# Patient Record
Sex: MALE | Race: WHITE | Employment: OTHER | RURAL
[De-identification: names, ages, dates, MRNs, and addresses within clinical notes are randomized per-mention and may not be internally consistent; named-entity substitution may affect disease eponyms.]

---

## 2017-02-09 ENCOUNTER — OFFICE VISIT (OUTPATIENT)
Dept: FAMILY MEDICINE CLINIC | Age: 82
End: 2017-02-09

## 2017-02-09 VITALS
BODY MASS INDEX: 29.77 KG/M2 | WEIGHT: 201 LBS | DIASTOLIC BLOOD PRESSURE: 70 MMHG | OXYGEN SATURATION: 94 % | HEIGHT: 69 IN | SYSTOLIC BLOOD PRESSURE: 100 MMHG | HEART RATE: 97 BPM | TEMPERATURE: 97.8 F | RESPIRATION RATE: 22 BRPM

## 2017-02-09 DIAGNOSIS — I48.91 ATRIAL FIBRILLATION, UNSPECIFIED TYPE (HCC): ICD-10-CM

## 2017-02-09 DIAGNOSIS — K21.9 GASTROESOPHAGEAL REFLUX DISEASE, ESOPHAGITIS PRESENCE NOT SPECIFIED: ICD-10-CM

## 2017-02-09 DIAGNOSIS — J40 BRONCHITIS: Primary | ICD-10-CM

## 2017-02-09 DIAGNOSIS — K57.30 DIVERTICULOSIS OF LARGE INTESTINE WITHOUT HEMORRHAGE: ICD-10-CM

## 2017-02-09 DIAGNOSIS — E78.00 HYPERCHOLESTEROLEMIA: ICD-10-CM

## 2017-02-09 DIAGNOSIS — R05.9 COUGH: ICD-10-CM

## 2017-02-09 RX ORDER — CODEINE PHOSPHATE AND GUAIFENESIN 10; 100 MG/5ML; MG/5ML
SOLUTION ORAL
Qty: 180 ML | Refills: 0 | Status: SHIPPED | OUTPATIENT
Start: 2017-02-09 | End: 2017-04-26

## 2017-02-09 RX ORDER — DOXYCYCLINE 100 MG/1
100 TABLET ORAL 2 TIMES DAILY
Qty: 20 TAB | Refills: 0 | Status: SHIPPED | OUTPATIENT
Start: 2017-02-09 | End: 2017-02-19

## 2017-02-09 RX ORDER — LEVOFLOXACIN 500 MG/1
500 TABLET, FILM COATED ORAL DAILY
Qty: 10 TAB | Refills: 0 | Status: SHIPPED | OUTPATIENT
Start: 2017-02-09 | End: 2017-02-09 | Stop reason: SINTOL

## 2017-02-09 NOTE — MR AVS SNAPSHOT
Visit Information Date & Time Provider Department Dept. Phone Encounter #  
 2/9/2017  3:00 PM Ancelmo MckeonLeo 72 [phone] [id number] Follow-up Instructions Return if symptoms worsen or fail to improve. Upcoming Health Maintenance Date Due  
 GLAUCOMA SCREENING Q2Y 7/1/1993 Pneumococcal 65+ High/Highest Risk (1 of 2 - PCV13) 7/1/1993 INFLUENZA AGE 9 TO ADULT 8/1/2016 MEDICARE YEARLY EXAM 6/9/2017 DTaP/Tdap/Td series (2 - Td) 12/12/2026 Allergies as of 2/9/2017  Review Complete On: 2/9/2017 By: Ancelmo Mckeon MD  
  
 Severity Noted Reaction Type Reactions Ancef [Cefazolin]  10/28/2013    Unknown (comments) Neosporin [Benzalkonium Chloride]  10/28/2013    Unknown (comments) Polysporin [Bacitracin-polymyxin B]  10/28/2013    Other (comments) WOUNDS DO NOT HEAL Current Immunizations  Never Reviewed Name Date Influenza Vaccine 10/14/2013 Tdap 12/12/2016 Zoster Vaccine, Live 2/23/2016 Not reviewed this visit You Were Diagnosed With   
  
 Codes Comments Bronchitis    -  Primary ICD-10-CM: D03 ICD-9-CM: 493 Atrial fibrillation, unspecified type ([facility]     ICD-10-CM: I48.91 
ICD-9-CM: 427.31 Hypercholesterolemia     ICD-10-CM: E78.00 ICD-9-CM: 272.0 Gastroesophageal reflux disease, esophagitis presence not specified     ICD-10-CM: K21.9 ICD-9-CM: 530.81 Diverticulosis of large intestine without hemorrhage     ICD-10-CM: K57.30 ICD-9-CM: 562.10 Cough     ICD-10-CM: R05 ICD-9-CM: 946. 2 Vitals BP Pulse Temp Resp Height(growth percentile) 100/70 (BP 1 Location: Left arm, BP Patient Position: Sitting) 97 97.8 °F (36.6 °C) (Temporal) 22 5' 9\" (1.753 m) Weight(growth percentile) SpO2 BMI Smoking Status 201 lb (91.2 kg) 94% 29.68 kg/m2 Former Smoker BMI and BSA Data  Body Mass Index Body Surface Area  
 29.68 kg/m 2 2.11 m 2  
  
  
 Preferred Pharmacy Pharmacy Name Phone [facility], [place] [address] [phone] Your Updated Medication List  
  
   
This list is accurate as of: 17  3:38 PM.  Always use your most recent med list.  
  
  
  
  
 amiodarone 200 mg tablet Commonly known as:  CORDARONE Take  by mouth two (2) times a day. carvedilol 3.125 mg tablet Commonly known as:  Vergia Scotty Take  by mouth two (2) times daily (with meals). diphenhydrAMINE 25 mg capsule Commonly known as:  BENADRYL Take 25 mg by mouth nightly as needed for Sleep. Indications: ALLERGIC RHINITIS  
  
 guaiFENesin-codeine 100-10 mg/5 mL solution Commonly known as:  ROBITUSSIN AC  
1-2 tsp po q 4 hours prn cough  
  
 levoFLOXacin 500 mg tablet Commonly known as:  Marci Guilerendira Take 1 Tab by mouth daily for 10 days. lisinopril 5 mg tablet Commonly known as:  Uzma Shames Take  by mouth daily. rivaroxaban 15 mg Tab tablet Commonly known as:  Alayne Viktor Take  by mouth daily. simvastatin 20 mg tablet Commonly known as:  ZOCOR Take  by mouth nightly. Prescriptions Printed Refills  
 guaiFENesin-codeine (ROBITUSSIN AC) 100-10 mg/5 mL solution 0 Si-2 tsp po q 4 hours prn cough Class: Print Prescriptions Sent to Pharmacy Refills  
 levoFLOXacin (LEVAQUIN) 500 mg tablet 0 Sig: Take 1 Tab by mouth daily for 10 days. Class: Normal  
 Pharmacy: [facility], [address] [facility] [address] Ph #: [phone] Route: Oral  
  
Follow-up Instructions Return if symptoms worsen or fail to improve. To-Do List   
 2017 Imaging:  XR CHEST PA LAT Introducing [facility] & HEALTH SERVICES! Kathryn Bee introduces [employer]Cell patient portal. Now you can access parts of your medical record, email your doctor's office, and request medication refills online.    
 
1. In your internet browser, go to [email or web address] 2. Click on the First Time User? Click Here link in the Sign In box. You will see the New Member Sign Up page. 3. Enter your Cor[employer] Lace Access Code exactly as it appears below. You will not need to use this code after youve completed the sign-up process. If you do not sign up before the expiration date, you must request a new code. · Cor[employer] L[employer] Access Code: [email or web address] Expires: 3/12/2017  4:06 PM 
 
4. Enter the last four digits of your Social Security Number (xxxx) and Date of Birth (mm/dd/yyyy) as indicated and click Submit. You will be taken to the next sign-up page. 5. Create a cWyzet ID. This will be your Corrupt Lace login ID and cannot be changed, so think of one that is secure and easy to remember. 6. Create a Corrupt Lace password. You can change your password at any time. 7. Enter your Password Reset Question and Answer. This can be used at a later time if you forget your password. 8. Enter your e-mail address. You will receive e-mail notification when new information is available in [address] 9. Click Sign Up. You can now view and download portions of your medical record. 10. Click the Download Summary menu link to download a portable copy of your medical information. If you have questions, please visit the Frequently Asked Questions section of the Cor[employer] Lace website. Remember, Cor[employer] Lace is NOT to be used for urgent needs. For medical emergencies, dial 911. Now available from your iPhone and Android! Please provide this summary of care documentation to your next provider. Your primary care clinician is listed as Adalberto Gunter. If you have any questions after today's visit, please call [phone].

## 2017-02-09 NOTE — PROGRESS NOTES
Subjective:  Rogelio Shields presents with cough he has had now for about 4 weeks. Slowly progressive productive at times of light colored sputum on one occasion was noted mild blood tinge sputum denies any norma hemoptysis. Low-grade fever not quantitated no chills. Denies any respiratory distress no tobacco use no history of asthma or COPD. Does have paroxysmal coughing episodes disrupt sleep. Some mild shortness of breath with exertion. Decreased appetite taking fluids well not sleeping well feels rundown and tired fatigued      Problem list reviewed as part of this encounter. Past Medical History   Diagnosis Date    Atrial fibrillation (HCC)     Diverticula of colon     GERD (gastroesophageal reflux disease)     Hypercholesterolemia     Malignant neoplasm of prostate ([facility]       Medication list reviewed and updated. Review of Systems -as per the above in previous documentation  Objective:  Visit Vitals    /70 (BP 1 Location: Left arm, BP Patient Position: Sitting)    Pulse 97    Temp 97.8 °F (36.6 °C) (Temporal)    Resp 22    Ht 5' 9\" (1.753 m)    Wt 201 lb (91.2 kg)    SpO2 94%    BMI 29.68 kg/m2     Alert and oriented. No acute distress. HEENT Ears TMs are normal.  Canals are clear. Eyes pupils equal, round, react to light and accommodation. Extraocular movements intact. Nose patent, boggy erythematous. Mouth and throat is clear. Neck supple full range of motion no thyromegaly. No carotid bruits. No significant lymphadenopathy  Lungs clear to auscultation without wheezes, rales, scattered rhonchi bases. No respiratory distress accessory muscle use or tachypnea  Heart  RRR,  S1 & S2 are normal intensity. No murmur; no gallop  Abdomen bowel sounds active. No tenderness, guarding, rebound, masses, organomegaly. Back no CVA tenderness. Extremities without clubbing, cyanosis, or edema  Neuro HMF intact.   Cranial nerves II through XII grossly normal.  Motor is 5 over 5 and symmetrical.  Deep tendon reflexes +2 equal  Results for orders placed or performed in visit on 16   VITAMIN B12 & FOLATE   Result Value Ref Range    Vitamin B12 253 211 - 946 pg/mL    Folate 7.2 >5.7 ng/mL   METABOLIC PANEL, BASIC   Result Value Ref Range    Glucose 138 (H) 65 - 99 mg/dL    BUN 27 8 - 27 mg/dL    Creatinine 1.42 (H) 0.76 - 1.27 mg/dL    GFR est non-AA 44 (L) >59 mL/min/1.73    GFR est AA 51 (L) >59 mL/min/1.73    BUN/Creatinine ratio 19 10 - 22    Sodium 139 134 - 144 mmol/L    Potassium 4.8 3.5 - 5.2 mmol/L    Chloride 100 97 - 108 mmol/L    CO2 25 18 - 29 mmol/L    Calcium 9.0 8.6 - 10.2 mg/dL   CKD REPORT   Result Value Ref Range    Interpretation Note          Assessment:  Encounter Diagnoses   Name Primary?  Bronchitis Yes    Atrial fibrillation, unspecified type ([facility]     Hypercholesterolemia     Gastroesophageal reflux disease, esophagitis presence not specified     Diverticulosis of large intestine without hemorrhage     Cough            Plan:  See orders. Orders Placed This Encounter    XR CHEST PA LAT     Standing Status:   Future     Standing Expiration Date:   3/9/2018     Order Specific Question:   Reason for Exam     Answer:   cough     Order Specific Question:   Is Patient Allergic to Contrast Dye? Answer:   No    levoFLOXacin (LEVAQUIN) 500 mg tablet     Sig: Take 1 Tab by mouth daily for 10 days. Dispense:  10 Tab     Refill:  0    guaiFENesin-codeine (ROBITUSSIN AC) 100-10 mg/5 mL solution     Si-2 tsp po q 4 hours prn cough     Dispense:  180 mL     Refill:  0    fluids rest medications as listed Levaquin was changed to doxycycline given interaction with amiodarone. Follow up if symptoms worsen, change, fail to improve or any new symptoms develop. There are no Patient Instructions on file for this visit.     (Please note that portions of this note were completed with a voice recognition program. Efforts were made to edit the dictations but occasionally words are mis-transcribed.)

## 2017-02-10 DIAGNOSIS — R05.9 COUGH: ICD-10-CM

## 2017-02-10 DIAGNOSIS — J40 BRONCHITIS: ICD-10-CM

## 2017-02-13 NOTE — PROGRESS NOTES
Patient has been advised of chest xray results. Stated he is better had 2 coughing spells where he coughed up mucous that was blood tinged.

## 2017-03-13 ENCOUNTER — TELEPHONE (OUTPATIENT)
Dept: FAMILY MEDICINE CLINIC | Age: 82
End: 2017-03-13

## 2017-03-13 NOTE — TELEPHONE ENCOUNTER
Patient wants to know if he can drink OJ. He says he was told his potassium was elevated and therefore should not drink OJ.

## 2017-03-14 ENCOUNTER — DOCUMENTATION ONLY (OUTPATIENT)
Dept: FAMILY MEDICINE CLINIC | Age: 82
End: 2017-03-14

## 2017-03-14 ENCOUNTER — TELEPHONE (OUTPATIENT)
Dept: FAMILY MEDICINE CLINIC | Age: 82
End: 2017-03-14

## 2017-03-14 ENCOUNTER — OFFICE VISIT (OUTPATIENT)
Dept: FAMILY MEDICINE CLINIC | Age: 82
End: 2017-03-14

## 2017-03-14 NOTE — PROGRESS NOTES
CT of brain without contrast scheduled for 3- 10:00 AM,patient aware,no prep,order faxed to [place] [address] Radiology and [address].  Elmo called no PA  Needed ref # CDR [id number]

## 2017-03-14 NOTE — MR AVS SNAPSHOT
Visit Information Date & Time Provider Department Dept. Phone Encounter #  
 3/14/2017  2:00 PM Davon Jacob NP [address] [phone] [id number] Upcoming Health Maintenance Date Due  
 GLAUCOMA SCREENING Q2Y 7/1/1993 Pneumococcal 65+ High/Highest Risk (1 of 2 - PCV13) 7/1/1993 INFLUENZA AGE 9 TO ADULT 8/1/2016 MEDICARE YEARLY EXAM 6/9/2017 DTaP/Tdap/Td series (2 - Td) 12/12/2026 Allergies as of 3/14/2017  Review Complete On: 2/9/2017 By: Isaac James MD  
  
 Severity Noted Reaction Type Reactions Ancef [Cefazolin]  10/28/2013    Unknown (comments) Neosporin [Benzalkonium Chloride]  10/28/2013    Unknown (comments) Polysporin [Bacitracin-polymyxin B]  10/28/2013    Other (comments) WOUNDS DO NOT HEAL Current Immunizations  Never Reviewed Name Date Influenza Vaccine 10/14/2013 Tdap 12/12/2016 Zoster Vaccine, Live 2/23/2016 Not reviewed this visit You Were Diagnosed With   
  
 Codes Comments Weakness due to cerebrovascular accident (CVA) ([facility]    -  Primary ICD-10-CM: I63.9, R53.1 ICD-9-CM: 434.91, 780.79 Vitals BP Pulse Temp Height(growth percentile) 118/72 (BP 1 Location: Left arm, BP Patient Position: Sitting) 94 97.8 °F (36.6 °C) (Temporal) 5' 9\" (1.753 m) Weight(growth percentile) SpO2 BMI Smoking Status 203 lb (92.1 kg) 96% 29.98 kg/m2 Former Smoker BMI and BSA Data Body Mass Index Body Surface Area  
 29.98 kg/m 2 2.12 m 2 Preferred Pharmacy Pharmacy Name Phone [facility] [address] [phone] Your Updated Medication List  
  
   
This list is accurate as of: 3/14/17 11:59 PM.  Always use your most recent med list.  
  
  
  
  
 amiodarone 200 mg tablet Commonly known as:  CORDARONE Take  by mouth two (2) times a day. carvedilol 3.125 mg tablet Commonly known as:  Cori Mandy  
 Take  by mouth two (2) times daily (with meals). diphenhydrAMINE 25 mg capsule Commonly known as:  BENADRYL Take 25 mg by mouth nightly as needed for Sleep. Indications: ALLERGIC RHINITIS  
  
 guaiFENesin-codeine 100-10 mg/5 mL solution Commonly known as:  ROBITUSSIN AC  
1-2 tsp po q 4 hours prn cough  
  
 lisinopril 5 mg tablet Commonly known as:  Araceli Primmer Take  by mouth daily. rivaroxaban 15 mg Tab tablet Commonly known as:  Ronnie Safe Take  by mouth daily. simvastatin 20 mg tablet Commonly known as:  ZOCOR Take  by mouth nightly. Introducing [facility] & HEALTH SERVICES! Melissa Wyman introduces [employer]box patient portal. Now you can access parts of your medical record, email your doctor's office, and request medication refills online. 1. In your internet browser, go to [email or web address] 2. Click on the First Time User? Click Here link in the Sign In box. You will see the New Member Sign Up page. 3. Enter your Fundbox Access Code exactly as it appears below. You will not need to use this code after youve completed the sign-up process. If you do not sign up before the expiration date, you must request a new code. · Fundbox Access Code: [email or web address] Expires: 6/12/2017  4:32 PM 
 
4. Enter the last four digits of your Social Security Number (xxxx) and Date of Birth (mm/dd/yyyy) as indicated and click Submit. You will be taken to the next sign-up page. 5. Create a Diagnosoftt ID. This will be your Fundbox login ID and cannot be changed, so think of one that is secure and easy to remember. 6. Create a Fundbox password. You can change your password at any time. 7. Enter your Password Reset Question and Answer. This can be used at a later time if you forget your password. 8. Enter your e-mail address. You will receive e-mail notification when new information is available in [address] 9. Click Sign Up. You can now view and download portions of your medical record. 10. Click the Download Summary menu link to download a portable copy of your medical information. If you have questions, please visit the Frequently Asked Questions section of the Wallarm website. Remember, Wallarm is NOT to be used for urgent needs. For medical emergencies, dial 911. Now available from your iPhone and Android! Please provide this summary of care documentation to your next provider. Your primary care clinician is listed as Francisco Javier Garcia. If you have any questions after today's visit, please call [phone].

## 2017-03-15 VITALS
SYSTOLIC BLOOD PRESSURE: 118 MMHG | TEMPERATURE: 97.8 F | WEIGHT: 203 LBS | BODY MASS INDEX: 30.07 KG/M2 | HEIGHT: 69 IN | OXYGEN SATURATION: 96 % | HEART RATE: 94 BPM | DIASTOLIC BLOOD PRESSURE: 72 MMHG

## 2017-03-15 NOTE — PROGRESS NOTES
Subjective:   03/14/17  Edith Kay is a 80 y.o. male who presents today with the following:  Chief Complaint   Patient presents with    Swelling     rt [address] was working in his attic last week when he lost his balance and feel face first into fiberglass insulation. ROS:  Gen: denies fever, chills, fatigue, weight loss, weight gain  HEENT:denies blurry vision, nasal congestion, sore throat  Resp: denies dypsnea, cough, wheezing  CV: denies chest pain radiating to the jaws or arms, palpitations, lower extremity edema  Abd: denies nausea, vomiting, diarrhea, constipation  Neuro: denies numbness/tingling  Endo: denies polyuria, polydipsia, heat/cold intolerance  Heme: no lymphadenopathy    Allergies   Allergen Reactions    Ancef [Cefazolin] Unknown (comments)    Neosporin [Benzalkonium Chloride] Unknown (comments)    Polysporin [Bacitracin-Polymyxin B] Other (comments)     WOUNDS DO NOT HEAL         Current Outpatient Prescriptions:     guaiFENesin-codeine (ROBITUSSIN AC) 100-10 mg/5 mL solution, 1-2 tsp po q 4 hours prn cough, Disp: 180 mL, Rfl: 0    diphenhydrAMINE (BENADRYL) 25 mg capsule, Take 25 mg by mouth nightly as needed for Sleep. Indications: ALLERGIC RHINITIS, Disp: , Rfl:     rivaroxaban (XARELTO) 15 mg tab tablet, Take  by mouth daily. , Disp: , Rfl:     carvedilol (COREG) 3.125 mg tablet, Take  by mouth two (2) times daily (with meals). , Disp: , Rfl:     lisinopril (PRINIVIL, ZESTRIL) 5 mg tablet, Take  by mouth daily. , Disp: , Rfl:     amiodarone (CORDARONE) 200 mg tablet, Take  by mouth two (2) times a day., Disp: , Rfl:     simvastatin (ZOCOR) 20 mg tablet, Take  by mouth nightly., Disp: , Rfl:     Past Medical History:   Diagnosis Date    At[facility] fibrillation ([facility]     Diverticula of colon     GERD (gastroesophageal reflux disease)     Hypercholesterolemia     Malignant neoplasm of prostate ([facility]        Past Surgical History:   Procedure Laterality Date    HX CATARACT REMOVAL      HX HEENT      tonsils/facial surgery/cataract    HX PROSTATECTOMY         History   Smoking Status    Former Smoker   Smokeless Tobacco    Not on file       Social History     Social History    Marital status:      Spouse name: N/A    Number of children: N/A    Years of education: N/A     Social History Main Topics    Smoking status: Former Smoker    Smokeless tobacco: None    Alcohol use 4.2 oz/week     7 Standard drinks or equivalent per week      Comment: SOCIAL    Drug use: None    Sexual activity: Not Asked     Other Topics Concern     Service Yes     [employer]    Blood Transfusions No    Caffeine Concern No     1 Cup Coffe Daily    Occupational Exposure No    Hobby Hazards No    Sleep Concern No    Stress Concern No    Weight Concern No     Heart Health    Special Diet No    Back Care Yes    Exercise Yes     Swimmimg / Walking    Bike Helmet No     Doesn't Own    Seat Belt Yes    Self-Exams Yes     Social History Narrative       Family History   Problem Relation Age of Onset    Cancer Mother      BRAIN TUMOR    Cancer Brother      PROSTATE         Objective:     Visit Vitals    /72 (BP 1 Location: Left arm, BP Patient Position: Sitting)    Pulse 94    Temp 97.8 °F (36.6 °C) (Temporal)    Ht 5' 9\" (1.753 m)    Wt 203 lb (92.1 kg)    SpO2 96%    BMI 29.98 kg/m2     Body mass index is 29.98 kg/(m^2). General: Alert and oriented. No acute distress. Well nourished  HEENT : Facial drooping on the right with loss of nasal labial fold and ptosis of the right eye. Ears:TMs are normal. Canals are clear. Eyes: pupils equal, round, react to light and accommodation. Extra ocular movements intact. Nose: patent. Mouth and throat is clear. Neck:supple full range of motion no thyromegaly. Trachea midline, No carotid bruits. No significant lymphadenopathy  Lungs[de-identified] clear to auscultation without wheezes, rales, or rhonchi.   Heart :RRR, S1 & S2 are normal intensity. No murmur; no gallop  Abdomen: bowel sounds active. No tenderness, guarding, rebound, masses, hepatic or spleen enlargement  Back: no CVA tenderness. Extremities: without clubbing, cyanosis, or edema. Right upper and lower extremity deficits  noted in strength. Pulses: radial and femoral pulses are normal  Neuro: HMF intact. Cranial nerves II through XII grossly normal.  Motor: is 3 over 5 and asymmetrical. Right upper and lower extremities  Deep tendon reflexes: +2 equal        No results found for this visit on 03/14/17. Assessment/ Plan:     Weakness due to CVA    CT of the brain without contrast    Education provided on warning signs of a stroke and stroke prevention. Verbal and written instructions (see AVS) provided.  Patient expresses understanding of diagnosis and treatment plan.     Allison Banks, KIETC

## 2017-03-17 ENCOUNTER — TELEPHONE (OUTPATIENT)
Dept: FAMILY MEDICINE CLINIC | Age: 82
End: 2017-03-17

## 2017-03-28 ENCOUNTER — TELEPHONE (OUTPATIENT)
Dept: FAMILY MEDICINE CLINIC | Age: 82
End: 2017-03-28

## 2017-03-28 NOTE — TELEPHONE ENCOUNTER
The last time Mr Silvia Hernandez was here and saw Adán Seo came in to see him also. He would like to go over his CT result with Dr Janet Seo. Phone number [phone].

## 2017-03-28 NOTE — TELEPHONE ENCOUNTER
Advised patient that Dr Chan Bowie is out office today , but I will send him a message he will be in [facility] office in morning.

## 2017-04-25 ENCOUNTER — TELEPHONE (OUTPATIENT)
Dept: FAMILY MEDICINE CLINIC | Age: 82
End: 2017-04-25

## 2017-04-25 NOTE — TELEPHONE ENCOUNTER
Pt states dr Cinthya Ford gave him a med for cough and wants more , he has cough again made aware needs be seen and pt states would like to be worked in today ?  Please advise

## 2017-04-25 NOTE — TELEPHONE ENCOUNTER
Spoke to pt and he is going to call and get a Z spot Wednesday or Thursday. He has a cough since last weekend. He stated he may try OTC meds until he gets in.

## 2017-04-26 ENCOUNTER — TELEPHONE (OUTPATIENT)
Dept: FAMILY MEDICINE CLINIC | Age: 82
End: 2017-04-26

## 2017-04-26 ENCOUNTER — OFFICE VISIT (OUTPATIENT)
Dept: FAMILY MEDICINE CLINIC | Age: 82
End: 2017-04-26

## 2017-04-26 VITALS
HEART RATE: 90 BPM | OXYGEN SATURATION: 94 % | RESPIRATION RATE: 18 BRPM | WEIGHT: 200 LBS | HEIGHT: 69 IN | BODY MASS INDEX: 29.62 KG/M2 | TEMPERATURE: 97 F | SYSTOLIC BLOOD PRESSURE: 90 MMHG | DIASTOLIC BLOOD PRESSURE: 46 MMHG

## 2017-04-26 DIAGNOSIS — I48.91 ATRIAL FIBRILLATION, UNSPECIFIED TYPE (HCC): ICD-10-CM

## 2017-04-26 DIAGNOSIS — R05.9 COUGH: ICD-10-CM

## 2017-04-26 DIAGNOSIS — R09.89 RESPIRATORY CRACKLES AT RIGHT LUNG BASE: ICD-10-CM

## 2017-04-26 DIAGNOSIS — J18.9 CAP (COMMUNITY ACQUIRED PNEUMONIA): Primary | ICD-10-CM

## 2017-04-26 DIAGNOSIS — M27.8 JAW MASS: ICD-10-CM

## 2017-04-26 RX ORDER — ALBUTEROL SULFATE 90 UG/1
2 AEROSOL, METERED RESPIRATORY (INHALATION)
Qty: 1 INHALER | Refills: 2 | Status: SHIPPED | OUTPATIENT
Start: 2017-04-26

## 2017-04-26 RX ORDER — AMOXICILLIN AND CLAVULANATE POTASSIUM 875; 125 MG/1; MG/1
1 TABLET, FILM COATED ORAL EVERY 12 HOURS
Qty: 20 TAB | Refills: 0 | Status: SHIPPED | OUTPATIENT
Start: 2017-04-26 | End: 2017-05-06

## 2017-04-26 NOTE — PROGRESS NOTES
Taran Kapadia is a 80 y.o. male presenting for/with:    Cough      HPI:  Symptoms include cough and chest congestion x3 days, gradually worsening. Had similar spell about a year ago. Started after cutting the grass in the pollen this year and last year. CXR from 2/2017 reviewed, showed no Chronic lung dz or acute ASDZ. Facial mass  Pt also reports the R cheek is a little bigger lately, now seems to have a definite bulge at the angle of the mandible. Firm, Nttp, doesn't ache. Never smoker. PMH, SH, Medications/Allergies: reviewed, on chart. ROS:  Constitutional: No fever, chills or weight loss  Respiratory: No cough, SOB   CV: No chest pain or Palpitations    Visit Vitals    BP 90/46    Pulse 90    Temp 97 °F (36.1 °C) (Oral)    Resp 18    Ht 5' 9\" (1.753 m)    Wt 200 lb (90.7 kg)    SpO2 94%    BMI 29.53 kg/m2     Wt Readings from Last 3 Encounters:   04/26/17 200 lb (90.7 kg)   03/15/17 203 lb (92.1 kg)   02/09/17 201 lb (91.2 kg)     Physical Examination: General appearance - alert, well appearing, and in no distress  Mental status - alert, oriented to person, place, and time  Eyes - pupils equal and reactive, extraocular eye movements intact  ENT - bilateral external ears and nose normal. Normal lips. OP pink, moist. nttp to the parotid duct or the parotid gland. A 1cm firm mass is palpated along the lateral mandible at the angle  Neck - supple, no significant adenopathy, no thyromegaly or mass  Lymphatics - no palpable lymphadenopathy, no hepatosplenomegaly  Chest - R base with mod crackles and wheezes throughout. Heart - normal rate, regular rhythm, normal S1, S2, no murmurs, rubs, clicks or gallops  Extremities - peripheral pulses normal, no pedal edema, no clubbing or cyanosis    A/P  CAP, atypical  Vs asthma attack.  In older frail pt, without access to POC CBC, will start empiric tx with augmentin (can't use levaquin or zmax 2nd to amiodarone tx, and is allergic to ancef, so augmentin probably best bet, no probls with PCN in past, and new recommendations downplay cross reactivity between CF and PCN meds), albuterol inhaler. Check CBC and CXR at [facility]. Jaw mass  More obvious since I last saw pt with Chiquis Curiel NP. Concerning for neoplasm, but not mentioned at all on CT report from just a few weeks ago. Tried to look at CT imaging, but couldn't make any definitive judgment about jaw mass. Will try to get radiology to overread the CT to see if they see anything on the jaw. DDx includes metatstasis, warthins tumor, primary parotid neoplasm. Doesn't feel like a dental abscess, and isn't ttp. F/U 1 wk.

## 2017-04-26 NOTE — PATIENT INSTRUCTIONS
Pneumonia: Care Instructions  Your Care Instructions    Pneumonia is an infection of the lungs. Most cases are caused by infections from bacteria or viruses. Pneumonia may be mild or very severe. If it is caused by bacteria, you will be treated with antibiotics. It may take a few weeks to a few months to recover fully from pneumonia, depending on how sick you were and whether your overall health is good. Follow-up care is a key part of your treatment and safety. Be sure to make and go to all appointments, and call your doctor if you are having problems. Its also a good idea to know your test results and keep a list of the medicines you take. How can you care for yourself at home? · Take your antibiotics exactly as directed. Do not stop taking the medicine just because you are feeling better. You need to take the full course of antibiotics. · Take your medicines exactly as prescribed. Call your doctor if you think you are having a problem with your medicine. · Get plenty of rest and sleep. You may feel weak and tired for a while, but your energy level will improve with time. · To prevent dehydration, drink plenty of fluids, enough so that your urine is light yellow or clear like water. Choose water and other caffeine-free clear liquids until you feel better. If you have kidney, heart, or liver disease and have to limit fluids, talk with your doctor before you increase the amount of fluids you drink. · Take care of your cough so you can rest. A cough that brings up mucus from your lungs is common with pneumonia. It is one way your body gets rid of the infection. But if coughing keeps you from resting or causes severe fatigue and chest-wall pain, talk to your doctor. He or she may suggest that you take a medicine to reduce the cough. · Use a vaporizer or humidifier to add moisture to your bedroom. Follow the directions for cleaning the machine. · Do not smoke or allow others to smoke around you.  Smoke will make your cough last longer. If you need help quitting, talk to your doctor about stop-smoking programs and medicines. These can increase your chances of quitting for good. · Take an over-the-counter pain medicine, such as acetaminophen (Tylenol), ibuprofen (Advil, Motrin), or naproxen (Aleve). Read and follow all instructions on the label. · Do not take two or more pain medicines at the same time unless the doctor told you to. Many pain medicines have acetaminophen, which is Tylenol. Too much acetaminophen (Tylenol) can be harmful. · If you were given a spirometer to measure how well your lungs are working, use it as instructed. This can help your doctor tell how your recovery is going. · To prevent pneumonia in the future, talk to your doctor about getting a flu vaccine (once a year) and a pneumococcal vaccine (one time only for most people). When should you call for help? Call 911 anytime you think you may need emergency care. For example, call if:  · You have severe trouble breathing. Call your doctor now or seek immediate medical care if:  · You cough up dark brown or bloody mucus (sputum). · You have new or worse trouble breathing. · You are dizzy or lightheaded, or you feel like you may faint. Watch closely for changes in your health, and be sure to contact your doctor if:  · You have a new or higher fever. · You are coughing more deeply or more often. · You are not getting better after 2 days (48 hours). · You do not get better as expected. Where can you learn more? Go to http://ellen-judd.info/. Enter [id number] in the search box to learn more about \"Pneumonia: Care Instructions. \"  Current as of: May 23, 2016  Content Version: 11.2  © 3156-1282 [employer]. Care instructions adapted under license by [employer] (which disclaims liability or warranty for this information).  If you have questions about a medical condition or this instruction, always ask your healthcare professional. [place] [address] any warranty or liability for your use of this information.

## 2017-04-26 NOTE — MR AVS SNAPSHOT
Visit Information Date & Time Provider Department Dept. Phone Encounter #  
 4/26/2017  3:40 PM Sandra Boudreaux MD [address] [place] [address] [id number] Your Appointments 6/29/2017  2:00 PM  
ESTABLISHED PATIENT with Tomas Neumann MD  
[address] [place] ([address]) Appt Note: ov  
 [address] [address]  
[address] [facility] [address] [address] Upcoming Health Maintenance Date Due  
 GLAUCOMA SCREENING Q2Y 7/1/1993 Pneumococcal 65+ High/Highest Risk (1 of 2 - PCV13) 7/1/1993 INFLUENZA AGE 9 TO ADULT 8/1/2016 MEDICARE YEARLY EXAM 6/9/2017 DTaP/Tdap/Td series (2 - Td) 12/12/2026 Allergies as of 4/26/2017  Review Complete On: 4/26/2017 By: Sandra Boudreaux MD  
  
 Severity Noted Reaction Type Reactions Ancef [Cefazolin]  10/28/2013    Unknown (comments) Neosporin [Benzalkonium Chloride]  10/28/2013    Unknown (comments) Polysporin [Bacitracin-polymyxin B]  10/28/2013    Other (comments) WOUNDS DO NOT HEAL Current Immunizations  Never Reviewed Name Date Influenza Vaccine 10/14/2013 Tdap 12/12/2016 Zoster Vaccine, Live 2/23/2016 Not reviewed this visit You Were Diagnosed With   
  
 Codes Comments CAP (community acquired pneumonia)    -  Primary ICD-10-CM: J18.9 ICD-9-CM: 227 Atrial fibrillation, unspecified type ([facility] 75.)     ICD-10-CM: I48.91 
ICD-9-CM: 427.31 Jaw mass     ICD-10-CM: R22.0 ICD-9-CM: 784.2 Cough     ICD-10-CM: R05 ICD-9-CM: 786.2 Respiratory crackles at right lung base     ICD-10-CM: R09.89 ICD-9-CM: 004. 7 Vitals BP Pulse Temp Resp Height(growth percentile) Weight(growth percentile) 90/46 90 97 °F (36.1 °C) (Oral) 18 5' 9\" (1.753 m) 200 lb (90.7 kg) SpO2 BMI Smoking Status 94% 29.53 kg/m2 Former Smoker Vitals History BMI and BSA Data Body Mass Index Body Surface Area  
 29.53 kg/m 2 2.1 m 2 Preferred Pharmacy Pharmacy Name Phone [facility] [place] - [address] [phone] Your Updated Medication List  
  
   
This list is accurate as of: 4/26/17  5:09 PM.  Always use your most recent med list.  
  
  
  
  
 albuterol 90 mcg/actuation inhaler Commonly known as:  PROVENTIL HFA, VENTOLIN HFA, PROAIR HFA Take 2 Puffs by inhalation every four (4) hours as needed for Wheezing. amiodarone 200 mg tablet Commonly known as:  CORDARONE Take  by mouth two (2) times a day. amoxicillin-clavulanate 875-125 mg per tablet Commonly known as:  AUGMENTIN Take 1 Tab by mouth every twelve (12) hours for 10 days. Indications: pneumonia  
  
 carvedilol 3.125 mg tablet Commonly known as:  Ronaldo Flatten Take  by mouth two (2) times daily (with meals). diphenhydrAMINE 25 mg capsule Commonly known as:  BENADRYL Take 25 mg by mouth nightly as needed for Sleep. Indications: ALLERGIC RHINITIS  
  
 lisinopril 5 mg tablet Commonly known as:  Kvng Mash Take  by mouth daily. rivaroxaban 20 mg Tab tablet Commonly known as:  Ronald Middleton Take 1 Tab by mouth daily. Indications: prevent stroke  
  
 simvastatin 20 mg tablet Commonly known as:  ZOCOR Take  by mouth nightly. Prescriptions Printed Refills  
 rivaroxaban (XARELTO) 20 mg tab tablet 11 Sig: Take 1 Tab by mouth daily. Indications: prevent stroke Class: Print Route: Oral  
  
Prescriptions Sent to Pharmacy Refills  
 albuterol (PROVENTIL HFA, VENTOLIN HFA, PROAIR HFA) 90 mcg/actuation inhaler 2 Sig: Take 2 Puffs by inhalation every four (4) hours as needed for Wheezing. Class: Normal  
 Pharmacy: [facility], [address] Ph #: [phone] Route: Inhalation amoxicillin-clavulanate (AUGMENTIN) 875-125 mg per tablet 0 Sig: Take 1 Tab by mouth every twelve (12) hours for 10 days. Indications: pneumonia Class: Normal  
 Pharmacy: [facility] [address] [facility] [address]  #: [phone] Route: Oral  
  
To-Do List   
 04/26/2017 Lab:  CBC WITH AUTOMATED DIFF   
  
 04/26/2017 Imaging:  XR CHEST PA LAT Introducing [facility] & HEALTH SERVICES! Tanikaerendira Conway introduces Phoodeez patient portal. Now you can access parts of your medical record, email your doctor's office, and request medication refills online. 1. In your internet browser, go to [email or web address] 2. Click on the First Time User? Click Here link in the Sign In box. You will see the New Member Sign Up page. 3. Enter your Phoodeez Access Code exactly as it appears below. You will not need to use this code after youve completed the sign-up process. If you do not sign up before the expiration date, you must request a new code. · Phoodeez Access Code: [email or web address] Expires: 6/12/2017  4:32 PM 
 
4. Enter the last four digits of your Social Security Number (xxxx) and Date of Birth (mm/dd/yyyy) as indicated and click Submit. You will be taken to the next sign-up page. 5. Create a Phoodeez ID. This will be your Phoodeez login ID and cannot be changed, so think of one that is secure and easy to remember. 6. Create a Phoodeez password. You can change your password at any time. 7. Enter your Password Reset Question and Answer. This can be used at a later time if you forget your password. 8. Enter your e-mail address. You will receive e-mail notification when new information is available in [address]. 9. Click Sign Up. You can now view and download portions of your medical record. 10. Click the Download Summary menu link to download a portable copy of your medical information. If you have questions, please visit the Frequently Asked Questions section of the [employer] website. Remember, [employer] is NOT to be used for urgent needs. For medical emergencies, dial 911. Now available from your iPhone and Android! Please provide this summary of care documentation to your next provider. Your primary care clinician is listed as Tita Horner. If you have any questions after today's visit, please call [phone].

## 2017-05-02 ENCOUNTER — TELEPHONE (OUTPATIENT)
Dept: FAMILY MEDICINE CLINIC | Age: 82
End: 2017-05-02

## 2017-05-02 ENCOUNTER — DOCUMENTATION ONLY (OUTPATIENT)
Dept: FAMILY MEDICINE CLINIC | Age: 82
End: 2017-05-02

## 2017-05-02 DIAGNOSIS — K11.8 MASS OF PAROTID GLAND: Primary | ICD-10-CM

## 2017-05-02 DIAGNOSIS — K11.8 PAROTID MASS: Primary | ICD-10-CM

## 2017-05-02 NOTE — PROGRESS NOTES
Appointment for CT of the neck with contrast was schedule for May 3,,2017 (Wednesday ) @ 9:00 AM ,arrival time 8:00AM, Lab([facility] ) to be drawn at [facility] prior to CT. Patient informed of appointment ,arrrival time and not to eat or drink after [place], [facility] [address] # [phone] effective from 05/02/2017 to 06/02/2017.

## 2017-05-04 ENCOUNTER — OFFICE VISIT (OUTPATIENT)
Dept: FAMILY MEDICINE CLINIC | Age: 82
End: 2017-05-04

## 2017-05-04 VITALS
BODY MASS INDEX: 29.1 KG/M2 | SYSTOLIC BLOOD PRESSURE: 97 MMHG | RESPIRATION RATE: 16 BRPM | DIASTOLIC BLOOD PRESSURE: 74 MMHG | HEART RATE: 55 BPM | TEMPERATURE: 96.5 F | WEIGHT: 196.5 LBS | HEIGHT: 69 IN | OXYGEN SATURATION: 94 %

## 2017-05-04 DIAGNOSIS — K11.8 PAROTID MASS: Primary | ICD-10-CM

## 2017-05-04 NOTE — PROGRESS NOTES
Ilene Bustillo is a 80 y.o. male presenting for/with:    Jaw Pain      HPI:  F/U cough and chest congestion. Doing a lot better since last week. Facial mass  R cheek con't to have prominent mass. Saw Eye dr Umair Kruger, had eval, also thought to have a parotid mass. 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 No LAD. Never smoker. PMH, SH, Medications/Allergies: reviewed, on chart. ROS:  Constitutional: No fever, chills or weight loss  Respiratory: No cough, SOB   CV: No chest pain or Palpitations    Visit Vitals    BP 97/74    Pulse (!) 55    Temp 96.5 °F (35.8 °C) (Oral)    Resp 16    Ht 5' 9\" (1.753 m)    Wt 196 lb 8 oz (89.1 kg)    SpO2 94%    BMI 29.02 kg/m2     Wt Readings from Last 3 Encounters:   05/04/17 196 lb 8 oz (89.1 kg)   04/26/17 200 lb (90.7 kg)   03/15/17 203 lb (92.1 kg)     Physical Examination: General appearance - alert, well appearing, and in no distress  Mental status - alert, oriented to person, place, and time  Eyes - pupils equal and reactive, extraocular eye movements intact  ENT - bilateral external ears and nose normal. Normal lips. OP pink, moist. nttp to the parotid duct or the parotid gland. A 1cm firm mass is palpated along the lateral mandible at the angle, unchanged. Neck - supple, no significant adenopathy, no thyromegaly or mass  Lymphatics - no palpable lymphadenopathy, no hepatosplenomegaly  Chest - R base with mod crackles and wheezes throughout. Heart - normal rate, regular rhythm, normal S1, S2, no murmurs, rubs, clicks or gallops  Extremities - peripheral pulses normal, no pedal edema, no clubbing or cyanosis    A/P  Jaw mass/Parotid gland mass  Seeing Dr Travon Sykes next week for further eval. Already arranged. CAP, atypical  Back to normal now. Did well with augmentin and albuterol inhaler. Monitor. F/U per ENT.

## 2017-05-04 NOTE — MR AVS SNAPSHOT
Visit Information Date & Time Provider Department Dept. Phone Encounter #  
 5/4/2017 10:50 AM Clara Terry MD [address] [place] [address] [id number] Follow-up Instructions Return if symptoms worsen or fail to improve. Your Appointments 6/29/2017  2:00 PM  
ESTABLISHED PATIENT with Tanna Boswell MD  
[address] [place] ([facility]) Appt Note: ov  
 [address] [address]  
[address] [facility] [address] [address] Upcoming Health Maintenance Date Due  
 GLAUCOMA SCREENING Q2Y 7/1/1993 Pneumococcal 65+ High/Highest Risk (1 of 2 - PCV13) 7/1/1993 MEDICARE YEARLY EXAM 6/9/2017 INFLUENZA AGE 9 TO ADULT 8/1/2017 DTaP/Tdap/Td series (2 - Td) 12/12/2026 Allergies as of 5/4/2017  Review Complete On: 5/4/2017 By: Alberto Stout LPN Severity Noted Reaction Type Reactions Ancef [Cefazolin]  10/28/2013    Unknown (comments) Neosporin [Benzalkonium Chloride]  10/28/2013    Unknown (comments) Polysporin [Bacitracin-polymyxin B]  10/28/2013    Other (comments) WOUNDS DO NOT HEAL Current Immunizations  Never Reviewed Name Date Influenza Vaccine 10/14/2013 Tdap 12/12/2016 Zoster Vaccine, Live 2/23/2016 Not reviewed this visit Vitals BP Pulse Temp Resp Height(growth percentile) Weight(growth percentile) 97/74 (!) 55 96.5 °F (35.8 °C) (Oral) 16 5' 9\" (1.753 m) 196 lb 8 oz (89.1 kg) SpO2 BMI Smoking Status 94% 29.02 kg/m2 Former Smoker BMI and BSA Data Body Mass Index Body Surface Area  
 29.02 kg/m 2 2.08 m 2 Preferred Pharmacy Pharmacy Name Phone [facility] [place]  [address] [phone] Your Updated Medication List  
  
   
This list is accurate as of: 5/4/17 12:02 PM.  Always use your most recent med list.  
  
  
  
 albuterol 90 mcg/actuation inhaler Commonly known as:  PROVENTIL HFA, VENTOLIN HFA, PROAIR HFA Take 2 Puffs by inhalation every four (4) hours as needed for Wheezing. amiodarone 200 mg tablet Commonly known as:  CORDARONE Take  by mouth two (2) times a day. amoxicillin-clavulanate 875-125 mg per tablet Commonly known as:  AUGMENTIN Take 1 Tab by mouth every twelve (12) hours for 10 days. Indications: pneumonia  
  
 carvedilol 3.125 mg tablet Commonly known as:  Cleda Dima Take  by mouth two (2) times daily (with meals). diphenhydrAMINE 25 mg capsule Commonly known as:  BENADRYL Take 25 mg by mouth nightly as needed for Sleep. Indications: ALLERGIC RHINITIS  
  
 lisinopril 5 mg tablet Commonly known as:  Jeff Leaver Take  by mouth daily. rivaroxaban 20 mg Tab tablet Commonly known as:  Zelda Juniper Take 1 Tab by mouth daily. Indications: prevent stroke  
  
 simvastatin 20 mg tablet Commonly known as:  ZOCOR Take  by mouth nightly. Follow-up Instructions Return if symptoms worsen or fail to improve. Introducing [facility] & HEALTH SERVICES! Arnulfo Goldsmith introduces [email or web address] patient portal. Now you can access parts of your medical record, email your doctor's office, and request medication refills online. 1. In your internet browser, go to [email or web address] 2. Click on the First Time User? Click Here link in the Sign In box. You will see the New Member Sign Up page. 3. Enter your [email or web address] Access Code exactly as it appears below. You will not need to use this code after youve completed the sign-up process. If you do not sign up before the expiration date, you must request a new code. · [email or web address] Access Code: [email or web address] Expires: 6/12/2017  4:32 PM 
 
4. Enter the last four digits of your Social Security Number (xxxx) and Date of Birth (mm/dd/yyyy) as indicated and click Submit.  You will be taken to the next sign-up page. 5. Create a Curb Call ID. This will be your Curb Call login ID and cannot be changed, so think of one that is secure and easy to remember. 6. Create a Curb Call password. You can change your password at any time. 7. Enter your Password Reset Question and Answer. This can be used at a later time if you forget your password. 8. Enter your e-mail address. You will receive e-mail notification when new information is available in [address] 9. Click Sign Up. You can now view and download portions of your medical record. 10. Click the Download Summary menu link to download a portable copy of your medical information. If you have questions, please visit the Frequently Asked Questions section of the Curb Call website. Remember, [employer] is NOT to be used for urgent needs. For medical emergencies, dial 911. Now available from your iPhone and Android! Please provide this summary of care documentation to your next provider. Your primary care clinician is listed as Theo Jones. If you have any questions after today's visit, please call [phone].

## 2017-05-30 ENCOUNTER — HOSPITAL ENCOUNTER (OUTPATIENT)
Dept: ULTRASOUND IMAGING | Age: 82
Discharge: HOME | End: 2017-05-30
Attending: OTOLARYNGOLOGY
Payer: MEDICARE

## 2017-05-30 DIAGNOSIS — K11.8 PAROTID MASS: ICD-10-CM

## 2017-05-30 PROCEDURE — 88172 CYTP DX EVAL FNA 1ST EA SITE: CPT | Performed by: OTOLARYNGOLOGY

## 2017-05-30 PROCEDURE — 88305 TISSUE EXAM BY PATHOLOGIST: CPT | Performed by: OTOLARYNGOLOGY

## 2017-05-30 PROCEDURE — 88173 CYTOPATH EVAL FNA REPORT: CPT | Performed by: OTOLARYNGOLOGY

## 2017-05-30 PROCEDURE — 88342 IMHCHEM/IMCYTCHM 1ST ANTB: CPT | Performed by: OTOLARYNGOLOGY

## 2017-05-30 PROCEDURE — 42400 BIOPSY OF SALIVARY GLAND: CPT

## 2017-06-07 NOTE — PERIOP NOTES
Spoke with nurse in Dr. Rich Ocampo office; they do PAT per anesthesia guidelines. I asked if they want T & S & they said no.

## 2017-06-08 ENCOUNTER — HOSPITAL ENCOUNTER (OUTPATIENT)
Dept: PREADMISSION TESTING | Age: 82
Discharge: HOME | End: 2017-06-08
Attending: OTOLARYNGOLOGY
Payer: MEDICARE

## 2017-06-08 VITALS
DIASTOLIC BLOOD PRESSURE: 72 MMHG | OXYGEN SATURATION: 95 % | HEART RATE: 73 BPM | HEIGHT: 69 IN | TEMPERATURE: 98 F | WEIGHT: 198.85 LBS | SYSTOLIC BLOOD PRESSURE: 132 MMHG | BODY MASS INDEX: 29.45 KG/M2 | RESPIRATION RATE: 18 BRPM

## 2017-06-08 LAB
ANION GAP BLD CALC-SCNC: 2 MMOL/L (ref 5–15)
BUN SERPL-MCNC: 26 MG/DL (ref 6–20)
BUN/CREAT SERPL: 16 (ref 12–20)
CALCIUM SERPL-MCNC: 9.1 MG/DL (ref 8.5–10.1)
CHLORIDE SERPL-SCNC: 105 MMOL/L (ref 97–108)
CO2 SERPL-SCNC: 30 MMOL/L (ref 21–32)
CREAT SERPL-MCNC: 1.65 MG/DL (ref 0.7–1.3)
ERYTHROCYTE [DISTWIDTH] IN BLOOD BY AUTOMATED COUNT: 14.8 % (ref 11.5–14.5)
GLUCOSE SERPL-MCNC: 102 MG/DL (ref 65–100)
HCT VFR BLD AUTO: 37.4 % (ref 36.6–50.3)
HGB BLD-MCNC: 12.3 G/DL (ref 12.1–17)
MCH RBC QN AUTO: 34.7 PG (ref 26–34)
MCHC RBC AUTO-ENTMCNC: 32.9 G/DL (ref 30–36.5)
MCV RBC AUTO: 105.6 FL (ref 80–99)
PLATELET # BLD AUTO: 176 K/UL (ref 150–400)
POTASSIUM SERPL-SCNC: 4.8 MMOL/L (ref 3.5–5.1)
RBC # BLD AUTO: 3.54 M/UL (ref 4.1–5.7)
SODIUM SERPL-SCNC: 137 MMOL/L (ref 136–145)
WBC # BLD AUTO: 5.9 K/UL (ref 4.1–11.1)

## 2017-06-08 PROCEDURE — 36415 COLL VENOUS BLD VENIPUNCTURE: CPT | Performed by: OTOLARYNGOLOGY

## 2017-06-08 PROCEDURE — 85027 COMPLETE CBC AUTOMATED: CPT | Performed by: OTOLARYNGOLOGY

## 2017-06-08 PROCEDURE — 80048 BASIC METABOLIC PNL TOTAL CA: CPT | Performed by: OTOLARYNGOLOGY

## 2017-06-08 NOTE — PERIOP NOTES
[facility]  Preoperative Instructions        Surgery Date 6/13/2017          Time of Arrival 5:45 a.m.    1. On the day of your surgery, please report to the Surgical Services Registration Desk and sign in at your designated time. The Surgery Center is located to the right of the Emergency Room. 2. You must have someone with you to drive you home. You should not drive a car for 24 hours following surgery. Please make arrangements for a friend or family member to stay with you for the first 24 hours after your surgery. 3. Do not have anything to eat or drink (including water, gum, mints, coffee, juice) after midnight 6/12/2017. This may not apply to medications prescribed by your physician. Please note special instructions, if applicable. If you are currently taking Plavix, Coumadin, or other blood-thinning agents, contact your surgeon for instructions. 4. We recommend you do not drink any alcoholic beverages for 24 hours before and after your surgery. 5. Have a list of all current medications, including vitamins, herbal supplements and any other over the counter medications. Stop all Aspirin and non-steroidal anti-inflammatory drugs (I.e. Advil, Aleve), as directed by your surgeon's office. Stop all vitamins and herbal supplements seven days prior to your surgery. 6. Wear comfortable clothes. Wear glasses instead of contacts. Do not bring any money or jewelry. Please bring picture ID, insurance card, and any prearranged co-payment or hospital payment. Do not wear make-up, particularly mascara the morning of your surgery. Do not wear nail polish, particularly if you are having foot /hand surgery. Wear your hair loose or down, no ponytails, buns, anali pins or clips. All body piercings must be removed.   Please shower with antibacterial soap for three consecutive days before and on the morning of surgery, but do not apply any lotions, powders or deodorants after the shower on the day of surgery. Please use a fresh towels after each shower. Please sleep in clean clothes and change bed linens the night before surgery. Please do not shave for 48 hours prior to surgery. Shaving of the face is acceptable. 7. You should understand that if you do not follow these instructions your surgery may be cancelled. If your physical condition changes (I.e. fever, cold or flu) please contact your surgeon as soon as possible. 8. It is important that you be on time. If a situation occurs where you may be late, please call [phone] (OR Holding Area). 9. If you have any questions and or problems, please call [phone] (Pre-admission Testing). 10. Your surgery time may be subject to change. You will receive a phone call the evening prior if your time changes. 11.  If having outpatient surgery, you must have someone to drive you here, stay with you during the duration of your stay, and to drive you home at time of discharge. Special Instructions: Follow Xarelto instructions given to you by Dr. Martha Murdock. MEDICATIONS TO TAKE THE MORNING OF SURGERY WITH A SIP OF WATER: inhaler, amiodarone, carvedilol      I understand a pre-operative phone call will be made to verify my surgery time. In the event that I am not available, I give permission for a message to be left on my answering service and/or with another person?   yes    Contact # [phone]         ___________________      __________   _________    (Signature of Patient)             (Witness)                (Date and Time)

## 2017-06-12 ENCOUNTER — ANESTHESIA EVENT (OUTPATIENT)
Dept: SURGERY | Age: 82
End: 2017-06-12
Payer: MEDICARE

## 2017-06-13 ENCOUNTER — ANESTHESIA (OUTPATIENT)
Dept: SURGERY | Age: 82
End: 2017-06-13
Payer: MEDICARE

## 2017-06-13 ENCOUNTER — HOSPITAL ENCOUNTER (OUTPATIENT)
Age: 82
Discharge: HOME | End: 2017-06-15
Attending: OTOLARYNGOLOGY | Admitting: OTOLARYNGOLOGY
Payer: MEDICARE

## 2017-06-13 LAB
ABO + RH BLD: NORMAL
BLOOD GROUP ANTIBODIES SERPL: NORMAL
SPECIMEN EXP DATE BLD: NORMAL

## 2017-06-13 PROCEDURE — 77030018836 HC SOL IRR NACL ICUM -A: Performed by: OTOLARYNGOLOGY

## 2017-06-13 PROCEDURE — 77030021678 HC GLIDESCP STAT DISP VERT -B: Performed by: NURSE ANESTHETIST, CERTIFIED REGISTERED

## 2017-06-13 PROCEDURE — 88342 IMHCHEM/IMCYTCHM 1ST ANTB: CPT | Performed by: OTOLARYNGOLOGY

## 2017-06-13 PROCEDURE — 74011250636 HC RX REV CODE- 250/636: Performed by: OTOLARYNGOLOGY

## 2017-06-13 PROCEDURE — 77030013079 HC BLNKT BAIR HGGR 3M -A: Performed by: NURSE ANESTHETIST, CERTIFIED REGISTERED

## 2017-06-13 PROCEDURE — 88307 TISSUE EXAM BY PATHOLOGIST: CPT | Performed by: OTOLARYNGOLOGY

## 2017-06-13 PROCEDURE — 77030005401 HC CATH RAD ARRO -A: Performed by: NURSE ANESTHETIST, CERTIFIED REGISTERED

## 2017-06-13 PROCEDURE — 74011250636 HC RX REV CODE- 250/636: Performed by: ANESTHESIOLOGY

## 2017-06-13 PROCEDURE — 74011000250 HC RX REV CODE- 250

## 2017-06-13 PROCEDURE — 77030014366 HC DRN WND BNTM -A: Performed by: OTOLARYNGOLOGY

## 2017-06-13 PROCEDURE — 77030008698 HC TU ET REINF MEDT -D: Performed by: NURSE ANESTHETIST, CERTIFIED REGISTERED

## 2017-06-13 PROCEDURE — 77030012406 HC DRN WND PENRS BARD -A: Performed by: OTOLARYNGOLOGY

## 2017-06-13 PROCEDURE — 74011250636 HC RX REV CODE- 250/636

## 2017-06-13 PROCEDURE — 77030032490 HC SLV COMPR SCD KNE COVD -B: Performed by: OTOLARYNGOLOGY

## 2017-06-13 PROCEDURE — 74011000250 HC RX REV CODE- 250: Performed by: OTOLARYNGOLOGY

## 2017-06-13 PROCEDURE — 77030011640 HC PAD GRND REM COVD -A: Performed by: OTOLARYNGOLOGY

## 2017-06-13 PROCEDURE — 77030013218 HC SUPP FACE BSNM -B: Performed by: OTOLARYNGOLOGY

## 2017-06-13 PROCEDURE — 77030013079 HC BLNKT BAIR HGGR 3M -A: Performed by: OTOLARYNGOLOGY

## 2017-06-13 PROCEDURE — 77030019615 HC ELCTRD EMG NDL MEDT -B: Performed by: OTOLARYNGOLOGY

## 2017-06-13 PROCEDURE — 88313 SPECIAL STAINS GROUP 2: CPT | Performed by: OTOLARYNGOLOGY

## 2017-06-13 PROCEDURE — 76010000175 HC OR TIME 4 TO 4.5 HR INTENSV-TIER 1: Performed by: OTOLARYNGOLOGY

## 2017-06-13 PROCEDURE — 77030034850: Performed by: OTOLARYNGOLOGY

## 2017-06-13 PROCEDURE — 77030019655 HC PRB STIM CRAN MEDT -B: Performed by: OTOLARYNGOLOGY

## 2017-06-13 PROCEDURE — 76060000039 HC ANESTHESIA 4 TO 4.5 HR: Performed by: OTOLARYNGOLOGY

## 2017-06-13 PROCEDURE — 36415 COLL VENOUS BLD VENIPUNCTURE: CPT | Performed by: OTOLARYNGOLOGY

## 2017-06-13 PROCEDURE — 77030002888 HC SUT CHRMC J&J -A: Performed by: OTOLARYNGOLOGY

## 2017-06-13 PROCEDURE — 77030008467 HC STPLR SKN COVD -B: Performed by: OTOLARYNGOLOGY

## 2017-06-13 PROCEDURE — 77030020061 HC IV BLD WRMR ADMIN SET 3M -B: Performed by: NURSE ANESTHETIST, CERTIFIED REGISTERED

## 2017-06-13 PROCEDURE — 74011250637 HC RX REV CODE- 250/637: Performed by: OTOLARYNGOLOGY

## 2017-06-13 PROCEDURE — 77030002996 HC SUT SLK J&J -A: Performed by: OTOLARYNGOLOGY

## 2017-06-13 PROCEDURE — 77030013797 HC KT TRNSDUC PRSSR EDWD -A: Performed by: NURSE ANESTHETIST, CERTIFIED REGISTERED

## 2017-06-13 PROCEDURE — 77030002916 HC SUT ETHLN J&J -A: Performed by: OTOLARYNGOLOGY

## 2017-06-13 PROCEDURE — 86900 BLOOD TYPING SEROLOGIC ABO: CPT | Performed by: OTOLARYNGOLOGY

## 2017-06-13 PROCEDURE — 76210000016 HC OR PH I REC 1 TO 1.5 HR: Performed by: OTOLARYNGOLOGY

## 2017-06-13 RX ORDER — SODIUM CHLORIDE 9 MG/ML
25 INJECTION, SOLUTION INTRAVENOUS CONTINUOUS
Status: DISCONTINUED | OUTPATIENT
Start: 2017-06-13 | End: 2017-06-13 | Stop reason: HOSPADM

## 2017-06-13 RX ORDER — HYDROCODONE BITARTRATE AND ACETAMINOPHEN 10; 325 MG/1; MG/1
1-2 TABLET ORAL
Status: DISCONTINUED | OUTPATIENT
Start: 2017-06-13 | End: 2017-06-15 | Stop reason: HOSPADM

## 2017-06-13 RX ORDER — DOXYCYCLINE HYCLATE 100 MG
100 TABLET ORAL EVERY 12 HOURS
Status: DISCONTINUED | OUTPATIENT
Start: 2017-06-13 | End: 2017-06-15 | Stop reason: HOSPADM

## 2017-06-13 RX ORDER — DEXTROSE, SODIUM CHLORIDE, AND POTASSIUM CHLORIDE 5; .2; .15 G/100ML; G/100ML; G/100ML
75 INJECTION INTRAVENOUS CONTINUOUS
Status: DISCONTINUED | OUTPATIENT
Start: 2017-06-13 | End: 2017-06-15 | Stop reason: HOSPADM

## 2017-06-13 RX ORDER — FENTANYL CITRATE 50 UG/ML
50 INJECTION, SOLUTION INTRAMUSCULAR; INTRAVENOUS AS NEEDED
Status: DISCONTINUED | OUTPATIENT
Start: 2017-06-13 | End: 2017-06-13 | Stop reason: HOSPADM

## 2017-06-13 RX ORDER — CLINDAMYCIN PHOSPHATE 900 MG/50ML
900 INJECTION INTRAVENOUS ONCE
Status: COMPLETED | OUTPATIENT
Start: 2017-06-13 | End: 2017-06-13

## 2017-06-13 RX ORDER — ALBUTEROL SULFATE 90 UG/1
2 AEROSOL, METERED RESPIRATORY (INHALATION)
Status: DISCONTINUED | OUTPATIENT
Start: 2017-06-13 | End: 2017-06-15 | Stop reason: HOSPADM

## 2017-06-13 RX ORDER — AMIODARONE HYDROCHLORIDE 200 MG/1
200 TABLET ORAL DAILY
Status: DISCONTINUED | OUTPATIENT
Start: 2017-06-14 | End: 2017-06-15 | Stop reason: HOSPADM

## 2017-06-13 RX ORDER — MIDAZOLAM HYDROCHLORIDE 1 MG/ML
0.5 INJECTION, SOLUTION INTRAMUSCULAR; INTRAVENOUS
Status: DISCONTINUED | OUTPATIENT
Start: 2017-06-13 | End: 2017-06-13 | Stop reason: HOSPADM

## 2017-06-13 RX ORDER — LIDOCAINE HYDROCHLORIDE AND EPINEPHRINE 10; 10 MG/ML; UG/ML
INJECTION, SOLUTION INFILTRATION; PERINEURAL AS NEEDED
Status: DISCONTINUED | OUTPATIENT
Start: 2017-06-13 | End: 2017-06-13 | Stop reason: HOSPADM

## 2017-06-13 RX ORDER — MIDAZOLAM HYDROCHLORIDE 1 MG/ML
1 INJECTION, SOLUTION INTRAMUSCULAR; INTRAVENOUS AS NEEDED
Status: DISCONTINUED | OUTPATIENT
Start: 2017-06-13 | End: 2017-06-13 | Stop reason: HOSPADM

## 2017-06-13 RX ORDER — SODIUM CHLORIDE 0.9 % (FLUSH) 0.9 %
5-10 SYRINGE (ML) INJECTION AS NEEDED
Status: DISCONTINUED | OUTPATIENT
Start: 2017-06-13 | End: 2017-06-13 | Stop reason: HOSPADM

## 2017-06-13 RX ORDER — LIDOCAINE HYDROCHLORIDE 20 MG/ML
INJECTION, SOLUTION EPIDURAL; INFILTRATION; INTRACAUDAL; PERINEURAL AS NEEDED
Status: DISCONTINUED | OUTPATIENT
Start: 2017-06-13 | End: 2017-06-13 | Stop reason: HOSPADM

## 2017-06-13 RX ORDER — SODIUM CHLORIDE 0.9 % (FLUSH) 0.9 %
5-10 SYRINGE (ML) INJECTION EVERY 8 HOURS
Status: DISCONTINUED | OUTPATIENT
Start: 2017-06-13 | End: 2017-06-15 | Stop reason: HOSPADM

## 2017-06-13 RX ORDER — SODIUM CHLORIDE 0.9 % (FLUSH) 0.9 %
5-10 SYRINGE (ML) INJECTION AS NEEDED
Status: DISCONTINUED | OUTPATIENT
Start: 2017-06-13 | End: 2017-06-15 | Stop reason: HOSPADM

## 2017-06-13 RX ORDER — SUCCINYLCHOLINE CHLORIDE 20 MG/ML
INJECTION INTRAMUSCULAR; INTRAVENOUS AS NEEDED
Status: DISCONTINUED | OUTPATIENT
Start: 2017-06-13 | End: 2017-06-13 | Stop reason: HOSPADM

## 2017-06-13 RX ORDER — ROCURONIUM BROMIDE 10 MG/ML
INJECTION, SOLUTION INTRAVENOUS AS NEEDED
Status: DISCONTINUED | OUTPATIENT
Start: 2017-06-13 | End: 2017-06-13 | Stop reason: HOSPADM

## 2017-06-13 RX ORDER — HYDROMORPHONE HYDROCHLORIDE 1 MG/ML
0.5 INJECTION, SOLUTION INTRAMUSCULAR; INTRAVENOUS; SUBCUTANEOUS
Status: DISCONTINUED | OUTPATIENT
Start: 2017-06-13 | End: 2017-06-13 | Stop reason: HOSPADM

## 2017-06-13 RX ORDER — DEXAMETHASONE SODIUM PHOSPHATE 4 MG/ML
10 INJECTION, SOLUTION INTRA-ARTICULAR; INTRALESIONAL; INTRAMUSCULAR; INTRAVENOUS; SOFT TISSUE ONCE
Status: COMPLETED | OUTPATIENT
Start: 2017-06-13 | End: 2017-06-13

## 2017-06-13 RX ORDER — CARVEDILOL 3.12 MG/1
3.12 TABLET ORAL 2 TIMES DAILY WITH MEALS
Status: DISCONTINUED | OUTPATIENT
Start: 2017-06-13 | End: 2017-06-15 | Stop reason: HOSPADM

## 2017-06-13 RX ORDER — ONDANSETRON 2 MG/ML
INJECTION INTRAMUSCULAR; INTRAVENOUS AS NEEDED
Status: DISCONTINUED | OUTPATIENT
Start: 2017-06-13 | End: 2017-06-13 | Stop reason: HOSPADM

## 2017-06-13 RX ORDER — MORPHINE SULFATE 10 MG/ML
2 INJECTION, SOLUTION INTRAMUSCULAR; INTRAVENOUS
Status: DISCONTINUED | OUTPATIENT
Start: 2017-06-13 | End: 2017-06-13 | Stop reason: HOSPADM

## 2017-06-13 RX ORDER — ONDANSETRON 2 MG/ML
4 INJECTION INTRAMUSCULAR; INTRAVENOUS AS NEEDED
Status: DISCONTINUED | OUTPATIENT
Start: 2017-06-13 | End: 2017-06-13 | Stop reason: HOSPADM

## 2017-06-13 RX ORDER — SODIUM CHLORIDE, SODIUM LACTATE, POTASSIUM CHLORIDE, CALCIUM CHLORIDE 600; 310; 30; 20 MG/100ML; MG/100ML; MG/100ML; MG/100ML
100 INJECTION, SOLUTION INTRAVENOUS CONTINUOUS
Status: DISCONTINUED | OUTPATIENT
Start: 2017-06-13 | End: 2017-06-13 | Stop reason: HOSPADM

## 2017-06-13 RX ORDER — ACETAMINOPHEN 10 MG/ML
INJECTION, SOLUTION INTRAVENOUS AS NEEDED
Status: DISCONTINUED | OUTPATIENT
Start: 2017-06-13 | End: 2017-06-13 | Stop reason: HOSPADM

## 2017-06-13 RX ORDER — LIDOCAINE HYDROCHLORIDE 10 MG/ML
0.1 INJECTION, SOLUTION EPIDURAL; INFILTRATION; INTRACAUDAL; PERINEURAL AS NEEDED
Status: DISCONTINUED | OUTPATIENT
Start: 2017-06-13 | End: 2017-06-13 | Stop reason: HOSPADM

## 2017-06-13 RX ORDER — SODIUM CHLORIDE 0.9 % (FLUSH) 0.9 %
5-10 SYRINGE (ML) INJECTION EVERY 8 HOURS
Status: DISCONTINUED | OUTPATIENT
Start: 2017-06-13 | End: 2017-06-13 | Stop reason: HOSPADM

## 2017-06-13 RX ORDER — LISINOPRIL 5 MG/1
5 TABLET ORAL EVERY EVENING
Status: DISCONTINUED | OUTPATIENT
Start: 2017-06-13 | End: 2017-06-15 | Stop reason: HOSPADM

## 2017-06-13 RX ORDER — FENTANYL CITRATE 50 UG/ML
INJECTION, SOLUTION INTRAMUSCULAR; INTRAVENOUS AS NEEDED
Status: DISCONTINUED | OUTPATIENT
Start: 2017-06-13 | End: 2017-06-13 | Stop reason: HOSPADM

## 2017-06-13 RX ORDER — ROPIVACAINE HYDROCHLORIDE 5 MG/ML
30 INJECTION, SOLUTION EPIDURAL; INFILTRATION; PERINEURAL AS NEEDED
Status: DISCONTINUED | OUTPATIENT
Start: 2017-06-13 | End: 2017-06-13 | Stop reason: HOSPADM

## 2017-06-13 RX ORDER — SODIUM CHLORIDE 9 MG/ML
INJECTION, SOLUTION INTRAVENOUS
Status: DISCONTINUED | OUTPATIENT
Start: 2017-06-13 | End: 2017-06-13 | Stop reason: HOSPADM

## 2017-06-13 RX ORDER — SODIUM CHLORIDE, SODIUM LACTATE, POTASSIUM CHLORIDE, CALCIUM CHLORIDE 600; 310; 30; 20 MG/100ML; MG/100ML; MG/100ML; MG/100ML
25 INJECTION, SOLUTION INTRAVENOUS CONTINUOUS
Status: DISCONTINUED | OUTPATIENT
Start: 2017-06-13 | End: 2017-06-13 | Stop reason: HOSPADM

## 2017-06-13 RX ORDER — DIPHENHYDRAMINE HYDROCHLORIDE 50 MG/ML
12.5 INJECTION, SOLUTION INTRAMUSCULAR; INTRAVENOUS AS NEEDED
Status: DISCONTINUED | OUTPATIENT
Start: 2017-06-13 | End: 2017-06-13 | Stop reason: HOSPADM

## 2017-06-13 RX ORDER — PROPOFOL 10 MG/ML
INJECTION, EMULSION INTRAVENOUS AS NEEDED
Status: DISCONTINUED | OUTPATIENT
Start: 2017-06-13 | End: 2017-06-13 | Stop reason: HOSPADM

## 2017-06-13 RX ORDER — AZITHROMYCIN 250 MG/1
250 TABLET, FILM COATED ORAL DAILY
Status: DISCONTINUED | OUTPATIENT
Start: 2017-06-14 | End: 2017-06-13 | Stop reason: SINTOL

## 2017-06-13 RX ORDER — FENTANYL CITRATE 50 UG/ML
25 INJECTION, SOLUTION INTRAMUSCULAR; INTRAVENOUS
Status: DISCONTINUED | OUTPATIENT
Start: 2017-06-13 | End: 2017-06-13 | Stop reason: HOSPADM

## 2017-06-13 RX ADMIN — CARVEDILOL 3.12 MG: 3.12 TABLET, FILM COATED ORAL at 17:10

## 2017-06-13 RX ADMIN — FENTANYL CITRATE 25 MCG: 50 INJECTION, SOLUTION INTRAMUSCULAR; INTRAVENOUS at 08:56

## 2017-06-13 RX ADMIN — PROPOFOL 150 MG: 10 INJECTION, EMULSION INTRAVENOUS at 07:39

## 2017-06-13 RX ADMIN — LISINOPRIL 5 MG: 5 TABLET ORAL at 17:10

## 2017-06-13 RX ADMIN — ONDANSETRON 4 MG: 2 INJECTION INTRAMUSCULAR; INTRAVENOUS at 11:12

## 2017-06-13 RX ADMIN — DOXYCYCLINE HYCLATE 100 MG: 100 TABLET, COATED ORAL at 17:10

## 2017-06-13 RX ADMIN — LIDOCAINE HYDROCHLORIDE 80 MG: 20 INJECTION, SOLUTION EPIDURAL; INFILTRATION; INTRACAUDAL; PERINEURAL at 07:39

## 2017-06-13 RX ADMIN — FENTANYL CITRATE 50 MCG: 50 INJECTION, SOLUTION INTRAMUSCULAR; INTRAVENOUS at 07:39

## 2017-06-13 RX ADMIN — SODIUM CHLORIDE: 9 INJECTION, SOLUTION INTRAVENOUS at 08:15

## 2017-06-13 RX ADMIN — FENTANYL CITRATE 25 MCG: 50 INJECTION, SOLUTION INTRAMUSCULAR; INTRAVENOUS at 09:41

## 2017-06-13 RX ADMIN — FENTANYL CITRATE 50 MCG: 50 INJECTION, SOLUTION INTRAMUSCULAR; INTRAVENOUS at 08:07

## 2017-06-13 RX ADMIN — SUCCINYLCHOLINE CHLORIDE 160 MG: 20 INJECTION INTRAMUSCULAR; INTRAVENOUS at 07:39

## 2017-06-13 RX ADMIN — SODIUM CHLORIDE, SODIUM LACTATE, POTASSIUM CHLORIDE, AND CALCIUM CHLORIDE 25 ML/HR: 600; 310; 30; 20 INJECTION, SOLUTION INTRAVENOUS at 07:19

## 2017-06-13 RX ADMIN — FENTANYL CITRATE 25 MCG: 50 INJECTION, SOLUTION INTRAMUSCULAR; INTRAVENOUS at 10:31

## 2017-06-13 RX ADMIN — Medication 10 ML: at 15:24

## 2017-06-13 RX ADMIN — SODIUM CHLORIDE, SODIUM LACTATE, POTASSIUM CHLORIDE, AND CALCIUM CHLORIDE: 600; 310; 30; 20 INJECTION, SOLUTION INTRAVENOUS at 10:39

## 2017-06-13 RX ADMIN — ACETAMINOPHEN 1000 MG: 10 INJECTION, SOLUTION INTRAVENOUS at 09:44

## 2017-06-13 RX ADMIN — Medication 10 ML: at 21:55

## 2017-06-13 RX ADMIN — DEXAMETHASONE SODIUM PHOSPHATE 10 MG: 4 INJECTION, SOLUTION INTRA-ARTICULAR; INTRALESIONAL; INTRAMUSCULAR; INTRAVENOUS; SOFT TISSUE at 07:20

## 2017-06-13 RX ADMIN — LIDOCAINE HYDROCHLORIDE 100 MG: 20 INJECTION, SOLUTION EPIDURAL; INFILTRATION; INTRACAUDAL; PERINEURAL at 11:31

## 2017-06-13 RX ADMIN — DEXTROSE MONOHYDRATE, SODIUM CHLORIDE, AND POTASSIUM CHLORIDE 75 ML/HR: 50; 2.25; 1.49 INJECTION, SOLUTION INTRAVENOUS at 15:24

## 2017-06-13 RX ADMIN — DOXYCYCLINE HYCLATE 100 MG: 100 TABLET, COATED ORAL at 21:55

## 2017-06-13 RX ADMIN — ROCURONIUM BROMIDE 5 MG: 10 INJECTION, SOLUTION INTRAVENOUS at 07:39

## 2017-06-13 RX ADMIN — CLINDAMYCIN PHOSPHATE 900 MG: 18 INJECTION, SOLUTION INTRAVENOUS at 07:41

## 2017-06-13 NOTE — PERIOP NOTES
Handoff Report from Operating Room to PACU    Report received from JANINE Kuhn RN and Monica Palomino CRNA regarding Berenice Salgado.      Surgeon(s):  Varsha Tovar MD  And Procedure(s) (LRB):  RIGHT PAROTIDECTOMY, MODIFIED RADICAL NECK DISSECTION (Right)  confirmed   with allergies, drains, dressings and local anesthetic discussed. Anesthesia type, drugs, patient history, complications, estimated blood loss, vital signs, intake and output, and last pain medication, lines and temperature were reviewed.

## 2017-06-13 NOTE — IP AVS SNAPSHOT
Current Discharge Medication List  
  
CONTINUE these medications which have NOT CHANGED Dose & Instructions Dispensing Information Comments Morning Noon Evening Bedtime  
 albuterol 90 mcg/actuation inhaler Commonly known as:  PROVENTIL HFA, VENTOLIN HFA, PROAIR HFA Your last dose was: Your next dose is:    
   
   
 Dose:  2 Puff Take 2 Puffs by inhalation every four (4) hours as needed for Wheezing. Quantity:  1 Inhaler Refills:  2  
     
   
   
   
  
 amiodarone 200 mg tablet Commonly known as:  CORDARONE Your last dose was: Your next dose is:    
   
   
 Dose:  200 mg Take 200 mg by mouth daily. Refills:  0  
     
   
   
   
  
 carvedilol 3.125 mg tablet Commonly known as:  Persaud Brod Your last dose was: Your next dose is: Take  by mouth two (2) times daily (with meals). Refills:  0  
     
   
   
   
  
 diphenhydrAMINE 25 mg capsule Commonly known as:  BENADRYL Your last dose was: Your next dose is:    
   
   
 Dose:  25 mg Take 25 mg by mouth nightly as needed for Sleep. Indications: ALLERGIC RHINITIS Refills:  0  
     
   
   
   
  
 lisinopril 5 mg tablet Commonly known as:  Sydna Lies Your last dose was: Your next dose is:    
   
   
 Dose:  5 mg Take 5 mg by mouth every evening. Refills:  0  
     
   
   
   
  
 simvastatin 20 mg tablet Commonly known as:  ZOCOR Your last dose was: Your next dose is: Take  by mouth nightly. Refills:  0  
     
   
   
   
  
 triamcinolone acetonide 0.1 % ointment Commonly known as:  KENALOG Your last dose was: Your next dose is:    
   
   
 Apply  to affected area two (2) times a day. use thin layer   Indications: psoariasis Refills:  0 ASK your doctor about these medications Dose & Instructions Dispensing Information Comments Morning Noon Evening Bedtime  
 rivaroxaban 20 mg Tab tablet Commonly known as:  Kathy Driver Your last dose was: Your next dose is:    
   
   
 Dose:  20 mg Take 1 Tab by mouth daily. Indications: prevent stroke Quantity:  30 Tab Refills:  11

## 2017-06-13 NOTE — ANESTHESIA PREPROCEDURE EVALUATION
Anesthetic History   No history of anesthetic complications            Review of Systems / Medical History  Patient summary reviewed, nursing notes reviewed and pertinent labs reviewed    Pulmonary  Within defined limits                 Neuro/Psych   Within defined limits           Cardiovascular          CHF  Dysrhythmias : atrial fibrillation  CAD    Exercise tolerance: >4 METS     GI/Hepatic/Renal     GERD           Endo/Other        Cancer     Other Findings            Physical Exam    Airway  Mallampati: II  TM Distance: 4 - 6 cm  Neck ROM: normal range of motion   Mouth opening: Normal     Cardiovascular  Regular rate and rhythm,  S1 and S2 normal,  no murmur, click, rub, or gallop             Dental  No notable dental hx       Pulmonary  Breath sounds clear to auscultation               Abdominal  GI exam deferred       Other Findings            Anesthetic Plan    ASA: 3  Anesthesia type: general          Induction: Intravenous  Anesthetic plan and risks discussed with: Patient      Glidescope/asleep fob

## 2017-06-13 NOTE — IP AVS SNAPSHOT
[address] [facility] 
[phone] Patient: Dioni Griffin 
MRN: [id number] TSW:[id number] You are allergic to the following Allergen Reactions Ancef (Cefazolin) Unknown (comments) \"drops my blood pressure\" Neosporin (Benzalkonium Chloride) Unknown (comments) Polysporin (Bacitracin-Polymyxin B) Other (comments) \"WOUNDS DO NOT HEAL\" Recent Documentation Height Weight BMI Smoking Status 1.753 m 88.1 kg 28.68 kg/m2 Former Smoker Emergency Contacts Name Discharge Info Relation Home Work Mobile oMntse Worthy DISCHARGE CAREGIVER [3] Spouse [3]   [phone] About your hospitalization You were admitted on:  June 13, 2017 You last received care in the:  [facility] GENERAL SURGERY You were discharged on:  Caro 15, 2017 Unit phone number:  [phone] Why you were hospitalized Your primary diagnosis was:  Not on File Providers Seen During Your Hospitalizations Provider Role Specialty Primary office phone Shereen Ray MD Attending Provider Otolaryngology [phone] Your Primary Care Physician (PCP) Primary Care Physician Office Phone Office Fax Rosangela Duran [phone] Follow-up Information Follow up With Details Comments Contact Info Virginie Blum MD On 6/29/2017 2:00 pm  [address] [facility],[address] [place] [address] [phone] Brock Felix, NP   [address] [facility] [address] [employer] [address] 
[phone] Shereen Ray MD Go on 6/22/2017 at 3 PM  [address] [facility] 
[phone] Your Appointments Thursday June 29, 2017  2:00 PM EDT  
ESTABLISHED PATIENT with Virginie Blum MD  
[address] [place] [address] ([facility]) [address]  
[phone] Current Discharge Medication List  
  
CONTINUE these medications which have NOT CHANGED Dose & Instructions Dispensing Information Comments Morning Noon Evening Bedtime  
 albuterol 90 mcg/actuation inhaler Commonly known as:  PROVENTIL HFA, VENTOLIN HFA, PROAIR HFA Your last dose was: Your next dose is:    
   
   
 Dose:  2 Puff Take 2 Puffs by inhalation every four (4) hours as needed for Wheezing. Quantity:  1 Inhaler Refills:  2  
     
   
   
   
  
 amiodarone 200 mg tablet Commonly known as:  CORDARONE Your last dose was: Your next dose is:    
   
   
 Dose:  200 mg Take 200 mg by mouth daily. Refills:  0  
     
   
   
   
  
 carvedilol 3.125 mg tablet Commonly known as:  Geannie Clos Your last dose was: Your next dose is: Take  by mouth two (2) times daily (with meals). Refills:  0  
     
   
   
   
  
 diphenhydrAMINE 25 mg capsule Commonly known as:  BENADRYL Your last dose was: Your next dose is:    
   
   
 Dose:  25 mg Take 25 mg by mouth nightly as needed for Sleep. Indications: ALLERGIC RHINITIS Refills:  0  
     
   
   
   
  
 lisinopril 5 mg tablet Commonly known as:  Marcia Shawl Your last dose was: Your next dose is:    
   
   
 Dose:  5 mg Take 5 mg by mouth every evening. Refills:  0  
     
   
   
   
  
 simvastatin 20 mg tablet Commonly known as:  ZOCOR Your last dose was: Your next dose is: Take  by mouth nightly. Refills:  0  
     
   
   
   
  
 triamcinolone acetonide 0.1 % ointment Commonly known as:  KENALOG Your last dose was: Your next dose is:    
   
   
 Apply  to affected area two (2) times a day. use thin layer   Indications: psoariasis Refills:  0 ASK your doctor about these medications Dose & Instructions Dispensing Information Comments Morning Noon Evening Bedtime  
 rivaroxaban 20 mg Tab tablet Commonly known as:  Alpeshcarla Pool Your last dose was: Your next dose is:    
   
   
 Dose:  20 mg Take 1 Tab by mouth daily. Indications: prevent stroke Quantity:  30 Tab Refills:  11 Discharge Instructions [facility] M.D., Ph.D., F.A.C.S. Otolaryngology 95  [facility] [address] [place], [address] [facility]  
[phone] Salivary Gland Surgery Discharge Instructions: 
Maintain facelift dressing for two more days, may remove on Saturday, 6/17/2017. Change right lower neck gauze dressing 2-3 times/day until no longer needed. Clean any crusts around drain site with peroxide and q-tips. Replace dressing at drain site as needed. Prescriptions for Norco and Doxycycline have been provided to patient. Take antibiotic as prescribed. Take pain medication as needed. Resume Xarelto on 6/20/2017. Call Dr. Vince Roca' office for any questions at [phone]. Discharge Orders None Introducing [facility]! [facility] introduces [employer] patient portal. Now you can access parts of your medical record, email your doctor's office, and request medication refills online. 1. In your internet browser, go to [email or web address] 2. Click on the First Time User? Click Here link in the Sign In box. You will see the New Member Sign Up page. 3. Enter your [employer] Access Code exactly as it appears below. You will not need to use this code after youve completed the sign-up process. If you do not sign up before the expiration date, you must request a new code. · [employer] Access Code: [email or web address] Expires: 9/11/2017  6:16 AM 
 
4. Enter the last four digits of your Social Security Number (xxxx) and Date of Birth (mm/dd/yyyy) as indicated and click Submit.  You will be taken to the next sign-up page. 5. Create a [employer] ID. This will be your [employer] login ID and cannot be changed, so think of one that is secure and easy to remember. 6. Create a [employer] password. You can change your password at any time. 7. Enter your Password Reset Question and Answer. This can be used at a later time if you forget your password. 8. Enter your e-mail address. You will receive e-mail notification when new information is available in [address] 9. Click Sign Up. You can now view and download portions of your medical record. 10. Click the Download Summary menu link to download a portable copy of your medical information. If you have questions, please visit the Frequently Asked Questions section of the [employer] website. Remember, [employer] is NOT to be used for urgent needs. For medical emergencies, dial 911. Now available from your iPhone and Android! General Information Please provide this summary of care documentation to your next provider. Patient Signature:  ____________________________________________________________ Date:  ____________________________________________________________  
  
Gabriella Campo Provider Signature:  ____________________________________________________________ Date:  ____________________________________________________________

## 2017-06-13 NOTE — PROGRESS NOTES
Primary Nurse Martin Langston and Otto Watson RN performed a dual skin assessment on this patient Impairment noted. Patient has areas of psoriasis over entire body.    Luis score is 19

## 2017-06-13 NOTE — PERIOP NOTES
TRANSFER - OUT REPORT:    Verbal report given to Marcelo Cerda RN(name) on Mirreyes  being transferred to [facility](unit) for routine post - op       Report consisted of patients Situation, Background, Assessment and   Recommendations(SBAR). Information from the following report(s) SBAR, OR Summary, Intake/Output and MAR was reviewed with the receiving nurse. Opportunity for questions and clarification was provided. Patient transported with:   O2 @ 2 liters  Tech     Family in [address] notified of transfer by volunteer.

## 2017-06-13 NOTE — DISCHARGE INSTRUCTIONS
[facility], PRISCA Modi. Avel Eid M.D., Ph.D., F.A.C.S. [address] [facility], [address] [facility]   [phone]       Salivary Gland Surgery Discharge Instructions:  Maintain facelift dressing for two more days, may remove on Saturday, 6/17/2017. Change right lower neck gauze dressing 2-3 times/day until no longer needed. Clean any crusts around drain site with peroxide and q-tips. Replace dressing at drain site as needed. Prescriptions for Norco and Doxycycline have been provided to patient. Take antibiotic as prescribed. Take pain medication as needed. Resume Xarelto on 6/20/2017. Call Dr. Avel Eid' office for any questions at [phone].

## 2017-06-13 NOTE — PROGRESS NOTES
End of Shift Nursing Note    Bedside shift change report given to Rey Archuleta RN  (oncoming nurse) by Andrew Baez RN (offgoing nurse). Report included the following information SBAR, Kardex, Intake/Output, MAR and Recent Results. Zone Phone:   [phone]    Significant changes during shift:       Non-emergent issues for physician to address:        Number times ambulated in hallway past shift: 0      Number of times OOB to chair past shift: 0    POD #:      Vital Signs:    Temp: 97.4 °F (36.3 °C)     Pulse (Heart Rate): 67     BP: 105/67     Resp Rate: 18     O2 Sat (%): 99 %    Lines & Drains:     Urinary Catheter? No   Placement Date:    Medical Necessity:   Central Line? No   Placement Date:    Medical Necessity:   PICC Line? No   Placement Date:    Medical Necessity:     NG tube [] in [] removed [] not applicable   Drains [] in [] removed [] not applicable     Skin Integrity:      Wounds: yes   Dressings Present: yes    Wound Concerns: no      GI:    Current diet:  DIET REGULAR    Nausea: NO  Vomiting: NO  Bowel Sounds: YES  Flatus: YES  Last Bowel Movement: yesterday   Appearance:     Respiratory:  Supplemental O2: No      Device:    via  Liters/min     Incentive Spirometer: YES  Volume:   Coughing and Deep Breathing: YES  Oral Care: YES  Understanding (patient/family education): YES   Getting out of bed: YES  Head of bed elevation: YES    Patient Safety:    Falls Score: 3  Mobility Score: 1  Bed Alarm On? No  Sitter? No      Opportunity for questions and clarification was given to oncoming nurse. Patient bed is in upright position, side rails are up x 3, door & observation blinds open as needed, call bell within reach and patient not in distress.     Wilberto Grimaldo

## 2017-06-13 NOTE — ANESTHESIA POSTPROCEDURE EVALUATION
Post-Anesthesia Evaluation and Assessment    Patient: Nanda Finney MRN: [id number]  SSN: [id number]    YOB: 1928  Age: 80 y.o. Sex: male       Cardiovascular Function/Vital Signs  Visit Vitals    BP (P) 107/63 (BP 1 Location: Left arm, BP Patient Position: At rest;Head of bed elevated (Comment degrees))    Pulse 66    Temp 36.4 °C (97.5 °F)    Resp 12    Ht 5' 9\" (1.753 m)    Wt 88.1 kg (194 lb 3.6 oz)    SpO2 98%    BMI 28.68 kg/m2       Patient is status post general anesthesia for Procedure(s):  RIGHT PAROTIDECTOMY, MODIFIED RADICAL NECK DISSECTION. Nausea/Vomiting: None    Postoperative hydration reviewed and adequate. Pain:  Pain Scale 1: (P) Numeric (0 - 10) (06/13/17 1300)  Pain Intensity 1: (P) 0 (06/13/17 1300)   Managed    Neurological Status:   Neuro (WDL): Within Defined Limits (06/13/17 0730)  Neuro  Neurologic State: Alert (06/13/17 1760)  Orientation Level: Oriented X4 (06/13/17 1403)  Cognition: Appropriate decision making; Appropriate safety awareness; Follows commands (06/13/17 0736)  Speech: Clear (06/13/17 0657)  Assessment L Pupil: Deferred (06/13/17 0657)  Assessment R Pupil: Deferred (06/13/17 0657)  LUE Motor Response: Purposeful;Spontaneous  (06/13/17 0657)  LLE Motor Response: Spontaneous ; Purposeful (06/13/17 0657)  RUE Motor Response: Purposeful;Spontaneous  (06/13/17 0657)  RLE Motor Response: Spontaneous ; Purposeful (06/13/17 0657)   At baseline    Mental Status and Level of Consciousness: Arousable    Pulmonary Status:   O2 Device: (P) Nasal cannula (06/13/17 1300)   Adequate oxygenation and airway patent    Complications related to anesthesia: None    Post-anesthesia assessment completed.  No concerns    Signed By: Sandra Coombs MD     June 13, 2017

## 2017-06-13 NOTE — OP NOTES
[address], [facility]   OP NOTE       Name:  Chavo Estrada   MR#:  [id number]   :  1928   Account #:  [de-identified]    Surgery Date:  2017   Date of Adm:  2017       PREOPERATIVE DIAGNOSIS: Squamous cell carcinoma, right   parotid. POSTOPERATIVE DIAGNOSIS: Squamous cell carcinoma, right   parotid. PROCEDURES PERFORMED: Right total parotidectomy with sacrifice   of facial nerve, right modified radical neck dissection. INDICATIONS: The patient is an 80-year-old male with a several   month history of right preauricular swelling. A recent fine-needle   aspiration confirmed squamous cell carcinoma. A recent CT scan of   the neck showed a mass occupying the right parotid gland. Cervical   lymphadenopathy was not described on the patient's CT findings. The   patient's preoperative examination included right facial nerve paralysis,   further evidence of a malignant process involving the right parotid   gland. Preoperatively, the alternatives, potential benefits, and possible   risks of the procedure were explained to the patient and his family, who   understood and requested to proceed. The eventual need for   postoperative radiation therapy and likely tarsorrhaphy intervention   were explained to the patient preoperatively. ESTIMATED BLOOD LOSS: 110 mL. COMPLICATIONS: None apparent. SPECIMENS REMOVED: Right parotid, facial nerve, supraomohyoid   neck dissection. ANESTHESIA: General endotracheal tube anesthesia. DESCRIPTION OF PROCEDURE: The patient was brought to the   operating room, placed supine on the operating table, and following the   smooth induction of general endotracheal tube anesthesia, placement   of a Ivy catheter, and application of an arterial blood pressure   monitor to the right forearm, the table was turned 90 degrees and the   patient was positioned for operation.  The right neck was cleansed with   topical alcohol solution. Xylocaine 1% with 1:100,000 epinephrine was   injected along the site of standard parotidectomy incision with an   inferior anterior extension to allow for cervical lymphadenectomy. A   routine Betadine prep and drape was carried out. The facial nerve   integrity monitor was applied and tested and found to function   marginally at the periorbital branch superior to the orbit and the   marginal branch inferiorly. A 15 blade was used to crosshatch the previously demarcated incision. A 15 blade was used to initiate a standard parotidectomy incision with   inferior extension or lymphadenectomy. A meticulous elevation of the   parotidectomy flap off of the superficial fascia overlying the parotid was   carried out from posterior to anterior. The inferior skin flap was   elevated off of the sternocleidomastoid muscle until adequate anterior   exposure could be obtained. A pretragal dissection was carried out from lateral to medial. Significant   difficulty was encountered medially due to the size of the tumor and   compression of the space preventing easy dissection. For this reason,   attention was turned superiorly and inferiorly to allow elevation of the   soft tissues away from the underlying structures consisting of the   mandible superiorly. The inferior neck dissection was carried out with   elevation of the fibrofatty tissue and lymph nodes occupying the   supraomohyoid region of the superior neck. The specimen was   allowed to remain in contiguity with the parotidectomy specimen   throughout the procedure. As inferior and superior elevation of the soft   tissues and lymphatics proceeded, the deep portion of the parotid   fascia could be identified and appeared to be intact. This was used as   a deep margin plane where this was possible. Where the deep parotid   fascia appeared to involve the masseter muscle, the masseter was   sacrificed.  The Harmonic scissor was used to facilitate dissection. The   retrofacial artery and vein was ligated inferiorly. The artery was   controlled with a stick tie consisting of a silk suture. Once superior and   inferior mobility was obtained, the medial dissection proceeded until   the facial nerve could be identified in its typical exit location from the   temporal bone. The main trunk of the nerve was then observed to   enter the tumor and was circumferentially involved by tumor. The nerve   could not be  from the bulky tumor tissue. At this point, it was   elected to sacrifice the facial nerve in order to preserve oncologic   margins. The facial nerve was sectioned and with meticulous tedious   medial dissection and further mobilization superiorly and inferiorly, the   parotid mass which occupied the inferior and middle portions of the   parotid gland was elevated off of the mandible. The masseter was of   necessity sacrificed posteriorly. The anterior masseter portion could be   preserved. Once the entire parotid containing the tumor was elevated   out of the neck along with the lymphatic contents of the superior neck,   the wound was meticulously inspected for hemostasis. With multiple   Valsalva maneuvers, satisfactory hemostasis could be confidently   confirmed. The wound was copiously irrigated with sterile saline and   closed in layers over a 1/2-inch nonlatex Penrose drain. Interrupted 3-0   chromic was used for the deep layers and a running 4-0 subcuticular   chromic was used to approximate the skin edges. The wound was   drained inferiorly with the 1/2-inch Penrose which was secured with   nylon sutures. A sterile dressing consisting of Spandage and 4 x 4's   and inferiorly and 4 x 4's and a facelift dressing superiorly. A large   Jobst facelift dressing was found appropriate for the patient's size and   was applied without difficulty.  The leads of the nerve integrity monitor   were removed as the patient was aroused from general anesthesia. The patient was subsequently extubated in the operating room, and   transferred to the recovery area in satisfactory condition. The Ivy   catheter was likewise removed at the conclusion of the patient's   procedure.         Gordo Wooten MD      [address] [place] [address] / Tanis Epley   D:  06/13/2017   12:54   T:  06/13/2017   13:52   Job #:  [id number]

## 2017-06-13 NOTE — BRIEF OP NOTE
BRIEF OPERATIVE NOTE    Date of Procedure: 6/13/2017   Preoperative Diagnosis: PAROTID MASS  Postoperative Diagnosis: PAROTID MASS    Procedure(s):  RIGHT PAROTIDECTOMY, MODIFIED RADICAL NECK DISSECTION  Surgeon(s) and Role:     * Theodora Smart MD - Primary         Assistant Staff:       Surgical Staff:  Circ-1: Helena Foote RN  Scrub Tech-1: Zach Jones  Scrub RN-1: Dewayne Conway RN  Surg Asst-1: Moises Chang  Float Staff: Ulises Jasso RN  Event Time In   Incision Start 2252   Incision Close      Anesthesia: General   Estimated Blood Loss:120 ml  Specimens:   ID Type Source Tests Collected by Time Destination   1 : RIGHT TOTAL CAROTIDECTOMY WITH FACIAL NERVE, STITCH SUPERIOR, RIGHT SUPRAOMOHYOID NECK DISSECTION Preservative Neck  Theodora Smart MD 6/13/2017 1039 Pathology      Findings: Dense Involvement of the right facial nerve by tumor mass. Unable to separate without sacrifice of oncologic margins.    Complications: none apparent  Implants: * No implants in log *

## 2017-06-14 LAB
APPEARANCE UR: CLEAR
BACTERIA URNS QL MICRO: NEGATIVE /HPF
BILIRUB UR QL: NEGATIVE
COLOR UR: ABNORMAL
EPITH CASTS URNS QL MICRO: ABNORMAL /LPF
GLUCOSE UR STRIP.AUTO-MCNC: NEGATIVE MG/DL
HGB UR QL STRIP: ABNORMAL
HYALINE CASTS URNS QL MICRO: ABNORMAL /LPF (ref 0–5)
KETONES UR QL STRIP.AUTO: NEGATIVE MG/DL
LEUKOCYTE ESTERASE UR QL STRIP.AUTO: NEGATIVE
NITRITE UR QL STRIP.AUTO: NEGATIVE
PH UR STRIP: 5.5 [PH] (ref 5–8)
PROT UR STRIP-MCNC: NEGATIVE MG/DL
RBC #/AREA URNS HPF: ABNORMAL /HPF (ref 0–5)
SP GR UR REFRACTOMETRY: 1.01 (ref 1–1.03)
UA: UC IF INDICATED,UAUC: ABNORMAL
UROBILINOGEN UR QL STRIP.AUTO: 0.2 EU/DL (ref 0.2–1)
WBC URNS QL MICRO: ABNORMAL /HPF (ref 0–4)

## 2017-06-14 PROCEDURE — 81001 URINALYSIS AUTO W/SCOPE: CPT | Performed by: OTOLARYNGOLOGY

## 2017-06-14 PROCEDURE — 74011250637 HC RX REV CODE- 250/637: Performed by: OTOLARYNGOLOGY

## 2017-06-14 PROCEDURE — 77010033678 HC OXYGEN DAILY

## 2017-06-14 RX ADMIN — Medication 10 ML: at 14:30

## 2017-06-14 RX ADMIN — LISINOPRIL 5 MG: 5 TABLET ORAL at 17:32

## 2017-06-14 RX ADMIN — AMIODARONE HYDROCHLORIDE 200 MG: 200 TABLET ORAL at 08:58

## 2017-06-14 RX ADMIN — DOXYCYCLINE HYCLATE 100 MG: 100 TABLET, COATED ORAL at 20:34

## 2017-06-14 RX ADMIN — Medication 10 ML: at 22:10

## 2017-06-14 RX ADMIN — DOXYCYCLINE HYCLATE 100 MG: 100 TABLET, COATED ORAL at 08:58

## 2017-06-14 RX ADMIN — CARVEDILOL 3.12 MG: 3.12 TABLET, FILM COATED ORAL at 08:58

## 2017-06-14 NOTE — PROGRESS NOTES
End of Shift Nursing Note    Bedside shift change report given to [address] [place] [address],[address] (oncoming nurse) by Bernardo Schwartz  (offgoing nurse). Report included the following information SBAR, Kardex and MAR. Zone Phone:       Significant changes during shift:    No pain   Non-emergent issues for physician to address:   none     Number times ambulated in hallway past shift: 1      Number of times OOB to chair past shift: 1      POD #:      Vital Signs:    Temp: 98 °F (36.7 °C)     Pulse (Heart Rate): 71     BP: 106/70     Resp Rate: 18     O2 Sat (%): 97 %    Lines & Drains:     Urinary Catheter? No   Placement Date:    Medical Necessity:   Central Line? No   Placement Date:    Medical Necessity:   PICC Line? No   Placement Date:    Medical Necessity:     NG tube [] in [] removed [x] not applicable   Drains [x] in [] removed [] not applicable     Skin Integrity:      Wounds: yes   Dressings Present: yes    Wound Concerns: no      GI:    Current diet:  DIET REGULAR    Nausea: NO  Vomiting: NO  Bowel Sounds: YES  Flatus: YES  Last Bowel Movement:    Appearance:     Respiratory:  Supplemental O2: No      Device:    via  Liters/min     Incentive Spirometer: YES  Volume:   Coughing and Deep Breathing: YES  Oral Care: YES  Understanding (patient/family education): YES   Getting out of bed: YES  Head of bed elevation: YES    Patient Safety:    Falls Score: 2  Mobility Score: 2  Bed Alarm On? No  Sitter? No      Opportunity for questions and clarification was given to oncoming nurse. Patient bed is in low position, side rails are up x 2, door & observation blinds open as needed, call bell within reach and patient not in distress.     Jennifer Squires RN

## 2017-06-14 NOTE — PROGRESS NOTES
Pt is an 79 y/o  male admitted for parotid mass. Pt lives with spouse in a one story home with four steps to the entrance of the residence. Pt is independent with ADL's to include driving. [facility], [facility]F or DME providers. Pt prefers to use [facility] in [place], [place]. Pt will be transported at discharge by spouse via private vehicle. CM met with pt and spouse to complete initial assessment. Pt is alert and oriented to person, place,time and situation. CM will continue to follow pt to assist with discharge plans as needed. Care Management Interventions  PCP Verified by CM: Yes (John Israel )  Mode of Transport at Discharge:  Other (see comment) (private vehicle )  Transition of Care Consult (CM Consult): Discharge Planning (CM to assist as needed )  Discharge Durable Medical Equipment: No  Health Maintenance Reviewed: Yes  Physical Therapy Consult: No  Occupational Therapy Consult: No  Speech Therapy Consult: No  Current Support Network: Lives with Spouse  Confirm Follow Up Transport: Self  Plan discussed with Pt/Family/Caregiver: Yes  Discharge Location  Discharge Placement: Home    MONTEZ Mo, [address] [place] [address]   [phone]

## 2017-06-14 NOTE — PROGRESS NOTES
Interdisciplinary Rounds were completed on this patient. Rounds included nursing, clinical care leader, pharmacy, and case management. Patient was doing well without problems, helped up to chair to eat breakfast (minimal assist). Patient had the following concerns: when to resume xarelto (pharmacy to follow up with MD). Goals for the day will include: mobilize and possible discharge later today or tomorrow.

## 2017-06-14 NOTE — PROGRESS NOTES
2003: Assisted pt up to BR. Pt was able to void but complained about a \"warm,burning feeling\" when voiding. Nurse assessed urine and found pt to have what appear to be blood in pt urine. Calling on-call dr vega for Dr Constantine Thao ([phone]). 2006: Spoke to on call service and they stated Dr Damián Aguirre is on call at [phone], calling now    2011: Spoke to Dr Damián Aguirre, he is aware of pt bloody urine and \"burning\", Order for UA at this time and continue to monitor.

## 2017-06-15 VITALS
RESPIRATION RATE: 16 BRPM | HEART RATE: 75 BPM | TEMPERATURE: 98.1 F | BODY MASS INDEX: 28.77 KG/M2 | HEIGHT: 69 IN | DIASTOLIC BLOOD PRESSURE: 84 MMHG | SYSTOLIC BLOOD PRESSURE: 111 MMHG | WEIGHT: 194.22 LBS | OXYGEN SATURATION: 90 %

## 2017-06-15 PROCEDURE — 74011250637 HC RX REV CODE- 250/637: Performed by: OTOLARYNGOLOGY

## 2017-06-15 RX ADMIN — Medication 10 ML: at 06:51

## 2017-06-15 RX ADMIN — CARVEDILOL 3.12 MG: 3.12 TABLET, FILM COATED ORAL at 08:42

## 2017-06-15 RX ADMIN — AMIODARONE HYDROCHLORIDE 200 MG: 200 TABLET ORAL at 08:42

## 2017-06-15 RX ADMIN — DOXYCYCLINE HYCLATE 100 MG: 100 TABLET, COATED ORAL at 08:42

## 2017-06-15 NOTE — PROGRESS NOTES
Dr. Avani Bains here,changed patient's dressing and removed drain and sutures. Dressing per R neck CDI.

## 2017-06-15 NOTE — PROGRESS NOTES
End of Shift Nursing Note    Bedside shift change report given to Heidi RN (oncoming nurse) by Justice Merchant RN (offgoing nurse). Report included the following information SBAR. Zone Phone:       Significant changes during shift:    none   Non-emergent issues for physician to address:   none     Number times ambulated in hallway past shift: 0      Number of times OOB to chair past shift:     POD #: 1     Vital Signs:    Temp: 98.2 °F (36.8 °C)     Pulse (Heart Rate): 73     BP: 113/65     Resp Rate: 16     O2 Sat (%): 93 %    Lines & Drains:     Urinary Catheter? No       Skin Integrity:      Wounds: yes   Dressings Present: yes    Wound Concerns: No ,with some drainage per R neck     GI:    Current diet:  DIET REGULAR    Nausea: NO  Vomiting: NO  Bowel Sounds: NO  Flatus: YES  Last Bowel Movement: 6/12/2017   Appearance: black brown    Respiratory:  Supplemental O2: No        Incentive Spirometer: YES  Volume:   Coughing and Deep Breathing: YES  Oral Care: YES  Understanding (patient/family education): YES   Getting out of bed: YES  Head of bed elevation: YES    Patient Safety:    Falls Score: 1  Mobility Score: 4  Bed Alarm On? No  Sitter? No      Opportunity for questions and clarification was given to oncoming nurse. Patient bed is in low position, side rails are up x 3, door & observation blinds open as needed, call bell within reach and patient not in distress.     Margret Trujillo

## 2017-06-15 NOTE — PROGRESS NOTES
Pt is scheduled to discharge to home today. CM added Dr. Jhonatan Le appointment to follow up on AVS 6/22/17 at 3 PM.  Pt is Outpatient. Family will transport him home in the car. No further CM needs. Care Management Interventions  PCP Verified by CM: Yes  Mode of Transport at Discharge:  Other (see comment) (Family to transport home in car. )  Transition of Care Consult (CM Consult): Discharge Planning  MyChart Signup: No  Discharge Durable Medical Equipment: No  Health Maintenance Reviewed: Yes  Physical Therapy Consult: No  Occupational Therapy Consult: No  Speech Therapy Consult: No  Current Support Network: Lives with Spouse  Confirm Follow Up Transport: Family  Plan discussed with Pt/Family/Caregiver: Yes  Freedom of Choice Offered: Yes  Discharge Location  Discharge Placement: [address] [facility]   Ext [phone]

## 2017-06-15 NOTE — PROGRESS NOTES
Otolaryngology POD 2  Patient alert, conversant, has been ambulatory. Voiding without difficulty. Exam:Afebrile with stable vital signs. Taking PO well. Incision intact, without evidence of infection. Small amount of serosanguinous drainage observed from drain. Drain removed. Dressing changed. Parotidectomy flap flat without evidence of seroma or hematoma. Skin well perfused over flap. 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 Recommend: Discharge to home. Patient to continue facelift dressing with gauze for two more days. Wound cares to include regular cleaning of any crusts from the incision site, change gauze dressings as needed. Resume Xarelto in five days. Need for Oculoplastic and Radiation Therapy consults reviewed, My office to arrange. Patient will follow up to our office at 3 pm on Thursday June 22, 2017.  Patient has filled prescriptions already for Doxycycline and Norco.

## 2017-06-15 NOTE — PROGRESS NOTES
Pt signed and understood discharge instructions. Pt IV's removed pt tolerated well. Medication education understood by Pt and family .

## 2017-06-29 ENCOUNTER — OFFICE VISIT (OUTPATIENT)
Dept: FAMILY MEDICINE CLINIC | Age: 82
End: 2017-06-29

## 2017-06-29 VITALS
SYSTOLIC BLOOD PRESSURE: 92 MMHG | RESPIRATION RATE: 18 BRPM | HEART RATE: 68 BPM | OXYGEN SATURATION: 92 % | BODY MASS INDEX: 28.65 KG/M2 | WEIGHT: 194 LBS | DIASTOLIC BLOOD PRESSURE: 60 MMHG

## 2017-06-29 DIAGNOSIS — I48.91 ATRIAL FIBRILLATION, UNSPECIFIED TYPE (HCC): ICD-10-CM

## 2017-06-29 DIAGNOSIS — K11.8 PAROTID MASS: ICD-10-CM

## 2017-06-29 DIAGNOSIS — Z00.00 MEDICARE ANNUAL WELLNESS VISIT, SUBSEQUENT: Primary | ICD-10-CM

## 2017-06-29 DIAGNOSIS — C61 MALIGNANT NEOPLASM OF PROSTATE (HCC): ICD-10-CM

## 2017-06-29 DIAGNOSIS — E78.00 HYPERCHOLESTEROLEMIA: ICD-10-CM

## 2017-06-29 RX ORDER — TRAMADOL HYDROCHLORIDE 50 MG/1
TABLET ORAL
Status: ON HOLD | COMMUNITY
Start: 2017-06-01 | End: 2018-02-23

## 2017-06-29 RX ORDER — GENTAMICIN SULFATE AND PREDNISOLONE ACETATE 3; 6 MG/G; MG/G
OINTMENT OPHTHALMIC
COMMUNITY
Start: 2017-05-22 | End: 2017-09-10

## 2017-06-29 RX ORDER — HYDROCODONE BITARTRATE AND ACETAMINOPHEN 5; 325 MG/1; MG/1
TABLET ORAL
COMMUNITY
Start: 2017-06-06 | End: 2017-09-10

## 2017-06-29 NOTE — ACP (ADVANCE CARE PLANNING)
He has a LW on file. He is presently under treatment for parotid cancer.     In the event that he is unable to speak for himself, he asks that we contact his wife, Jean-Claude Geller, who can be reached at [place] [address]

## 2017-06-29 NOTE — PROGRESS NOTES
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 He is  and lives in [facility]. 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 He now has a right facial droop and is scheduled for speech therapy and radiation therapy in [place]. 18 months ago he was diagnosed with AFib and has been cardioverted twice by Dr Rachel Cedeno. Anticoagulated and rate controlled. No bleeding or CVA. There is a history of prostate cancer, hyperlipidemia and HTN    Allergies   Allergen Reactions    Ancef [Cefazolin] Unknown (comments)     \"drops my blood pressure\"    Neosporin [Benzalkonium Chloride] Unknown (comments)    Polysporin [Bacitracin-Polymyxin B] Other (comments)     \"WOUNDS DO NOT HEAL\"       Current Outpatient Prescriptions   Medication Sig    HYDROcodone-acetaminophen (NORCO) 5-325 mg per tablet     traMADol (ULTRAM) 50 mg tablet     triamcinolone acetonide (KENALOG) 0.1 % ointment Apply  to affected area two (2) times a day. use thin layer   Indications: psoariasis    diphenhydrAMINE (BENADRYL) 25 mg capsule Take 25 mg by mouth nightly as needed for Sleep. Indications: ALLERGIC RHINITIS    carvedilol (COREG) 3.125 mg tablet Take  by mouth two (2) times daily (with meals).  lisinopril (PRINIVIL, ZESTRIL) 5 mg tablet Take 5 mg by mouth every evening.  amiodarone (CORDARONE) 200 mg tablet Take 200 mg by mouth daily.  PRED-G S.O.P. 0.3-0.6 % oint     albuterol (PROVENTIL HFA, VENTOLIN HFA, PROAIR HFA) 90 mcg/actuation inhaler Take 2 Puffs by inhalation every four (4) hours as needed for Wheezing.  rivaroxaban (XARELTO) 20 mg tab tablet Take 1 Tab by mouth daily. Indications: prevent stroke (Patient taking differently: Take 15 mg by mouth daily (with dinner).  Indications: prevent stroke)    simvastatin (ZOCOR) 20 mg tablet Take  by mouth nightly. No current facility-administered medications for this visit. Past Medical History:   Diagnosis Date    [facility]     Dr Leila Vallejo - cardiologist    CAD (coronary artery disease)     Cardiomyopathy ([facility]     Diverticula of colon     Heart failure ([facility]     Hypercholesterolemia     Ill-defined condition     psorosis    Malignant neoplasm of prostate ([facility]     prostrate removed    Psoriasis     lower arms, legs (above knees)         ROS:  Denies fever, chills, cough, chest pain, SOB,  nausea, vomiting, or diarrhea. Denies wt loss, wt gain, hemoptysis, hematochezia or melena. Physical Examination:    BP 92/60 (BP 1 Location: Left arm, BP Patient Position: Sitting)  Pulse 68  Resp 18  Wt 194 lb (88 kg)  SpO2 92%  BMI 28.65 kg/m2    General: Alert and Ox3, Speech is dysarthric  HEENT:  NC/AT, unable to close the right eye  Neck:  There is a healing right neck incision. Chest:  Clear to Ausculation, without wheezes, rales, rubs or ronchi  Cardiac: RRR  Abdomen:  +BS, soft, nontender without palpable HSM  Extremities:  No cyanosis, clubbing or edema  Neurologic:  Ambulatory without assist, right facial droop. Moves all extremities. Skin: no rash  Lymphadenopathy: no cervical or supraclavicular nodes      ASSESSMENT AND PLAN:     1. Undergoing care for right parotid tumor  2. SAWV today  3. Labs today  4. RTC in 3 months    Orders Placed This Encounter    CBC WITH AUTOMATED DIFF    PROSTATE SPECIFIC AG    LIPID PANEL    METABOLIC PANEL, COMPREHENSIVE    TSH 3RD GENERATION    HYDROcodone-acetaminophen (NORCO) 5-325 mg per tablet    PRED-G S.O.P. 0.3-0.6 % oint    traMADol (ULTRAM) 50 mg tablet       Lana Angulo MD, FACP        ______________________________________________________________________    Filbert Kayser is a 80 y.o. male and presents for annual Medicare Wellness Visit.     Problem List: Reviewed with patient and discussed risk factors. Patient Active Problem List   Diagnosis Code    Hypercholesterolemia E78.00    Malignant neoplasm of prostate ([facility] C61    GERD (gastroesophageal reflux disease) K21.9    Diverticula of colon K57.30    Atrial fibrillation ([facility] I48.91    Parotid mass K11.9       Current medical providers:  Patient Care Team:  Brock Felix NP as PCP - General (Nurse Practitioner)  Abby Stephens MD as Consulting Provider (Dermatology)    [facility], Medications/Allergies: reviewed, on chart. Male Alcohol Screening: On any occasion during the past 3 months, have you had more than 4 drinks containing alcohol? No    Do you average more than 14 drinks per week? No    ROS:  Constitutional: No fever, chills or weight loss  Respiratory: No cough, SOB   CV: No chest pain or Palpitations    Objective:  Visit Vitals    BP 92/60 (BP 1 Location: Left arm, BP Patient Position: Sitting)    Pulse 68    Resp 18    Wt 194 lb (88 kg)    SpO2 92%    BMI 28.65 kg/m2    Body mass index is 28.65 kg/(m^2). Assessment of cognitive impairment: Alert and oriented x 3    Depression Screen:   PHQ over the last two weeks 5/4/2017   PHQ Not Done -   Little interest or pleasure in doing things Not at all   Feeling down, depressed or hopeless -   Total Score PHQ 2 -       Fall Risk Assessment:    Fall Risk Assessment, last 12 mths 5/4/2017   Able to walk? Yes   Fall in past 12 months? Yes   Fall with injury? Yes   Number of falls in past 12 months 4   Fall Risk Score 5       Functional Ability:   Does the patient exhibit a steady gait? yes   How long did it take the patient to get up and walk from a sitting position? 12 sec   Is the patient self reliant?  (ie can do own laundry, meals, household chores)  yes     Does the patient handle his/her own medications? yes     Does the patient handle his/her own money? yes     Is the patients home safe (ie good lighting, handrails on stairs and bath, etc.)? yes     Did you notice or did patient express any hearing difficulties? yes     Did you notice or did patient express any vision difficulties? no       Advance Care Planning:   Patient was offered the opportunity to discuss advance care planning:  yes     Does patient have an Advance Directive:  yes   If no, did you provide information on Caring Connections?  no       Plan:      Orders Placed This Encounter    CBC WITH AUTOMATED DIFF    PROSTATE SPECIFIC AG    LIPID PANEL    METABOLIC PANEL, COMPREHENSIVE    TSH 3RD GENERATION    HYDROcodone-acetaminophen (NORCO) 5-325 mg per tablet    PRED-G S.O.P. 0.3-0.6 % oint    traMADol (ULTRAM) 50 mg tablet       Health Maintenance   Topic Date Due    GLAUCOMA SCREENING Q2Y  07/01/1993    Pneumococcal 65+ High/Highest Risk (1 of 2 - PCV13) 07/01/1993    MEDICARE YEARLY EXAM  06/09/2017    INFLUENZA AGE 9 TO ADULT  08/01/2017    DTaP/Tdap/Td series (2 - Td) 12/12/2026    ZOSTER VACCINE AGE 60>  Completed       *Patient verbalized understanding and agreement with the plan. A copy of the After Visit Summary with personalized health plan was given to the patient today.

## 2017-06-30 LAB
ALBUMIN SERPL-MCNC: 4.1 G/DL (ref 3.5–4.7)
ALBUMIN/GLOB SERPL: 1.5 {RATIO} (ref 1.2–2.2)
ALP SERPL-CCNC: 69 IU/L (ref 39–117)
ALT SERPL-CCNC: 19 IU/L (ref 0–44)
AST SERPL-CCNC: 26 IU/L (ref 0–40)
BASOPHILS # BLD AUTO: 0 X10E3/UL (ref 0–0.2)
BASOPHILS NFR BLD AUTO: 0 %
BILIRUB SERPL-MCNC: 0.8 MG/DL (ref 0–1.2)
BUN SERPL-MCNC: 27 MG/DL (ref 8–27)
BUN/CREAT SERPL: 18 (ref 10–24)
CALCIUM SERPL-MCNC: 9.1 MG/DL (ref 8.6–10.2)
CHLORIDE SERPL-SCNC: 101 MMOL/L (ref 96–106)
CHOLEST SERPL-MCNC: 128 MG/DL (ref 100–199)
CO2 SERPL-SCNC: 27 MMOL/L (ref 18–29)
CREAT SERPL-MCNC: 1.48 MG/DL (ref 0.76–1.27)
EOSINOPHIL # BLD AUTO: 0 X10E3/UL (ref 0–0.4)
EOSINOPHIL NFR BLD AUTO: 1 %
ERYTHROCYTE [DISTWIDTH] IN BLOOD BY AUTOMATED COUNT: 14.5 % (ref 12.3–15.4)
GLOBULIN SER CALC-MCNC: 2.7 G/DL (ref 1.5–4.5)
GLUCOSE SERPL-MCNC: 84 MG/DL (ref 65–99)
HCT VFR BLD AUTO: 36.9 % (ref 37.5–51)
HDLC SERPL-MCNC: 49 MG/DL
HGB BLD-MCNC: 11.9 G/DL (ref 12.6–17.7)
IMM GRANULOCYTES # BLD: 0 X10E3/UL (ref 0–0.1)
IMM GRANULOCYTES NFR BLD: 1 %
INTERPRETATION: NORMAL
LDLC SERPL CALC-MCNC: 60 MG/DL (ref 0–99)
LYMPHOCYTES # BLD AUTO: 0.8 X10E3/UL (ref 0.7–3.1)
LYMPHOCYTES NFR BLD AUTO: 20 %
MCH RBC QN AUTO: 33.8 PG (ref 26.6–33)
MCHC RBC AUTO-ENTMCNC: 32.2 G/DL (ref 31.5–35.7)
MCV RBC AUTO: 105 FL (ref 79–97)
MONOCYTES # BLD AUTO: 1.1 X10E3/UL (ref 0.1–0.9)
MONOCYTES NFR BLD AUTO: 27 %
MORPHOLOGY BLD-IMP: ABNORMAL
NEUTROPHILS # BLD AUTO: 2.1 X10E3/UL (ref 1.4–7)
NEUTROPHILS NFR BLD AUTO: 51 %
PLATELET # BLD AUTO: 191 X10E3/UL (ref 150–379)
POTASSIUM SERPL-SCNC: 5.8 MMOL/L (ref 3.5–5.2)
PROT SERPL-MCNC: 6.8 G/DL (ref 6–8.5)
PSA SERPL-MCNC: <0.1 NG/ML (ref 0–4)
RBC # BLD AUTO: 3.52 X10E6/UL (ref 4.14–5.8)
SODIUM SERPL-SCNC: 142 MMOL/L (ref 134–144)
TRIGL SERPL-MCNC: 94 MG/DL (ref 0–149)
TSH SERPL DL<=0.005 MIU/L-ACNC: 1.13 UIU/ML (ref 0.45–4.5)
VLDLC SERPL CALC-MCNC: 19 MG/DL (ref 5–40)
WBC # BLD AUTO: 4.2 X10E3/UL (ref 3.4–10.8)

## 2017-06-30 NOTE — PROGRESS NOTES
Patient notified by phone of lab results. K+ is up in the absence of hemolysis and supplementation. Will need to reduce dose of Lisinopril. He is due for followup in 3 months and we will need to check this again at that time.

## 2017-07-07 ENCOUNTER — HOSPITAL ENCOUNTER (OUTPATIENT)
Dept: PET IMAGING | Age: 82
Discharge: HOME | End: 2017-07-07
Payer: MEDICARE

## 2017-07-07 VITALS — WEIGHT: 193 LBS | BODY MASS INDEX: 28.58 KG/M2 | HEIGHT: 69 IN

## 2017-07-07 DIAGNOSIS — Z85.818 HISTORY OF PAROTID CANCER: ICD-10-CM

## 2017-07-07 PROCEDURE — 78815 PET IMAGE W/CT SKULL-THIGH: CPT

## 2017-07-07 RX ORDER — SODIUM CHLORIDE 0.9 % (FLUSH) 0.9 %
10 SYRINGE (ML) INJECTION
Status: COMPLETED | OUTPATIENT
Start: 2017-07-07 | End: 2017-07-07

## 2017-07-07 RX ADMIN — Medication 10 ML: at 12:52

## 2017-09-06 ENCOUNTER — OFFICE VISIT (OUTPATIENT)
Dept: FAMILY MEDICINE CLINIC | Age: 82
End: 2017-09-06

## 2017-09-06 ENCOUNTER — TELEPHONE (OUTPATIENT)
Dept: FAMILY MEDICINE CLINIC | Age: 82
End: 2017-09-06

## 2017-09-06 VITALS
RESPIRATION RATE: 16 BRPM | WEIGHT: 186 LBS | OXYGEN SATURATION: 93 % | SYSTOLIC BLOOD PRESSURE: 98 MMHG | BODY MASS INDEX: 27.47 KG/M2 | HEART RATE: 72 BPM | DIASTOLIC BLOOD PRESSURE: 60 MMHG

## 2017-09-06 DIAGNOSIS — L03.113 CELLULITIS OF RIGHT UPPER ARM: Primary | ICD-10-CM

## 2017-09-06 PROBLEM — D49.0 PAROTID TUMOR: Status: ACTIVE | Noted: 2017-01-01

## 2017-09-06 RX ORDER — CLINDAMYCIN HYDROCHLORIDE 300 MG/1
300 CAPSULE ORAL 3 TIMES DAILY
Qty: 30 CAP | Refills: 0 | Status: SHIPPED | OUTPATIENT
Start: 2017-09-06 | End: 2017-09-21 | Stop reason: SDUPTHER

## 2017-09-06 RX ORDER — SILVER SULFADIAZINE 10 G/1000G
CREAM TOPICAL
COMMUNITY
Start: 2017-09-05 | End: 2018-02-23

## 2017-09-06 NOTE — PROGRESS NOTES
Chief Complaint   Patient presents with    Elbow Swelling     right    Psoriasis         HPI:         is a 80 y.o. male who notes the onset of a skin problem. The details are as follows:  He noted emergence or a red, warm rash 5 days ago. Denies trauma or bite. Undergoing treatment for right parotid tumor (XRT); He is allergic to cephalosporins. Allergies   Allergen Reactions    Ancef [Cefazolin] Unknown (comments)     \"drops my blood pressure\"    Neosporin [Benzalkonium Chloride] Unknown (comments)    Polysporin [Bacitracin-Polymyxin B] Other (comments)     \"WOUNDS DO NOT HEAL\"       Current Outpatient Prescriptions   Medication Sig    SSD 1 % topical cream     clindamycin (CLEOCIN) 300 mg capsule Take 1 Cap by mouth three (3) times daily for 10 days.  PRED-G S.O.P. 0.3-0.6 % oint     triamcinolone acetonide (KENALOG) 0.1 % ointment Apply  to affected area two (2) times a day. use thin layer   Indications: psoariasis    rivaroxaban (XARELTO) 20 mg tab tablet Take 1 Tab by mouth daily. Indications: prevent stroke (Patient taking differently: Take 15 mg by mouth daily (with dinner). Indications: prevent stroke)    diphenhydrAMINE (BENADRYL) 25 mg capsule Take 25 mg by mouth nightly as needed for Sleep. Indications: ALLERGIC RHINITIS    carvedilol (COREG) 3.125 mg tablet Take  by mouth two (2) times daily (with meals).  lisinopril (PRINIVIL, ZESTRIL) 5 mg tablet Take 5 mg by mouth every evening.  amiodarone (CORDARONE) 200 mg tablet Take 200 mg by mouth daily.  simvastatin (ZOCOR) 20 mg tablet Take  by mouth nightly.  HYDROcodone-acetaminophen (NORCO) 5-325 mg per tablet     traMADol (ULTRAM) 50 mg tablet     albuterol (PROVENTIL HFA, VENTOLIN HFA, PROAIR HFA) 90 mcg/actuation inhaler Take 2 Puffs by inhalation every four (4) hours as needed for Wheezing. No current facility-administered medications for this visit.         Past Medical History:   Diagnosis Date  Atrial fibrillation with RVR ([facility])     Dr Nery Santana - cardiologist    CAD (coronary artery disease)     Cardiomyopathy ([facility]     Diverticula of colon     Heart failure ([facility]     Hypercholesterolemia     Ill-defined condition     psorosis    Malignant neoplasm of prostate ([facility]     prostrate removed    Parotid tumor 06/13/2017    Surgery, XRT; resulting right [place]' palsy    Psoriasis     lower arms, legs (above knees)         ROS:  Denies fever, chills, cough, chest pain, SOB,  nausea, vomiting, or diarrhea. Denies wt loss, wt gain, hemoptysis, hematochezia or melena. Physical Examination:    Visit Vitals    BP 98/60 (BP 1 Location: Left arm, BP Patient Position: Sitting)    Pulse 72    Resp 16    Wt 186 lb (84.4 kg)    SpO2 93%    BMI 27.47 kg/m2      General:  Alert, cooperative, no distress. Head:  Normocephalic, without obvious abnormality, atraumatic. Eyes:  Right eye is patched. Right Bell's palsy   Chest wall:  No tenderness or deformity. Extremities: Extremities normal, atraumatic, no cyanosis or edema. Skin:             Lymph nodes: Cervical and supraclavicular nodes are normal.   Neurologic: Moves all extremities, fluent speech         ASSESSMENT AND PLAN:    1.  RUE cellulitis:  Nonpurulent:  Clindamycin would provide coverage for MRSA and Group B strep. Allergy to cephalosporins narrows options. 2.  If he fails outpatient antibiotics, he may require IV therapy  3. Currently undergoing XRT for right parottid tumor  4. Follow up in a week. Orders Placed This Encounter    SSD 1 % topical cream    clindamycin (CLEOCIN) 300 mg capsule     Sig: Take 1 Cap by mouth three (3) times daily for 10 days.      Dispense:  30 Cap     Refill:  0       Carlos Salazar MD, Stevan Yao

## 2017-09-06 NOTE — MR AVS SNAPSHOT
Visit Information Date & Time Provider Department Dept. Phone Encounter #  
 9/6/2017  1:15 PM Darleen De Jesus MD [address] [place] [address] [id number] Follow-up Instructions Return in about 1 week (around 9/13/2017). Follow-up and Disposition History Your Appointments 9/13/2017  1:30 PM  
ESTABLISHED PATIENT with Darleen De Jesus MD  
[place] [address] ([address]) Appt Note: PER BESSLER/1 wk FU  
 [address] [address] [phone]  
  
   
 [address] [address]  
  
    
 10/5/2017  2:00 PM  
ESTABLISHED PATIENT with Darleen De Jesus MD  
[address] [place] [address] ([address]) Appt Note: f/u $20 ck sj 8.21.17  
 [address] [facility] [address]  
[address] [facility] [address] [facility] [address] Upcoming Health Maintenance Date Due Pneumococcal 65+ High/Highest Risk (2 of 2 - PPSV23) 10/21/2015 MEDICARE YEARLY EXAM 6/30/2018 GLAUCOMA SCREENING Q2Y 4/28/2019 DTaP/Tdap/Td series (2 - Td) 12/12/2026 Allergies as of 9/6/2017  Review Complete On: 9/6/2017 By: Darleen De Jesus MD  
  
 Severity Noted Reaction Type Reactions Ancef [Cefazolin]  10/28/2013    Unknown (comments) \"drops my blood pressure\" Neosporin [Benzalkonium Chloride]  10/28/2013    Unknown (comments) Polysporin [Bacitracin-polymyxin B]  10/28/2013    Other (comments) \"WOUNDS DO NOT HEAL\" Current Immunizations  Never Reviewed Name Date Influenza Vaccine 10/14/2013 Pneumococcal Conjugate (PCV-13) 8/26/2015 Tdap 12/12/2016 Zoster Vaccine, Live 2/23/2016 Not reviewed this visit You Were Diagnosed With   
  
 Codes Comments Cellulitis of right upper arm    -  Primary ICD-10-CM: P91.085 ICD-9-CM: 623. 3 Vitals BP Pulse Resp Weight(growth percentile) SpO2 BMI 98/60 (BP 1 Location: Left arm, BP Patient Position: Sitting) 72 16 186 lb (84.4 kg) 93% 27.47 kg/m2 Smoking Status Former Smoker BMI and BSA Data Body Mass Index Body Surface Area  
 27.47 kg/m 2 2.03 m 2 Preferred Pharmacy Pharmacy Name Phone [facility] [address] [facility], [place] - [address] [phone] Your Updated Medication List  
  
   
This list is accurate as of: 9/6/17  2:10 PM.  Always use your most recent med list.  
  
  
  
  
 albuterol 90 mcg/actuation inhaler Commonly known as:  PROVENTIL HFA, VENTOLIN HFA, PROAIR HFA Take 2 Puffs by inhalation every four (4) hours as needed for Wheezing. amiodarone 200 mg tablet Commonly known as:  CORDARONE Take 200 mg by mouth daily. carvedilol 3.125 mg tablet Commonly known as:  Alem Awe Take  by mouth two (2) times daily (with meals). clindamycin 300 mg capsule Commonly known as:  CLEOCIN Take 1 Cap by mouth three (3) times daily for 10 days. diphenhydrAMINE 25 mg capsule Commonly known as:  BENADRYL Take 25 mg by mouth nightly as needed for Sleep. Indications: ALLERGIC RHINITIS HYDROcodone-acetaminophen 5-325 mg per tablet Commonly known as:  NORCO  
  
 lisinopril 5 mg tablet Commonly known as:  John Human Take 5 mg by mouth every evening. PRED-G S.O.P. 0.3-0.6 % Oint Generic drug:  gentamicin-prednisoLONE  
  
 rivaroxaban 20 mg Tab tablet Commonly known as:  Coretta Benes Take 1 Tab by mouth daily. Indications: prevent stroke  
  
 simvastatin 20 mg tablet Commonly known as:  ZOCOR Take  by mouth nightly. SSD 1 % topical cream  
Generic drug:  silver sulfADIAZINE  
  
 traMADol 50 mg tablet Commonly known as:  ULTRAM  
  
 triamcinolone acetonide 0.1 % ointment Commonly known as:  KENALOG Apply  to affected area two (2) times a day. use thin layer   Indications: psoariasis Prescriptions Sent to Pharmacy Refills  
 clindamycin (CLEOCIN) 300 mg capsule 0 Sig: Take 1 Cap by mouth three (3) times daily for 10 days. Class: Normal  
 Pharmacy: [address] [address]  #: [phone] Route: Oral  
  
Follow-up Instructions Return in about 1 week (around 9/13/2017). Introducing hospitals & HEALTH SERVICES! Dear Beto Henry: Thank you for requesting a [employer] account. Our records indicate that you already have an active [employer] account. You can access your account anytime at [email or web address] Did you know that you can access your hospital and ER discharge instructions at any time in [employer]? You can also review all of your test results from your hospital stay or ER visit. Additional Information If you have questions, please visit the Frequently Asked Questions section of the [employer] website at [email or web address] Remember, [employer] is NOT to be used for urgent needs. For medical emergencies, dial 911. Now available from your iPhone and Android! Please provide this summary of care documentation to your next provider. Your primary care clinician is listed as Marely Morgan. If you have any questions after today's visit, please call [phone].

## 2017-09-13 ENCOUNTER — OFFICE VISIT (OUTPATIENT)
Dept: FAMILY MEDICINE CLINIC | Age: 82
End: 2017-09-13

## 2017-09-13 VITALS
HEART RATE: 68 BPM | SYSTOLIC BLOOD PRESSURE: 84 MMHG | OXYGEN SATURATION: 93 % | DIASTOLIC BLOOD PRESSURE: 50 MMHG | BODY MASS INDEX: 25.09 KG/M2 | RESPIRATION RATE: 16 BRPM | WEIGHT: 185 LBS

## 2017-09-13 DIAGNOSIS — L03.113 CELLULITIS OF RIGHT UPPER ARM: ICD-10-CM

## 2017-09-13 DIAGNOSIS — D49.0 PAROTID TUMOR: Primary | ICD-10-CM

## 2017-09-13 NOTE — PATIENT INSTRUCTIONS
If you have any questions regarding [employer], you may call [employer] support at [phone].

## 2017-09-13 NOTE — PROGRESS NOTES
Chief Complaint   Patient presents with    Psoriasis         HPI:         is a 80 y.o. male who notes the onset of a skin problem. The details are as follows:  Seen last week with a concern for MRSA cellulitis in the right arm. Started Clindamycin and he reports that he is much better. Seen and released from the ER for noncardiac chest pain       Allergies   Allergen Reactions    Ancef [Cefazolin] Unknown (comments)     \"drops my blood pressure\"    Neosporin [Benzalkonium Chloride] Unknown (comments)    Polysporin [Bacitracin-Polymyxin B] Other (comments)     \"WOUNDS DO NOT HEAL\"       Current Outpatient Prescriptions   Medication Sig    SSD 1 % topical cream     clindamycin (CLEOCIN) 300 mg capsule Take 1 Cap by mouth three (3) times daily for 10 days.  traMADol (ULTRAM) 50 mg tablet     triamcinolone acetonide (KENALOG) 0.1 % ointment Apply  to affected area two (2) times a day. use thin layer   Indications: psoariasis    albuterol (PROVENTIL HFA, VENTOLIN HFA, PROAIR HFA) 90 mcg/actuation inhaler Take 2 Puffs by inhalation every four (4) hours as needed for Wheezing.  rivaroxaban (XARELTO) 20 mg tab tablet Take 1 Tab by mouth daily. Indications: prevent stroke (Patient taking differently: Take 15 mg by mouth daily (with dinner). Indications: prevent stroke)    diphenhydrAMINE (BENADRYL) 25 mg capsule Take 25 mg by mouth nightly as needed for Sleep. Indications: ALLERGIC RHINITIS    carvedilol (COREG) 3.125 mg tablet Take  by mouth two (2) times daily (with meals).  lisinopril (PRINIVIL, ZESTRIL) 5 mg tablet Take 5 mg by mouth every evening.  amiodarone (CORDARONE) 200 mg tablet Take 200 mg by mouth daily.  simvastatin (ZOCOR) 20 mg tablet Take  by mouth nightly. No current facility-administered medications for this visit.         Past Medical History:   Diagnosis Date    Atrial fibrillation with RVR ([facility]     Dr Juliane Kayser - cardiologist    CAD (coronary artery disease)     Cardiomyopathy ([address]     Diverticula of colon     Heart failure ([address]     Hypercholesterolemia     Ill-defined condition     psorosis    Malignant neoplasm of prostate ([address]     prostrate removed    Parotid tumor 06/13/2017    Surgery, XRT; resulting right Wellington' palsy    Psoriasis     lower arms, legs (above knees)         ROS:  Denies fever, chills, cough, chest pain, SOB,  nausea, vomiting, or diarrhea. Denies wt loss, wt gain, hemoptysis, hematochezia or melena. Physical Examination:    Visit Vitals    BP (!) 84/50 (BP 1 Location: Right arm, BP Patient Position: Sitting)    Pulse 68    Resp 16    Wt 185 lb (83.9 kg)    SpO2 93%    BMI 25.09 kg/m2      General:  Alert, cooperative, no distress. Head:  Normocephalic, without obvious abnormality, atraumatic. Eyes:  Conjunctivae/corneas clear. Pupils equal, round, reactive to light. Chest wall:  No tenderness or deformity. Extremities: Extremities normal, atraumatic, no cyanosis or edema. Skin:         Lymph nodes: Cervical and supraclavicular nodes are normal.   Neurologic: Moves all extremities, fluent speech         ASSESSMENT AND PLAN:    1.  RUE cellulitis:  Improved. Complete clindamycin  2. Right Bell's palsy due to parotid tumor, and its treatment  3. Low BP: asymptomatic  4. Recent evaluation for noncardiac chest pain. No orders of the defined types were placed in this encounter.       Odilia uL MD, [place]

## 2017-09-13 NOTE — MR AVS SNAPSHOT
Visit Information Date & Time Provider Department Dept. Phone Encounter #  
 9/13/2017  1:30 PM Renea Aguilar, Cherelle Forrest [id number] Your Appointments 10/5/2017  2:00 PM  
ESTABLISHED PATIENT with Renea Aguilar MD  
[address] ([facility]) Appt Note: f/u $20 ck sj 8.21.17  
 [address] [address]  
[address] [facility] [address] [facility] [address] Upcoming Health Maintenance Date Due Pneumococcal 65+ High/Highest Risk (2 of 2 - PPSV23) 10/21/2015 MEDICARE YEARLY EXAM 6/30/2018 GLAUCOMA SCREENING Q2Y 4/28/2019 DTaP/Tdap/Td series (2 - Td) 12/12/2026 Allergies as of 9/13/2017  Review Complete On: 9/13/2017 By: Renea Aguilar MD  
  
 Severity Noted Reaction Type Reactions Ancef [Cefazolin]  10/28/2013    Unknown (comments) \"drops my blood pressure\" Neosporin [Benzalkonium Chloride]  10/28/2013    Unknown (comments) Polysporin [Bacitracin-polymyxin B]  10/28/2013    Other (comments) \"WOUNDS DO NOT HEAL\" Current Immunizations  Never Reviewed Name Date Influenza Vaccine 10/14/2013 Pneumococcal Conjugate (PCV-13) 8/26/2015 Tdap 12/12/2016 Zoster Vaccine, Live 2/23/2016 Not reviewed this visit Vitals BP Pulse Resp Weight(growth percentile) SpO2 BMI  
 (!) 84/50 (BP 1 Location: Right arm, BP Patient Position: Sitting) 68 16 185 lb (83.9 kg) 93% 25.09 kg/m2 Smoking Status Former Smoker BMI and BSA Data Body Mass Index Body Surface Area 25.09 kg/m 2 2.06 m 2 Preferred Pharmacy Pharmacy Name Phone [facility] - [address] [phone] Your Updated Medication List  
  
   
This list is accurate as of: 9/13/17  2:04 PM.  Always use your most recent med list.  
  
  
  
  
 albuterol 90 mcg/actuation inhaler Commonly known as:  PROVENTIL HFA, VENTOLIN HFA, PROAIR HFA Take 2 Puffs by inhalation every four (4) hours as needed for Wheezing. amiodarone 200 mg tablet Commonly known as:  CORDARONE Take 200 mg by mouth daily. carvedilol 3.125 mg tablet Commonly known as:  Melanie Eli Take  by mouth two (2) times daily (with meals). clindamycin 300 mg capsule Commonly known as:  CLEOCIN Take 1 Cap by mouth three (3) times daily for 10 days. diphenhydrAMINE 25 mg capsule Commonly known as:  BENADRYL Take 25 mg by mouth nightly as needed for Sleep. Indications: ALLERGIC RHINITIS  
  
 lisinopril 5 mg tablet Commonly known as:  Meade Nakul Take 5 mg by mouth every evening. rivaroxaban 20 mg Tab tablet Commonly known as:  Ollen Alpesh Take 1 Tab by mouth daily. Indications: prevent stroke  
  
 simvastatin 20 mg tablet Commonly known as:  ZOCOR Take  by mouth nightly. SSD 1 % topical cream  
Generic drug:  silver sulfADIAZINE  
  
 traMADol 50 mg tablet Commonly known as:  ULTRAM  
  
 triamcinolone acetonide 0.1 % ointment Commonly known as:  KENALOG Apply  to affected area two (2) times a day. use thin layer   Indications: psoariasis Patient Instructions If you have any questions regarding Wesabe, you may call Wesabe support at [phone]. [facility] SERVICES! Dear Radha Blackwell: Thank you for requesting a FuturestateIT account. Our records indicate that you already have an active FuturestateIT account. You can access your account anytime at [email or web address] Did you know that you can access your hospital and ER discharge instructions at any time in FuturestateIT? You can also review all of your test results from your hospital stay or ER visit. Additional Information If you have questions, please visit the Frequently Asked Questions section of the [employer]OR [employer] website at [email or web address] Remember, [employer] is NOT to be used for urgent needs. For medical emergencies, dial 911. Now available from your iPhone and Android! Please provide this summary of care documentation to your next provider. Your primary care clinician is listed as Ney Castillo. If you have any questions after today's visit, please call [phone].

## 2017-09-21 ENCOUNTER — OFFICE VISIT (OUTPATIENT)
Dept: FAMILY MEDICINE CLINIC | Age: 82
End: 2017-09-21

## 2017-09-21 ENCOUNTER — TELEPHONE (OUTPATIENT)
Dept: FAMILY MEDICINE CLINIC | Age: 82
End: 2017-09-21

## 2017-09-21 VITALS
OXYGEN SATURATION: 96 % | BODY MASS INDEX: 24.79 KG/M2 | TEMPERATURE: 98.3 F | HEART RATE: 58 BPM | SYSTOLIC BLOOD PRESSURE: 73 MMHG | HEIGHT: 72 IN | RESPIRATION RATE: 16 BRPM | DIASTOLIC BLOOD PRESSURE: 52 MMHG | WEIGHT: 183 LBS

## 2017-09-21 DIAGNOSIS — L03.113 CELLULITIS OF RIGHT UPPER ARM: ICD-10-CM

## 2017-09-21 DIAGNOSIS — I48.91 ATRIAL FIBRILLATION, UNSPECIFIED TYPE (HCC): Primary | ICD-10-CM

## 2017-09-21 RX ORDER — CLINDAMYCIN HYDROCHLORIDE 300 MG/1
300 CAPSULE ORAL 3 TIMES DAILY
Qty: 30 CAP | Refills: 0 | Status: SHIPPED | OUTPATIENT
Start: 2017-09-21 | End: 2017-09-28 | Stop reason: ALTCHOICE

## 2017-09-21 RX ORDER — AMIODARONE HYDROCHLORIDE 100 MG/1
100 TABLET ORAL DAILY
Qty: 90 TAB | Refills: 4
Start: 2017-09-21 | End: 2018-02-23

## 2017-09-21 NOTE — MR AVS SNAPSHOT
Visit Information Date & Time Provider Department Dept. Phone Encounter #  
 9/21/2017  2:00 PM Agnieszka SantanaMikayla [id number] Your Appointments 9/27/2017  9:00 AM  
ESTABLISHED PATIENT with MD [address] ([facility]) Appt Note: f/up, sr right arm pain and swelling,  
 [address] [facility] [address] [facility] [id number] [phone]  
  
   
 [address] [facility] [address] [facility] [address] 9/28/2017  1:00 PM  
ESTABLISHED PATIENT with Agnieszka Santana MD  
[address] ([facility]) Appt Note: Work in at 1:00 p.m. per Dr. Walton/F/U arm problem [address]  
[phone]  
  
   
 [address] [facility] [address]  
  
    
 10/5/2017  2:00 PM  
ESTABLISHED PATIENT with Agnieszka Santana MD  
[address] ([facility]) Appt Note: f/u $20 ck sj 8.21.17  
 [address]  
[phone] Upcoming Health Maintenance Date Due Pneumococcal 65+ High/Highest Risk (2 of 2 - PPSV23) 10/21/2015 MEDICARE YEARLY EXAM 6/30/2018 GLAUCOMA SCREENING Q2Y 4/28/2019 DTaP/Tdap/Td series (2 - Td) 12/12/2026 Allergies as of 9/21/2017  Review Complete On: 9/21/2017 By: Agnieszka Santana MD  
  
 Severity Noted Reaction Type Reactions Ancef [Cefazolin]  10/28/2013    Unknown (comments) \"drops my blood pressure\" Neosporin [Benzalkonium Chloride]  10/28/2013    Unknown (comments) Polysporin [Bacitracin-polymyxin B]  10/28/2013    Other (comments) \"WOUNDS DO NOT HEAL\" Current Immunizations  Never Reviewed Name Date Influenza High Dose Vaccine PF 9/13/2017 Influenza Vaccine 10/14/2013 Pneumococcal Conjugate (PCV-13) 8/26/2015 Tdap 12/12/2016 Zoster Vaccine, Live 2/23/2016 Not reviewed this visit Vitals BP Pulse Temp Resp Height(growth percentile) Weight(growth percentile) (!) 73/52 (BP 1 Location: Left arm, BP Patient Position: Sitting) (!) 58 98.3 °F (36.8 °C) (Temporal) 16 6' (1.829 m) 183 lb (83 kg) SpO2 BMI Smoking Status 96% 24.82 kg/m2 Former Smoker Vitals History BMI and BSA Data Body Mass Index Body Surface Area  
 24.82 kg/m 2 2.05 m 2 Preferred Pharmacy Pharmacy Name Phone [facility] [address] [place] - [address] [phone] Your Updated Medication List  
  
   
This list is accurate as of: 9/21/17  2:35 PM.  Always use your most recent med list.  
  
  
  
  
 albuterol 90 mcg/actuation inhaler Commonly known as:  PROVENTIL HFA, VENTOLIN HFA, PROAIR HFA Take 2 Puffs by inhalation every four (4) hours as needed for Wheezing. amiodarone 200 mg tablet Commonly known as:  CORDARONE Take 200 mg by mouth daily. carvedilol 3.125 mg tablet Commonly known as:  Raynette Hy Take  by mouth two (2) times daily (with meals). diphenhydrAMINE 25 mg capsule Commonly known as:  BENADRYL Take 25 mg by mouth nightly as needed for Sleep. Indications: ALLERGIC RHINITIS  
  
 rivaroxaban 20 mg Tab tablet Commonly known as:  Mardeen Naas Take 1 Tab by mouth daily. Indications: prevent stroke  
  
 simvastatin 20 mg tablet Commonly known as:  ZOCOR Take  by mouth nightly. SSD 1 % topical cream  
Generic drug:  silver sulfADIAZINE  
  
 traMADol 50 mg tablet Commonly known as:  ULTRAM  
  
 triamcinolone acetonide 0.1 % ointment Commonly known as:  KENALOG Apply  to affected area two (2) times a day. use thin layer   Indications: psoariasis Introducing [facility] & HEALTH SERVICES! Dear Almaz Omalley: Thank you for requesting a Avieon account. Our records indicate that you already have an active Avieon account. You can access your account anytime at [email or web address]. [email or web address] [email or web address] Corporation Did you know that you can access your hospital and ER discharge instructions at any time in Strategy Store? You can also review all of your test results from your hospital stay or ER visit. Additional Information If you have questions, please visit the Frequently Asked Questions section of the Strategy Store website at [email or web address] Remember, Strategy Store is NOT to be used for urgent needs. For medical emergencies, dial 911. Now available from your iPhone and Android! Please provide this summary of care documentation to your next provider. Your primary care clinician is listed as Momo Appiah. If you have any questions after today's visit, please call [phone].

## 2017-09-21 NOTE — PROGRESS NOTES
Chief Complaint   Patient presents with    Arm Pain     right    Fatigue         HPI:       is a 80 y.o. male. Slowly resolving RUE cellulitis; improving on Clindamycin. Just about out of meds. Much less pain with diminished swelling and edema. No fever or rigors. New Issues:  Notes that he is a bit dizzy when walking today. Denies bleeding, diarrhea. Allergies   Allergen Reactions    Ancef [Cefazolin] Unknown (comments)     \"drops my blood pressure\"    Neosporin [Benzalkonium Chloride] Unknown (comments)    Polysporin [Bacitracin-Polymyxin B] Other (comments)     \"WOUNDS DO NOT HEAL\"       Current Outpatient Prescriptions   Medication Sig    triamcinolone acetonide (KENALOG) 0.1 % ointment Apply  to affected area two (2) times a day. use thin layer   Indications: psoariasis    rivaroxaban (XARELTO) 20 mg tab tablet Take 1 Tab by mouth daily. Indications: prevent stroke (Patient taking differently: Take 15 mg by mouth daily (with dinner). Indications: prevent stroke)    carvedilol (COREG) 3.125 mg tablet Take  by mouth two (2) times daily (with meals).  lisinopril (PRINIVIL, ZESTRIL) 5 mg tablet Take 5 mg by mouth every evening.  amiodarone (CORDARONE) 200 mg tablet Take 200 mg by mouth daily.  simvastatin (ZOCOR) 20 mg tablet Take  by mouth nightly.  SSD 1 % topical cream     traMADol (ULTRAM) 50 mg tablet     albuterol (PROVENTIL HFA, VENTOLIN HFA, PROAIR HFA) 90 mcg/actuation inhaler Take 2 Puffs by inhalation every four (4) hours as needed for Wheezing.  diphenhydrAMINE (BENADRYL) 25 mg capsule Take 25 mg by mouth nightly as needed for Sleep. Indications: ALLERGIC RHINITIS     No current facility-administered medications for this visit.         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 Denies wt loss, wt gain, hemoptysis, hematochezia or melena. Physical Examination:    BP (!) 73/52 (BP 1 Location: Left arm, BP Patient Position: Sitting)  Pulse (!) 58  Temp 98.3 °F (36.8 °C) (Temporal)   Resp 16  Ht 6' (1.829 m)  Wt 183 lb (83 kg)  SpO2 96%  BMI 24.82 kg/m2    General: Alert and Ox3, Fluent speech  HEENT:  NC/AT, EOMI, OP: clear  Neck:  Supple, no adenopathy, JVD, mass or bruit  Chest:  Clear to Ausculation, without wheezes, rales, rubs or ronchi  Cardiac: RRR  Abdomen:  +BS, soft, nontender without palpable HSM  Extremities:  Markedly decreased erythema and edema in the right volar distal forearm. Neurologic:  Ambulatory without assist, CN 2-12 grossly intact. Moves all extremities. Skin: no rash  Lymphadenopathy: no cervical or supraclavicular nodes      ASSESSMENT AND PLAN:     1. Resolving RUE cellulitis:  Will need a few more days of antibiotics  2. Hypotension:  Hold Lisinopril, Simvastatin and reduce Amiodarone to 100 mg daily and reassess in a week. No orders of the defined types were placed in this encounter.       Aneta Garcia MD, [address]

## 2017-09-28 ENCOUNTER — OFFICE VISIT (OUTPATIENT)
Dept: FAMILY MEDICINE CLINIC | Age: 82
End: 2017-09-28

## 2017-09-28 VITALS
TEMPERATURE: 97.9 F | WEIGHT: 182 LBS | OXYGEN SATURATION: 96 % | RESPIRATION RATE: 16 BRPM | DIASTOLIC BLOOD PRESSURE: 52 MMHG | SYSTOLIC BLOOD PRESSURE: 87 MMHG | BODY MASS INDEX: 24.65 KG/M2 | HEART RATE: 89 BPM | HEIGHT: 72 IN

## 2017-09-28 DIAGNOSIS — L03.113 CELLULITIS OF RIGHT UPPER ARM: Primary | ICD-10-CM

## 2017-09-28 DIAGNOSIS — K11.8 PAROTID MASS: ICD-10-CM

## 2017-09-28 NOTE — MR AVS SNAPSHOT
Visit Information Date & Time Provider Department Dept. Phone Encounter #  
 9/28/2017  1:00 PM Rosa Maria Ortiz, [address] [phone] [id number] Your Appointments 10/5/2017  2:00 PM  
ESTABLISHED PATIENT with Rosa Maria Ortiz MD  
[address] ([facility]) Appt Note: f/u $20 ck sj 8.21.17  
 [address] [address]  
[address] [facility] [address] [facility] [address] Upcoming Health Maintenance Date Due Pneumococcal 65+ High/Highest Risk (2 of 2 - PPSV23) 10/21/2015 MEDICARE YEARLY EXAM 6/30/2018 GLAUCOMA SCREENING Q2Y 4/28/2019 DTaP/Tdap/Td series (2 - Td) 12/12/2026 Allergies as of 9/28/2017  Review Complete On: 9/28/2017 By: Rosa Maria Ortiz MD  
  
 Severity Noted Reaction Type Reactions Ancef [Cefazolin]  10/28/2013    Unknown (comments) \"drops my blood pressure\" Neosporin [Benzalkonium Chloride]  10/28/2013    Unknown (comments) Polysporin [Bacitracin-polymyxin B]  10/28/2013    Other (comments) \"WOUNDS DO NOT HEAL\" Current Immunizations  Never Reviewed Name Date Influenza High Dose Vaccine PF 9/13/2017 Influenza Vaccine 10/14/2013 Pneumococcal Conjugate (PCV-13) 8/26/2015 Tdap 12/12/2016 Zoster Vaccine, Live 2/23/2016 Not reviewed this visit Vitals BP Pulse Temp Resp Height(growth percentile) Weight(growth percentile) (!) 87/52 (BP 1 Location: Left arm, BP Patient Position: Sitting) 89 97.9 °F (36.6 °C) (Temporal) 16 6' (1.829 m) 182 lb (82.6 kg) SpO2 BMI Smoking Status 96% 24.68 kg/m2 Former Smoker Vitals History BMI and BSA Data Body Mass Index Body Surface Area  
 24.68 kg/m 2 2.05 m 2 Preferred Pharmacy Pharmacy Name Phone [facility] [address], [place] - [address] [phone] Your Updated Medication List  
  
   
This list is accurate as of: 9/28/17  1:30 PM.  Always use your most recent med list.  
  
  
  
  
 albuterol 90 mcg/actuation inhaler Commonly known as:  PROVENTIL HFA, VENTOLIN HFA, PROAIR HFA Take 2 Puffs by inhalation every four (4) hours as needed for Wheezing. amiodarone 100 mg tablet Commonly known as:  Meghan Branch Take 1 Tab by mouth daily. carvedilol 3.125 mg tablet Commonly known as:  James Hirschfeld Take  by mouth two (2) times daily (with meals). clindamycin 300 mg capsule Commonly known as:  CLEOCIN Take 1 Cap by mouth three (3) times daily for 10 days. diphenhydrAMINE 25 mg capsule Commonly known as:  BENADRYL Take 25 mg by mouth nightly as needed for Sleep. Indications: ALLERGIC RHINITIS  
  
 rivaroxaban 20 mg Tab tablet Commonly known as:  Evangelista Lemon Take 1 Tab by mouth daily. Indications: prevent stroke SSD 1 % topical cream  
Generic drug:  silver sulfADIAZINE  
  
 traMADol 50 mg tablet Commonly known as:  ULTRAM  
  
 triamcinolone acetonide 0.1 % ointment Commonly known as:  KENALOG Apply  to affected area two (2) times a day. use thin layer   Indications: psoariasis Introducing [facility] & HEALTH SERVICES! Dear Jose Cordero: Thank you for requesting a Livestation account. Our records indicate that you already have an active Livestation account. You can access your account anytime at [email or web address] Did you know that you can access your hospital and ER discharge instructions at any time in Livestation? You can also review all of your test results from your hospital stay or ER visit. Additional Information If you have questions, please visit the Frequently Asked Questions section of the Livestation website at [email or web address] Remember, Livestation is NOT to be used for urgent needs. For medical emergencies, dial 911. Now available from your iPhone and Android! Please provide this summary of care documentation to your next provider. Your primary care clinician is listed as Chey Gaxiola. If you have any questions after today's visit, please call [phone].

## 2017-09-28 NOTE — PROGRESS NOTES
Chief Complaint   Patient presents with    Arm Pain     follow up         HPI:         is a 80 y.o. male who notes the onset of a skin problem. The details are as follows:  Seen twice in the past 2 weeks for a cellulitis involving the volar right forearm. Tolerating the Clindamycin. He feels that the arm is 100% better. No fever, chills, diarrhea or rigors. Allergies   Allergen Reactions    Ancef [Cefazolin] Unknown (comments)     \"drops my blood pressure\"    Neosporin [Benzalkonium Chloride] Unknown (comments)    Polysporin [Bacitracin-Polymyxin B] Other (comments)     \"WOUNDS DO NOT HEAL\"       Current Outpatient Prescriptions   Medication Sig    amiodarone (PACERONE) 100 mg tablet Take 1 Tab by mouth daily.  clindamycin (CLEOCIN) 300 mg capsule Take 1 Cap by mouth three (3) times daily for 10 days.  SSD 1 % topical cream     traMADol (ULTRAM) 50 mg tablet     triamcinolone acetonide (KENALOG) 0.1 % ointment Apply  to affected area two (2) times a day. use thin layer   Indications: psoariasis    albuterol (PROVENTIL HFA, VENTOLIN HFA, PROAIR HFA) 90 mcg/actuation inhaler Take 2 Puffs by inhalation every four (4) hours as needed for Wheezing.  rivaroxaban (XARELTO) 20 mg tab tablet Take 1 Tab by mouth daily. Indications: prevent stroke (Patient taking differently: Take 15 mg by mouth daily (with dinner). Indications: prevent stroke)    diphenhydrAMINE (BENADRYL) 25 mg capsule Take 25 mg by mouth nightly as needed for Sleep. Indications: ALLERGIC RHINITIS    carvedilol (COREG) 3.125 mg tablet Take  by mouth two (2) times daily (with meals). No current facility-administered medications for this visit.         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 Denies wt loss, wt gain, hemoptysis, hematochezia or melena. Physical Examination:    Visit Vitals    BP (!) 87/52 (BP 1 Location: Left arm, BP Patient Position: Sitting)    Pulse 89    Temp 97.9 °F (36.6 °C) (Temporal)    Resp 16    Ht 6' (1.829 m)    Wt 182 lb (82.6 kg)    SpO2 96%    BMI 24.68 kg/m2      General:  Alert, cooperative, no distress. Head:  Normocephalic, without obvious abnormality, atraumatic. Eyes:  Conjunctivae/corneas clear. Pupils equal, round, reactive to light. Chest wall:  No tenderness or deformity. Extremities: Extremities normal, atraumatic, no cyanosis or edema. Skin:  no erythema   Lymph nodes: Cervical and supraclavicular nodes are normal.   Neurologic: Moves all extremities, fluent speech               ASSESSMENT AND PLAN:    1.  RUE cellulitis has resolved  2. Right facial droop  3. Hypotension:  Discontinue Lisinopril and hydrate    No orders of the defined types were placed in this encounter.       Ja Cutler MD, [address]

## 2018-01-09 ENCOUNTER — TELEPHONE (OUTPATIENT)
Dept: FAMILY MEDICINE CLINIC | Age: 83
End: 2018-01-09

## 2018-01-09 ENCOUNTER — OFFICE VISIT (OUTPATIENT)
Dept: FAMILY MEDICINE CLINIC | Age: 83
End: 2018-01-09

## 2018-01-09 VITALS
OXYGEN SATURATION: 94 % | RESPIRATION RATE: 18 BRPM | WEIGHT: 199 LBS | DIASTOLIC BLOOD PRESSURE: 60 MMHG | BODY MASS INDEX: 26.95 KG/M2 | HEIGHT: 72 IN | HEART RATE: 70 BPM | TEMPERATURE: 97.5 F | SYSTOLIC BLOOD PRESSURE: 96 MMHG

## 2018-01-09 DIAGNOSIS — I50.22 SYSTOLIC CHF, CHRONIC (HCC): ICD-10-CM

## 2018-01-09 DIAGNOSIS — R60.0 BILATERAL LEG EDEMA: Primary | ICD-10-CM

## 2018-01-09 RX ORDER — LISINOPRIL 5 MG/1
5 TABLET ORAL DAILY
COMMUNITY
Start: 2017-12-21 | End: 2018-01-09

## 2018-01-09 RX ORDER — FUROSEMIDE 20 MG/1
20 TABLET ORAL
Qty: 30 TAB | Refills: 3 | Status: SHIPPED | OUTPATIENT
Start: 2018-01-09 | End: 2018-02-23

## 2018-01-09 RX ORDER — POTASSIUM CHLORIDE 750 MG/1
10 TABLET, EXTENDED RELEASE ORAL
Qty: 30 TAB | Refills: 3 | Status: SHIPPED | OUTPATIENT
Start: 2018-01-09 | End: 2018-02-23

## 2018-01-09 NOTE — MR AVS SNAPSHOT
Visit Information Date & Time Provider Department Dept. Phone Encounter #  
 1/9/2018  2:30 PM Leslie Luna, [address] [phone] [id number] Upcoming Health Maintenance Date Due Pneumococcal 65+ High/Highest Risk (2 of 2 - PPSV23) 10/21/2015 MEDICARE YEARLY EXAM 6/30/2018 GLAUCOMA SCREENING Q2Y 4/28/2019 DTaP/Tdap/Td series (2 - Td) 12/12/2026 Allergies as of 1/9/2018  Review Complete On: 1/9/2018 By: Leslie Luna MD  
  
 Severity Noted Reaction Type Reactions Ancef [Cefazolin]  10/28/2013    Unknown (comments) \"drops my blood pressure\" Neosporin [Benzalkonium Chloride]  10/28/2013    Unknown (comments) Polysporin [Bacitracin-polymyxin B]  10/28/2013    Other (comments) \"WOUNDS DO NOT HEAL\" Current Immunizations  Never Reviewed Name Date Influenza High Dose Vaccine PF 9/13/2017 Influenza Vaccine 10/14/2013 Pneumococcal Conjugate (PCV-13) 8/26/2015 Tdap 12/12/2016 Zoster Vaccine, Live 2/23/2016 Not reviewed this visit You Were Diagnosed With   
  
 Codes Comments Bilateral leg edema    -  Primary ICD-10-CM: R60.0 ICD-9-CM: 967. 3 Systolic CHF, chronic (HCC)     ICD-10-CM: I50.22 ICD-9-CM: 428.22, 428.0 Vitals BP Pulse Temp Resp Height(growth percentile) Weight(growth percentile) 96/60 (BP 1 Location: Left arm, BP Patient Position: Sitting) 70 97.5 °F (36.4 °C) (Oral) 18 6' (1.829 m) 199 lb (90.3 kg) SpO2 BMI Smoking Status 94% 26.99 kg/m2 Former Smoker BMI and BSA Data Body Mass Index Body Surface Area  
 26.99 kg/m 2 2.14 m 2 Preferred Pharmacy Pharmacy Name Phone [address], [facility] [place] [address] [place] [phone] Your Updated Medication List  
  
   
This list is accurate as of: 1/9/18  3:33 PM.  Always use your most recent med list.  
  
  
  
  
 albuterol 90 mcg/actuation inhaler Commonly known as:  PROVENTIL HFA, VENTOLIN HFA, PROAIR HFA Take 2 Puffs by inhalation every four (4) hours as needed for Wheezing. amiodarone 100 mg tablet Commonly known as:  Rose Collar Take 1 Tab by mouth daily. carvedilol 3.125 mg tablet Commonly known as:  Clerance Barley Take  by mouth two (2) times daily (with meals). diphenhydrAMINE 25 mg capsule Commonly known as:  BENADRYL Take 25 mg by mouth nightly as needed for Sleep. Indications: ALLERGIC RHINITIS  
  
 furosemide 20 mg tablet Commonly known as:  LASIX Take 1 Tab by mouth every Tuesday and Friday. potassium chloride 10 mEq tablet Commonly known as:  KLOR-CON Take 1 Tab by mouth every Tuesday and Friday. rivaroxaban 20 mg Tab tablet Commonly known as:  Hailee Lines Take 1 Tab by mouth daily. Indications: prevent stroke SSD 1 % topical cream  
Generic drug:  silver sulfADIAZINE  
  
 traMADol 50 mg tablet Commonly known as:  ULTRAM  
  
 triamcinolone acetonide 0.1 % ointment Commonly known as:  KENALOG Apply  to affected area two (2) times a day. use thin layer   Indications: psoariasis Prescriptions Sent to Pharmacy Refills  
 furosemide (LASIX) 20 mg tablet 3 Sig: Take 1 Tab by mouth every Tuesday and Friday. Class: Normal  
 Pharmacy: [address] [place], [place] [address]erendira Maciel Ph #: [phone] Route: Oral  
 potassium chloride (KLOR-CON) 10 mEq tablet 3 Sig: Take 1 Tab by mouth every Tuesday and Friday. Class: Normal  
 Pharmacy: [address] [place] [address] Toshia Maciel Ph #: [phone] Route: Oral  
  
Introducing [facility]! Dear Coy Cords: Thank you for requesting a 1DayLater account. Our records indicate that you already have an active 1DayLater account. You can access your account anytime at [email or web address] Access [email or web address] Did you know that you can access your hospital and ER discharge instructions at any time in [email or web address]? You can also review all of your test results from your hospital stay or ER visit. Additional Information If you have questions, please visit the Frequently Asked Questions section of the [email or web address] website at [email or web address] Remember, [email or web address] is NOT to be used for urgent needs. For medical emergencies, dial 911. Now available from your iPhone and Android! Please provide this summary of care documentation to your next provider. Your primary care clinician is listed as Liana High. If you have any questions after today's visit, please call [phone].

## 2018-01-09 NOTE — PROGRESS NOTES
Chief Complaint   Patient presents with    Shortness of Breath     even walking in the house, the past few days         HPI:       is a 80 y.o. 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 He is  and lives in [facility]. 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 18 months ago he was diagnosed with AFib and has been cardioverted twice by Dr Katelyn Lr. Anticoagulated and rate controlled. No bleeding or CVA. There is a history of prostate cancer, hyperlipidemia and HTN    New Issues:  He has noted increasing GRADY and SOB especially over the last few days. No bleeding, cough, fever, PND or orthopnea    Allergies   Allergen Reactions    Ancef [Cefazolin] Unknown (comments)     \"drops my blood pressure\"    Neosporin [Benzalkonium Chloride] Unknown (comments)    Polysporin [Bacitracin-Polymyxin B] Other (comments)     \"WOUNDS DO NOT HEAL\"       Current Outpatient Prescriptions   Medication Sig    furosemide (LASIX) 20 mg tablet Take 1 Tab by mouth every Tuesday and Friday.  amiodarone (PACERONE) 100 mg tablet Take 1 Tab by mouth daily.  SSD 1 % topical cream     traMADol (ULTRAM) 50 mg tablet     triamcinolone acetonide (KENALOG) 0.1 % ointment Apply  to affected area two (2) times a day. use thin layer   Indications: psoariasis    rivaroxaban (XARELTO) 20 mg tab tablet Take 1 Tab by mouth daily. Indications: prevent stroke (Patient taking differently: Take 15 mg by mouth daily (with dinner). Indications: prevent stroke)    diphenhydrAMINE (BENADRYL) 25 mg capsule Take 25 mg by mouth nightly as needed for Sleep. Indications: ALLERGIC RHINITIS    carvedilol (COREG) 3.125 mg tablet Take  by mouth two (2) times daily (with meals).     albuterol (PROVENTIL HFA, VENTOLIN HFA, PROAIR HFA) 90 mcg/actuation inhaler Take 2 Puffs by inhalation every four (4) hours as needed for Wheezing. No current facility-administered medications for this visit. 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 Denies wt loss, wt gain, hemoptysis, hematochezia or melena. Physical Examination:    BP 96/60 (BP 1 Location: Left arm, BP Patient Position: Sitting)  Pulse 70  Temp 97.5 °F (36.4 °C) (Oral)   Resp 18  Ht 6' (1.829 m)  Wt 199 lb (90.3 kg)  SpO2 94%  BMI 26.99 kg/m2    General: Alert and Ox3, Speech is dysarthric  HEENT:  NC/AT, unable to close the right eye  Neck:  There is a healing right neck incision. Chest:  Clear to Ausculation, without wheezes, rales, rubs or ronchi  Cardiac: RRR  Abdomen:  +BS, soft, nontender without palpable HSM  Extremities:  Bilateral LE edema  Neurologic:  Ambulatory without assist, right facial droop. Moves all extremities. Skin: no rash  Lymphadenopathy: no cervical or supraclavicular nodes    Wt Readings from Last 3 Encounters:   01/09/18 199 lb (90.3 kg)   09/28/17 182 lb (82.6 kg)   09/21/17 183 lb (83 kg)       ASSESSMENT AND PLAN:     1.  GRADY:  Likely somewhat volume overloaded (17 pound weight gain and ankle edema on exam). BP is borderline so the diuresis will need to be induced gradually. He had problems with hypokalemia previously. Will start with Furosemide 20 mg and Klor Con 10 Meq twice a week. Will cautiously titrate from there. Avoid salt and salty foods. No evidence for pneumonia, anemia or bleeding. RTC in a week if not better:  Would then check labs, ECHO and CXR    Orders Placed This Encounter    DISCONTD: lisinopril (PRINIVIL, ZESTRIL) 5 mg tablet     Sig: Take 5 mg by mouth daily.  furosemide (LASIX) 20 mg tablet     Sig: Take 1 Tab by mouth every Tuesday and Friday.      Dispense:  30 Tab     Refill:  3       Rizwan Castillo MD, [address]

## 2018-01-12 ENCOUNTER — TELEPHONE (OUTPATIENT)
Dept: FAMILY MEDICINE CLINIC | Age: 83
End: 2018-01-12

## 2018-01-12 NOTE — TELEPHONE ENCOUNTER
Called patient to check on him and his breathing. States he just took the 2nd pill today so hasn't really seen a difference. Told him to call Monday to the [place] office and update Dr. Guilherme Longoria.

## 2018-02-02 ENCOUNTER — APPOINTMENT (OUTPATIENT)
Dept: CT IMAGING | Age: 83
DRG: 871 | End: 2018-02-02
Attending: EMERGENCY MEDICINE
Payer: MEDICARE

## 2018-02-02 ENCOUNTER — HOSPITAL ENCOUNTER (INPATIENT)
Age: 83
LOS: 21 days | Discharge: NURSING, REHAB OR LONG-TERM CARE | DRG: 871 | End: 2018-02-23
Attending: EMERGENCY MEDICINE | Admitting: INTERNAL MEDICINE
Payer: MEDICARE

## 2018-02-02 ENCOUNTER — APPOINTMENT (OUTPATIENT)
Dept: GENERAL RADIOLOGY | Age: 83
DRG: 871 | End: 2018-02-02
Attending: EMERGENCY MEDICINE
Payer: MEDICARE

## 2018-02-02 ENCOUNTER — TELEPHONE (OUTPATIENT)
Dept: FAMILY MEDICINE CLINIC | Age: 83
End: 2018-02-02

## 2018-02-02 DIAGNOSIS — K11.8 PAROTID MASS: ICD-10-CM

## 2018-02-02 DIAGNOSIS — R45.1 AGITATION: ICD-10-CM

## 2018-02-02 DIAGNOSIS — C61 MALIGNANT NEOPLASM OF PROSTATE (HCC): ICD-10-CM

## 2018-02-02 DIAGNOSIS — I95.9 HYPOTENSION, UNSPECIFIED HYPOTENSION TYPE: ICD-10-CM

## 2018-02-02 DIAGNOSIS — D49.0 PAROTID TUMOR: ICD-10-CM

## 2018-02-02 DIAGNOSIS — T68.XXXA HYPOTHERMIA, INITIAL ENCOUNTER: Primary | ICD-10-CM

## 2018-02-02 DIAGNOSIS — R65.21 SEPTIC SHOCK (HCC): ICD-10-CM

## 2018-02-02 DIAGNOSIS — D68.9 COAGULOPATHY (HCC): ICD-10-CM

## 2018-02-02 DIAGNOSIS — R53.81 PHYSICAL DECONDITIONING: ICD-10-CM

## 2018-02-02 DIAGNOSIS — K59.00 CONSTIPATION, UNSPECIFIED CONSTIPATION TYPE: ICD-10-CM

## 2018-02-02 DIAGNOSIS — K92.2 ACUTE LOWER GI BLEEDING: ICD-10-CM

## 2018-02-02 DIAGNOSIS — R13.12 OROPHARYNGEAL DYSPHAGIA: ICD-10-CM

## 2018-02-02 DIAGNOSIS — A41.9 SEPTIC SHOCK (HCC): ICD-10-CM

## 2018-02-02 DIAGNOSIS — N17.9 AKI (ACUTE KIDNEY INJURY) (HCC): ICD-10-CM

## 2018-02-02 DIAGNOSIS — R00.1 SYMPTOMATIC BRADYCARDIA: ICD-10-CM

## 2018-02-02 DIAGNOSIS — R41.0 DELIRIUM: ICD-10-CM

## 2018-02-02 DIAGNOSIS — D61.818 PANCYTOPENIA (HCC): ICD-10-CM

## 2018-02-02 DIAGNOSIS — J96.01 ACUTE RESPIRATORY FAILURE WITH HYPOXIA (HCC): ICD-10-CM

## 2018-02-02 DIAGNOSIS — L03.113 CELLULITIS OF RIGHT UPPER ARM: ICD-10-CM

## 2018-02-02 LAB
ABO + RH BLD: NORMAL
ALBUMIN SERPL-MCNC: 3.3 G/DL (ref 3.5–5)
ALBUMIN/GLOB SERPL: 1 {RATIO} (ref 1.1–2.2)
ALP SERPL-CCNC: 99 U/L (ref 45–117)
ALT SERPL-CCNC: 24 U/L (ref 12–78)
ANION GAP SERPL CALC-SCNC: 9 MMOL/L (ref 5–15)
APPEARANCE UR: ABNORMAL
APTT PPP: 61.8 SEC (ref 22.1–32.5)
AST SERPL-CCNC: 32 U/L (ref 15–37)
BACTERIA URNS QL MICRO: NEGATIVE /HPF
BASOPHILS # BLD: 0 K/UL (ref 0–0.1)
BASOPHILS NFR BLD: 1 % (ref 0–1)
BILIRUB SERPL-MCNC: 0.9 MG/DL (ref 0.2–1)
BILIRUB UR QL: NEGATIVE
BLOOD GROUP ANTIBODIES SERPL: NORMAL
BNP SERPL-MCNC: 3989 PG/ML (ref 0–450)
BUN SERPL-MCNC: 63 MG/DL (ref 6–20)
BUN/CREAT SERPL: 29 (ref 12–20)
CALCIUM SERPL-MCNC: 8.5 MG/DL (ref 8.5–10.1)
CHLORIDE SERPL-SCNC: 110 MMOL/L (ref 97–108)
CK MB CFR SERPL CALC: 14.3 % (ref 0–2.5)
CK MB SERPL-MCNC: 9.9 NG/ML (ref 5–25)
CK SERPL-CCNC: 69 U/L (ref 39–308)
CO2 SERPL-SCNC: 26 MMOL/L (ref 21–32)
COLOR UR: ABNORMAL
CREAT SERPL-MCNC: 2.15 MG/DL (ref 0.7–1.3)
DIFFERENTIAL METHOD BLD: ABNORMAL
EOSINOPHIL # BLD: 0 K/UL (ref 0–0.4)
EOSINOPHIL NFR BLD: 0 % (ref 0–7)
EPITH CASTS URNS QL MICRO: ABNORMAL /LPF
ERYTHROCYTE [DISTWIDTH] IN BLOOD BY AUTOMATED COUNT: 16 % (ref 11.5–14.5)
GLOBULIN SER CALC-MCNC: 3.4 G/DL (ref 2–4)
GLUCOSE SERPL-MCNC: 92 MG/DL (ref 65–100)
GLUCOSE UR STRIP.AUTO-MCNC: NEGATIVE MG/DL
HCT VFR BLD AUTO: 33.9 % (ref 36.6–50.3)
HEMOCCULT STL QL: POSITIVE
HGB BLD-MCNC: 10.9 G/DL (ref 12.1–17)
HGB UR QL STRIP: ABNORMAL
IMM GRANULOCYTES # BLD: 0 K/UL
IMM GRANULOCYTES NFR BLD AUTO: 0 %
INR PPP: 2.3 (ref 0.9–1.1)
KETONES UR QL STRIP.AUTO: NEGATIVE MG/DL
LACTATE SERPL-SCNC: 1.2 MMOL/L (ref 0.4–2)
LEUKOCYTE ESTERASE UR QL STRIP.AUTO: NEGATIVE
LYMPHOCYTES # BLD: 0.4 K/UL (ref 0.8–3.5)
LYMPHOCYTES NFR BLD: 19 % (ref 12–49)
MAGNESIUM SERPL-MCNC: 2.4 MG/DL (ref 1.6–2.4)
MCH RBC QN AUTO: 34.5 PG (ref 26–34)
MCHC RBC AUTO-ENTMCNC: 32.2 G/DL (ref 30–36.5)
MCV RBC AUTO: 107.3 FL (ref 80–99)
MONOCYTES # BLD: 0.2 K/UL (ref 0–1)
MONOCYTES NFR BLD: 11 % (ref 5–13)
NEUTS SEG # BLD: 1.3 K/UL (ref 1.8–8)
NEUTS SEG NFR BLD: 69 % (ref 32–75)
NITRITE UR QL STRIP.AUTO: NEGATIVE
NRBC # BLD: 0.02 K/UL (ref 0–0.01)
NRBC BLD-RTO: 1 PER 100 WBC
PH UR STRIP: 5 [PH] (ref 5–8)
PLATELET # BLD AUTO: 45 K/UL (ref 150–400)
PMV BLD AUTO: ABNORMAL FL (ref 8.9–12.9)
POTASSIUM SERPL-SCNC: 5.1 MMOL/L (ref 3.5–5.1)
PROT SERPL-MCNC: 6.7 G/DL (ref 6.4–8.2)
PROT UR STRIP-MCNC: NEGATIVE MG/DL
PROTHROMBIN TIME: 23.4 SEC (ref 9–11.1)
RBC # BLD AUTO: 3.16 M/UL (ref 4.1–5.7)
RBC #/AREA URNS HPF: ABNORMAL /HPF (ref 0–5)
RBC MORPH BLD: ABNORMAL
SODIUM SERPL-SCNC: 145 MMOL/L (ref 136–145)
SP GR UR REFRACTOMETRY: 1.02 (ref 1–1.03)
SPECIMEN EXP DATE BLD: NORMAL
THERAPEUTIC RANGE,PTTT: ABNORMAL SECS (ref 58–77)
TROPONIN I SERPL-MCNC: 0.07 NG/ML
UA: UC IF INDICATED,UAUC: ABNORMAL
UROBILINOGEN UR QL STRIP.AUTO: 0.2 EU/DL (ref 0.2–1)
WBC # BLD AUTO: 1.9 K/UL (ref 4.1–11.1)
WBC MORPH BLD: ABNORMAL
WBC URNS QL MICRO: ABNORMAL /HPF (ref 0–4)

## 2018-02-02 PROCEDURE — 85730 THROMBOPLASTIN TIME PARTIAL: CPT | Performed by: EMERGENCY MEDICINE

## 2018-02-02 PROCEDURE — C1751 CATH, INF, PER/CENT/MIDLINE: HCPCS

## 2018-02-02 PROCEDURE — 71045 X-RAY EXAM CHEST 1 VIEW: CPT

## 2018-02-02 PROCEDURE — 86900 BLOOD TYPING SEROLOGIC ABO: CPT | Performed by: EMERGENCY MEDICINE

## 2018-02-02 PROCEDURE — 81001 URINALYSIS AUTO W/SCOPE: CPT | Performed by: EMERGENCY MEDICINE

## 2018-02-02 PROCEDURE — 83880 ASSAY OF NATRIURETIC PEPTIDE: CPT | Performed by: EMERGENCY MEDICINE

## 2018-02-02 PROCEDURE — 77030034848

## 2018-02-02 PROCEDURE — 85610 PROTHROMBIN TIME: CPT | Performed by: EMERGENCY MEDICINE

## 2018-02-02 PROCEDURE — 65610000006 HC RM INTENSIVE CARE

## 2018-02-02 PROCEDURE — 85025 COMPLETE CBC W/AUTO DIFF WBC: CPT | Performed by: EMERGENCY MEDICINE

## 2018-02-02 PROCEDURE — 51702 INSERT TEMP BLADDER CATH: CPT

## 2018-02-02 PROCEDURE — 82553 CREATINE MB FRACTION: CPT | Performed by: EMERGENCY MEDICINE

## 2018-02-02 PROCEDURE — 87040 BLOOD CULTURE FOR BACTERIA: CPT | Performed by: EMERGENCY MEDICINE

## 2018-02-02 PROCEDURE — 83735 ASSAY OF MAGNESIUM: CPT | Performed by: EMERGENCY MEDICINE

## 2018-02-02 PROCEDURE — 30233R1 TRANSFUSION OF NONAUTOLOGOUS PLATELETS INTO PERIPHERAL VEIN, PERCUTANEOUS APPROACH: ICD-10-PCS | Performed by: INTERNAL MEDICINE

## 2018-02-02 PROCEDURE — 87077 CULTURE AEROBIC IDENTIFY: CPT | Performed by: EMERGENCY MEDICINE

## 2018-02-02 PROCEDURE — 36415 COLL VENOUS BLD VENIPUNCTURE: CPT | Performed by: EMERGENCY MEDICINE

## 2018-02-02 PROCEDURE — 96365 THER/PROPH/DIAG IV INF INIT: CPT

## 2018-02-02 PROCEDURE — 74011250636 HC RX REV CODE- 250/636: Performed by: EMERGENCY MEDICINE

## 2018-02-02 PROCEDURE — 80053 COMPREHEN METABOLIC PANEL: CPT | Performed by: EMERGENCY MEDICINE

## 2018-02-02 PROCEDURE — 99285 EMERGENCY DEPT VISIT HI MDM: CPT

## 2018-02-02 PROCEDURE — 75810000137 HC PLCMT CENT VENOUS CATH

## 2018-02-02 PROCEDURE — 70450 CT HEAD/BRAIN W/O DYE: CPT

## 2018-02-02 PROCEDURE — 87147 CULTURE TYPE IMMUNOLOGIC: CPT | Performed by: EMERGENCY MEDICINE

## 2018-02-02 PROCEDURE — 83605 ASSAY OF LACTIC ACID: CPT | Performed by: EMERGENCY MEDICINE

## 2018-02-02 PROCEDURE — 87186 SC STD MICRODIL/AGAR DIL: CPT | Performed by: EMERGENCY MEDICINE

## 2018-02-02 PROCEDURE — 96366 THER/PROPH/DIAG IV INF ADDON: CPT

## 2018-02-02 PROCEDURE — 84484 ASSAY OF TROPONIN QUANT: CPT | Performed by: EMERGENCY MEDICINE

## 2018-02-02 PROCEDURE — 82272 OCCULT BLD FECES 1-3 TESTS: CPT | Performed by: EMERGENCY MEDICINE

## 2018-02-02 PROCEDURE — 93005 ELECTROCARDIOGRAM TRACING: CPT

## 2018-02-02 RX ORDER — SODIUM CHLORIDE 0.9 % (FLUSH) 0.9 %
5-10 SYRINGE (ML) INJECTION EVERY 8 HOURS
Status: DISCONTINUED | OUTPATIENT
Start: 2018-02-02 | End: 2018-02-23 | Stop reason: HOSPADM

## 2018-02-02 RX ORDER — VANCOMYCIN 1.75 GRAM/500 ML IN 0.9 % SODIUM CHLORIDE INTRAVENOUS
1750 EVERY 24 HOURS
Status: DISCONTINUED | OUTPATIENT
Start: 2018-02-03 | End: 2018-02-08

## 2018-02-02 RX ORDER — DOPAMINE HYDROCHLORIDE 320 MG/100ML
0-20 INJECTION, SOLUTION INTRAVENOUS
Status: DISCONTINUED | OUTPATIENT
Start: 2018-02-02 | End: 2018-02-05

## 2018-02-02 RX ORDER — LEVOFLOXACIN 5 MG/ML
750 INJECTION, SOLUTION INTRAVENOUS
Status: DISCONTINUED | OUTPATIENT
Start: 2018-02-02 | End: 2018-02-07

## 2018-02-02 RX ORDER — SODIUM CHLORIDE 9 MG/ML
250 INJECTION, SOLUTION INTRAVENOUS AS NEEDED
Status: DISCONTINUED | OUTPATIENT
Start: 2018-02-02 | End: 2018-02-07 | Stop reason: ALTCHOICE

## 2018-02-02 RX ORDER — SODIUM CHLORIDE 9 MG/ML
75 INJECTION, SOLUTION INTRAVENOUS CONTINUOUS
Status: DISCONTINUED | OUTPATIENT
Start: 2018-02-02 | End: 2018-02-03

## 2018-02-02 RX ORDER — MUPIROCIN 20 MG/G
OINTMENT TOPICAL 2 TIMES DAILY
Status: DISPENSED | OUTPATIENT
Start: 2018-02-02 | End: 2018-02-07

## 2018-02-02 RX ORDER — SODIUM CHLORIDE 0.9 % (FLUSH) 0.9 %
5-10 SYRINGE (ML) INJECTION AS NEEDED
Status: DISCONTINUED | OUTPATIENT
Start: 2018-02-02 | End: 2018-02-23 | Stop reason: HOSPADM

## 2018-02-02 RX ADMIN — DOPAMINE HYDROCHLORIDE IN DEXTROSE 2.5 MCG/KG/MIN: 3.2 INJECTION, SOLUTION INTRAVENOUS at 19:35

## 2018-02-02 RX ADMIN — LEVOFLOXACIN 750 MG: 5 INJECTION, SOLUTION INTRAVENOUS at 23:33

## 2018-02-02 RX ADMIN — Medication 10 ML: at 23:36

## 2018-02-02 RX ADMIN — VANCOMYCIN HYDROCHLORIDE 2250 MG: 10 INJECTION, POWDER, LYOPHILIZED, FOR SOLUTION INTRAVENOUS at 23:30

## 2018-02-02 NOTE — IP AVS SNAPSHOT
[address] [facility] 
[phone] Patient: Yunior Cevallos 
MRN: [id number] PBJ:1/2/8422 About your hospitalization You were admitted on:  February 2, 2018 You last received care in the:  [facility] PROGRESSIVE CARE You were discharged on:  February 23, 2018 Why you were hospitalized Your primary diagnosis was:  Not on File Your diagnoses also included:  Atrial Fibrillation (Hcc), Hypothermia, Sepsis (Hcc), Oropharyngeal Dysphagia, Increased Oropharyngeal Secretions, Aspiration Pneumonia (Hcc), Physical Deconditioning Follow-up Information Follow up With Details Comments Contact Info Tu Mulligan NP  PCP - please schedule a follow up 1-2 weeks after you are discharged from [address] [place] [address] [address] [employer] [address] 
[phone] [facility]. - you are being discharged here for [address] [facility] 
[phone] Anitha Childers MD  Cardiology - follow up 1-2 weeks after you are discharged from [address] [place] [address]. [address] [facility] 
[phone] Discharge Orders None A check jorge indicates which time of day the medication should be taken. My Medications START taking these medications Instructions Each Dose to Equal  
 Morning Noon Evening Bedtime  
 midodrine 2.5 mg tablet Commonly known as:  Evangelist Yina Your last dose was: Your next dose is: Take 3 Tabs by mouth three (3) times daily (with meals) for 30 days. 7.5 mg  
    
   
   
   
  
 nystatin powder Commonly known as:  MYCOSTATIN Your last dose was: Your next dose is:    
   
   
 Apply  to affected area three (3) times daily. polyethylene glycol 17 gram packet Commonly known as:  Odella Cadet Start taking on:  2/24/2018 Your last dose was: Your next dose is: Take 1 Packet by mouth daily. 17 g CHANGE how you take these medications Instructions Each Dose to Equal  
 Morning Noon Evening Bedtime  
 furosemide 40 mg tablet Commonly known as:  LASIX What changed:   
- medication strength 
- how much to take - when to take this Your last dose was: Your next dose is: Take 1 Tab by mouth every Monday, Wednesday, Friday. 40 mg  
    
   
   
   
  
 rivaroxaban 15 mg Tab tablet Commonly known as:  Tree Lambert Start taking on:  2/24/2018 What changed:   
- medication strength 
- how much to take - when to take this Your last dose was: Your next dose is: Take 1 Tab by mouth daily (with breakfast). 15 mg  
    
   
   
   
  
 traMADol 50 mg tablet Commonly known as:  ULTRAM  
What changed:   
- how much to take 
- how to take this - when to take this 
- reasons to take this Your last dose was: Your next dose is: Take 1 Tab by mouth every six (6) hours as needed for Pain. Max Daily Amount: 200 mg.  
 50 mg CONTINUE taking these medications Instructions Each Dose to Equal  
 Morning Noon Evening Bedtime  
 albuterol 90 mcg/actuation inhaler Commonly known as:  PROVENTIL HFA, VENTOLIN HFA, PROAIR HFA Your last dose was: Your next dose is: Take 2 Puffs by inhalation every four (4) hours as needed for Wheezing. 2 Puff STOP taking these medications   
 amiodarone 100 mg tablet Commonly known as:  PACERONE  
   
  
 carvedilol 3.125 mg tablet Commonly known as:  COREG  
   
  
 diphenhydrAMINE 25 mg capsule Commonly known as:  BENADRYL potassium chloride 10 mEq tablet Commonly known as:  KLOR-CON  
   
  
 SSD 1 % topical cream  
Generic drug:  silver sulfADIAZINE  
   
  
 triamcinolone acetonide 0.1 % ointment Commonly known as:  KENALOG Where to Get Your Medications These medications were sent to [facility]  [address] [facility] [address] Phone:  [phone]  
  furosemide 40 mg tablet Information on where to get these meds will be given to you by the nurse or doctor. ! Ask your nurse or doctor about these medications  
  midodrine 2.5 mg tablet  
 nystatin powder  
 polyethylene glycol 17 gram packet  
 rivaroxaban 15 mg Tab tablet  
 traMADol 50 mg tablet Discharge Instructions HOSPITALIST DISCHARGE INSTRUCTIONS 
 
NAME: Heather Reich  
:  1928 MRN:  [id number] Date/Time:  2018 1:34 PM 
 
ADMIT DATE: 2018 DISCHARGE DATE: 2018 Attending Physician: Olimpia Juarez MD 
 
DISCHARGE DIAGNOSIS: 
Septic Shock, Hypothermia, Bacteremia, Aspiration Pneumonia, Respiratory Failure, Encephalopathy, Delirium, Chronic A. Fib,  BABAK/CKD, Pancytopenia, H/O Prostatic Cancer, Deep Tissue Injury Medications: Per above medication reconciliation. Pain Management: per above medications Recommended diet: Mechanical Soft Diet Recommended activity: Activity as tolerated Wound care: See surgical/procedure care instructions Indwelling devices:  None Supplemental Oxygen: None Required Lab work: Per SNF routine Glucose management:  None Code status: Partial 
 
 
CHF specific discharge instructions Weight: 
· Daily weights, Notify your Doctor if Wt gain of 3 lb in a day or 5 lb in a week · Daily Intake & Output Diet : 
· Low salt cardiac diet · Fluid restriction of 1500 ml daily Outside physician follow up: Follow-up Information Follow up With Details Comments Contact Info Areli Lau NP On 2018 For hospital follow up appointment at [address] [place], [place] [address] 
[phone] F/U PCP 
F/U Cardiology Skilled nursing facility/ SNF MD responsible for above on discharge. Information obtained by : 
I understand that if any problems occur once I am at home I am to contact my physician. I understand and acknowledge receipt of the instructions indicated above. Physician's or R.N.'s Signature                                                                  Date/Time Patient or Repres [email or web address] Announcement We are excited to announce that we are making your provider's discharge notes available to you in Chimeros. You will see these notes when they are completed and signed by the physician that discharged you from your recent hospital stay. If you have any questions or concerns about any information you see in Chimeros, please call the Health Information Department where you were seen or reach out to your Primary Care Provider for more information about your plan of care. Introducing [address]! Dear Keysha Trevino: Thank you for requesting a Chimeros account. Our records indicate that you already have an active Chimeros account. You can access your account anytime at [email or web address]. [employer] Did you know that you can access your hospital and ER discharge instructions at any time in Chimeros? You can also review all of your test results from your hospital stay or ER visit. Additional Information If you have questions, please visit the Frequently Asked Questions section of the Chimeros website at [email or web address]. [email or web address]/. Remember, Chimeros is NOT to be used for urgent needs.  For medical emergencies, dial 911. Now available from your iPhone and Android! Providers Seen During Your Hospitalization Provider Specialty Primary office phone Abby Hurd MD Emergency Medicine [phone] Thad Bowens MD Internal Medicine [phone] Zoe Summers MD Internal Medicine [phone] Yu Sherman MD Internal Medicine [phone] Marta Wood MD Hospitalist [phone] Your Primary Care Physician (PCP) Primary Care Physician Office Phone Office Fax Beata Ch [phone] You are allergic to the following Allergen Reactions Ancef (Cefazolin) Unknown (comments) \"drops my blood pressure\" Neosporin (Benzalkonium Chloride) Unknown (comments) Polysporin (Bacitracin-Polymyxin B) Other (comments) \"WOUNDS DO NOT HEAL\" Recent Documentation Height Weight BMI Smoking Status 1.753 m 90 kg 29.3 kg/m2 Former Smoker Emergency Contacts Name Discharge Info Relation Home Work Mobile Montse Worthy DISCHARGE CAREGIVER [3] Spouse [3]   [phone] Montse Hendrix  Spouse [3] [phone] Jerrell Chin  Daughter [21]   [phone] MonroeDeacon DISCHARGE CAREGIVER [3] Child [2]   [phone] Patient Belongings The following personal items are in your possession at time of discharge: 
  Dental Appliances: None  Visual Aid: Glasses      Home Medications: None   Jewelry: None  Clothing: Shirt    Other Valuables: None Please provide this summary of care documentation to your next provider. Signatures-by signing, you are acknowledging that this After Visit Summary has been reviewed with you and you have received a copy. Patient Signature:  ____________________________________________________________ Date:  ____________________________________________________________  
  
Fred Lizama  Provider Signature: ____________________________________________________________ Date:  ____________________________________________________________

## 2018-02-02 NOTE — TELEPHONE ENCOUNTER
[phone] contact # maria isabel Willard(wife) wants an appt to see Dr. Sudhir Lowry as they are [facility] patients and patient can't walk, crawling to bathroom, or she has to give assistance. Dr. Sudhir Lowry saw him about 10 days ago, feeling weak and having swelling in legs and still the same and now can't walk either.     Thanks,

## 2018-02-02 NOTE — TELEPHONE ENCOUNTER
Tues went to [place] to see a Dr. Raynette Kawasaki? Wed morning noted feet swollen and not moving very fast. This morning got up to go to  and he crawled, later she got him up and he got upright but couldn't walk. Increased confusion. Lasix Tue and Friday, Has not taken today.   No trouble breathing

## 2018-02-02 NOTE — IP AVS SNAPSHOT
[address] [facility] 
[phone] Patient: Najma Gardner 
MRN: [id number] OVU:7/8/[birth year] A check jorge indicates which time of day the medication should be taken. My Medications START taking these medications Instructions Each Dose to Equal  
 Morning Noon Evening Bedtime  
 midodrine 2.5 mg tablet Commonly known as:  Luretha Pardon Your last dose was: Your next dose is: Take 3 Tabs by mouth three (3) times daily (with meals) for 30 days. 7.5 mg  
    
   
   
   
  
 nystatin powder Commonly known as:  MYCOSTATIN Your last dose was: Your next dose is:    
   
   
 Apply  to affected area three (3) times daily. polyethylene glycol 17 gram packet Commonly known as:  Wyalisaerendira Mary Jane Start taking on:  2/24/2018 Your last dose was: Your next dose is: Take 1 Packet by mouth daily. 17 g CHANGE how you take these medications Instructions Each Dose to Equal  
 Morning Noon Evening Bedtime  
 furosemide 40 mg tablet Commonly known as:  LASIX What changed:   
- medication strength 
- how much to take - when to take this Your last dose was: Your next dose is: Take 1 Tab by mouth every Monday, Wednesday, Friday. 40 mg  
    
   
   
   
  
 rivaroxaban 15 mg Tab tablet Commonly known as:  [place] Low Start taking on:  2/24/2018 What changed:   
- medication strength 
- how much to take - when to take this Your last dose was: Your next dose is: Take 1 Tab by mouth daily (with breakfast). 15 mg  
    
   
   
   
  
 traMADol 50 mg tablet Commonly known as:  ULTRAM  
What changed:   
- how much to take 
- how to take this - when to take this 
- reasons to take this Your last dose was: Your next dose is: Take 1 Tab by mouth every six (6) hours as needed for Pain. Max Daily Amount: 200 mg.  
 50 mg CONTINUE taking these medications Instructions Each Dose to Equal  
 Morning Noon Evening Bedtime  
 albuterol 90 mcg/actuation inhaler Commonly known as:  PROVENTIL HFA, VENTOLIN HFA, PROAIR HFA Your last dose was: Your next dose is: Take 2 Puffs by inhalation every four (4) hours as needed for Wheezing. 2 Puff STOP taking these medications   
 amiodarone 100 mg tablet Commonly known as:  PACERONE  
   
  
 carvedilol 3.125 mg tablet Commonly known as:  COREG  
   
  
 diphenhydrAMINE 25 mg capsule Commonly known as:  BENADRYL potassium chloride 10 mEq tablet Commonly known as:  KLOR-CON  
   
  
 SSD 1 % topical cream  
Generic drug:  silver sulfADIAZINE  
   
  
 triamcinolone acetonide 0.1 % ointment Commonly known as:  KENALOG Where to Get Your Medications [facility] [address]  [address] [place] [address] Phone:  [phone]  
  furosemide 40 mg tablet Information on where to get these meds will be given to you by the nurse or doctor. ! Ask your nurse or doctor about these medications  
  midodrine 2.5 mg tablet  
 nystatin powder  
 polyethylene glycol 17 gram packet  
 rivaroxaban 15 mg Tab tablet  
 traMADol 50 mg tablet

## 2018-02-02 NOTE — ED PROVIDER NOTES
EMERGENCY DEPARTMENT HISTORY AND PHYSICAL EXAM      Date: 2/2/2018  Patient Name: Ana Tran    History of Presenting Illness     Chief Complaint   Patient presents with    Slow Heart Rate    Shortness of Breath    Lethargy     since last night       History Provided By: Patient and EMS and Caregiver    HPI: Ana Tran, 80 y.o. male with PMHx significant for HLD, A Fib with RVR, cardiomyopathy, CAD, and CHF, presents by EMS to the ED with cc of generalized weakness. Pt endorses associated acute weight gain, bilateral leg swelling, and SOB. Pt reports that he has been having worsening generalized weakness since last night. Pt states that he also had a syncopal episode yesterday. Pt's caregiver reports that the pt's facial droop is baseline secondary to a past diagnosis of Bell's Palsy. They also note that the pt's pulse is baseline, but the bilateral edema is new. Per EMS, the pt was evaluated at [facility] for similar symptoms this afternoon where he was found to have orthopnea and dyspnea on exertion. Pt was also found to have right-sided weakness and BABAK. Pt also exhibited a junctional heart rhythm that jumps to bradycardia. Due to the acuity of the pt's condition, her was transferred to [facility] for further cardiac work-up. PCP: Winnie Kay NP    There are no other complaints, changes, or physical findings at this time. Current Outpatient Prescriptions   Medication Sig Dispense Refill    furosemide (LASIX) 20 mg tablet Take 1 Tab by mouth every Tuesday and Friday. 30 Tab 3    potassium chloride (KLOR-CON) 10 mEq tablet Take 1 Tab by mouth every Tuesday and Friday. 30 Tab 3    amiodarone (PACERONE) 100 mg tablet Take 1 Tab by mouth daily. 90 Tab 4    SSD 1 % topical cream       traMADol (ULTRAM) 50 mg tablet       triamcinolone acetonide (KENALOG) 0.1 % ointment Apply  to affected area two (2) times a day.  use thin layer   Indications: psoariasis      albuterol (PROVENTIL HFA, VENTOLIN HFA, PROAIR HFA) 90 mcg/actuation inhaler Take 2 Puffs by inhalation every four (4) hours as needed for Wheezing. 1 Inhaler 2    rivaroxaban (XARELTO) 20 mg tab tablet Take 1 Tab by mouth daily. Indications: prevent stroke (Patient taking differently: Take 15 mg by mouth daily (with dinner). Indications: prevent stroke) 30 Tab 11    diphenhydrAMINE (BENADRYL) 25 mg capsule Take 25 mg by mouth nightly as needed for Sleep. Indications: ALLERGIC RHINITIS      carvedilol (COREG) 3.125 mg tablet Take  by mouth two (2) times daily (with meals). 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, XRT; resulting right Timnath' palsy    Psoriasis     lower arms, legs (above knees)       Past Surgical History:  Past Surgical History:   Procedure Laterality Date    HX CATARACT REMOVAL Bilateral     HX COLONOSCOPY      HX PROSTATECTOMY  1997    HX TONSILLECTOMY  as a child       Family History:  Family History   Problem Relation Age of Onset    Cancer Mother      BRAIN TUMOR    Cancer Brother      PROSTATE       Social History:  Social History   Substance Use Topics    Smoking status: Former Smoker    Smokeless tobacco: Never Used    Alcohol use 4.2 oz/week     7 Standard drinks or equivalent per week      Comment: \"1 drink a night\"       Allergies:   Allergies   Allergen Reactions    Ancef [Cefazolin] Unknown (comments)     \"drops my blood pressure\"    Neosporin [Benzalkonium Chloride] Unknown (comments)    Polysporin [Bacitracin-Polymyxin B] Other (comments)     \"WOUNDS DO NOT HEAL\"         Review of Systems   Review of Systems   Constitutional: Positive for unexpected weight change. Negative for chills and fever. HENT: Negative. Negative for congestion, facial swelling, rhinorrhea, sore throat, trouble swallowing and voice change. Eyes: Negative. Respiratory: Positive for shortness of breath. Negative for apnea, cough, chest tightness and wheezing. Cardiovascular: Positive for leg swelling. Negative for chest pain and palpitations. Gastrointestinal: Negative. Negative for abdominal distention, abdominal pain, blood in stool, constipation, diarrhea, nausea and vomiting. Endocrine: Negative. Negative for cold intolerance, heat intolerance and polyuria. Genitourinary: Negative. Negative for difficulty urinating, dysuria, flank pain, frequency, hematuria and urgency. Musculoskeletal: Negative. Negative for arthralgias, back pain, myalgias, neck pain and neck stiffness. Skin: Negative. Negative for color change and rash. Neurological: Positive for weakness. Negative for dizziness, syncope, facial asymmetry, speech difficulty, light-headedness, numbness and headaches. Hematological: Negative. Does not bruise/bleed easily. Psychiatric/Behavioral: Negative. Negative for confusion and self-injury. The patient is not nervous/anxious. Physical Exam   Physical Exam   Constitutional: He is oriented to person, place, and time. Vital signs are normal. He is cooperative. Non-toxic appearance. No distress. Critically ill appearing male  T 88.5F   HR 36  BP 80/60  R 30  91% on 3L oxygen. HENT:   Mouth/Throat: Mucous membranes are normal. No posterior oropharyngeal erythema. Facial disfigurement; surgical resection scar on the right side of the face near parotid gland  Right eye patch   Eyes: Conjunctivae and EOM are normal. Pupils are equal, round, and reactive to light. Neck: Normal range of motion. Cardiovascular: Normal heart sounds.   Exam reveals no gallop and no friction rub.    No murmur heard. Irregularly irregular; bradycardic  Decreased cap refill   Pulmonary/Chest: Effort normal and breath sounds normal. No respiratory distress. He has no wheezes. He has no rales. He exhibits no tenderness. On 3L NC; coarse BS throughout     Abdominal: Soft. Bowel sounds are normal. He exhibits no distension and no mass. There is no tenderness. There is no rebound and no guarding. Musculoskeletal: Normal range of motion. He exhibits no tenderness or deformity. 3+ pitting edema   Neurological: He is alert and oriented to person, place, and time. He displays normal reflexes. A cranial nerve deficit is present. He exhibits normal muscle tone. Coordination normal.   Obvious right facial droop; right sided weakness    Skin: Skin is dry. No rash noted. There is pallor. Nursing note and vitals reviewed. Diagnostic Study Results     Labs -     Recent Results (from the past 12 hour(s))   CBC WITH AUTOMATED DIFF    Collection Time: 02/02/18  1:30 PM   Result Value Ref Range    WBC 2.5 (L) 4.1 - 11.1 K/uL    RBC 3.21 (L) 4.10 - 5.70 M/uL    HGB 11.0 (L) 12.1 - 17.0 g/dL    HCT 33.8 (L) 36.6 - 50.3 %    .3 (H) 80.0 - 99.0 FL    MCH 34.3 (H) 26.0 - 34.0 PG    MCHC 32.5 30.0 - 36.5 g/dL    RDW 16.0 (H) 11.5 - 14.5 %    PLATELET 50 (L) 870 - 400 K/uL    MPV 14.7 (H) 8.9 - 12.9 FL    NRBC 0.0 0  WBC    ABSOLUTE NRBC 0.00 0.00 - 0.01 K/uL    NEUTROPHILS 62 32 - 75 %    LYMPHOCYTES 23 12 - 49 %    MONOCYTES 13 5 - 13 %    EOSINOPHILS 0 0 - 7 %    BASOPHILS 0 0 - 1 %    IMMATURE GRANULOCYTES 1 (H) 0.0 - 0.5 %    ABS. NEUTROPHILS 1.6 (L) 1.8 - 8.0 K/UL    ABS. LYMPHOCYTES 0.6 (L) 0.8 - 3.5 K/UL    ABS. MONOCYTES 0.3 0.0 - 1.0 K/UL    ABS. EOSINOPHILS 0.0 0.0 - 0.4 K/UL    ABS. BASOPHILS 0.0 0.0 - 0.1 K/UL    ABS. IMM.  GRANS. 0.0 0.00 - 0.04 K/UL    DF AUTOMATED     METABOLIC PANEL, COMPREHENSIVE    Collection Time: 02/02/18  1:30 PM   Result Value Ref Range    Sodium 144 136 - 145 mmol/L    Potassium 5.0 3.5 - 5.1 mmol/L    Chloride 107 97 - 108 mmol/L    CO2 25 21 - 32 mmol/L    Anion gap 12 5 - 15 mmol/L    Glucose 117 (H) 65 - 100 mg/dL    BUN 66 (H) 7.0 - 18.0 MG/DL    Creatinine 2.38 (H) 0.70 - 1.30 MG/DL    BUN/Creatinine ratio 28 (H) 12 - 20      GFR est AA 31 (L) >60 ml/min/1.73m2    GFR est non-AA 26 (L) >60 ml/min/1.73m2    Calcium 8.8 8.5 - 10.1 MG/DL    Bilirubin, total 0.9 0.2 - 1.0 MG/DL    ALT (SGPT) 30 14 - 63 U/L    AST (SGOT) 37 15 - 37 U/L    Alk. phosphatase 105 45 - 117 U/L    Protein, total 7.0 6.4 - 8.2 g/dL    Albumin 3.5 3.5 - 5.0 g/dL    Globulin 3.5 2.0 - 4.0 g/dL    A-G Ratio 1.0 (L) 1.1 - 2.2     CK W/ CKMB & INDEX    Collection Time: 02/02/18  1:30 PM   Result Value Ref Range    CK 82 39 - 308 U/L    CK - MB 9.2 (H) <3.6 NG/ML    CK-MB Index 11.2 (H) 0 - 2.5     MAGNESIUM    Collection Time: 02/02/18  1:30 PM   Result Value Ref Range    Magnesium 2.3 1.6 - 2.4 mg/dL   TROPONIN I    Collection Time: 02/02/18  1:30 PM   Result Value Ref Range    Troponin-I, Qt. 0.08 (H) <0.08 ng/mL   BNP    Collection Time: 02/02/18  1:30 PM   Result Value Ref Range     (H) 0 - 100 pg/mL   EKG, 12 LEAD, INITIAL    Collection Time: 02/02/18  1:43 PM   Result Value Ref Range    Ventricular Rate 45 BPM    Atrial Rate 26 BPM    QRS Duration 194 ms    Q-T Interval 678 ms    QTC Calculation (Bezet) 586 ms    Calculated R Axis -65 degrees    Calculated T Axis 28 degrees    Diagnosis       Undetermined rhythm  Right bundle branch block  Left anterior fascicular block  ** Bifascicular block **  Abnormal ECG  When compared with ECG of 10-SEP-2017 20:15,  Current undetermined rhythm precludes rhythm comparison, needs review  QRS duration has increased  QRS voltage has decreased         Radiologic Studies -     CXR Results  (Last 48 hours)               02/02/18 1340  XR CHEST PORT Final result    Impression:  IMPRESSION:       Cardiomegaly and pulmonary vascular congestion.  Small right-sided pleural   effusion and minor right basilar atelectasis. Narrative:  CHEST, ONE VIEW       INDICATION:  Shortness of breath. COMPARISON:  9/10/2017. FINDINGS:       There is stable cardiomegaly. Pulmonary vascular congestion. Small right-sided   pleural effusion and mild right basilar atelectasis. No evidence of   pneumothorax. There are no acute osseous findings. Moderate degenerative changes   at the left glenohumeral joint. Medical Decision Making   I am the first provider for this patient. I reviewed the vital signs, available nursing notes, past medical history, past surgical history, family history and social history. Vital Signs-Reviewed the patient's vital signs. Patient Vitals for the past 12 hrs:   Pulse Resp BP SpO2   02/02/18 1828 (!) 41 15 (!) 89/60 91 %       Pulse Oximetry Analysis - 94% on 3L NC    Cardiac Monitor:   Rate: 56 bpm  Rhythm: sinus bradycardia; Records Reviewed: Nursing Notes, Old Medical Records, Previous electrocardiograms, Ambulance Run Sheet, Previous Radiology Studies and Previous Laboratory Studies    Provider Notes (Medical Decision Making):   DDx: cardiogenic shock, sepsis, septic shock, hypovolemic shock, symptomatic bradycardia, hypothermia, CHF, aplastic anemia; respiratory failure    Pt is a 80 y.o. Male critically ill patient transferred from [facility] ED for acute pancytopenia, symptomatic bradycardia, and hypotension.  Pt is also extremely hypothermic upon arrival. Will passively warm pt with Jo-Ann hugger; pt with symptomatic bradycardia and severe hypotension; emergent central line access placed and dopamine gtt started per Cardiology recs; pt with active bleeding around central line insertion site as well as BRBPR; concern for consumptive coagulopathy/DIC; will transfuse platelets; check STAT head CT; concern for severe sepsis; will start broad spectrum IV abx including vanc/levo/ovidio (PCN allergy); continue resuscitation in the ER until transfer to ICU. Condition remains guarded. Family (daughter and son) updated at bedside. ED Course:   Initial assessment performed. The patients presenting problems have been discussed, and they are in agreement with the care plan formulated and outlined with them. I have encouraged them to ask questions as they arise throughout their visit. CONSULT NOTE:  6:39 PM  Nya Rosales MD spoke with Lydia Badillo MD  Specialty: Cardiology  Discussed patient's hx, disposition, and available diagnostic and imaging results. Reviewed care plans. Consultant agrees with plans as outlined. Dr. Jose Angel Weber recommends a Dopamine drip at 2.5 mcg/kg/hr. Currently no central access; pt with decreased mentation; emergent central line placed in left IJ    Written by Domi Paula ED Scribe, as dictated by Nya Rosales MD.    6:40 PM - I suspect that this patient has an active infection. 6:45PM - The patient met criteria for severe sepsis at this time. PROVIDER SEPSIS PHYSICAL EXAM EVAL  Vital signs reviewed (see nursing documentation for further details):  Vitals:    02/03/18 0245 02/03/18 0300 02/03/18 0315 02/03/18 0345   BP: 108/63 97/57 102/55 101/62   Pulse: 95 95 90 96   Resp: 23 14 18 17   Temp: 95.9 °F (35.5 °C) 96.3 °F (35.7 °C)     SpO2: 95% 94% 94% 94%       Cardiac exam:Bradycardic  Pulmonary exam:Rales  Peripheral pulses:Diminished  Capillary refill:Delayed  Skin exam:pale    Exam performed by Nya Rosales MD    SEP-1 Core Measure Exclusion Criteria  Pt No exclusion criteria   Has the patient/family refused IV fluids? yes due to h/o CHF and concern for flash pulm edema      Procedure Note - Emergent Central Line Placement:   7:13 PM  Performed by: Nya Rosales MD    Immediately prior to the procedure, the patient was reevaluated and found suitable for the planned procedure and any planned medications.   Immediately prior to the procedure a time out was called to verify the correct patient, procedure, equipment, staff, and marking as appropriate. Area was cleansed with Chlorprep and anesthetized with 3 mLs of 1% lidocaine. Prepped and draped in sterile fashion. Landmarks identified. 18 gauge needle with triple lumen catheter was inserted into pt's Left, Internal Jugular Vein with ultrasound guidance. Line sutured in place; sterile dressing applied with Biopatch. Position: Trendelenburg  Number of attempts: 1  Estimated blood loss: 5 cc  The procedure took 1-15 minutes, and pt tolerated well. PROGRESS NOTE:  9:10 PM  Notified by nursing that the pt's central line is bleeding; also pt's underwear saturated with blood from rectum. Platelet 43. Purse string 4-0 nylon placed around central line site under sterile precautions; hemostasis achieved. Lower GI bleed concerning; will start PPI; transfuse as needed. Written by LALI Mcdonald, as dictated by Christ Shepherd MD.    Procedure Note - Bleeding Control Management   9:10 PM  Performed by: Christ Shepherd MD .  Complexity - simple  Slow bleed around central line insertion site; no brisk bleed; severe thrombocytopenia noted; 4-0 nylon purse string stitch placed around left CVC site in sterile fashion; Biopatch replaced and secured with tegaderm; hemostasis achieved. Planning for platelet transfusion. Hemostasis was achieved after placement. Estimated blood loss: 20cc  The procedure took 1-15 minutes, and pt tolerated well. CONSULT NOTE:   9:23 PM  Christ Shepherd MD spoke with Kerry Gonzales MD   Specialty: Hospitalist  Discussed pt's hx, disposition, and available diagnostic and imaging results. Reviewed care plans. Consultant will evaluate pt for admission. Written by LALI Mcdonald, as dictated by Christ Shepherd MD.    Procedure Note - Rectal Exam:   10:28 PM  Performed by: Christ Shepherd MD  Chaperoned by: Mario Rico RN  Rectal exam performed. Gross blood visualized around rectal vault.    Bright red stool was collected. Stool was collected and sent to the lab for Hemoccult testing. Other findings: No external hemmorhoids or hard stool  The procedure took 1-15 minutes, and pt tolerated well. PROGRESS NOTE:  12:30 AM  Pt's BP is stable on Dopamine drip; mentation improved; Consent obtained for platelet transfusion. Written by Domi Paula ED Scribe, as dictated by Nya Rosales MD.      Critical Care Time:   CRITICAL CARE NOTE :    8:06 PM  IMPENDING DETERIORATION -Airway, Respiratory, Cardiovascular, CNS, Metabolic and Renal    ASSOCIATED RISK FACTORS - Hypotension, Shock, Hypoxia, Bleeding, Dysrhythmia, Metabolic changes, Dehydration, Vascular Compromise and CNS Decompensation    MANAGEMENT- Bedside Assessment and Supervision of Care    INTERPRETATION -  Xrays, CT Scan, Blood Gases, ECG, Blood Pressure and Cardiac Output Measures     INTERVENTIONS - hemodynamic mngmt, vascular control, Neurologic interventions  and Metobolic interventions    CASE REVIEW - Hospitalist, Medical Sub-Specialist, Nursing and Family    TREATMENT RESPONSE -Stable    PERFORMED BY - Self    NOTES   :    I have spent 80 minutes of critical care time involved in lab review, consultations with specialist, family decision- making, bedside attention and documentation. During this entire length of time I was immediately available to the patient . Critical Care: The reason for providing this level of medical care for this critically ill patient was due to a critical illness that impaired one or more vital organ systems, such that there was a high probability of imminent or life threatening deterioration in the patient's condition. This care involved high complexity decision making to assess, manipulate, and support vital system functions, to treat this degree of vital organ system failure, and to prevent further life threatening deterioration of the patients condition.       Nya Rosales MD        Disposition: ICU; CONDITION - critical/guarded  PLAN:  1. Admit to Hospitalist    ADMIT NOTE:  9:22 PM  Patient is being admitted to the hospital by Dr. Nydia Peng. The results of their tests and reasons for their admission have been discussed with them and/or available family. They convey agreement and understanding for the need to be admitted and for their admission diagnosis. Consultation has been made with the inpatient physician specialist for hospitalization. Diagnosis     Clinical Impression:   1. Hypothermia, initial encounter    2. Hypotension, unspecified hypotension type    3. Symptomatic bradycardia    4. Pancytopenia ([address]    5. BABAK (acute kidney injury) ([address]    6. Acute lower GI bleeding    7. Coagulopathy ([address]    8. Acute respiratory failure with hypoxia (HCC)    9. Cellulitis of right upper arm    10. Parotid tumor    11. Septic shock ([address]        Attestations: This note is prepared by Violet Olivas, acting as Scribe for Ale Godinez MD.    Ale Godinez MD: The scribe's documentation has been prepared under my direction and personally reviewed by me in its entirety. I confirm that the note above accurately reflects all work, treatment, procedures, and medical decision making performed by me. This note will not be viewable in [address]

## 2018-02-03 ENCOUNTER — APPOINTMENT (OUTPATIENT)
Dept: ULTRASOUND IMAGING | Age: 83
DRG: 871 | End: 2018-02-03
Attending: INTERNAL MEDICINE
Payer: MEDICARE

## 2018-02-03 LAB
ANION GAP SERPL CALC-SCNC: 9 MMOL/L (ref 5–15)
ATRIAL RATE: 42 BPM
BUN SERPL-MCNC: 64 MG/DL (ref 6–20)
BUN/CREAT SERPL: 28 (ref 12–20)
CALCIUM SERPL-MCNC: 8.8 MG/DL (ref 8.5–10.1)
CALCULATED R AXIS, ECG10: -66 DEGREES
CALCULATED T AXIS, ECG11: 42 DEGREES
CHLORIDE SERPL-SCNC: 111 MMOL/L (ref 97–108)
CO2 SERPL-SCNC: 26 MMOL/L (ref 21–32)
CREAT SERPL-MCNC: 2.28 MG/DL (ref 0.7–1.3)
DIAGNOSIS, 93000: NORMAL
ERYTHROCYTE [DISTWIDTH] IN BLOOD BY AUTOMATED COUNT: 16.2 % (ref 11.5–14.5)
GLUCOSE BLD STRIP.AUTO-MCNC: 115 MG/DL (ref 65–100)
GLUCOSE BLD STRIP.AUTO-MCNC: 129 MG/DL (ref 65–100)
GLUCOSE BLD STRIP.AUTO-MCNC: 137 MG/DL (ref 65–100)
GLUCOSE BLD STRIP.AUTO-MCNC: 62 MG/DL (ref 65–100)
GLUCOSE BLD STRIP.AUTO-MCNC: 64 MG/DL (ref 65–100)
GLUCOSE BLD STRIP.AUTO-MCNC: 66 MG/DL (ref 65–100)
GLUCOSE BLD STRIP.AUTO-MCNC: 67 MG/DL (ref 65–100)
GLUCOSE SERPL-MCNC: 63 MG/DL (ref 65–100)
HCT VFR BLD AUTO: 31.6 % (ref 36.6–50.3)
HGB BLD-MCNC: 10.3 G/DL (ref 12.1–17)
MCH RBC QN AUTO: 34.2 PG (ref 26–34)
MCHC RBC AUTO-ENTMCNC: 32.6 G/DL (ref 30–36.5)
MCV RBC AUTO: 105 FL (ref 80–99)
NRBC # BLD: 0.03 K/UL (ref 0–0.01)
NRBC BLD-RTO: 0.5 PER 100 WBC
PATH REV BLD -IMP: NORMAL
PLATELET # BLD AUTO: 111 K/UL (ref 150–400)
PMV BLD AUTO: 11.1 FL (ref 8.9–12.9)
POTASSIUM SERPL-SCNC: 5.1 MMOL/L (ref 3.5–5.1)
Q-T INTERVAL, ECG07: 682 MS
QRS DURATION, ECG06: 194 MS
QTC CALCULATION (BEZET), ECG08: 549 MS
RBC # BLD AUTO: 3.01 M/UL (ref 4.1–5.7)
SERVICE CMNT-IMP: ABNORMAL
SERVICE CMNT-IMP: NORMAL
SERVICE CMNT-IMP: NORMAL
SODIUM SERPL-SCNC: 146 MMOL/L (ref 136–145)
VENTRICULAR RATE, ECG03: 39 BPM
WBC # BLD AUTO: 5.7 K/UL (ref 4.1–11.1)

## 2018-02-03 PROCEDURE — 65620000000 HC RM CCU GENERAL

## 2018-02-03 PROCEDURE — 74011000258 HC RX REV CODE- 258: Performed by: INTERNAL MEDICINE

## 2018-02-03 PROCEDURE — 36430 TRANSFUSION BLD/BLD COMPNT: CPT

## 2018-02-03 PROCEDURE — 74011250636 HC RX REV CODE- 250/636: Performed by: INTERNAL MEDICINE

## 2018-02-03 PROCEDURE — 74011000250 HC RX REV CODE- 250

## 2018-02-03 PROCEDURE — 77030033269 HC SLV COMPR SCD KNE2 CARD -B

## 2018-02-03 PROCEDURE — 93306 TTE W/DOPPLER COMPLETE: CPT

## 2018-02-03 PROCEDURE — 74011250636 HC RX REV CODE- 250/636: Performed by: EMERGENCY MEDICINE

## 2018-02-03 PROCEDURE — 74011250637 HC RX REV CODE- 250/637: Performed by: INTERNAL MEDICINE

## 2018-02-03 PROCEDURE — 74011000250 HC RX REV CODE- 250: Performed by: INTERNAL MEDICINE

## 2018-02-03 PROCEDURE — 36415 COLL VENOUS BLD VENIPUNCTURE: CPT | Performed by: INTERNAL MEDICINE

## 2018-02-03 PROCEDURE — P9035 PLATELET PHERES LEUKOREDUCED: HCPCS | Performed by: EMERGENCY MEDICINE

## 2018-02-03 PROCEDURE — 80048 BASIC METABOLIC PNL TOTAL CA: CPT | Performed by: INTERNAL MEDICINE

## 2018-02-03 PROCEDURE — 82962 GLUCOSE BLOOD TEST: CPT

## 2018-02-03 PROCEDURE — 85027 COMPLETE CBC AUTOMATED: CPT | Performed by: INTERNAL MEDICINE

## 2018-02-03 PROCEDURE — 76770 US EXAM ABDO BACK WALL COMP: CPT

## 2018-02-03 RX ORDER — FAMOTIDINE 20 MG/1
20 TABLET, FILM COATED ORAL DAILY
Status: DISCONTINUED | OUTPATIENT
Start: 2018-02-03 | End: 2018-02-03

## 2018-02-03 RX ORDER — LANOLIN ALCOHOL/MO/W.PET/CERES
9 CREAM (GRAM) TOPICAL
Status: DISCONTINUED | OUTPATIENT
Start: 2018-02-03 | End: 2018-02-09

## 2018-02-03 RX ORDER — INSULIN LISPRO 100 [IU]/ML
INJECTION, SOLUTION INTRAVENOUS; SUBCUTANEOUS EVERY 6 HOURS
Status: DISCONTINUED | OUTPATIENT
Start: 2018-02-04 | End: 2018-02-07

## 2018-02-03 RX ORDER — INSULIN LISPRO 100 [IU]/ML
INJECTION, SOLUTION INTRAVENOUS; SUBCUTANEOUS
Status: DISCONTINUED | OUTPATIENT
Start: 2018-02-03 | End: 2018-02-03

## 2018-02-03 RX ORDER — NYSTATIN 100000 [USP'U]/G
POWDER TOPICAL 3 TIMES DAILY
Status: DISCONTINUED | OUTPATIENT
Start: 2018-02-03 | End: 2018-02-23 | Stop reason: HOSPADM

## 2018-02-03 RX ORDER — DEXTROSE MONOHYDRATE 50 MG/ML
75 INJECTION, SOLUTION INTRAVENOUS CONTINUOUS
Status: DISCONTINUED | OUTPATIENT
Start: 2018-02-03 | End: 2018-02-05

## 2018-02-03 RX ORDER — DEXTROSE 50 % IN WATER (D50W) INTRAVENOUS SYRINGE
Status: COMPLETED
Start: 2018-02-03 | End: 2018-02-03

## 2018-02-03 RX ORDER — CHLORHEXIDINE GLUCONATE 1.2 MG/ML
15 RINSE ORAL EVERY 12 HOURS
Status: DISCONTINUED | OUTPATIENT
Start: 2018-02-03 | End: 2018-02-07

## 2018-02-03 RX ORDER — MAGNESIUM SULFATE 100 %
4 CRYSTALS MISCELLANEOUS AS NEEDED
Status: DISCONTINUED | OUTPATIENT
Start: 2018-02-03 | End: 2018-02-23 | Stop reason: HOSPADM

## 2018-02-03 RX ORDER — FAMOTIDINE 20 MG/1
20 TABLET, FILM COATED ORAL 2 TIMES DAILY
Status: DISCONTINUED | OUTPATIENT
Start: 2018-02-03 | End: 2018-02-03 | Stop reason: DRUGHIGH

## 2018-02-03 RX ORDER — DEXTROSE 50 % IN WATER (D50W) INTRAVENOUS SYRINGE
12.5-25 AS NEEDED
Status: DISCONTINUED | OUTPATIENT
Start: 2018-02-03 | End: 2018-02-23 | Stop reason: HOSPADM

## 2018-02-03 RX ADMIN — CHLORHEXIDINE GLUCONATE 15 ML: 1.2 RINSE ORAL at 20:58

## 2018-02-03 RX ADMIN — NOREPINEPHRINE BITARTRATE 2 MCG/MIN: 1 INJECTION INTRAVENOUS at 12:42

## 2018-02-03 RX ADMIN — MUPIROCIN: 20 OINTMENT TOPICAL at 01:26

## 2018-02-03 RX ADMIN — MUPIROCIN: 20 OINTMENT TOPICAL at 11:48

## 2018-02-03 RX ADMIN — SODIUM CHLORIDE 1 G: 900 INJECTION, SOLUTION INTRAVENOUS at 02:49

## 2018-02-03 RX ADMIN — Medication 10 ML: at 12:56

## 2018-02-03 RX ADMIN — MELATONIN 3 MG ORAL TABLET 9 MG: 3 TABLET ORAL at 23:35

## 2018-02-03 RX ADMIN — DEXTROSE MONOHYDRATE 75 ML/HR: 5 INJECTION, SOLUTION INTRAVENOUS at 21:14

## 2018-02-03 RX ADMIN — NYSTATIN: 100000 POWDER TOPICAL at 21:07

## 2018-02-03 RX ADMIN — MUPIROCIN: 20 OINTMENT TOPICAL at 18:42

## 2018-02-03 RX ADMIN — DEXTROSE MONOHYDRATE 75 ML/HR: 5 INJECTION, SOLUTION INTRAVENOUS at 08:12

## 2018-02-03 RX ADMIN — VANCOMYCIN HYDROCHLORIDE 1750 MG: 10 INJECTION, POWDER, LYOPHILIZED, FOR SOLUTION INTRAVENOUS at 23:13

## 2018-02-03 RX ADMIN — DEXTROSE MONOHYDRATE 12.5 G: 25 INJECTION, SOLUTION INTRAVENOUS at 07:24

## 2018-02-03 RX ADMIN — MEROPENEM 500 MG: 500 INJECTION, POWDER, FOR SOLUTION INTRAVENOUS at 13:02

## 2018-02-03 RX ADMIN — Medication 10 ML: at 21:08

## 2018-02-03 RX ADMIN — Medication 10 ML: at 13:04

## 2018-02-03 RX ADMIN — CHLORHEXIDINE GLUCONATE 15 ML: 1.2 RINSE ORAL at 12:07

## 2018-02-03 RX ADMIN — Medication 10 ML: at 05:55

## 2018-02-03 RX ADMIN — NYSTATIN: 100000 POWDER TOPICAL at 18:42

## 2018-02-03 NOTE — PROGRESS NOTES
Pharmacy Automatic Renal Dosing Protocol - Antimicrobials    Indication for Antimicrobials: sepsis d/t endocarditis     Current Regimen of Each Antimicrobial:  Pharmacy consult to dose levofloxacin and vancomycin    Significant Cultures:   /- blood - pending    Paralysis, amputations, malnutrition: NA    Labs:  Recent Labs      18   1845  18   1330   CREA  2.15*  2.38*   BUN  63*  66*   WBC  1.9*  2.5*     Temp (24hrs), Av.3 °F (36.8 °C), Min:98.3 °F (36.8 °C), Max:98.3 °F (36.8 °C)      Creatinine Clearance (mL/min) or Dialysis: 26  No results found for: PCT    Impression/Plan:   Pharmacy consult to dose vancomycin and levofloxacin per MD for endocarditis. Levofloxacin - 750 mg IV Q48H for CrCl ~26 ml/min. Vancomycin - 2250 mg IV once followed by 1750 mg IV Q24H for an expected trough of 18.6     Pharmacy will follow daily and adjust medications as appropriate for renal function and/or serum levels. Thank you,  Kiley Kingston, PHARMD          Recommended duration of therapy  [email or web address]. docx    Renal Dosing  [email or web address]/Renal%[email or web address]. pdf

## 2018-02-03 NOTE — ED NOTES
TRANSFER - OUT REPORT:    Verbal report given to Natalie Maza RN(name) on Duarte Dodson  being transferred to CCU room 2544(unit) for routine progression of care       Report consisted of patients Situation, Background, Assessment and   Recommendations(SBAR). Information from the following report(s) SBAR, Kardex, ED Summary, Procedure Summary, Intake/Output, MAR and Recent Results was reviewed with the receiving nurse. Lines:   Triple Lumen 02/02/18 Left Neck (Active)   Central Line Being Utilized Yes 2/2/2018  7:47 PM   Site Assessment Clean, dry, & intact 2/2/2018  7:47 PM   Infiltration Assessment 0 2/2/2018  7:47 PM   Affected Extremity/Extremities Pulses palpable 2/2/2018  7:47 PM   Dressing Status Clean, dry, & intact 2/2/2018  7:47 PM   Dressing Type Transparent;Tape 2/2/2018  7:47 PM   Proximal Hub Color/Line Status Flushed 2/2/2018  7:47 PM   Positive Blood Return (Medial Site) Yes 2/2/2018  7:47 PM   Medial Hub Color/Line Status Flushed 2/2/2018  7:47 PM   Positive Blood Return (Lateral Site) Yes 2/2/2018  7:47 PM   Distal Hub Color/Line Status Flushed 2/2/2018  7:47 PM   Positive Blood Return (Site #3) Yes 2/2/2018  7:47 PM        Opportunity for questions and clarification was provided.       Patient transported with:   Monitor  Registered Nurse

## 2018-02-03 NOTE — PROGRESS NOTES
[facility]  Pulmonary, Critical Care, and Sleep Medicine    Name: Liliana Ramirez MRN: [id number]   : 1928 Hospital: [facility]   Date: 2/3/2018        Critical Care Initial Patient Consult    IMPRESSION:   · Encephalopathy, may be more delirium. Has some confusing comments, Has decreased short term memory. Noted per his daughter. · Severe Sepsis on pressors with dopamine and  Broad empiric abx. He had vibrio infections twice in the past. (years ago)  · Pancytopenia, ?aplastic anemia, ? MDS WBC: 5.7, Hgb: 10.3 Plt: 111. · Acute Bacteremia, + blood Cx noted. · Acute on Chronic Renal Insufficiency. · Hypotension/Bradycardia on pressors  · Left IJ CVC in place  · Parathyroid mass. S/p resection. · Hypoglycemia, was given D50 this am and placed on Dextrose infusion. · A fib, CHF followed by [facility]. Noted to have bradycardia last pm. Now with increased peripheral edema. · Coagulopathy. · Poor oral dentition. · UA negative for UTI. · Pt has been having several weeks of intermittent blood stools and constipation. · Decreased po intake for last 1-2 days. · Hx of prostate Ca. · Psoriasis  · Pt at moderate to high risk of decompensation. Risk of multiple organ failure. Critically ill, very high risk of multiple organ failure, on pressors. Bacteremia. 45 min CC, EOP. Discussed with pt, his daughter, and nurse. RECOMMENDATIONS:   · ON Vanc, Merrem, Levolfox  · Monitor Blood Cultures  · Will monitor for further Hypoglycemia, Added Dextrose infusion. · Can changes to Levophed drip  · Monitor Cr, LFTs, CBC. · Follow Coags  · Assess for him to take po. · Add CHG oral.   · Hold blood pressure medications. · Check Ionized Calcium levels  · Hold Coreg for now  · Hold Xarelto for now. Recheck Coags tomorrow. · Monitor for ongoing bloody stools. Subjective/History: This patient has been seen and evaluated at the request of Dr. Katie Dumont for above. Patient is a 80 y.o. male who presents for above. Was noted to be bradycardic. Noted to be hypothermic, hypotensive. NO nausea, no vomiting. No sick contacts. Noted to have bradycardia on presentation. Initially was noted to have some lethargy in ER. Had a syncopal episode per ER. Possible [place] palsy with facial droop verus parathyroid tumor. Has an eye patch in place on right eye. Was felt to have some right sided weakness, CT of head in Er was negative for an acute change. Per pt: noted to be weak and had difficulty walking. Has been easily bruising. Had decreased po intake for several days. WAs noted to have generalized fatigue. Had increased shortness of breath. Has been retaining more fluid in his legs and arms recently. [place] and had an open wound to his left forearm. Has orthopnea. No chest pain, has been having bloody stools for several days. Hx obtain as above. His daughter helped. Pt is confused and not able to give full details. ROS not able to be complete due to pts medical condition. 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 Authorizing Provider   furosemide (LASIX) 20 mg tablet Take 1 Tab by mouth every Tuesday and Friday.  1/9/18  Yes Inessa Kamara MD   potassium chloride (KLOR-CON) 10 mEq tablet Take 1 Tab by mouth every Tuesday and Friday. 1/9/18  Yes Homar Yepez MD   amiodarone (PACERONE) 100 mg tablet Take 1 Tab by mouth daily. 9/21/17  Yes Homar Yepez MD   SSD 1 % topical cream  9/5/17  Yes Historical Provider   traMADol (ULTRAM) 50 mg tablet  6/1/17  Yes Historical Provider   triamcinolone acetonide (KENALOG) 0.1 % ointment Apply  to affected area two (2) times a day. use thin layer   Indications: psoariasis   Yes Historical Provider   albuterol (PROVENTIL HFA, VENTOLIN HFA, PROAIR HFA) 90 mcg/actuation inhaler Take 2 Puffs by inhalation every four (4) hours as needed for Wheezing. 4/26/17  Yes Reynaldo Holt MD   rivaroxaban (XARELTO) 20 mg tab tablet Take 1 Tab by mouth daily. Indications: prevent stroke  Patient taking differently: Take 15 mg by mouth daily (with dinner). Indications: prevent stroke 4/26/17  Yes Reynaldo Holt MD   diphenhydrAMINE (BENADRYL) 25 mg capsule Take 25 mg by mouth nightly as needed for Sleep. Indications: ALLERGIC RHINITIS   Yes Historical Provider   carvedilol (COREG) 3.125 mg tablet Take  by mouth two (2) times daily (with meals).    Yes Historical Provider     Current Facility-Administered Medications   Medication Dose Route Frequency    meropenem (MERREM) 1 g in 0.9% sodium chloride (MBP/ADV) 50 mL  1 g IntraVENous Q12H    dextrose 5% infusion  75 mL/hr IntraVENous CONTINUOUS    DOPamine (INTROPIN) 800 mg/250 mL (3,200 mcg/mL) infusion  0-20 mcg/kg/min IntraVENous TITRATE    sodium chloride (NS) flush 5-10 mL  5-10 mL IntraVENous Q8H    levoFLOXacin (LEVAQUIN) 750 mg in D5W IVPB  750 mg IntraVENous Q48H    vancomycin (VANCOCIN) 1750 mg in  ml infusion  1,750 mg IntraVENous Q24H    mupirocin (BACTROBAN) 2 % ointment   Both Nostrils BID     Allergies   Allergen Reactions    Ancef [Cefazolin] Unknown (comments)     \"drops my blood pressure\"    Neosporin [Benzalkonium Chloride] Unknown (comments)    Polysporin [Bacitracin-Polymyxin B] Other (comments)     \"WOUNDS DO NOT HEAL\"      Social History   Substance Use Topics    Smoking status: Former Smoker    Smokeless tobacco: Never Used    Alcohol use 4.2 oz/week     7 Standard drinks or equivalent per week      Comment: \"1 drink a night\"      Family History   Problem Relation Age of Onset    Cancer Mother      BRAIN TUMOR    Cancer Brother      PROSTATE        Review of Systems:  Review of systems not obtained due to patient factors. Objective:   Vital Signs:    Visit Vitals    BP 98/63    Pulse 96    Temp 97.5 °F (36.4 °C)    Resp 19    Ht 5' 9\" (1.753 m)    Wt 88 kg (194 lb 0.1 oz)    SpO2 95%    BMI 28.65 kg/m2       O2 Device: Nasal cannula   O2 Flow Rate (L/min): 4 l/min   Temp (24hrs), Av °F (34.4 °C), Min:88.5 °F (31.4 °C), Max:98.3 °F (36.8 °C)       Intake/Output:   Last shift:         Last 3 shifts:  1901 -  0700  In: 1318.5 [I.V.:747.7]  Out: 900 [Urine:900]    Intake/Output Summary (Last 24 hours) at 18 0745  Last data filed at 18 0555   Gross per 24 hour   Intake          1318.48 ml   Output              900 ml   Net           418.48 ml     Hemodynamics:   PAP:   CO:     Wedge:   CI:     CVP:    SVR:       PVR:       Ventilator Settings:  Mode Rate Tidal Volume Pressure FiO2 PEEP                    Peak airway pressure:      Minute ventilation:        Physical Exam:    General:  Alert, cooperative, no distress, appears stated age. Head:  Normocephalic, without obvious abnormality, atraumatic. Eyes:  Conjunctivae/corneas clear. PERRL, EOMs intact. Nose: Nares normal. Septum midline. Mucosa normal. No drainage or sinus tenderness. Throat: Lips, mucosa, and tongue normal. Teeth and gums normal.   Neck: Supple, symmetrical, trachea midline, no adenopathy, thyroid: no enlargment/tenderness/nodules, no carotid bruit and no JVD. Back:   Symmetric, no curvature. ROM normal.   Lungs:   Clear to auscultation bilaterally.    Chest wall:  No tenderness or deformity. Heart:  Regular rate and rhythm, S1, S2 normal, no murmur, click, rub or gallop. Abdomen:   Soft, non-tender. Bowel sounds normal. No masses,  No organomegaly. Extremities: Extremities normal, atraumatic, no cyanosis or edema. Pulses: 2+ and symmetric all extremities. Skin: Skin color, texture, turgor normal. No rashes or lesions   Lymph nodes: Cervical, supraclavicular, and axillary nodes normal.   Neurologic: Grossly nonfocal       Data:     Recent Results (from the past 24 hour(s))   CBC WITH AUTOMATED DIFF    Collection Time: 02/02/18  1:30 PM   Result Value Ref Range    WBC 2.5 (L) 4.1 - 11.1 K/uL    RBC 3.21 (L) 4.10 - 5.70 M/uL    HGB 11.0 (L) 12.1 - 17.0 g/dL    HCT 33.8 (L) 36.6 - 50.3 %    .3 (H) 80.0 - 99.0 FL    MCH 34.3 (H) 26.0 - 34.0 PG    MCHC 32.5 30.0 - 36.5 g/dL    RDW 16.0 (H) 11.5 - 14.5 %    PLATELET 50 (L) 343 - 400 K/uL    MPV 14.7 (H) 8.9 - 12.9 FL    NRBC 0.0 0  WBC    ABSOLUTE NRBC 0.00 0.00 - 0.01 K/uL    NEUTROPHILS 62 32 - 75 %    LYMPHOCYTES 23 12 - 49 %    MONOCYTES 13 5 - 13 %    EOSINOPHILS 0 0 - 7 %    BASOPHILS 0 0 - 1 %    IMMATURE GRANULOCYTES 1 (H) 0.0 - 0.5 %    ABS. NEUTROPHILS 1.6 (L) 1.8 - 8.0 K/UL    ABS. LYMPHOCYTES 0.6 (L) 0.8 - 3.5 K/UL    ABS. MONOCYTES 0.3 0.0 - 1.0 K/UL    ABS. EOSINOPHILS 0.0 0.0 - 0.4 K/UL    ABS. BASOPHILS 0.0 0.0 - 0.1 K/UL    ABS. IMM.  GRANS. 0.0 0.00 - 0.04 K/UL    DF AUTOMATED     METABOLIC PANEL, COMPREHENSIVE    Collection Time: 02/02/18  1:30 PM   Result Value Ref Range    Sodium 144 136 - 145 mmol/L    Potassium 5.0 3.5 - 5.1 mmol/L    Chloride 107 97 - 108 mmol/L    CO2 25 21 - 32 mmol/L    Anion gap 12 5 - 15 mmol/L    Glucose 117 (H) 65 - 100 mg/dL    BUN 66 (H) 7.0 - 18.0 MG/DL    Creatinine 2.38 (H) 0.70 - 1.30 MG/DL    BUN/Creatinine ratio 28 (H) 12 - 20      GFR est AA 31 (L) >60 ml/min/1.73m2    GFR est non-AA 26 (L) >60 ml/min/1.73m2    Calcium 8.8 8.5 - 10.1 MG/DL    Bilirubin, total 0.9 0.2 - 1.0 MG/DL    ALT (SGPT) 30 14 - 63 U/L    AST (SGOT) 37 15 - 37 U/L    Alk. phosphatase 105 45 - 117 U/L    Protein, total 7.0 6.4 - 8.2 g/dL    Albumin 3.5 3.5 - 5.0 g/dL    Globulin 3.5 2.0 - 4.0 g/dL    A-G Ratio 1.0 (L) 1.1 - 2.2     CK W/ CKMB & INDEX    Collection Time: 02/02/18  1:30 PM   Result Value Ref Range    CK 82 39 - 308 U/L    CK - MB 9.2 (H) <3.6 NG/ML    CK-MB Index 11.2 (H) 0 - 2.5     MAGNESIUM    Collection Time: 02/02/18  1:30 PM   Result Value Ref Range    Magnesium 2.3 1.6 - 2.4 mg/dL   TROPONIN I    Collection Time: 02/02/18  1:30 PM   Result Value Ref Range    Troponin-I, Qt. 0.08 (H) <0.08 ng/mL   BNP    Collection Time: 02/02/18  1:30 PM   Result Value Ref Range     (H) 0 - 100 pg/mL   EKG, 12 LEAD, INITIAL    Collection Time: 02/02/18  1:43 PM   Result Value Ref Range    Ventricular Rate 45 BPM    Atrial Rate 26 BPM    QRS Duration 194 ms    Q-T Interval 678 ms    QTC Calculation (Bezet) 586 ms    Calculated R Axis -65 degrees    Calculated T Axis 28 degrees    Diagnosis       Atrial Fibrillation with slow ventricular response  Right bundle branch block  Left anterior fascicular block  ** Bifascicular block **  Abnormal ECG  When compared with ECG of 10-SEP-2017 20:15,  Vent.  rate has decreased  QRS duration has increased  QRS voltage has decreased  Confirmed by Cody Easley MD, -- ([id number]) on 2/3/2018 4:50:23 AM     EKG, 12 LEAD, INITIAL    Collection Time: 02/02/18  6:25 PM   Result Value Ref Range    Ventricular Rate 39 BPM    Atrial Rate 42 BPM    QRS Duration 194 ms    Q-T Interval 682 ms    QTC Calculation (Bezet) 549 ms    Calculated R Axis -66 degrees    Calculated T Axis 42 degrees    Diagnosis       Probably Atrial fibrillation with slow ventricular response  Right bundle branch block  Left anterior fascicular block  ** Bifascicular block **  Possible Lateral infarct , age undetermined  Abnormal ECG  When compared with ECG of 02-FEB-2018 13:43,  No significant change was found  Confirmed by Yves Geller ([id number]) on 2/3/2018 7:29:58 AM     CBC WITH AUTOMATED DIFF    Collection Time: 02/02/18  6:45 PM   Result Value Ref Range    WBC 1.9 (L) 4.1 - 11.1 K/uL    RBC 3.16 (L) 4.10 - 5.70 M/uL    HGB 10.9 (L) 12.1 - 17.0 g/dL    HCT 33.9 (L) 36.6 - 50.3 %    .3 (H) 80.0 - 99.0 FL    MCH 34.5 (H) 26.0 - 34.0 PG    MCHC 32.2 30.0 - 36.5 g/dL    RDW 16.0 (H) 11.5 - 14.5 %    PLATELET 45 (LL) 393 - 400 K/uL    MPV ABNORMAL 8.9 - 12.9 FL    NRBC 1.0 (H) 0  WBC    ABSOLUTE NRBC 0.02 (H) 0.00 - 0.01 K/uL    NEUTROPHILS 69 32 - 75 %    LYMPHOCYTES 19 12 - 49 %    MONOCYTES 11 5 - 13 %    EOSINOPHILS 0 0 - 7 %    BASOPHILS 1 0 - 1 %    IMMATURE GRANULOCYTES 0 %    ABS. NEUTROPHILS 1.3 (L) 1.8 - 8.0 K/UL    ABS. LYMPHOCYTES 0.4 (L) 0.8 - 3.5 K/UL    ABS. MONOCYTES 0.2 0.0 - 1.0 K/UL    ABS. EOSINOPHILS 0.0 0.0 - 0.4 K/UL    ABS. BASOPHILS 0.0 0.0 - 0.1 K/UL    ABS. IMM. GRANS. 0.0 K/UL    DF MANUAL      RBC COMMENTS MACROCYTOSIS      WBC COMMENTS SMEAR FOR PATHOLOGIST REVIEW     CK W/ CKMB & INDEX    Collection Time: 02/02/18  6:45 PM   Result Value Ref Range    CK 69 39 - 308 U/L    CK - MB 9.9 (H) <3.6 NG/ML    CK-MB Index 14.3 (H) 0 - 2.5     METABOLIC PANEL, COMPREHENSIVE    Collection Time: 02/02/18  6:45 PM   Result Value Ref Range    Sodium 145 136 - 145 mmol/L    Potassium 5.1 3.5 - 5.1 mmol/L    Chloride 110 (H) 97 - 108 mmol/L    CO2 26 21 - 32 mmol/L    Anion gap 9 5 - 15 mmol/L    Glucose 92 65 - 100 mg/dL    BUN 63 (H) 6 - 20 MG/DL    Creatinine 2.15 (H) 0.70 - 1.30 MG/DL    BUN/Creatinine ratio 29 (H) 12 - 20      GFR est AA 35 (L) >60 ml/min/1.73m2    GFR est non-AA 29 (L) >60 ml/min/1.73m2    Calcium 8.5 8.5 - 10.1 MG/DL    Bilirubin, total 0.9 0.2 - 1.0 MG/DL    ALT (SGPT) 24 12 - 78 U/L    AST (SGOT) 32 15 - 37 U/L    Alk.  phosphatase 99 45 - 117 U/L    Protein, total 6.7 6.4 - 8.2 g/dL    Albumin 3.3 (L) 3.5 - 5.0 g/dL    Globulin 3.4 2.0 - 4.0 g/dL A-G Ratio 1.0 (L) 1.1 - 2.2     MAGNESIUM    Collection Time: 02/02/18  6:45 PM   Result Value Ref Range    Magnesium 2.4 1.6 - 2.4 mg/dL   NT-PRO BNP    Collection Time: 02/02/18  6:45 PM   Result Value Ref Range    NT pro-BNP 3989 (H) 0 - 450 PG/ML   PROTHROMBIN TIME + INR    Collection Time: 02/02/18  6:45 PM   Result Value Ref Range    INR 2.3 (H) 0.9 - 1.1      Prothrombin time 23.4 (H) 9.0 - 11.1 sec   PTT    Collection Time: 02/02/18  6:45 PM   Result Value Ref Range    aPTT 61.8 (H) 22.1 - 32.5 sec    aPTT, therapeutic range     58.0 - 77.0 SECS   TROPONIN I    Collection Time: 02/02/18  6:45 PM   Result Value Ref Range    Troponin-I, Qt. 0.07 (H) <0.05 ng/mL   URINALYSIS W/ REFLEX CULTURE    Collection Time: 02/02/18  7:26 PM   Result Value Ref Range    Color YELLOW/STRAW      Appearance CLOUDY (A) CLEAR      Specific gravity 1.018 1.003 - 1.030      pH (UA) 5.0 5.0 - 8.0      Protein NEGATIVE  NEG mg/dL    Glucose NEGATIVE  NEG mg/dL    Ketone NEGATIVE  NEG mg/dL    Bilirubin NEGATIVE  NEG      Blood SMALL (A) NEG      Urobilinogen 0.2 0.2 - 1.0 EU/dL    Nitrites NEGATIVE  NEG      Leukocyte Esterase NEGATIVE  NEG      WBC 0-4 0 - 4 /hpf    RBC 5-10 0 - 5 /hpf    Epithelial cells FEW FEW /lpf    Bacteria NEGATIVE  NEG /hpf    UA:UC IF INDICATED CULTURE NOT INDICATED BY UA RESULT CNI     CULTURE, BLOOD, PAIRED    Collection Time: 02/02/18  7:26 PM   Result Value Ref Range    Special Requests: NO SPECIAL REQUESTS      Culture result:        ONE OF TWO BOTTLES HAS BEEN FLAGGED POSITIVE BY INSTRUMENT. BOTTLE HAS BEEN SENT TO [facility] TO ASSESS FOR POSSIBLE GROWTH.     Culture result: REMAINING BOTTLE(S) HAS/HAVE NO GROWTH SO FAR     LACTIC ACID    Collection Time: 02/02/18  7:26 PM   Result Value Ref Range    Lactic acid 1.2 0.4 - 2.0 MMOL/L   TYPE & SCREEN    Collection Time: 02/02/18  8:17 PM   Result Value Ref Range    Crossmatch Expiration 02/05/2018     ABO/Rh(D) A POSITIVE     Antibody screen NEG PLATELETS, ALLOCATE    Collection Time: 02/02/18  9:45 PM   Result Value Ref Range    Unit number [id number]     Blood component type PLTPH LR,2     Unit division 00     Status of unit ISSUED     Unit number [id number]     Blood component type PLTPH LR 3     Unit division 00     Status of unit ISSUED    OCCULT BLOOD, STOOL    Collection Time: 02/02/18 10:31 PM   Result Value Ref Range    Occult blood, stool POSITIVE (A) NEG     METABOLIC PANEL, BASIC    Collection Time: 02/03/18  5:33 AM   Result Value Ref Range    Sodium 146 (H) 136 - 145 mmol/L    Potassium 5.1 3.5 - 5.1 mmol/L    Chloride 111 (H) 97 - 108 mmol/L    CO2 26 21 - 32 mmol/L    Anion gap 9 5 - 15 mmol/L    Glucose 63 (L) 65 - 100 mg/dL    BUN 64 (H) 6 - 20 MG/DL    Creatinine 2.28 (H) 0.70 - 1.30 MG/DL    BUN/Creatinine ratio 28 (H) 12 - 20      GFR est AA 33 (L) >60 ml/min/1.73m2    GFR est non-AA 27 (L) >60 ml/min/1.73m2    Calcium 8.8 8.5 - 10.1 MG/DL   CBC W/O DIFF    Collection Time: 02/03/18  5:33 AM   Result Value Ref Range    WBC 5.7 4.1 - 11.1 K/uL    RBC 3.01 (L) 4.10 - 5.70 M/uL    HGB 10.3 (L) 12.1 - 17.0 g/dL    HCT 31.6 (L) 36.6 - 50.3 %    .0 (H) 80.0 - 99.0 FL    MCH 34.2 (H) 26.0 - 34.0 PG    MCHC 32.6 30.0 - 36.5 g/dL    RDW 16.2 (H) 11.5 - 14.5 %    PLATELET 285 (L) 671 - 400 K/uL    MPV 11.1 8.9 - 12.9 FL    NRBC 0.5 (H) 0  WBC    ABSOLUTE NRBC 0.03 (H) 0.00 - 0.01 K/uL   GLUCOSE, POC    Collection Time: 02/03/18  6:45 AM   Result Value Ref Range    Glucose (POC) 62 (L) 65 - 100 mg/dL    Performed by Maru Mondragon    GLUCOSE, POC    Collection Time: 02/03/18  6:47 AM   Result Value Ref Range    Glucose (POC) 66 65 - 100 mg/dL    Performed by Maru bitHound    GLUCOSE, POC    Collection Time: 02/03/18  7:03 AM   Result Value Ref Range    Glucose (POC) 67 65 - 100 mg/dL    Performed by Maru Mondragon    GLUCOSE, POC    Collection Time: 02/03/18  7:22 AM   Result Value Ref Range    Glucose (POC) 64 (L) 65 - 100 mg/dL    Performed by Dickey Dancer              Telemetry:AFIB    Imaging:  I have personally reviewed the patients radiographs and have reviewed the reports:  FINDINGS:   A portable AP radiograph of the chest was obtained at 1919 hours. Lines and tubes: The patient is on a cardiac monitor and nasal oxygen. There is  a left jugular triple-lumen catheter with tip over the superior vena cava. Lungs: There is mild interstitial edema throughout the lungs. Pleura: Is a right pleural effusion. Mediastinum: Cardiac silhouette is enlarged and the aorta is atherosclerotic. Bones and soft tissues: The bones and soft tissues are grossly within normal  limits.       IMPRESSION: Left jugular catheter tip over the superior vena cava. No  pneumothorax.     I     Ct of Head: 2-2-18: IMPRESSION: No acute intracranial abnormality. Age-related volume loss and  microvascular disease unchanged. PET Scan: 7-7-17: IMPRESSION: Status post right parotidectomy with no abnormal hypermetabolic  activity to suggest residual, recurrent or metastatic disease.        Total critical care time exclusive of procedures: 45 minutes  Jesus Kan MD

## 2018-02-03 NOTE — CONSULTS
[id number] [facility] Oncology at [address] [place] [address]  [phone]    Hematology / Oncology Consult    Reason for Visit:   Beverley Sher is a 80 y.o. male who is seen in consultation at the request of Dr. Inder Reyes for evaluation of pancytopenia. History of Present Illness:   Mr. Jose Tyson is an 81 y/o male with AF and h/o parotid tumor s/p surgery and XRT who was admitted with weakness, found to hypotensive, hypothermic with pancytopenia. Patient had blood cx drawn and was started on broad spectrum antibiotics as well as dopamine. Review of prior blood counts reveals more normal CBCs in June and Sept 2017. This morning, the patient is confused. However, the daughter at bedside provides history that her father had been feeling well, walking without assistance. However, he has been less active the past 2 weeks. The patient had chills prior to admission. But he denies n/v/d, SOB, CP, abdominal pain or knowledge of fevers. He has been constipation at home. He also hurt his left elbow and has an abrasion he has been caring for the past several days. Patient's daughter states that PLTs were transfused due to use of Xarelto and prolonged bleeding after placement of triple lumen catheter in left neck.     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, XRT; resulting right Bolivar' palsy    Psoriasis     lower arms, legs (above knees)      Past Surgical History:   Procedure Laterality Date    HX CATARACT REMOVAL Bilateral     HX COLONOSCOPY      HX PROSTATECTOMY  1997    HX TONSILLECTOMY  as a child      Social History   Substance Use Topics    Smoking status: Former Smoker    Smokeless tobacco: Never Used    Alcohol use 4.2 oz/week     7 Standard drinks or equivalent per week      Comment: \"1 drink a night\"      Family History   Problem Relation Age of Onset    Cancer Mother      BRAIN TUMOR    Cancer Brother      PROSTATE     Current Facility-Administered Medications   Medication Dose Route Frequency    dextrose 5% infusion  75 mL/hr IntraVENous CONTINUOUS    meropenem (MERREM) 500 mg in 0.9% sodium chloride (MBP/ADV) 50 mL  0.5 g IntraVENous Q12H    insulin lispro (HUMALOG) injection   SubCUTAneous AC&HS    glucose chewable tablet 16 g  4 Tab Oral PRN    dextrose (D50W) injection syrg 12.5-25 g  12.5-25 g IntraVENous PRN    glucagon (GLUCAGEN) injection 1 mg  1 mg IntraMUSCular PRN    chlorhexidine (PERIDEX) 0.12 % mouthwash 15 mL  15 mL Oral Q12H    NOREPINephrine (LEVOPHED) 8 mg in dextrose 5% 250 mL infusion  2-30 mcg/min IntraVENous TITRATE    DOPamine (INTROPIN) 800 mg/250 mL (3,200 mcg/mL) infusion  0-20 mcg/kg/min IntraVENous TITRATE    0.9% sodium chloride infusion 250 mL  250 mL IntraVENous PRN    sodium chloride (NS) flush 5-10 mL  5-10 mL IntraVENous Q8H    sodium chloride (NS) flush 5-10 mL  5-10 mL IntraVENous PRN    levoFLOXacin (LEVAQUIN) 750 mg in D5W IVPB  750 mg IntraVENous Q48H    vancomycin (VANCOCIN) 1750 mg in  ml infusion  1,750 mg IntraVENous Q24H    mupirocin (BACTROBAN) 2 % ointment   Both Nostrils BID      Allergies   Allergen Reactions    Ancef [Cefazolin] Unknown (comments)     \"drops my blood pressure\"    Neosporin [Benzalkonium Chloride] Unknown (comments)    Polysporin [Bacitracin-Polymyxin B] Other (comments)     \"WOUNDS DO NOT HEAL\"        Review of Systems: A complete review of systems was obtained, negative except as described above.       Physical Exam:     Visit Vitals    /61    Pulse 90    Temp 97.2 °F (36.2 °C)    Resp 19    Ht 5' 9\" (1.753 m)    Wt 194 lb 0.1 oz (88 kg)    SpO2 96%  BMI 28.65 kg/m2     ECOG PS: 2  General: No distress  Eyes: R eye with bandage covering it due to facial droop. Left eye with pupils reactive, anicteric sclerae  HENT: R facial droop and drooling, post-op facial deformity. Atraumatic with normal appearance of ears and nose; OP clear  Neck: Supple; no thyromegaly   Lymphatic: No cervical, supraclavicular adenopathy. Respiratory: CTAB, normal respiratory effort  CV: Normal rate, regular rhythm, no murmurs, no peripheral edema  GI: Soft, nontender, nondistended, no masses, no hepatomegaly, no splenomegaly  MS: Normal gait and station. Digits without clubbing or cyanosis. Skin: No rashes, petechiae. Chronic venous stasis changes as well as ecchymoses on BLE. Normal temperature, turgor, and texture. Neuro/Psych: Alert, but confused. Appropriate affect. Results:     Lab Results   Component Value Date/Time    WBC 5.7 02/03/2018 05:33 AM    HGB 10.3 02/03/2018 05:33 AM    HCT 31.6 02/03/2018 05:33 AM    PLATELET 514 06/97/1736 05:33 AM    .0 02/03/2018 05:33 AM    ABS. NEUTROPHILS 1.3 02/02/2018 06:45 PM     Lab Results   Component Value Date/Time    Sodium 146 02/03/2018 05:33 AM    Potassium 5.1 02/03/2018 05:33 AM    Chloride 111 02/03/2018 05:33 AM    CO2 26 02/03/2018 05:33 AM    Glucose 63 02/03/2018 05:33 AM    BUN 64 02/03/2018 05:33 AM    Creatinine 2.28 02/03/2018 05:33 AM    GFR est AA 33 02/03/2018 05:33 AM    GFR est non-AA 27 02/03/2018 05:33 AM    Calcium 8.8 02/03/2018 05:33 AM    Glucose (POC) 137 02/03/2018 07:52 AM     Lab Results   Component Value Date/Time    Bilirubin, total 0.9 02/02/2018 06:45 PM    ALT (SGPT) 24 02/02/2018 06:45 PM    AST (SGOT) 32 02/02/2018 06:45 PM    Alk. phosphatase 99 02/02/2018 06:45 PM    Protein, total 6.7 02/02/2018 06:45 PM    Albumin 3.3 02/02/2018 06:45 PM    Globulin 3.4 02/02/2018 06:45 PM       CXR 2/2/18: Cardiomegaly and pulmonary vascular congestion.  Small right-sided pleural  effusion and minor right basilar atelectasis. PET 7/2017: IMPRESSION: Status post right parotidectomy with no abnormal hypermetabolic  activity to suggest residual, recurrent or metastatic disease. Assessment and Recommendations:   Jimy Mares is a 80 y.o. male admitted with weakness, sepsis, pancytopenia. 1. Leukopenia / Thrombocytopenia: Review of blood counts reveals normal CBCs in June and Sept 2017. Current pancytopenia is likely due to sepsis and will improve with treatment of underlying infection. Pt received 2 Units of PLTs with improvement in PLT count from 45 to 111 - likely due to transfusion as well as treatment of sepsis. Broad spectrum antibiotics can also sometimes cause cytopenias, but must risks/benefits must be balanced. -- No transfusions needed at this time. Only need to transfuse PLTs to maintain PLT > 10 or if acute bleeding with PLT < 20. Xarelto currently has no reversal agent in place, but FFP could be used if needed. -- Hold anticoagulation for PLT count < 50 or for procedures. -- Continue to monitor daily CBC.   -- Please do not hesitate to call with questions/concerns. 2. Macrocytic anemia: Given macrocytosis and VitB12 level borderline at 253 in July 2016, I recommend rechecking VitB12 level and checking MMA. -- Checking Vit B12 level and MMA     3. Bacteremia: GPC in chains growing in both bottles. -- On treatment with Levofloxacin, Meropenem and Vanc.  -- f/u speciation and recommend narrowing antibiotics accordingly. 4. History of parotid cancer: s/p surgical removal by ENT and adjuvant radiation. PET in July 2017 shows no evidence of recurrence or metastatic disease.       Signed By: Jacy Mcgarry MD     February 3, 2018

## 2018-02-03 NOTE — ROUTINE PROCESS
0730: Bedside shift report received. patient confused and agitated. Able to answer questions appropriately but continues to yell at staff \" you have the wrong hayder; you have made a big mistake\" Attempts to reorient. Blood glucose checked ; remains low. Adminstered d50; updated MD and family. Skin has multiple ecchymotic areas; noted skin tear to left elbow, left lower leg. Skin fragile; dry in area and weeps in others. Noted yeast and bruising to groin; nystatin ordered. 1130: Spoke with Md ; plan of care discussed. Shireen Palacios remains at bedside  1240: Levophed started will attempt to wean Dopamine. Patient remains confused  1630: Skin care provided; pt cooperative during bath but remains confused. 1830: Attempted to drink water; pt not safe for PO intake at this time.

## 2018-02-03 NOTE — ED NOTES
Pt arrived via EMS and bedside report obtained. Assumed care of pt at this time. Patient arrived with c/o of bradycardia along with generalized weakness and hypotensive. Patient was transferred from [facility] due to not having the proper MD. Patient has extensive cardiac hx including CHF. Patient currently has a core body temp of 88.5 F via rectal. Patient also has a HR of 36, bp of 80/60 and o2 of 91% on 2L oxygen. MD notified of worsening status. Pt resting comfortably in bed with side rails up and call bell with within reach. Pt aware of plan to await for MD/PA-C assessment, and pt/family verbalizes understanding. Placed on Monitor x 3 with alarms on. Code cart outside patients door with pads attached to patients chest. Bear hugger placed on patient to help increase temp.

## 2018-02-03 NOTE — PROGRESS NOTES
Pharmacy Automatic Renal Dosing Protocol - Antimicrobials    Indication for Antimicrobials: Endocarditis/ immunocompromised      Current Regimen of Each Antimicrobial:  Levofloxacin 750 mg IV every 48 hours (Start Date 22; Day # 2)  Meropenem 1 g IV every 12 hours (Start Date 2/3; Day # 1)  Vancomycin 1750 mg IV every 24 hours (Start Date ; Day # 2)    Previous Antimicrobial Therapy:  None    Goal Level: VANCOMYCIN TROUGH GOAL RANGE  Vancomycin Trough: 15 - 20 mcg/mL  Measured / Extrapolated Vancomycin Level: None    Significant Cultures:    Blood:  bottles flagged positive- pending    Paralysis, amputations, malnutrition: None documented    Labs:  Recent Labs      18   0533  18   1845  18   1330   CREA  2.28*  2.15*  2.38*   BUN  64*  63*  66*   WBC  5.7  1.9*  2.5*     Temp (24hrs), Av.2 °F (34.6 °C), Min:88.5 °F (31.4 °C), Max:98.3 °F (36.8 °C)      Creatinine Clearance (mL/min) or Dialysis: 22 mL/min   No results found for: PCT    Impression/Plan: Will change meropenem to 500 mg IV every 12 hours for CrCl 10-25 mL/min per renal dosing protocol. Will continue current regimen for levofloxacin and vancomycin as appropriate for indication and renal function. Pharmacy will follow daily and adjust medications as appropriate for renal function and/or serum levels. Thank you,  Neva Knox, ARMIND          Recommended duration of therapy  http://[facility]/[place]/[facility]/Pharmacy/Clinical%20Companion/Duration%20of%[email or web address]. docx    Renal Dosing  http://[facility]/[place]/[facility]/Pharmacy/Clinical%20Companion/Renal%[email or web address]. pdf

## 2018-02-03 NOTE — ED NOTES
Patient resting comfortable in bed. Lights dimmed and son and daughter at bedside. Patient's vitals are stable with Dopamine drip at 3.5 mcg/kg/hr. Call bell within reach.

## 2018-02-03 NOTE — PROGRESS NOTES
5100 Patient arrived in room [phone] with a tech and an RN. Patient is receiving Dopamine 3.5 mcg/kg/min and abxs. Patient has LIJ TLC with bleeding noted. 0045 First unit of plts started per order. VSS. Will continue to monitor. 0241 Platelets complete. VSS. Will continue to monitor. 6214 Second unit of plts started. VSS. Will continue to monitor. 0350 Second unit complete. VSS. Will continue to monitor. 3204 Serum glucose is 63, rechecked with POC BS 62 and 66. Treated with half a cup of orange juice. Will rechecked within 15 minutes. 0700 Bedside report given to Mallorie Jiang RN via [employer].

## 2018-02-03 NOTE — H&P
Hospitalist Admission Note    NAME: Clair Berry   :  1928   MRN:  [id number]     Date/Time:  2018 10:47 PM    Patient PCP: William Murdock, NP  ________________________________________________________________________    My assessment of this patient's clinical condition and my plan of care is as follows. Assessment / Plan:    1) Pancytopenia: per blood count Liley Aplastic Anemia or more MDS due to elevated MCV?? At this time will administer platelets, transfer to ICU, monitor blood count, consult hemonc for bone marrow Biopsy and other evaluation    2) Sepsis: Continue dopamine and titrate, IV fluids, immunocompromised patient, will continue broad spectrum ABX. BCX, UA, UCX    3) Hx A.fib: Will hold coreg, and amiodarone for hypotension    4) Heart Failure, will hold coreg      Code Status: full  Surrogate Decision Maker:    DVT Prophylaxis: yes  GI Prophylaxis: not indicated    Baseline: lives at home with spouse        Subjective:   CHIEF COMPLAINT: weakness, cough    HISTORY OF PRESENT ILLNESS:     Violet Junior is a 80 y.o. Male PMH A.fib, ENT mass resected (parathyroid tumor? ??) R side of his face, who presented to [facility] today from home because weakness, cough. Apparently he was founded to be bradycardic, and was sent here for further eval. Here patient hypotensive, hypothermic. Denies N/V diarrhea, sick contacs, no fever. We were asked to admit for work up and evaluation of the above problems.      Past Medical History:   Diagnosis Date    Atrial fibrillation with RVR ([facility]     Dr Hernandez De Souza - cardiologist    CAD (coronary artery disease)     Cardiomyopathy ([facility]     Diverticula of colon     Heart failure ([facility]     Hypercholesterolemia     Ill-defined condition     psorosis    Malignant neoplasm of prostate ([facility] [address] [facility]     prostrate removed    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, XRT; 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    Psoriasis     lower arms, legs (above knees) Past Surgical History:   Procedure Laterality Date    HX CATARACT REMOVAL Bilateral     HX COLONOSCOPY      HX PROSTATECTOMY  1997    HX TONSILLECTOMY  as a child       Social History   Substance Use Topics    Smoking status: Former Smoker    Smokeless tobacco: Never Used    Alcohol use 4.2 oz/week     7 Standard drinks or equivalent per week      Comment: \"1 drink a night\"        Family History   Problem Relation Age of Onset    Cancer Mother      BRAIN TUMOR    Cancer Brother      PROSTATE     Allergies   Allergen Reactions    Ancef [Cefazolin] Unknown (comments)     \"drops my blood pressure\"    Neosporin [Benzalkonium Chloride] Unknown (comments)    Polysporin [Bacitracin-Polymyxin B] Other (comments)     \"WOUNDS DO NOT HEAL\"        Prior to Admission medications    Medication Sig Start Date End Date Taking? Authorizing Provider   furosemide (LASIX) 20 mg tablet Take 1 Tab by mouth every Tuesday and Friday. 1/9/18  Yes Lakeisha Aguirre MD   potassium chloride (KLOR-CON) 10 mEq tablet Take 1 Tab by mouth every Tuesday and Friday. 1/9/18  Yes Lakeisha Aguirre MD   amiodarone (PACERONE) 100 mg tablet Take 1 Tab by mouth daily. 9/21/17  Yes Lakeisha Aguirre MD   SSD 1 % topical cream  9/5/17  Yes Historical Provider   traMADol (ULTRAM) 50 mg tablet  6/1/17  Yes Historical Provider   triamcinolone acetonide (KENALOG) 0.1 % ointment Apply  to affected area two (2) times a day. use thin layer   Indications: psoariasis   Yes Historical Provider   albuterol (PROVENTIL HFA, VENTOLIN HFA, PROAIR HFA) 90 mcg/actuation inhaler Take 2 Puffs by inhalation every four (4) hours as needed for Wheezing. 4/26/17  Yes Elder Riley MD   rivaroxaban (XARELTO) 20 mg tab tablet Take 1 Tab by mouth daily. Indications: prevent stroke  Patient taking differently: Take 15 mg by mouth daily (with dinner).  Indications: prevent stroke 4/26/17  Yes Elder Riley MD   diphenhydrAMINE (BENADRYL) 25 mg capsule Take 25 mg by mouth nightly as needed for Sleep. Indications: ALLERGIC RHINITIS   Yes Historical Provider   carvedilol (COREG) 3.125 mg tablet Take  by mouth two (2) times daily (with meals). Yes Historical Provider       REVIEW OF SYSTEMS:     I am not able to complete the review of systems because: The patient is intubated and sedated   x The patient has altered mental status due to his acute medical problems    The patient has baseline aphasia from prior stroke(s)    The patient has baseline dementia and is not reliable historian    The patient is in acute medical distress and unable to provide information           Total of 12 systems reviewed as follows:       POSITIVE= underlined text  Negative = text not underlined  General:  fever, chills, sweats, generalized weakness, weight loss/gain,      loss of appetite   Eyes:    blurred vision, eye pain, loss of vision, double vision  ENT:    rhinorrhea, pharyngitis   Respiratory:   cough, sputum production, SOB, GRADY, wheezing, pleuritic pain   Cardiology:   chest pain, palpitations, orthopnea, PND, edema, syncope   Gastrointestinal:  abdominal pain , N/V, diarrhea, dysphagia, constipation, bleeding   Genitourinary:  frequency, urgency, dysuria, hematuria, incontinence   Muskuloskeletal :  arthralgia, myalgia, back pain  Hematology:  easy bruising, nose or gum bleeding, lymphadenopathy   Dermatological: rash, ulceration, pruritis, color change / jaundice  Endocrine:   hot flashes or polydipsia   Neurological:  headache, dizziness, confusion, focal weakness, paresthesia,     Speech difficulties, memory loss, gait difficulty  Psychological: Feelings of anxiety, depression, agitation    Objective:   VITALS:    Visit Vitals    /70    Pulse 85    Temp (!) 92.7 °F (33.7 °C)    Resp 21    Ht 5' 9\" (1.753 m)    Wt 88 kg (194 lb 0.1 oz)    SpO2 (!) 89%    BMI 28.65 kg/m2       PHYSICAL EXAM:    General:    Alert, chronically ill looking.      HEENT: S/p sx changes seen in righ side of his face. Atraumatic, anicteric sclerae, pink conjunctivae     No oral ulcers, mucosa moist, throat clear, dentition fair  Neck:  Supple, symmetrical,  thyroid: non tender  Lungs:   Decreased breath sounds . Some rhonchi No Wheezing   Chest wall:  No tenderness  No Accessory muscle use. Heart:   Regular  rhythm,  No  murmur   No edema  Abdomen:   Soft, non-tender. Not distended. Bowel sounds normal  Extremities: No cyanosis. No clubbing,      Skin turgor normal, Capillary refill normal, Radial dial pulse 2+  Skin:     Not pale. Not Jaundiced  No rashes   Psych:  Unable to obtain . Neurologic: Alert      _______________________________________________________________________  Care Plan discussed with:    Comments   Patient     Family  x    RN x    Care Manager                    Consultant:      _______________________________________________________________________  Expected  Disposition:   Home with Family x   HH/PT/OT/RN    SNF/LTC    CARISSA    ________________________________________________________________________  TOTAL TIME:  48 Minutes    Critical Care Provided     Minutes non procedure based      Comments     Reviewed previous records   >50% of visit spent in counseling and coordination of care  Discussion with patient and/or family and questions answered       ________________________________________________________________________  Signed: Gunner Muhammad MD    Procedures: see electronic medical records for all procedures/Xrays and details which were not copied into this note but were reviewed prior to creation of Plan.     LAB DATA REVIEWED:    Recent Results (from the past 24 hour(s))   CBC WITH AUTOMATED DIFF    Collection Time: 02/02/18  1:30 PM   Result Value Ref Range    WBC 2.5 (L) 4.1 - 11.1 K/uL    RBC 3.21 (L) 4.10 - 5.70 M/uL    HGB 11.0 (L) 12.1 - 17.0 g/dL    HCT 33.8 (L) 36.6 - 50.3 %    .3 (H) 80.0 - 99.0 FL    MCH 34.3 (H) 26.0 - 34.0 PG    MCHC 32.5 30.0 - 36.5 g/dL    RDW 16.0 (H) 11.5 - 14.5 %    PLATELET 50 (L) 080 - 400 K/uL    MPV 14.7 (H) 8.9 - 12.9 FL    NRBC 0.0 0  WBC    ABSOLUTE NRBC 0.00 0.00 - 0.01 K/uL    NEUTROPHILS 62 32 - 75 %    LYMPHOCYTES 23 12 - 49 %    MONOCYTES 13 5 - 13 %    EOSINOPHILS 0 0 - 7 %    BASOPHILS 0 0 - 1 %    IMMATURE GRANULOCYTES 1 (H) 0.0 - 0.5 %    ABS. NEUTROPHILS 1.6 (L) 1.8 - 8.0 K/UL    ABS. LYMPHOCYTES 0.6 (L) 0.8 - 3.5 K/UL    ABS. MONOCYTES 0.3 0.0 - 1.0 K/UL    ABS. EOSINOPHILS 0.0 0.0 - 0.4 K/UL    ABS. BASOPHILS 0.0 0.0 - 0.1 K/UL    ABS. IMM. GRANS. 0.0 0.00 - 0.04 K/UL    DF AUTOMATED     METABOLIC PANEL, COMPREHENSIVE    Collection Time: 02/02/18  1:30 PM   Result Value Ref Range    Sodium 144 136 - 145 mmol/L    Potassium 5.0 3.5 - 5.1 mmol/L    Chloride 107 97 - 108 mmol/L    CO2 25 21 - 32 mmol/L    Anion gap 12 5 - 15 mmol/L    Glucose 117 (H) 65 - 100 mg/dL    BUN 66 (H) 7.0 - 18.0 MG/DL    Creatinine 2.38 (H) 0.70 - 1.30 MG/DL    BUN/Creatinine ratio 28 (H) 12 - 20      GFR est AA 31 (L) >60 ml/min/1.73m2    GFR est non-AA 26 (L) >60 ml/min/1.73m2    Calcium 8.8 8.5 - 10.1 MG/DL    Bilirubin, total 0.9 0.2 - 1.0 MG/DL    ALT (SGPT) 30 14 - 63 U/L    AST (SGOT) 37 15 - 37 U/L    Alk.  phosphatase 105 45 - 117 U/L    Protein, total 7.0 6.4 - 8.2 g/dL    Albumin 3.5 3.5 - 5.0 g/dL    Globulin 3.5 2.0 - 4.0 g/dL    A-G Ratio 1.0 (L) 1.1 - 2.2     CK W/ CKMB & INDEX    Collection Time: 02/02/18  1:30 PM   Result Value Ref Range    CK 82 39 - 308 U/L    CK - MB 9.2 (H) <3.6 NG/ML    CK-MB Index 11.2 (H) 0 - 2.5     MAGNESIUM    Collection Time: 02/02/18  1:30 PM   Result Value Ref Range    Magnesium 2.3 1.6 - 2.4 mg/dL   TROPONIN I    Collection Time: 02/02/18  1:30 PM   Result Value Ref Range    Troponin-I, Qt. 0.08 (H) <0.08 ng/mL   BNP    Collection Time: 02/02/18  1:30 PM   Result Value Ref Range     (H) 0 - 100 pg/mL   EKG, 12 LEAD, INITIAL    Collection Time: 02/02/18  1:43 PM   Result Value Ref Range    Ventricular Rate 45 BPM    Atrial Rate 26 BPM    QRS Duration 194 ms    Q-T Interval 678 ms    QTC Calculation (Bezet) 586 ms    Calculated R Axis -65 degrees    Calculated T Axis 28 degrees    Diagnosis       Undetermined rhythm  Right bundle branch block  Left anterior fascicular block  ** Bifascicular block **  Abnormal ECG  When compared with ECG of 10-SEP-2017 20:15,  Current undetermined rhythm precludes rhythm comparison, needs review  QRS duration has increased  QRS voltage has decreased     EKG, 12 LEAD, INITIAL    Collection Time: 02/02/18  6:25 PM   Result Value Ref Range    Ventricular Rate 39 BPM    Atrial Rate 42 BPM    QRS Duration 194 ms    Q-T Interval 682 ms    QTC Calculation (Bezet) 549 ms    Calculated R Axis -66 degrees    Calculated T Axis 42 degrees    Diagnosis       Atrial fibrillation with slow ventricular response  Right bundle branch block  Left anterior fascicular block  ** Bifascicular block **  Possible Lateral infarct , age undetermined  Abnormal ECG  When compared with ECG of 02-FEB-2018 13:43,  MANUAL COMPARISON REQUIRED, DATA IS UNCONFIRMED     CBC WITH AUTOMATED DIFF    Collection Time: 02/02/18  6:45 PM   Result Value Ref Range    WBC 1.9 (L) 4.1 - 11.1 K/uL    RBC 3.16 (L) 4.10 - 5.70 M/uL    HGB 10.9 (L) 12.1 - 17.0 g/dL    HCT 33.9 (L) 36.6 - 50.3 %    .3 (H) 80.0 - 99.0 FL    MCH 34.5 (H) 26.0 - 34.0 PG    MCHC 32.2 30.0 - 36.5 g/dL    RDW 16.0 (H) 11.5 - 14.5 %    PLATELET 45 (LL) 911 - 400 K/uL    MPV ABNORMAL 8.9 - 12.9 FL    NRBC 1.0 (H) 0  WBC    ABSOLUTE NRBC 0.02 (H) 0.00 - 0.01 K/uL    NEUTROPHILS 69 32 - 75 %    LYMPHOCYTES 19 12 - 49 %    MONOCYTES 11 5 - 13 %    EOSINOPHILS 0 0 - 7 %    BASOPHILS 1 0 - 1 %    IMMATURE GRANULOCYTES 0 %    ABS. NEUTROPHILS 1.3 (L) 1.8 - 8.0 K/UL    ABS. LYMPHOCYTES 0.4 (L) 0.8 - 3.5 K/UL    ABS. MONOCYTES 0.2 0.0 - 1.0 K/UL    ABS. EOSINOPHILS 0.0 0.0 - 0.4 K/UL    ABS. BASOPHILS 0.0 0.0 - 0.1 K/UL    ABS. IMM. Floydene Rosalva. 0.0 K/UL    DF MANUAL      RBC COMMENTS MACROCYTOSIS      WBC COMMENTS SMEAR FOR PATHOLOGIST REVIEW     CK W/ CKMB & INDEX    Collection Time: 02/02/18  6:45 PM   Result Value Ref Range    CK 69 39 - 308 U/L    CK - MB 9.9 (H) <3.6 NG/ML    CK-MB Index 14.3 (H) 0 - 2.5     METABOLIC PANEL, COMPREHENSIVE    Collection Time: 02/02/18  6:45 PM   Result Value Ref Range    Sodium 145 136 - 145 mmol/L    Potassium 5.1 3.5 - 5.1 mmol/L    Chloride 110 (H) 97 - 108 mmol/L    CO2 26 21 - 32 mmol/L    Anion gap 9 5 - 15 mmol/L    Glucose 92 65 - 100 mg/dL    BUN 63 (H) 6 - 20 MG/DL    Creatinine 2.15 (H) 0.70 - 1.30 MG/DL    BUN/Creatinine ratio 29 (H) 12 - 20      GFR est AA 35 (L) >60 ml/min/1.73m2    GFR est non-AA 29 (L) >60 ml/min/1.73m2    Calcium 8.5 8.5 - 10.1 MG/DL    Bilirubin, total 0.9 0.2 - 1.0 MG/DL    ALT (SGPT) 24 12 - 78 U/L    AST (SGOT) 32 15 - 37 U/L    Alk.  phosphatase 99 45 - 117 U/L    Protein, total 6.7 6.4 - 8.2 g/dL    Albumin 3.3 (L) 3.5 - 5.0 g/dL    Globulin 3.4 2.0 - 4.0 g/dL    A-G Ratio 1.0 (L) 1.1 - 2.2     MAGNESIUM    Collection Time: 02/02/18  6:45 PM   Result Value Ref Range    Magnesium 2.4 1.6 - 2.4 mg/dL   NT-PRO BNP    Collection Time: 02/02/18  6:45 PM   Result Value Ref Range    NT pro-BNP 3989 (H) 0 - 450 PG/ML   PROTHROMBIN TIME + INR    Collection Time: 02/02/18  6:45 PM   Result Value Ref Range    INR 2.3 (H) 0.9 - 1.1      Prothrombin time 23.4 (H) 9.0 - 11.1 sec   PTT    Collection Time: 02/02/18  6:45 PM   Result Value Ref Range    aPTT 61.8 (H) 22.1 - 32.5 sec    aPTT, therapeutic range     58.0 - 77.0 SECS   TROPONIN I    Collection Time: 02/02/18  6:45 PM   Result Value Ref Range    Troponin-I, Qt. 0.07 (H) <0.05 ng/mL   URINALYSIS W/ REFLEX CULTURE    Collection Time: 02/02/18  7:26 PM   Result Value Ref Range    Color YELLOW/STRAW      Appearance CLOUDY (A) CLEAR      Specific gravity 1.018 1.003 - 1.030      pH (UA) 5.0 5.0 - 8.0      Protein NEGATIVE  NEG mg/dL    Glucose NEGATIVE  NEG mg/dL    Ketone NEGATIVE  NEG mg/dL    Bilirubin NEGATIVE  NEG      Blood SMALL (A) NEG      Urobilinogen 0.2 0.2 - 1.0 EU/dL    Nitrites NEGATIVE  NEG      Leukocyte Esterase NEGATIVE  NEG      WBC 0-4 0 - 4 /hpf    RBC 5-10 0 - 5 /hpf    Epithelial cells FEW FEW /lpf    Bacteria NEGATIVE  NEG /hpf    UA:UC IF INDICATED CULTURE NOT INDICATED BY UA RESULT CNI     LACTIC ACID    Collection Time: 02/02/18  7:26 PM   Result Value Ref Range    Lactic acid 1.2 0.4 - 2.0 MMOL/L   TYPE & SCREEN    Collection Time: 02/02/18  8:17 PM   Result Value Ref Range    Crossmatch Expiration 02/05/2018     ABO/Rh(D) A POSITIVE     Antibody screen NEG

## 2018-02-03 NOTE — PROGRESS NOTES
Famotidine dose adjusted to 20 mg daily for CrCl <50 ml/min per protocol.     Thank you,  Kalina Ivy, PHARMD

## 2018-02-03 NOTE — PROGRESS NOTES
TRANSFER - IN REPORT:    Verbal report received from [facility], RN(name) on Stevan Cruz  being received from ED(unit) for routine progression of care      Report consisted of patients Situation, Background, Assessment and   Recommendations(SBAR). Information from the following report(s) SBAR, Kardex, ED Summary, Intake/Output, MAR, Recent Results, Med Rec Status, Cardiac Rhythm Afib and Alarm Parameters  was reviewed with the receiving nurse. Opportunity for questions and clarification was provided. Assessment completed upon patients arrival to unit and care assumed.          0590 Primary Nurse Elkin Cage RN and Manette Harada, RN performed a dual skin assessment on this patient Impairment noted- see wound doc flow sheet  Luis score is 14

## 2018-02-03 NOTE — PROGRESS NOTES
Hospitalist Progress Note    NAME: Jenniffer Townsend   :  1928   MRN:  [id number]       Assessment / Plan:  Septic Shock with Hypothermia (hypothermia resolved)  Bacteremia (GPC's on BCx)  -continue on Vancomycin, Meropenem and Levaquin until culture speciation and sensitivities available  -patient has been weaned off of Levophed  -still on Dopamine at time of my evaluation    Chronic (?) Atrial Fibrillation with Bradycardia: await Cardiology thoughts on if this is SSS  Acute on Chronic CHF  Acute Respiratory Failure with Hypoxia from Acute Pulmonary Edema (on CXR)  -await TTE  -continue Dopamine  -hold home Xarelto  -continue O2 (discussed with RN and unable to be weaned to off  -check Thyroid Function Studies  -hold home Coreg and Xarelto    Hypernatremia  Hypoglycemia  -continue D5W at 75 mL/hr  -check am cortisol    Acute Kidney Injury  CKD stage III  -stable, IVF's above, check renal Ultrasound, monitor renal function and avoid nephrotoxins    Pancytopenia  -s/p 2 units plts on  for acute bleeding from central line with plts improving from 45K to 111K  -Leukopenia resolved  -await Vitamin B12 level    Blood in Stool PTA  -monitor, likely from Xarelto and thrombocytopenia    Metabolic Encephalopathy  Delirium  -still having hallucinations  -start qhs melatonin    Patient with history of Left Parotid Mass s/p resection approximately 1 year ago by Dr. Therese Bolanos and XRT, 2017 PET negative  -Heme/Onc following    History of Prostate Cancer    Psoriasis    Body mass index is 28.65 kg/(m^2).     Code status: Full  Prophylaxis: SCD's and H2B  Recommended Disposition: TBD     Subjective:     Chief Complaint / Reason for Physician Visit: follow-up septic shock  Patient denies any complaints at this time besides feeling like he is in \"chains\"-->referring to SCD's and O2  Case discussed with RN and Daughter and Dr. Paolo Willoughby  Still on Dopamine  O2 unable to be weaned to off  Glucose better on dextrose gtt    Review of Systems:  Symptom Y/N Comments  Symptom Y/N Comments   Fever/Chills    Chest Pain n    Poor Appetite    Edema y    Cough    Abdominal Pain     Sputum    Joint Pain     SOB/GRADY n At rest with O2 on  Pruritis/Rash     Nausea/vomit n   Tolerating PT/OT     Diarrhea n   Tolerating Diet y    Constipation    Other       Could NOT obtain due to:      Objective:     VITALS:   Last 24hrs VS reviewed since prior progress note.  Most recent are:  Patient Vitals for the past 24 hrs:   Temp Pulse Resp BP SpO2   02/03/18 1200 96.8 °F (36 °C) 92 22 93/62 94 %   02/03/18 1145 96.8 °F (36 °C) - - - -   02/03/18 1130 - 86 18 98/61 96 %   02/03/18 1100 - 90 16 99/61 93 %   02/03/18 1030 - 92 20 90/52 96 %   02/03/18 1000 - 90 19 101/61 96 %   02/03/18 0930 - 94 18 103/53 95 %   02/03/18 0900 - 92 21 96/56 94 %   02/03/18 0800 - 93 20 90/56 96 %   02/03/18 0730 97.2 °F (36.2 °C) 96 20 105/65 95 %   02/03/18 0700 - 95 18 102/60 93 %   02/03/18 0600 - 96 19 98/63 95 %   02/03/18 0500 - 91 19 97/65 95 %   02/03/18 0400 97.5 °F (36.4 °C) 94 17 99/54 92 %   02/03/18 0345 - 96 17 101/62 94 %   02/03/18 0315 - 90 18 102/55 94 %   02/03/18 0300 96.3 °F (35.7 °C) 95 14 97/57 94 %   02/03/18 0245 95.9 °F (35.5 °C) 95 23 108/63 95 %   02/03/18 0215 95.4 °F (35.2 °C) 94 16 104/64 95 %   02/03/18 0145 95 °F (35 °C) 84 15 102/63 96 %   02/03/18 0115 (!) 94.7 °F (34.8 °C) 82 14 104/60 95 %   02/03/18 0100 (!) 94.6 °F (34.8 °C) 87 13 104/69 95 %   02/03/18 0046 (!) 94.6 °F (34.8 °C) 87 12 106/66 94 %   02/03/18 0045 - 87 15 - 93 %   02/02/18 2330 (!) 93.2 °F (34 °C) 86 15 106/65 94 %   02/02/18 2315 - 81 20 110/76 92 %   02/02/18 2300 - 84 18 110/68 92 %   02/02/18 2245 - 82 19 115/69 91 %   02/02/18 2230 (!) 92.7 °F (33.7 °C) 85 21 108/70 (!) 89 %   02/02/18 2200 - 80 16 113/73 90 %   02/02/18 2130 - 81 18 116/69 94 %   02/02/18 2115 - 83 16 110/75 94 %   02/02/18 2100 (!) 90.9 °F (32.7 °C) 76 18 124/80 93 %   02/02/18 2045 - 74 19 118/78 93 % 02/02/18 2030 - 66 15 119/81 93 %   02/02/18 2015 (!) 88.9 °F (31.6 °C) (!) 57 14 106/71 90 %   02/02/18 2000 - (!) 49 15 (!) 83/56 (!) 88 %   02/02/18 1945 - (!) 45 18 (!) 78/50 93 %   02/02/18 1937 - - - - 90 %   02/02/18 1931 - (!) 40 13 (!) 86/54 92 %   02/02/18 1828 (!) 88.5 °F (31.4 °C) (!) 41 15 (!) 89/60 91 %       Intake/Output Summary (Last 24 hours) at 02/03/18 1510  Last data filed at 02/03/18 1000   Gross per 24 hour   Intake          1318.48 ml   Output             1200 ml   Net           118.48 ml        PHYSICAL EXAM:  General: WD, Chronically ill appearing. Alert, cooperative, no acute distress    EENT:  EOMI. Anicteric sclerae. MM dry  Resp:  CTA bilaterally staci nterior and lateral assessment, no wheezing or rales. No accessory muscle use  CV:  irregular  Rhythm with normal rate, + edema noted with SCD's in palce  GI:  Soft, Non distended, Non tender.  +Bowel sounds  Neurologic:  Alert and oriented X 3, normal speech,   Psych:   Fair insight. Not anxious nor agitated at this time  Skin:  No rashes. No jaundice    Reviewed most current lab test results and cultures  YES  Reviewed most current radiology test results   YES  Review and summation of old records today    NO  Reviewed patient's current orders and MAR    YES  PMH/SH reviewed - no change compared to H&P  ________________________________________________________________________  Care Plan discussed with:    Comments   Patient x    Family  x    RN x    Care Manager     Consultant  x Dr. Ifeoma Duque team rounds were held today with , nursing, pharmacist and clinical coordinator. Patient's plan of care was discussed; medications were reviewed and discharge planning was addressed.      ________________________________________________________________________  Total NON critical care TIME:  40  Minutes    Total CRITICAL CARE TIME Spent:   Minutes non procedure based      Comments   >50% of visit spent in counseling and coordination of care     ________________________________________________________________________  Omaira Monroe MD     Procedures: see electronic medical records for all procedures/Xrays and details which were not copied into this note but were reviewed prior to creation of Plan. LABS:  I reviewed today's most current labs and imaging studies.   Pertinent labs include:  Recent Labs      02/03/18   0533 02/02/18   1845  02/02/18   1330   WBC  5.7  1.9*  2.5*   HGB  10.3*  10.9*  11.0*   HCT  31.6*  33.9*  33.8*   PLT  111*  45*  50*     Recent Labs      02/03/18   0533  02/02/18   1845  02/02/18   1330   NA  146*  145  144   K  5.1  5.1  5.0   CL  111*  110*  107   CO2  26  26  25   GLU  63*  92  117*   BUN  64*  63*  66*   CREA  2.28*  2.15*  2.38*   CA  8.8  8.5  8.8   MG   --   2.4  2.3   ALB   --   3.3*  3.5   TBILI   --   0.9  0.9   SGOT   --   32  37   ALT   --   24  30   INR   --   2.3*   --        Signed: Omaira Monroe MD

## 2018-02-03 NOTE — ED NOTES
Patient is currently sleeping in bed with son at bedside. Patient's currently vitals are bp 106/65, HR 88, temp 36.1 C, RR 12. Patient is on 4L via nasal cannula due to dropping O2 sats while sleeping. Patient had put out 400 ml of urine via vargas. Spoke with blood bank regarding the patients order for platelets. Lab stated that 2 units of platelets have been ordered and should be able to arrive to [facility] in an hour.  Patient and family updated on this information along with hospitalist.

## 2018-02-03 NOTE — PROGRESS NOTES
Problem: Falls - Risk of  Goal: *Absence of Falls  Document Jenna Fall Risk and appropriate interventions in the flowsheet.    Outcome: Progressing Towards Goal  Fall Risk Interventions:                               Comments: Bed alarm

## 2018-02-04 ENCOUNTER — APPOINTMENT (OUTPATIENT)
Dept: GENERAL RADIOLOGY | Age: 83
DRG: 871 | End: 2018-02-04
Attending: INTERNAL MEDICINE
Payer: MEDICARE

## 2018-02-04 LAB
ALBUMIN SERPL-MCNC: 2.8 G/DL (ref 3.5–5)
ALBUMIN/GLOB SERPL: 0.8 {RATIO} (ref 1.1–2.2)
ALP SERPL-CCNC: 93 U/L (ref 45–117)
ALT SERPL-CCNC: 22 U/L (ref 12–78)
ANION GAP SERPL CALC-SCNC: 3 MMOL/L (ref 5–15)
APTT PPP: 42.5 SEC (ref 22.1–32.5)
AST SERPL-CCNC: 30 U/L (ref 15–37)
BASOPHILS # BLD: 0 K/UL (ref 0–0.1)
BASOPHILS NFR BLD: 0 % (ref 0–1)
BILIRUB SERPL-MCNC: 1.3 MG/DL (ref 0.2–1)
BLD PROD TYP BPU: NORMAL
BLD PROD TYP BPU: NORMAL
BPU ID: NORMAL
BPU ID: NORMAL
BUN SERPL-MCNC: 50 MG/DL (ref 6–20)
BUN/CREAT SERPL: 23 (ref 12–20)
CALCIUM SERPL-MCNC: 8.4 MG/DL (ref 8.5–10.1)
CHLORIDE SERPL-SCNC: 109 MMOL/L (ref 97–108)
CO2 SERPL-SCNC: 29 MMOL/L (ref 21–32)
CORTIS AM PEAK SERPL-MCNC: 30.5 UG/DL (ref 4.3–22.4)
CREAT SERPL-MCNC: 2.15 MG/DL (ref 0.7–1.3)
DIFFERENTIAL METHOD BLD: ABNORMAL
EOSINOPHIL # BLD: 0 K/UL (ref 0–0.4)
EOSINOPHIL NFR BLD: 0 % (ref 0–7)
ERYTHROCYTE [DISTWIDTH] IN BLOOD BY AUTOMATED COUNT: 16.6 % (ref 11.5–14.5)
GLOBULIN SER CALC-MCNC: 3.7 G/DL (ref 2–4)
GLUCOSE BLD STRIP.AUTO-MCNC: 110 MG/DL (ref 65–100)
GLUCOSE BLD STRIP.AUTO-MCNC: 117 MG/DL (ref 65–100)
GLUCOSE BLD STRIP.AUTO-MCNC: 125 MG/DL (ref 65–100)
GLUCOSE BLD STRIP.AUTO-MCNC: 132 MG/DL (ref 65–100)
GLUCOSE BLD STRIP.AUTO-MCNC: 138 MG/DL (ref 65–100)
GLUCOSE SERPL-MCNC: 113 MG/DL (ref 65–100)
HCT VFR BLD AUTO: 31 % (ref 36.6–50.3)
HGB BLD-MCNC: 10 G/DL (ref 12.1–17)
IMM GRANULOCYTES # BLD: 0 K/UL (ref 0–0.04)
IMM GRANULOCYTES NFR BLD AUTO: 0 % (ref 0–0.5)
INR PPP: 1.5 (ref 0.9–1.1)
LYMPHOCYTES # BLD: 0.5 K/UL (ref 0.8–3.5)
LYMPHOCYTES NFR BLD: 8 % (ref 12–49)
MAGNESIUM SERPL-MCNC: 2.2 MG/DL (ref 1.6–2.4)
MCH RBC QN AUTO: 34.6 PG (ref 26–34)
MCHC RBC AUTO-ENTMCNC: 32.3 G/DL (ref 30–36.5)
MCV RBC AUTO: 107.3 FL (ref 80–99)
METAMYELOCYTES NFR BLD MANUAL: 1 %
MONOCYTES # BLD: 0.4 K/UL (ref 0–1)
MONOCYTES NFR BLD: 6 % (ref 5–13)
NEUTS SEG # BLD: 5.1 K/UL (ref 1.8–8)
NEUTS SEG NFR BLD: 85 % (ref 32–75)
NRBC # BLD: 0.05 K/UL (ref 0–0.01)
NRBC BLD-RTO: 0.8 PER 100 WBC
PHOSPHATE SERPL-MCNC: 3.4 MG/DL (ref 2.6–4.7)
PLATELET # BLD AUTO: 96 K/UL (ref 150–400)
PMV BLD AUTO: 12 FL (ref 8.9–12.9)
POTASSIUM SERPL-SCNC: 4.6 MMOL/L (ref 3.5–5.1)
PROT SERPL-MCNC: 6.5 G/DL (ref 6.4–8.2)
PROTHROMBIN TIME: 15.4 SEC (ref 9–11.1)
RBC # BLD AUTO: 2.89 M/UL (ref 4.1–5.7)
RBC MORPH BLD: ABNORMAL
RBC MORPH BLD: ABNORMAL
SERVICE CMNT-IMP: ABNORMAL
SODIUM SERPL-SCNC: 141 MMOL/L (ref 136–145)
STATUS OF UNIT,%ST: NORMAL
STATUS OF UNIT,%ST: NORMAL
T4 FREE SERPL-MCNC: 1.3 NG/DL (ref 0.8–1.5)
THERAPEUTIC RANGE,PTTT: ABNORMAL SECS (ref 58–77)
TSH SERPL DL<=0.05 MIU/L-ACNC: 2.79 UIU/ML (ref 0.36–3.74)
UNIT DIVISION, %UDIV: 0
UNIT DIVISION, %UDIV: 0
WBC # BLD AUTO: 6 K/UL (ref 4.1–11.1)

## 2018-02-04 PROCEDURE — 74011000250 HC RX REV CODE- 250: Performed by: INTERNAL MEDICINE

## 2018-02-04 PROCEDURE — 74011250636 HC RX REV CODE- 250/636: Performed by: EMERGENCY MEDICINE

## 2018-02-04 PROCEDURE — 74011000258 HC RX REV CODE- 258: Performed by: INTERNAL MEDICINE

## 2018-02-04 PROCEDURE — 83735 ASSAY OF MAGNESIUM: CPT | Performed by: INTERNAL MEDICINE

## 2018-02-04 PROCEDURE — 84439 ASSAY OF FREE THYROXINE: CPT | Performed by: INTERNAL MEDICINE

## 2018-02-04 PROCEDURE — 84100 ASSAY OF PHOSPHORUS: CPT | Performed by: INTERNAL MEDICINE

## 2018-02-04 PROCEDURE — 65620000000 HC RM CCU GENERAL

## 2018-02-04 PROCEDURE — 80053 COMPREHEN METABOLIC PANEL: CPT | Performed by: INTERNAL MEDICINE

## 2018-02-04 PROCEDURE — 74011250637 HC RX REV CODE- 250/637: Performed by: INTERNAL MEDICINE

## 2018-02-04 PROCEDURE — 85610 PROTHROMBIN TIME: CPT | Performed by: INTERNAL MEDICINE

## 2018-02-04 PROCEDURE — 85025 COMPLETE CBC W/AUTO DIFF WBC: CPT | Performed by: INTERNAL MEDICINE

## 2018-02-04 PROCEDURE — 36415 COLL VENOUS BLD VENIPUNCTURE: CPT | Performed by: INTERNAL MEDICINE

## 2018-02-04 PROCEDURE — 84443 ASSAY THYROID STIM HORMONE: CPT | Performed by: INTERNAL MEDICINE

## 2018-02-04 PROCEDURE — 74011250636 HC RX REV CODE- 250/636: Performed by: INTERNAL MEDICINE

## 2018-02-04 PROCEDURE — 82533 TOTAL CORTISOL: CPT | Performed by: INTERNAL MEDICINE

## 2018-02-04 PROCEDURE — 71045 X-RAY EXAM CHEST 1 VIEW: CPT

## 2018-02-04 PROCEDURE — 85730 THROMBOPLASTIN TIME PARTIAL: CPT | Performed by: INTERNAL MEDICINE

## 2018-02-04 PROCEDURE — 77010033678 HC OXYGEN DAILY

## 2018-02-04 PROCEDURE — 80202 ASSAY OF VANCOMYCIN: CPT | Performed by: INTERNAL MEDICINE

## 2018-02-04 RX ADMIN — LEVOFLOXACIN 750 MG: 5 INJECTION, SOLUTION INTRAVENOUS at 22:26

## 2018-02-04 RX ADMIN — NOREPINEPHRINE BITARTRATE 7 MCG/MIN: 1 INJECTION INTRAVENOUS at 03:59

## 2018-02-04 RX ADMIN — Medication 10 ML: at 20:36

## 2018-02-04 RX ADMIN — Medication 10 ML: at 15:29

## 2018-02-04 RX ADMIN — CHLORHEXIDINE GLUCONATE 15 ML: 1.2 RINSE ORAL at 10:18

## 2018-02-04 RX ADMIN — NYSTATIN: 100000 POWDER TOPICAL at 09:31

## 2018-02-04 RX ADMIN — MELATONIN 3 MG ORAL TABLET 9 MG: 3 TABLET ORAL at 22:00

## 2018-02-04 RX ADMIN — SODIUM CHLORIDE 0.4 MCG/KG/HR: 900 INJECTION, SOLUTION INTRAVENOUS at 10:19

## 2018-02-04 RX ADMIN — Medication 10 ML: at 22:21

## 2018-02-04 RX ADMIN — MEROPENEM 500 MG: 500 INJECTION, POWDER, FOR SOLUTION INTRAVENOUS at 14:15

## 2018-02-04 RX ADMIN — CHLORHEXIDINE GLUCONATE 15 ML: 1.2 RINSE ORAL at 20:36

## 2018-02-04 RX ADMIN — NYSTATIN: 100000 POWDER TOPICAL at 22:23

## 2018-02-04 RX ADMIN — VANCOMYCIN HYDROCHLORIDE 1750 MG: 10 INJECTION, POWDER, LYOPHILIZED, FOR SOLUTION INTRAVENOUS at 23:31

## 2018-02-04 RX ADMIN — Medication 10 ML: at 06:21

## 2018-02-04 RX ADMIN — NOREPINEPHRINE BITARTRATE 6 MCG/MIN: 1 INJECTION INTRAVENOUS at 23:30

## 2018-02-04 RX ADMIN — MUPIROCIN: 20 OINTMENT TOPICAL at 10:36

## 2018-02-04 RX ADMIN — FAMOTIDINE 20 MG: 10 INJECTION, SOLUTION INTRAVENOUS at 09:27

## 2018-02-04 RX ADMIN — MEROPENEM 500 MG: 500 INJECTION, POWDER, FOR SOLUTION INTRAVENOUS at 01:27

## 2018-02-04 RX ADMIN — DEXTROSE MONOHYDRATE 75 ML/HR: 5 INJECTION, SOLUTION INTRAVENOUS at 14:13

## 2018-02-04 NOTE — PROGRESS NOTES
Patient agitated with wife. Refusing meds, able to feed self,but questionable ability to swallow adequately. Food pocketing into right droopy cheek. precedex gtt returned back to 0.4mcgs/min. Lower partials in mouth. Will attempt removal when patient calmer.

## 2018-02-04 NOTE — CONSULTS
[facility]  MR#: [id number]  : 1928  ACCOUNT #: [de-identified]   DATE OF SERVICE: 2018    REFERRING PHYSICIAN:  Samina Dugan MD.    HISTORY OF PRESENT ILLNESS:  This is an 80-year-old male with a history of an unspecified cardiomyopathy, chronic unspecified congestive heart failure, and apparently recurrent atrial fibrillation treated with amiodarone and low dose beta blocker. He was admitted on transfer from [facility] with a history of several days of worsening shortness of breath, weakness, as well as a single episode of syncope the day prior to admission. In addition, he has had worsening bilateral lower extremity edema. It is unclear whether he is taking his medications. Due to the concerning nature of his presentation, he was transferred to [facility] for further evaluation and treatment. Here, just like at [facility], he was found to be bradycardic. His EKG on 2018 at 6:25 p.m. shows probable atrial fibrillation with slow ventricular response and a ventricular rate of 39 beats per minute. There is a right bundle branch block with left anterior fascicular block (bifascicular block). When compared to an earlier EKG, the heart rate has actually decreased from 45 beats per minute. Of note on 2017, he was in atrial fibrillation with a ventricular rate of 74 beats per minute. He is confused and cannot give a good history. My information was obtained minimally from him, but mostly from nursing and chart review. ALLERGIES:  1. ANCEF. 2.  NEOSPORIN. 3.  POLYSPORIN. MEDICATIONS:  1. Chlorhexidine mouthwash. 2.  Pepcid 20 mg injection daily. 3.  Sliding scale insulin. 4.  Levaquin 750 mg every 48 hours. 5.  Melatonin 9 mg at bedtime. 6.  Meropenem 500 mg every 12 hours. 7.  Bactroban ointment twice daily to the nostrils.   8.  Nystatin powder 3 times daily as appropriate. 9.  Vancomycin 1.75 grams intravenously every 24 hours. 10.  Norepinephrine 8 mcg per minute by infusion. 11.  He was on dopamine, but this was stopped. FAMILY HISTORY:  Noncontributory. SOCIAL HISTORY:  Noncontributory. REVIEW OF SYSTEMS:  Noncontributory. He cannot communicate a reliable review of systems due to his encephalopathy. PHYSICAL EXAMINATION:  VITAL SIGNS:  Temperature 97.4 degrees Fahrenheit, pulse 76, respirations 17, blood pressure 89/57, oxygen saturation 92% on 2 liters by nasal cannula. GENERAL:  Not diaphoretic, not in acute distress. HEENT:  Right eye has a patch. Mucous membranes are moist.  Conjunctivae are pale. NECK:  Supple, no palpable thyromegaly. RESPIRATORY:  Unlabored:  Clear to auscultation bilaterally anteriorly, symmetric air movement. HEART:  Irregularly irregular, no murmur, rub or gallop. No jugular venous distention. Palpable radial pulses bilaterally. ABDOMEN:  Soft, nontender, nondistended. EXTREMITIES:  No cyanosis or clubbing. NEUROLOGIC:  Confused, moves all extremities that I can tell. SKIN:  There are some ecchymoses. He has both lower greater than upper extremity edema. LABORATORY DATA:  Today on 02/03/2018, the white blood cell count is 5.7, hematocrit 31.6, platelets 986. Sodium 146, potassium 5.1, glucose 63, BUN 64, creatinine 2.28. ECG:  Today's study shows probable atrial fibrillation with slow ventricular response at a rate of 39 beats per minute. There is both a right bundle branch block and left anterior fascicular block. The QT interval is prolonged when corrected at 549 milliseconds. The chest x-ray on 02/02/2018 shows mild interstitial edema throughout both lungs. There is a left internal jugular catheter for a central line. Cardiac silhouette is enlarged. Of note, blood cultures drawn 02/02/2018 showed gram-positive cocci in chains growing in both bottles drawn. IMPRESSION:    1. Probably atrial fibrillation with slow ventricular response. This may be predominantly due to intrinsic AV conduction disease. To a small extent, it is due to some residual beta blocker as well as amiodarone which can have a beta blocker effect in part. 2.  Gram-positive cocci bacteremia and sepsis. 3.  Acute on chronic unspecified congestive heart failure. 4.  Acute kidney injury atop chronic kidney disease stage III. 5.  Other medical issues and comorbidities as noted above. RECOMMENDATIONS:  1. He is in the intensive care unit and has maintained a stable heart rhythm. He is on low dose pressors with adequate blood pressure. 2.  At this time, a pacemaker is not indicated. 3.  I would hold his amiodarone as well as his low dose beta blocker and see what becomes of the heart rate. 4.  I agree with treating him for the bacteremia. 5.  Monitor for volume status given the mild edema on x-ray and the absence of diuretic therapy. He may end up needing diuretic therapy within the next few days. MD ALVINA Oh / CARIDAD  D: 02/03/2018 22:35     T: 02/03/2018 23:59  JOB #: [id number]  CC: Kj Cervantes MD

## 2018-02-04 NOTE — PROGRESS NOTES
4367: Shift Summery:    VSS with levophed drip at 8 mcg/min. Dopamine drip weaned off at 2206. 2 L NC. Patient confused to situation. Used verbally aggressive language at times. Urine noted once to have a bloody haze, which has cleared. Continue to monitor.

## 2018-02-04 NOTE — PROGRESS NOTES
Bladder temp 95.5. Patient confused. Will place warm blankets on patient. Room temp incrreased. Fed patient scrambled eggs without incidence. Will change to mechanical soft diet due to missing teeth.

## 2018-02-04 NOTE — PROGRESS NOTES
[facility]  Pulmonary, Critical Care, and Sleep Medicine    Name: Kaur Mazariegos MRN: [id number]   : 1928 Hospital: [address] [facility]   Date: 2018        Critical Care  Patient Consult    IMPRESSION:   · Encephalopathy, may be more delirium, this is an ongoing and active issues, he is threat to himself. Will start on a continue precedex infusion, Will avoid BZDs. Suspect this may be due to his acute illness. Has some confusing comments, Has decreased short term memory. Pt seems to have worse delirium this am. Cursing and  somewhat combative with nurse. · Severe Sepsis on pressors, now off pressors. · Pancytopenia, ?aplastic anemia, ? MDS WBC: 5.7, Hgb: 10.3 Plt: 111. Reviewed Hem/Onc note. · Acute Bacteremia, + blood Cx noted. So far has  Enterococcus  And Alpha Strep in blood Cx. · Acute on Chronic Renal Insufficiency. Cr has been stable last 24. · Hypotension/Bradycardia on pressors, improving. · Left IJ CVC in place  · Parathyroid mass. S/p resection per Dr. Annita Cooney. · Hypoglycemia, improved. · A fib, CHF followed by [facility]. Noted to have bradycardia last pm. Now with increased peripheral edema. · Coagulopathy. Inr has decreased. · Poor oral dentition. · UA negative for UTI. · Pt has been having several weeks of intermittent blood stools and constipation. · Decreased po intake for last 1-2 days. · Hx of prostate Ca. · Psoriasis  · Pt at moderate to high risk of decompensation. Risk of multiple organ failure. Critically ill, very high risk of multiple organ failure, on pressors. Bacteremia. 35 min CC, EOP. Discussed with pt, his daughter, and nurse. RECOMMENDATIONS:   · ON Vanc, Merrem, Levolfox, will tailer abx when final cx return. · Started on precedex drip. · Monitor Blood Cultures, may need repeat set in 24 hrs. · Will monitor for further Hypoglycemia. · Levophed as need for hypotension. · Monitor Cr, LFTs, CBC.   · Follow Coags  · Assess for him to take po. · Add CHG oral.   · Hold blood pressure medications. · Hold Coreg for now  · Monitor for ongoing bloody stools. Subjective/History:   Last 24 hrs: Pt is more delirious this am. Not really able to get any hx from pt. Denies any pain in head, neck, back, abdomen, chest or legs. Has bandage to his right eye. This patient has been seen and evaluated at the request of Dr. Katie Dumont for above. Patient is a 80 y.o. male who presents for above. Was noted to be bradycardic. Noted to be hypothermic, hypotensive. NO nausea, no vomiting. No sick contacts. Noted to have bradycardia on presentation. Initially was noted to have some lethargy in ER. Had a syncopal episode per ER. 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 Has an eye patch in place on right eye. Was felt to have some right sided weakness, CT of head in Er was negative for an acute change. Per pt: noted to be weak and had difficulty walking. Has been easily bruising. Had decreased po intake for several days. WAs noted to have generalized fatigue. Had increased shortness of breath. Has been retaining more fluid in his legs and arms recently. Araceli Furl and had an open wound to his left forearm. Has orthopnea. No chest pain, has been having bloody stools for several days. Hx obtain as above. His daughter helped. Pt is confused and not able to give full details. ROS not able to be complete due to pts medical condition.      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 Authorizing Provider   furosemide (LASIX) 20 mg tablet Take 1 Tab by mouth every Tuesday and Friday. 1/9/18  Yes Inez Cordova MD   potassium chloride (KLOR-CON) 10 mEq tablet Take 1 Tab by mouth every Tuesday and Friday. 1/9/18  Yes Inez Cordova MD   amiodarone (PACERONE) 100 mg tablet Take 1 Tab by mouth daily. 9/21/17  Yes Inez Cordova MD   SSD 1 % topical cream  9/5/17  Yes Historical Provider   traMADol (ULTRAM) 50 mg tablet  6/1/17  Yes Historical Provider   triamcinolone acetonide (KENALOG) 0.1 % ointment Apply  to affected area two (2) times a day. use thin layer   Indications: psoariasis   Yes Historical Provider   albuterol (PROVENTIL HFA, VENTOLIN HFA, PROAIR HFA) 90 mcg/actuation inhaler Take 2 Puffs by inhalation every four (4) hours as needed for Wheezing. 4/26/17  Yes French Boogie MD   rivaroxaban (XARELTO) 20 mg tab tablet Take 1 Tab by mouth daily. Indications: prevent stroke  Patient taking differently: Take 15 mg by mouth daily (with dinner). Indications: prevent stroke 4/26/17  Yes French Boogie MD   diphenhydrAMINE (BENADRYL) 25 mg capsule Take 25 mg by mouth nightly as needed for Sleep. Indications: ALLERGIC RHINITIS   Yes Historical Provider   carvedilol (COREG) 3.125 mg tablet Take  by mouth two (2) times daily (with meals).    Yes Historical Provider     Current Facility-Administered Medications   Medication Dose Route Frequency    dextrose 5% infusion  75 mL/hr IntraVENous CONTINUOUS    meropenem (MERREM) 500 mg in 0.9% sodium chloride (MBP/ADV) 50 mL  0.5 g IntraVENous Q12H    chlorhexidine (PERIDEX) 0.12 % mouthwash 15 mL  15 mL Oral Q12H    NOREPINephrine (LEVOPHED) 8 mg in dextrose 5% 250 mL infusion  2-30 mcg/min IntraVENous TITRATE    melatonin tablet 9 mg  9 mg Oral QHS    nystatin (MYCOSTATIN) 100,000 unit/gram powder   Topical TID    famotidine (PF) (PEPCID) 20 mg in sodium chloride 0.9 % 10 mL injection  20 mg IntraVENous DAILY    insulin lispro (HUMALOG) injection   SubCUTAneous Q6H    DOPamine (INTROPIN) 800 mg/250 mL (3,200 mcg/mL) infusion  0-20 mcg/kg/min IntraVENous TITRATE    sodium chloride (NS) flush 5-10 mL  5-10 mL IntraVENous Q8H    levoFLOXacin (LEVAQUIN) 750 mg in D5W IVPB  750 mg IntraVENous Q48H    vancomycin (VANCOCIN) 1750 mg in  ml infusion  1,750 mg IntraVENous Q24H    mupirocin (BACTROBAN) 2 % ointment   Both Nostrils BID     Allergies   Allergen Reactions    Ancef [Cefazolin] Unknown (comments)     \"drops my blood pressure\"    Neosporin [Benzalkonium Chloride] Unknown (comments)    Polysporin [Bacitracin-Polymyxin B] Other (comments)     \"WOUNDS DO NOT HEAL\"      Social History   Substance Use Topics    Smoking status: Former Smoker    Smokeless tobacco: Never Used    Alcohol use 4.2 oz/week     7 Standard drinks or equivalent per week      Comment: \"1 drink a night\"      Family History   Problem Relation Age of Onset    Cancer Mother      BRAIN TUMOR    Cancer Brother      PROSTATE        Review of Systems:  Review of systems not obtained due to patient factors.     Objective:   Vital Signs:    Visit Vitals    BP 96/60    Pulse 69    Temp 97.2 °F (36.2 °C)    Resp 14    Ht 5' 9\" (1.753 m)    Wt 88 kg (194 lb 0.1 oz)    SpO2 95%    BMI 28.65 kg/m2       O2 Device: Nasal cannula   O2 Flow Rate (L/min): 2 l/min   Temp (24hrs), Av °F (36.1 °C), Min:96.4 °F (35.8 °C), Max:97.4 °F (36.3 °C)       Intake/Output:   Last shift:         Last 3 shifts:  1901 -  0700  In: 3850.7 [I.V.:3279.9]  Out: 0604 [Urine:2520]    Intake/Output Summary (Last 24 hours) at 18 0728  Last data filed at 18 0600   Gross per 24 hour   Intake          2532.24 ml Output             1620 ml   Net           912.24 ml     Hemodynamics:   PAP:   CO:     Wedge:   CI:     CVP:  CVP (mmHg): 9 mmHg (02/04/18 0000) SVR:       PVR:       Ventilator Settings:  Mode Rate Tidal Volume Pressure FiO2 PEEP                    Peak airway pressure:      Minute ventilation:        Physical Exam:    General:  Alert, Not really  cooperative, no distress, appears stated age. Delirious. Head:  Normocephalic, without obvious abnormality, atraumatic. Eyes:  Conjunctivae/corneas clear. PERRL, EOMs intact. Nose: Nares normal. Septum midline. Mucosa normal. No drainage or sinus tenderness. Throat: Lips, mucosa, and tongue normal. Teeth and gums normal.   Neck: Supple, symmetrical, trachea midline, no adenopathy, thyroid: no enlargment/tenderness/nodules, no carotid bruit and no JVD. Back:   Symmetric, no curvature. ROM normal.   Lungs:   Clear to auscultation bilaterally. Chest wall:  No tenderness or deformity. Heart:  Regular rate and rhythm, S1, S2 normal, no murmur, click, rub or gallop. Abdomen:   Soft, non-tender. Bowel sounds normal. No masses,  No organomegaly. Extremities: Extremities normal, atraumatic, no cyanosis or edema. Pulses: 2+ and symmetric all extremities. Skin: Skin color, texture, turgor normal. Has areas of bruising, has psoriasis changes. Bandage to his LUE. Lymph nodes: Cervical, supraclavicular, and axillary nodes normal.   Neurologic: Grossly nonfocal, moving all extremities. Psych: Seems to have delirium, accusing people of not being who they say, Not oriented this am. Refusing to allow full evaluation.         Data:     Recent Results (from the past 24 hour(s))   GLUCOSE, POC    Collection Time: 02/03/18  7:52 AM   Result Value Ref Range    Glucose (POC) 137 (H) 65 - 100 mg/dL    Performed by Miguel Islas, POC    Collection Time: 02/03/18 11:57 AM   Result Value Ref Range    Glucose (POC) 115 (H) 65 - 100 mg/dL    Performed by Mercedez Gaona Sana    GLUCOSE, POC    Collection Time: 02/03/18  4:42 PM   Result Value Ref Range    Glucose (POC) 129 (H) 65 - 100 mg/dL    Performed by Caleb Maxwell    GLUCOSE, POC    Collection Time: 02/03/18 11:51 PM   Result Value Ref Range    Glucose (POC) 125 (H) 65 - 100 mg/dL    Performed by Perez Macias    CBC WITH AUTOMATED DIFF    Collection Time: 02/04/18  4:18 AM   Result Value Ref Range    WBC 6.0 4.1 - 11.1 K/uL    RBC 2.89 (L) 4.10 - 5.70 M/uL    HGB 10.0 (L) 12.1 - 17.0 g/dL    HCT 31.0 (L) 36.6 - 50.3 %    .3 (H) 80.0 - 99.0 FL    MCH 34.6 (H) 26.0 - 34.0 PG    MCHC 32.3 30.0 - 36.5 g/dL    RDW 16.6 (H) 11.5 - 14.5 %    PLATELET 96 (L) 055 - 400 K/uL    MPV 12.0 8.9 - 12.9 FL    NRBC 0.8 (H) 0  WBC    ABSOLUTE NRBC 0.05 (H) 0.00 - 0.01 K/uL    NEUTROPHILS 85 (H) 32 - 75 %    LYMPHOCYTES 8 (L) 12 - 49 %    MONOCYTES 6 5 - 13 %    EOSINOPHILS 0 0 - 7 %    BASOPHILS 0 0 - 1 %    METAMYELOCYTES 1 %    IMMATURE GRANULOCYTES 0 0.0 - 0.5 %    ABS. NEUTROPHILS 5.1 1.8 - 8.0 K/UL    ABS. LYMPHOCYTES 0.5 (L) 0.8 - 3.5 K/UL    ABS. MONOCYTES 0.4 0.0 - 1.0 K/UL    ABS. EOSINOPHILS 0.0 0.0 - 0.4 K/UL    ABS. BASOPHILS 0.0 0.0 - 0.1 K/UL    ABS. IMM. GRANS. 0.0 0.00 - 0.04 K/UL    DF MANUAL      RBC COMMENTS ANISOCYTOSIS  1+        RBC COMMENTS MACROCYTOSIS  2+       METABOLIC PANEL, COMPREHENSIVE    Collection Time: 02/04/18  4:18 AM   Result Value Ref Range    Sodium 141 136 - 145 mmol/L    Potassium 4.6 3.5 - 5.1 mmol/L    Chloride 109 (H) 97 - 108 mmol/L    CO2 29 21 - 32 mmol/L    Anion gap 3 (L) 5 - 15 mmol/L    Glucose 113 (H) 65 - 100 mg/dL    BUN 50 (H) 6 - 20 MG/DL    Creatinine 2.15 (H) 0.70 - 1.30 MG/DL    BUN/Creatinine ratio 23 (H) 12 - 20      GFR est AA 35 (L) >60 ml/min/1.73m2    GFR est non-AA 29 (L) >60 ml/min/1.73m2    Calcium 8.4 (L) 8.5 - 10.1 MG/DL    Bilirubin, total 1.3 (H) 0.2 - 1.0 MG/DL    ALT (SGPT) 22 12 - 78 U/L    AST (SGOT) 30 15 - 37 U/L    Alk.  phosphatase 93 45 - 117 U/L Protein, total 6.5 6.4 - 8.2 g/dL    Albumin 2.8 (L) 3.5 - 5.0 g/dL    Globulin 3.7 2.0 - 4.0 g/dL    A-G Ratio 0.8 (L) 1.1 - 2.2     MAGNESIUM    Collection Time: 02/04/18  4:18 AM   Result Value Ref Range    Magnesium 2.2 1.6 - 2.4 mg/dL   PHOSPHORUS    Collection Time: 02/04/18  4:18 AM   Result Value Ref Range    Phosphorus 3.4 2.6 - 4.7 MG/DL   PROTHROMBIN TIME + INR    Collection Time: 02/04/18  4:18 AM   Result Value Ref Range    INR 1.5 (H) 0.9 - 1.1      Prothrombin time 15.4 (H) 9.0 - 11.1 sec   PTT    Collection Time: 02/04/18  4:18 AM   Result Value Ref Range    aPTT 42.5 (H) 22.1 - 32.5 sec    aPTT, therapeutic range     58.0 - 77.0 SECS   TSH 3RD GENERATION    Collection Time: 02/04/18  4:18 AM   Result Value Ref Range    TSH 2.79 0.36 - 3.74 uIU/mL   GLUCOSE, POC    Collection Time: 02/04/18  6:25 AM   Result Value Ref Range    Glucose (POC) 132 (H) 65 - 100 mg/dL    Performed by Arden Skiff              Telemetry:AFIB    Imaging:  I have personally reviewed the patients radiographs and have reviewed the reports:  FINDINGS:   A portable AP radiograph of the chest was obtained at 1919 hours. Lines and tubes: The patient is on a cardiac monitor and nasal oxygen. There is  a left jugular triple-lumen catheter with tip over the superior vena cava. Lungs: There is mild interstitial edema throughout the lungs. Pleura: Is a right pleural effusion. Mediastinum: Cardiac silhouette is enlarged and the aorta is atherosclerotic. Bones and soft tissues: The bones and soft tissues are grossly within normal  limits.       IMPRESSION: Left jugular catheter tip over the superior vena cava. No  pneumothorax.     I     Ct of Head: 2-2-18: IMPRESSION: No acute intracranial abnormality. Age-related volume loss and  microvascular disease unchanged. PET Scan: 7-7-17: IMPRESSION: Status post right parotidectomy with no abnormal hypermetabolic  activity to suggest residual, recurrent or metastatic disease. Total critical care time exclusive of procedures: 35 minutes  Adwoa Hui MD

## 2018-02-04 NOTE — PROGRESS NOTES
Hospitalist Progress Note    NAME: Najma Gardner   :  1928   MRN:  [id number]       Assessment / Plan:  Septic Shock with Hypothermia (hypothermia resolved)  Bacteremia (GPC's on BCx): Staph, Strep and Enterococcus? ??  -continue on Vancomycin, Meropenem and Levaquin until culture speciation and sensitivities available  -patient now on Levophed and Dopamine weaned to off    Chronic (?) Atrial Fibrillation with Bradycardia: await Cardiology thoughts on if this is SSS, seen by Dr. Lisa Giles last night per RN report  Acute on Chronic CHF  Acute Respiratory Failure with Hypoxia from Acute Pulmonary Edema (on CXR)  -await TTE (report not yet available)  -hold home Xarelto  -continue O2, wean as tolerated  -TSH wnl  -hold home Coreg and Xarelto    Hypernatremia  Hypoglycemia  -continue D5W at 75 mL/hr  -await am cortisol    Acute Kidney Injury  CKD stage III  -stable, IVF's above, renal Ultrasound unremarkable for acute pathology, monitor renal function and avoid nephrotoxins    Pancytopenia  -s/p 2 units plts on  for acute bleeding from central line with plts improving from 45K to 111K and decreased to 96K today  -Leukopenia resolved  -await Vitamin B12 level    Blood in Stool PTA  -monitor, likely from Xarelto and thrombocytopenia    Metabolic Encephalopathy  Delirium with hallucinations  -still having hallucinations  -continue qhs melatonin  -agree with Dr. Trevor Ta to try Precedex, may be limited pending HR response    Patient with history of Left Parotid Mass s/p resection approximately 1 year ago by Dr. Carlo Montenegro and XRT, 2017 PET negative  -Heme/Onc following    History of Prostate Cancer    Psoriasis    Body mass index is 28.65 kg/(m^2). Code status: Full  Prophylaxis: SCD's and H2B  Recommended Disposition: TBD     Subjective:     Chief Complaint / Reason for Physician Visit: follow-up septic shock  Patient states that he is \"not that woman's . \"  He also tells me that he is being conned and that I am being conned too  Seems mildly agitated  Denies pain and SOB  Case discussed with RN and Dr. Mary Walden    Review of Systems:  Symptom Y/N Comments  Symptom Y/N Comments   Fever/Chills    Chest Pain     Poor Appetite    Edema     Cough    Abdominal Pain     Sputum    Joint Pain     SOB/GRADY    Pruritis/Rash     Nausea/vomit    Tolerating PT/OT     Diarrhea    Tolerating Diet     Constipation    Other       Limited due to Delirium: Y     Objective:     VITALS:   Last 24hrs VS reviewed since prior progress note.  Most recent are:  Patient Vitals for the past 24 hrs:   Temp Pulse Resp BP SpO2   02/04/18 0700 - 69 14 96/60 -   02/04/18 0645 - - - 98/57 -   02/04/18 0630 - - - 95/49 -   02/04/18 0615 - - - 93/54 -   02/04/18 0600 - 64 17 95/49 -   02/04/18 0500 - 64 17 94/55 95 %   02/04/18 0400 97.2 °F (36.2 °C) 65 18 100/62 96 %   02/04/18 0300 - (!) 58 12 96/56 96 %   02/04/18 0200 - 68 14 98/64 99 %   02/04/18 0100 - 66 15 94/63 94 %   02/04/18 0000 96.4 °F (35.8 °C) 67 19 94/62 93 %   02/03/18 2330 - - - 96/64 -   02/03/18 2300 - 62 13 (!) 88/55 92 %   02/03/18 2230 - - - 91/55 -   02/03/18 2200 - 76 17 (!) 89/57 92 %   02/03/18 2100 - 75 14 98/59 95 %   02/03/18 2000 97.4 °F (36.3 °C) 85 25 107/59 97 %   02/03/18 1900 - 78 14 108/62 97 %   02/03/18 1830 - 75 15 103/56 96 %   02/03/18 1800 - 74 15 104/58 95 %   02/03/18 1730 - 72 14 98/62 96 %   02/03/18 1700 - 75 17 98/51 95 %   02/03/18 1648 - 73 16 92/58 95 %   02/03/18 1600 97.4 °F (36.3 °C) 75 16 - 95 %   02/03/18 1530 - 88 22 105/62 94 %   02/03/18 1500 - 83 16 99/65 95 %   02/03/18 1400 - 90 19 95/57 (!) 89 %   02/03/18 1300 - 83 19 (!) 84/49 94 %   02/03/18 1200 96.8 °F (36 °C) 92 22 93/62 94 %   02/03/18 1145 96.8 °F (36 °C) - - - -   02/03/18 1130 - 86 18 98/61 96 %   02/03/18 1100 - 90 16 99/61 93 %   02/03/18 1030 - 92 20 90/52 96 %   02/03/18 1000 - 90 19 101/61 96 %   02/03/18 0930 - 94 18 103/53 95 %   02/03/18 0900 - 92 21 96/56 94 % Intake/Output Summary (Last 24 hours) at 02/04/18 0800  Last data filed at 02/04/18 0600   Gross per 24 hour   Intake          2532.24 ml   Output             1470 ml   Net          1062.24 ml        PHYSICAL EXAM:  General: WD, Chronically ill appearing. Alert, cooperative, no acute distress    EENT:  EOMI. Anicteric sclerae. MM dry; Right facial droop and void from removal of parotid mass notable. Right eye with patch (due to inability to close eyelid. Resp:  CTA bilaterally on anterior and lateral assessment, no wheezing or rales. No accessory muscle use  CV:  irregular  Rhythm with normal rate, + edema noted with SCD's in palce  GI:  Soft, Non distended, Non tender.  +Bowel sounds  Neurologic:  Alert and oriented X 3, normal speech,   Psych:   Fair insight. Not anxious nor agitated at this time  Skin:  No rashes. No jaundice    Reviewed most current lab test results and cultures  YES  Reviewed most current radiology test results   YES  Review and summation of old records today    NO  Reviewed patient's current orders and MAR    YES  PMH/ reviewed - no change compared to H&P  ________________________________________________________________________  Care Plan discussed with:    Comments   Patient x    Family      RN x    Care Manager     Consultant  x Dr. Elif Herrera team rounds were held today with , nursing, pharmacist and clinical coordinator. Patient's plan of care was discussed; medications were reviewed and discharge planning was addressed.      ________________________________________________________________________  Total NON critical care TIME:  35  Minutes    Total CRITICAL CARE TIME Spent:   Minutes non procedure based      Comments   >50% of visit spent in counseling and coordination of care x    ________________________________________________________________________  Meryle Leach, MD     Procedures: see electronic medical records for all procedures/Xrays and details which were not copied into this note but were reviewed prior to creation of Plan. LABS:  I reviewed today's most current labs and imaging studies.   Pertinent labs include:  Recent Labs      02/04/18 0418 02/03/18 0533 02/02/18 1845   WBC  6.0  5.7  1.9*   HGB  10.0*  10.3*  10.9*   HCT  31.0*  31.6*  33.9*   PLT  96*  111*  45*     Recent Labs      02/04/18 0418 02/03/18 0533 02/02/18 1845  02/02/18   1330   NA  141  146*  145  144   K  4.6  5.1  5.1  5.0   CL  109*  111*  110*  107   CO2  29  26  26  25   GLU  113*  63*  92  117*   BUN  50*  64*  63*  66*   CREA  2.15*  2.28*  2.15*  2.38*   CA  8.4*  8.8  8.5  8.8   MG  2.2   --   2.4  2.3   PHOS  3.4   --    --    --    ALB  2.8*   --   3.3*  3.5   TBILI  1.3*   --   0.9  0.9   SGOT  30   --   32  37   ALT  22   --   24  30   INR  1.5*   --   2.3*   --        Signed: Meli Carroll MD

## 2018-02-04 NOTE — PROGRESS NOTES
Spiritual Care Assessment/Progress Notes    Leilani Johnston [id number]  [id number]    7/1/1928  80 y.o.  male    Patient Telephone Number: [phone] (home)   Yarsanism Affiliation: Skylar Macedo   Language: English   Extended Emergency Contact Information  Primary Emergency Contact: Estevan Pablo  Address: [address], [address] [place] [address] Phone: [phone]  Mobile Phone: [phone]  Relation: Spouse  Secondary Emergency Contact: Chelsi Ag Phone: [phone]  Relation: Spouse   Patient Active Problem List    Diagnosis Date Noted    Hypothermia 02/02/2018    Sepsis ([facility] 02/02/2018    Atrial fibrillation ([facility] 09/21/2017    Cellulitis of right upper arm 09/06/2017    Parotid mass 05/04/2017    Parotid tumor 01/01/2017    Hypercholesterolemia     Malignant neoplasm of prostate ([facility]     GERD (gastroesophageal reflux disease)     Diverticula of colon         Date: 2/4/2018       Level of Yarsanism/Spiritual Activity:  []         Involved in osmany tradition/spiritual practice    []         Not involved in osmany tradition/spiritual practice  []         Spiritually oriented    []         Claims no spiritual orientation    []         seeking spiritual identity  []         Feels alienated from Jehovah's witness practice/tradition  []         Feels angry about Jehovah's witness practice/tradition  []         Spirituality/Jehovah's witness tradition a resource for coping at this time.   [x]         Not able to assess due to medical condition    Services Provided Today per family:  []         crisis intervention    []         reading Scriptures  [x]         spiritual assessment    []         prayer  [x]         empathic listening/emotional support  []         rites and rituals (cite in comments)  [x]         life review     []         Jehovah's witness support  []         theological development   []         advocacy  []         ethical dialog     []         blessing  [] bereavement support    [x]         support to family  []         anticipatory grief support   []         help with AMD  []         spiritual guidance    []         meditation      Spiritual Care Needs  []         Emotional Support  []         Spiritual/Pentecostal Care  []         Loss/Adjustment  []         Advocacy/Referral                /Ethics  [x]         No needs expressed at               this time  []         Other: (note in               comments)  [address]  []         Follow up visits with               pt/family  []         Provide materials  []         Schedule sacraments  []         Contact Community               Clergy  [x]         Follow up as needed  []         Other: (note in               comments)      Comments: Initial visit with patient in 2544. Introduced self to patient's wife who was present at the bedside and explained role of 's in the hospital. Patient was asleep for duration of visit. Provided active listening as wife shared events leading to patient's hospitalization as well as extensive medical history and patient's previous hospitalizations. Patient and wife's two children live in the area and provide a good source of support and respite. Wife enjoys sharing stories about their life together and indicated that she is not overly worried about the patient's condition as she has confidence in his treatments and the care they have previously received at [address]. Advised wife of  availability and assured of ongoing support as needed and desired. Chaplain David Vinson M.Div.    Paging Service [phone])

## 2018-02-05 LAB
ANION GAP SERPL CALC-SCNC: 6 MMOL/L (ref 5–15)
BASOPHILS # BLD: 0 K/UL (ref 0–0.1)
BASOPHILS NFR BLD: 0 % (ref 0–1)
BUN SERPL-MCNC: 42 MG/DL (ref 6–20)
BUN/CREAT SERPL: 24 (ref 12–20)
CALCIUM SERPL-MCNC: 8.5 MG/DL (ref 8.5–10.1)
CHLORIDE SERPL-SCNC: 107 MMOL/L (ref 97–108)
CO2 SERPL-SCNC: 26 MMOL/L (ref 21–32)
CREAT SERPL-MCNC: 1.77 MG/DL (ref 0.7–1.3)
DATE LAST DOSE: ABNORMAL
DIFFERENTIAL METHOD BLD: ABNORMAL
EOSINOPHIL # BLD: 0 K/UL (ref 0–0.4)
EOSINOPHIL NFR BLD: 0 % (ref 0–7)
ERYTHROCYTE [DISTWIDTH] IN BLOOD BY AUTOMATED COUNT: 16.5 % (ref 11.5–14.5)
GLUCOSE BLD STRIP.AUTO-MCNC: 107 MG/DL (ref 65–100)
GLUCOSE BLD STRIP.AUTO-MCNC: 111 MG/DL (ref 65–100)
GLUCOSE BLD STRIP.AUTO-MCNC: 126 MG/DL (ref 65–100)
GLUCOSE SERPL-MCNC: 94 MG/DL (ref 65–100)
HCT VFR BLD AUTO: 31.3 % (ref 36.6–50.3)
HGB BLD-MCNC: 10.4 G/DL (ref 12.1–17)
IMM GRANULOCYTES # BLD: 0.1 K/UL (ref 0–0.04)
IMM GRANULOCYTES NFR BLD AUTO: 2 % (ref 0–0.5)
LYMPHOCYTES # BLD: 0.5 K/UL (ref 0.8–3.5)
LYMPHOCYTES NFR BLD: 11 % (ref 12–49)
MAGNESIUM SERPL-MCNC: 1.9 MG/DL (ref 1.6–2.4)
MCH RBC QN AUTO: 35.1 PG (ref 26–34)
MCHC RBC AUTO-ENTMCNC: 33.2 G/DL (ref 30–36.5)
MCV RBC AUTO: 105.7 FL (ref 80–99)
MONOCYTES # BLD: 0.8 K/UL (ref 0–1)
MONOCYTES NFR BLD: 18 % (ref 5–13)
NEUTS SEG # BLD: 3.2 K/UL (ref 1.8–8)
NEUTS SEG NFR BLD: 69 % (ref 32–75)
NRBC # BLD: 0.04 K/UL (ref 0–0.01)
NRBC BLD-RTO: 0.9 PER 100 WBC
PHOSPHATE SERPL-MCNC: 2.9 MG/DL (ref 2.6–4.7)
PLATELET # BLD AUTO: 83 K/UL (ref 150–400)
PLATELET COMMENTS,PCOM: ABNORMAL
PMV BLD AUTO: 12.6 FL (ref 8.9–12.9)
POTASSIUM SERPL-SCNC: 4.7 MMOL/L (ref 3.5–5.1)
RBC # BLD AUTO: 2.96 M/UL (ref 4.1–5.7)
RBC MORPH BLD: ABNORMAL
REPORTED DOSE,DOSE: ABNORMAL UNITS
REPORTED DOSE/TIME,TMG: ABNORMAL
SERVICE CMNT-IMP: ABNORMAL
SODIUM SERPL-SCNC: 139 MMOL/L (ref 136–145)
VANCOMYCIN TROUGH SERPL-MCNC: 19.6 UG/ML (ref 5–10)
WBC # BLD AUTO: 4.6 K/UL (ref 4.1–11.1)

## 2018-02-05 PROCEDURE — 80048 BASIC METABOLIC PNL TOTAL CA: CPT | Performed by: INTERNAL MEDICINE

## 2018-02-05 PROCEDURE — 74011000250 HC RX REV CODE- 250: Performed by: INTERNAL MEDICINE

## 2018-02-05 PROCEDURE — 74011250636 HC RX REV CODE- 250/636: Performed by: INTERNAL MEDICINE

## 2018-02-05 PROCEDURE — 74011250636 HC RX REV CODE- 250/636: Performed by: EMERGENCY MEDICINE

## 2018-02-05 PROCEDURE — 74011258636 HC RX REV CODE- 258/636: Performed by: INTERNAL MEDICINE

## 2018-02-05 PROCEDURE — 83735 ASSAY OF MAGNESIUM: CPT | Performed by: INTERNAL MEDICINE

## 2018-02-05 PROCEDURE — 85025 COMPLETE CBC W/AUTO DIFF WBC: CPT | Performed by: INTERNAL MEDICINE

## 2018-02-05 PROCEDURE — 65620000000 HC RM CCU GENERAL

## 2018-02-05 PROCEDURE — 74011000258 HC RX REV CODE- 258: Performed by: INTERNAL MEDICINE

## 2018-02-05 PROCEDURE — 82962 GLUCOSE BLOOD TEST: CPT

## 2018-02-05 PROCEDURE — 36415 COLL VENOUS BLD VENIPUNCTURE: CPT | Performed by: INTERNAL MEDICINE

## 2018-02-05 PROCEDURE — 74011250637 HC RX REV CODE- 250/637: Performed by: INTERNAL MEDICINE

## 2018-02-05 PROCEDURE — 84100 ASSAY OF PHOSPHORUS: CPT | Performed by: INTERNAL MEDICINE

## 2018-02-05 RX ORDER — HALOPERIDOL 5 MG/ML
5 INJECTION INTRAMUSCULAR
Status: DISCONTINUED | OUTPATIENT
Start: 2018-02-05 | End: 2018-02-08

## 2018-02-05 RX ORDER — ENOXAPARIN SODIUM 100 MG/ML
80 INJECTION SUBCUTANEOUS EVERY 24 HOURS
Status: DISCONTINUED | OUTPATIENT
Start: 2018-02-05 | End: 2018-02-08

## 2018-02-05 RX ORDER — DEXTROSE, SODIUM CHLORIDE, SODIUM LACTATE, POTASSIUM CHLORIDE, AND CALCIUM CHLORIDE 5; .6; .31; .03; .02 G/100ML; G/100ML; G/100ML; G/100ML; G/100ML
50 INJECTION, SOLUTION INTRAVENOUS CONTINUOUS
Status: DISCONTINUED | OUTPATIENT
Start: 2018-02-05 | End: 2018-02-08

## 2018-02-05 RX ORDER — DOBUTAMINE HYDROCHLORIDE 400 MG/100ML
5 INJECTION INTRAVENOUS
Status: DISCONTINUED | OUTPATIENT
Start: 2018-02-05 | End: 2018-02-07

## 2018-02-05 RX ORDER — AMIODARONE HYDROCHLORIDE 200 MG/1
100 TABLET ORAL DAILY
Status: DISCONTINUED | OUTPATIENT
Start: 2018-02-06 | End: 2018-02-20

## 2018-02-05 RX ADMIN — HALOPERIDOL LACTATE 5 MG: 5 INJECTION, SOLUTION INTRAMUSCULAR at 23:23

## 2018-02-05 RX ADMIN — SODIUM CHLORIDE 1 MCG/KG/HR: 900 INJECTION, SOLUTION INTRAVENOUS at 08:40

## 2018-02-05 RX ADMIN — HALOPERIDOL LACTATE 5 MG: 5 INJECTION, SOLUTION INTRAMUSCULAR at 10:13

## 2018-02-05 RX ADMIN — Medication 10 ML: at 16:15

## 2018-02-05 RX ADMIN — ENOXAPARIN SODIUM 80 MG: 40 INJECTION SUBCUTANEOUS at 11:11

## 2018-02-05 RX ADMIN — SODIUM CHLORIDE, SODIUM LACTATE, POTASSIUM CHLORIDE, CALCIUM CHLORIDE, AND DEXTROSE MONOHYDRATE 75 ML/HR: 600; 310; 30; 20; 5 INJECTION, SOLUTION INTRAVENOUS at 11:10

## 2018-02-05 RX ADMIN — HALOPERIDOL LACTATE 5 MG: 5 INJECTION, SOLUTION INTRAMUSCULAR at 17:20

## 2018-02-05 RX ADMIN — SODIUM CHLORIDE 0.8 MCG/KG/HR: 900 INJECTION, SOLUTION INTRAVENOUS at 15:48

## 2018-02-05 RX ADMIN — CHLORHEXIDINE GLUCONATE 15 ML: 1.2 RINSE ORAL at 20:40

## 2018-02-05 RX ADMIN — FAMOTIDINE 20 MG: 10 INJECTION, SOLUTION INTRAVENOUS at 09:01

## 2018-02-05 RX ADMIN — SODIUM CHLORIDE 0.8 MCG/KG/HR: 900 INJECTION, SOLUTION INTRAVENOUS at 21:57

## 2018-02-05 RX ADMIN — NYSTATIN: 100000 POWDER TOPICAL at 17:23

## 2018-02-05 RX ADMIN — DEXTROSE MONOHYDRATE 75 ML/HR: 5 INJECTION, SOLUTION INTRAVENOUS at 09:33

## 2018-02-05 RX ADMIN — SODIUM CHLORIDE 1.4 MCG/KG/HR: 900 INJECTION, SOLUTION INTRAVENOUS at 11:27

## 2018-02-05 RX ADMIN — Medication 10 ML: at 05:59

## 2018-02-05 RX ADMIN — VANCOMYCIN HYDROCHLORIDE 1750 MG: 10 INJECTION, POWDER, LYOPHILIZED, FOR SOLUTION INTRAVENOUS at 23:05

## 2018-02-05 RX ADMIN — Medication 10 ML: at 22:00

## 2018-02-05 RX ADMIN — DOBUTAMINE IN DEXTROSE 5 MCG/KG/MIN: 400 INJECTION, SOLUTION INTRAVENOUS at 16:07

## 2018-02-05 RX ADMIN — NYSTATIN: 100000 POWDER TOPICAL at 23:36

## 2018-02-05 RX ADMIN — MEROPENEM 500 MG: 500 INJECTION, POWDER, FOR SOLUTION INTRAVENOUS at 14:21

## 2018-02-05 RX ADMIN — MEROPENEM 500 MG: 500 INJECTION, POWDER, FOR SOLUTION INTRAVENOUS at 01:16

## 2018-02-05 NOTE — PROGRESS NOTES
1900 Mouth care given. Removed moderate amount of food pocketed in the corner of mouth. Eye ointment placed and eye patch applied. 2000 Partial Lower denture removed. 2200 Vancomycin trough level sent to lab as ordered. 0630 Slowly weaning Levophed gtt. Levophed gtt now infusing at 5 mcg/min. Refuse to allow fingerstick blood sugar to be obtained. Stated don't touch me. Blood glucose on am labs was 94. No coverage needed. Remains on Precedex gtt at 0.4 mcg/kg/hr.

## 2018-02-05 NOTE — PROGRESS NOTES
EP/ Arrhythmia/ Cardiology Progress Note    Patient ID:  Patient: Yovani Jasmine  MRN: [id number]  Age: 80 y.o.  : 1928 8:29 PM  Admit Date: 2018    Assessment: 1. Probably atrial fibrillation with slow ventricular response. This may be predominantly due to intrinsic AV conduction disease. To a small extent, it is due to some residual beta blocker as well as amiodarone which can have a beta blocker effect in part. 2. Gram-positive cocci bacteremia and sepsis. 3. Acute on chronic unspecified congestive heart failure. 4. Acute kidney injury atop chronic kidney disease stage III. 5. Pancytopenia. 6. Encephalopathy/delirium. 7. History of R face/neck mass resected. Plan:     1. At this time, a pacemaker is not a good idea. HR is slightly slow, adequate at this time. 2. I would hold his amiodarone as well as his low dose beta blocker and see what becomes of the heart rate. 3. Agree with treating him for bacteremia. 4. Monitor for volume status given the mild edema on x-ray and the absence of diuretic therapy. He may end up needing diuretic therapy within the next few days. [x]       High complexity decision making was performed in this patient at high risk for decompensation with multiple organ involvement. Subjective:     Yovani Jasmine denies chest pain. Review of Systems - He cannot relay a reliable ROS due to encephalopathy.     Objective:     Physical Exam:  Temp (24hrs), Av.3 °F (35.7 °C), Min:95.6 °F (35.3 °C), Max:97.2 °F (36.2 °C)    Patient Vitals for the past 8 hrs:   Pulse   18 (!) 54   18 1900 (!) 54   18 1800 61   18 1700 60   18 1600 (!) 55   18 1530 (!) 55   18 1515 (!) 59   18 1500 (!) 55   18 1400 (!) 57   18 1300 (!) 57    Patient Vitals for the past 8 hrs:   Resp   18 13   18 1900 13   02/04/18 1800 19   02/04/18 1700 15   02/04/18 1600 24   02/04/18 1530 26   02/04/18 1515 18   02/04/18 1500 15   02/04/18 1400 14   02/04/18 1300 13    Patient Vitals for the past 8 hrs:   BP   02/04/18 2000 97/63   02/04/18 1900 93/51   02/04/18 1800 96/66   02/04/18 1700 102/67   02/04/18 1600 103/58   02/04/18 1530 94/58   02/04/18 1515 98/58   02/04/18 1500 (!) 89/52   02/04/18 1400 100/64   02/04/18 1300 104/69        Intake/Output Summary (Last 24 hours) at 02/04/18 2029  Last data filed at 02/04/18 1527   Gross per 24 hour   Intake          1445.28 ml   Output             1015 ml   Net           430.28 ml       Nondiaphoretic, not in acute distress. MMM, no jaundice, HEENT stable. Unlabored, clear to auscultation bilaterally, symmetric air movement. Regular rate and rhythm, no murmur, pericardial rub, knock, or gallop. No JVD or peripheral edema. Palpable radial and DP/PT pulses bilaterally. Abdomen soft, nontender, nondistended. No pulsatile masses or bruit. No cyanosis. Skin warm and dry. No ulcers or rash. Musculoskeletal exam stable. Awake, confused. No tremor. Cardiographics and Studies, I personally reviewed:    Telemetry:  Afib with slow V response 50's. ECHO:  Done, result pending.     LAB Review:     Recent Labs      02/02/18   1845  02/02/18   1330   CPK  69  82   CKMB  9.9*  9.2*   CKNDX  14.3*  11.2*   TROIQ  0.07*  0.08*     Lab Results   Component Value Date/Time    Cholesterol, total 128 06/29/2017 10:41 AM    HDL Cholesterol 49 06/29/2017 10:41 AM    LDL, calculated 60 06/29/2017 10:41 AM    Triglyceride 94 06/29/2017 10:41 AM     Recent Labs      02/04/18   0418  02/02/18   1845   INR  1.5*  2.3*   PTP  15.4*  23.4*   APTT  42.5*  61.8*      Recent Labs      02/04/18   0418  02/03/18   0533  02/02/18   1845  02/02/18   1330   NA  141  146*  145  144   K  4.6  5.1  5.1  5.0   CL  109*  111*  110*  107   CO2  29  26  26  25   BUN  50*  64*  63*  66*   CREA  2.15*  2.28* 2.15*  2.38*   GLU  113*  63*  92  117*   PHOS  3.4   --    --    --    CA  8.4*  8.8  8.5  8.8   ALB  2.8*   --   3.3*  3.5   WBC  6.0  5.7  1.9*  2.5*   HGB  10.0*  10.3*  10.9*  11.0*   HCT  31.0*  31.6*  33.9*  33.8*   PLT  96*  111*  45*  50*     Recent Labs      02/04/18   0418  02/02/18   1845  02/02/18   1330   SGOT  30  32  37   AP  93  99  105   TP  6.5  6.7  7.0   ALB  2.8*  3.3*  3.5   GLOB  3.7  3.4  3.5     No components found for: GLPOC  No results for input(s): PH, PCO2, PO2 in the last 72 hours. Medications Reviewed: Allergies   Allergen Reactions    Ancef [Cefazolin] Unknown (comments)     \"drops my blood pressure\"    Neosporin [Benzalkonium Chloride] Unknown (comments)    Polysporin [Bacitracin-Polymyxin B] Other (comments)     \"WOUNDS DO NOT HEAL\"        Current Facility-Administered Medications   Medication Dose Route Frequency    dexmedeTOMidine (PRECEDEX) 400 mcg in 0.9% sodium chloride 100 mL infusion  0.2-1.4 mcg/kg/hr IntraVENous TITRATE    Vancomycin Trough- Please draw prior to 2300 dose tonight.    1 Each Other ONCE    dextrose 5% infusion  75 mL/hr IntraVENous CONTINUOUS    meropenem (MERREM) 500 mg in 0.9% sodium chloride (MBP/ADV) 50 mL  0.5 g IntraVENous Q12H    glucose chewable tablet 16 g  4 Tab Oral PRN    dextrose (D50W) injection syrg 12.5-25 g  12.5-25 g IntraVENous PRN    glucagon (GLUCAGEN) injection 1 mg  1 mg IntraMUSCular PRN    chlorhexidine (PERIDEX) 0.12 % mouthwash 15 mL  15 mL Oral Q12H    NOREPINephrine (LEVOPHED) 8 mg in dextrose 5% 250 mL infusion  2-30 mcg/min IntraVENous TITRATE    melatonin tablet 9 mg  9 mg Oral QHS    nystatin (MYCOSTATIN) 100,000 unit/gram powder   Topical TID    famotidine (PF) (PEPCID) 20 mg in sodium chloride 0.9 % 10 mL injection  20 mg IntraVENous DAILY    insulin lispro (HUMALOG) injection   SubCUTAneous Q6H    DOPamine (INTROPIN) 800 mg/250 mL (3,200 mcg/mL) infusion  0-20 mcg/kg/min IntraVENous TITRATE    0.9% sodium chloride infusion 250 mL  250 mL IntraVENous PRN    sodium chloride (NS) flush 5-10 mL  5-10 mL IntraVENous Q8H    sodium chloride (NS) flush 5-10 mL  5-10 mL IntraVENous PRN    levoFLOXacin (LEVAQUIN) 750 mg in D5W IVPB  750 mg IntraVENous Q48H    vancomycin (VANCOCIN) 1750 mg in  ml infusion  1,750 mg IntraVENous Q24H    mupirocin (BACTROBAN) 2 % ointment   Both Nostrils BID          Yesica Camargo MD  2/4/2018

## 2018-02-05 NOTE — PROGRESS NOTES
Dr. Sridhar Kidd at bedside. Updated on heart rate,BP,mental status. Will start dobutamine and wean off levophed as ordered.

## 2018-02-05 NOTE — INTERDISCIPLINARY ROUNDS
Patient discussed in rounds. Discussed antibiotic therapy and cultures. Weaning levophed. Patient with increased confusion and combative. Patient on precedex. Speech consulted.

## 2018-02-05 NOTE — PROGRESS NOTES
Temp down to 94. 8.  patient somewhat calmer after haldol dose. Will attempt to place [facility]. Dobutamine now at 3 mcgs/kg/min, and levophed is off. Heart rate afib 62 with occasional PVC's.

## 2018-02-05 NOTE — CDMP QUERY
Dr. Aly Green :  Please clarify if this patient is (was) being treated/managed for:     =>Unspecified systolic (congestive) heart failure  => Unspecified diastolic (congestive) heart failure  => Other explanation of clinical findings  => Unable to determine (no explanation for clinical findings)    The medical record reflects the following clinical findings, treatment, and risk factors. Risk Factors:  81 yo male w/ h/o CAD; Cardiomyopathy; CHF; A-Fib  Clinical Indicators:  noted in chart: 91% on 3L NC; NT pro BNP: 3989; There is mild interstitial edema throughout the lungs; 3+ pitting edema; Coarse BS throughout; EF 25-30%. Treatment: Monitor volume status; consider diuretic therapy; Hold amiodarone and BB    Please clarify and document your clinical opinion in the progress notes and discharge summary including the definitive and/or presumptive diagnosis, (suspected or probable), related to the above clinical findings. Please include clinical findings supporting your diagnosis.   Thank Angela Brennan

## 2018-02-05 NOTE — PROGRESS NOTES
EP/ Arrhythmia/ Cardiology Progress Note    Patient ID:  Patient: Christine Mckay  MRN: [id number]  Age: 80 y.o.  : 1928   Admit Date: 2018    Assessment: 1. Probably atrial fibrillation with slow ventricular response. This may be predominantly due to intrinsic AV conduction disease. To a small extent, it is due to some residual beta blocker as well as amiodarone which can have a beta blocker effect in part. 2. Gram-positive cocci bacteremia and sepsis. 3. Acute on chronic unspecified congestive heart failure. 4. Acute kidney injury atop chronic kidney disease stage III. Slowly improving. 5. Pancytopenia. 6. Encephalopathy/delirium. On med to limit combativeness. 7. History of R face/neck mass resected. 8. Partial code status (No shock or chest compressions). Plan:     1. HR is slow, adequate enough at this time. Too early for pacemaker given infection, etc.  2. Continue to hold beta blocker. 3. Restart amiodarone 100 daily. 4. Trying to wean norepinephrine. I'll start dobutamine at 5 mcg and we'll see what his HR and BP do.  5. Agree with treating him for bacteremia. 6. May start furosemide 40 IV daily tomorrow pending his labs/renal indices. Discussed status with daughter and nursing. [x]       High complexity decision making was performed in this patient at high risk for decompensation with multiple organ involvement. Subjective:     Christine Mckay denies chest pain. Now wearing mitts to protect against grabbing central line. Review of Systems - He cannot relay a reliable ROS due to encephalopathy.     Objective:     Physical Exam:  Temp (24hrs), Av.3 °F (35.7 °C), Min:95.7 °F (35.4 °C), Max:97.3 °F (36.3 °C)    Patient Vitals for the past 8 hrs:   Pulse   18 1320 (!) 53   18 1300 (!) 58   18 1200 (!) 56   18 1100 (!) 58   18 1025 (!) 58 02/05/18 1022 (!) 59   02/05/18 0900 68   02/05/18 0700 60   02/05/18 0600 61    Patient Vitals for the past 8 hrs:   Resp   02/05/18 1320 16   02/05/18 1300 18   02/05/18 1200 24   02/05/18 1100 16   02/05/18 1025 16   02/05/18 1022 20   02/05/18 0900 20   02/05/18 0700 18   02/05/18 0600 17    Patient Vitals for the past 8 hrs:   BP   02/05/18 1300 115/80   02/05/18 1200 108/76   02/05/18 1100 111/76   02/05/18 1025 106/73   02/05/18 0700 103/65   02/05/18 0600 106/66          Intake/Output Summary (Last 24 hours) at 02/05/18 1328  Last data filed at 02/05/18 1129   Gross per 24 hour   Intake          2825.32 ml   Output             1115 ml   Net          1710.32 ml       Nondiaphoretic, not in acute distress. MMM, no jaundice, HEENT stable. L eye patch. Central line in the neck. Unlabored, clear to auscultation bilaterally, symmetric air movement. Regular rate and rhythm, no murmur, pericardial rub, knock, or gallop. No JVD or peripheral edema. Palpable radial and pulses bilaterally. Abdomen soft, nontender, nondistended. No pulsatile masses or bruit. No cyanosis. Skin warm and dry. No ulcers or rash. Extremities with mitts. Musculoskeletal exam stable. Awake, confused. No tremor. Cardiographics and Studies, I personally reviewed:    Telemetry:  Afib with slow V response 50's. ECHO 2/3:    LEFT VENTRICLE: The ventricle was mildly dilated. Ejection fraction was  estimated in the range of 25 % to 30 %. There was moderate diffuse  hypokinesis. RIGHT VENTRICLE: The ventricle was moderately dilated. Systolic function  was mildly reduced. LEFT ATRIUM: The atrium was moderately dilated. RIGHT ATRIUM: The atrium was mildly dilated. MITRAL VALVE: There was mild thickening. DOPPLER: There was moderate  regurgitation. AORTIC VALVE: Leaflets exhibited mildly increased thickness. DOPPLER:  There was no significant regurgitation. TRICUSPID VALVE: Normal valve structure.  DOPPLER: There was moderate  regurgitation. Pulmonary artery systolic pressure: 57 mmHg. There was  moderate pulmonary hypertension. PULMONIC VALVE: Normal valve structure. AORTA: The root exhibited normal size. SYSTEMIC VEINS: IVC: The respirophasic change in diameter was less than  50%. PERICARDIUM: There was no pericardial effusion. The pericardium was normal  in appearance. LAB Review:     Recent Labs      02/02/18 1845 02/02/18   1330   CPK  69  82   CKMB  9.9*  9.2*   CKNDX  14.3*  11.2*   TROIQ  0.07*  0.08*     Lab Results   Component Value Date/Time    Cholesterol, total 128 06/29/2017 10:41 AM    HDL Cholesterol 49 06/29/2017 10:41 AM    LDL, calculated 60 06/29/2017 10:41 AM    Triglyceride 94 06/29/2017 10:41 AM     Recent Labs      02/04/18 0418 02/02/18   1845   INR  1.5*  2.3*   PTP  15.4*  23.4*   APTT  42.5*  61.8*      Recent Labs      02/05/18   0427  02/04/18 0418 02/03/18   0533  02/02/18 1845 02/02/18   1330   NA  139  141  146*  145  144   K  4.7  4.6  5.1  5.1  5.0   CL  107  109*  111*  110*  107   CO2  26  29  26  26  25   BUN  42*  50*  64*  63*  66*   CREA  1.77*  2.15*  2.28*  2.15*  2.38*   GLU  94  113*  63*  92  117*   PHOS  2.9  3.4   --    --    --    CA  8.5  8.4*  8.8  8.5  8.8   ALB   --   2.8*   --   3.3*  3.5   WBC  4.6  6.0  5.7  1.9*  2.5*   HGB  10.4*  10.0*  10.3*  10.9*  11.0*   HCT  31.3*  31.0*  31.6*  33.9*  33.8*   PLT  83*  96*  111*  45*  50*     Recent Labs      02/04/18 0418 02/02/18   1845  02/02/18   1330   SGOT  30  32  37   AP  93  99  105   TP  6.5  6.7  7.0   ALB  2.8*  3.3*  3.5   GLOB  3.7  3.4  3.5     No components found for: GLPOC  No results for input(s): PH, PCO2, PO2 in the last 72 hours. Medications Reviewed:      Allergies   Allergen Reactions    Ancef [Cefazolin] Unknown (comments)     \"drops my blood pressure\"    Neosporin [Benzalkonium Chloride] Unknown (comments)    Polysporin [Bacitracin-Polymyxin B] Other (comments) \"WOUNDS DO NOT HEAL\"        Current Facility-Administered Medications   Medication Dose Route Frequency    dexmedeTOMidine (PRECEDEX) 400 mcg in 0.9% sodium chloride 100 mL infusion  0.2-1.4 mcg/kg/hr IntraVENous TITRATE    haloperidol lactate (HALDOL) injection 5 mg  5 mg IntraVENous Q6H PRN    dextrose 5% lactated ringers infusion  75 mL/hr IntraVENous CONTINUOUS    enoxaparin (LOVENOX) injection 80 mg  80 mg SubCUTAneous Q24H    meropenem (MERREM) 500 mg in 0.9% sodium chloride (MBP/ADV) 50 mL  0.5 g IntraVENous Q12H    glucose chewable tablet 16 g  4 Tab Oral PRN    dextrose (D50W) injection syrg 12.5-25 g  12.5-25 g IntraVENous PRN    glucagon (GLUCAGEN) injection 1 mg  1 mg IntraMUSCular PRN    chlorhexidine (PERIDEX) 0.12 % mouthwash 15 mL  15 mL Oral Q12H    NOREPINephrine (LEVOPHED) 8 mg in dextrose 5% 250 mL infusion  2-30 mcg/min IntraVENous TITRATE    melatonin tablet 9 mg  9 mg Oral QHS    nystatin (MYCOSTATIN) 100,000 unit/gram powder   Topical TID    famotidine (PF) (PEPCID) 20 mg in sodium chloride 0.9 % 10 mL injection  20 mg IntraVENous DAILY    insulin lispro (HUMALOG) injection   SubCUTAneous Q6H    0.9% sodium chloride infusion 250 mL  250 mL IntraVENous PRN    sodium chloride (NS) flush 5-10 mL  5-10 mL IntraVENous Q8H    sodium chloride (NS) flush 5-10 mL  5-10 mL IntraVENous PRN    levoFLOXacin (LEVAQUIN) 750 mg in D5W IVPB  750 mg IntraVENous Q48H    vancomycin (VANCOCIN) 1750 mg in  ml infusion  1,750 mg IntraVENous Q24H    mupirocin (BACTROBAN) 2 % ointment   Both Nostrils BID          Robb Elizabeth MD  2/5/2018

## 2018-02-05 NOTE — PROGRESS NOTES
Pressure Ulcer Prevention Alert Received for Luis < 14 (moderate risk).        Care Plan/Interventions for Nursin. Complete Luis Pressure Ulcer Risk Scale and use sub scores to identify appropriate interventions. 2. Perform Assessment: skin, changes in LOC, visual cues for pain, monitor skin under medical devices  3. Respond to Reduced Sensory Perception: changes in LOC, check visual cues for pain, float heels, suspension boots, pressure redistribution bed/mattress/chair cushion, turning and reposition approximately every 2 hours (pillows & wedges), pad between skin to skin, turn & reposition  4. Manage Moisture: absorbent under pads, internal / external urinary device, internal /  external fecal device, minimize layers, contain wound drainage, access need for specialty bed, limit adult briefs, maintain skin hydration (lotion/cream), moisture barrier, offer toileting every hour  5. Promote Activity: increase time out of bed, chair cushion, PT/OT evaluation, trapeze to reposition, pressure redistribution bed/mattress/chair  6. Address Reduced Mobility: float heels / suspension boot, HOB 30 degrees or less, pressure redistribution bed/mattress/cushion, PT / OT evaluation, turn and reposition approximately every 2 hours (pillows & wedges)  7. Promote Nutrition: document food / fluid / supplement intake, encourage/assist with meals as needed  8. Reduce Friction and Shear: transferring/repositioning devices (lift/draw sheet), lift team/ patient mobility team, feet elevated on foot rest, minimize layers, foam dressing / transparent film / skin sealants, protective barrier creams and emollients, transfer aides (board, [place] lift, ceiling lift, stand assist), HOB 30 degrees or less, trapeze to reposition.   Wound Care Team

## 2018-02-05 NOTE — PROGRESS NOTES
Hospitalist Progress Note    NAME: Kasey Valerio   :  1928   MRN:  [id number]       Assessment / Plan:  Septic Shock with Hypothermia (hypothermia resolved)  Bacteremia (GPC's on BCx): Staph, Strep and Enterococcus; suspect from cutaneous source as patient picks his psoriasis relentlessness at home (per wife's description)  -continue on Vancomycin, Meropenem and Levaquin until culture speciation and sensitivities available  -patient now on Levophed (being weaned) and Dopamine weaned to off  -repeat BCx's in the am    Chronic (?) Atrial Fibrillation with Bradycardia: await Cardiology thoughts on if this is SSS, seen by Dr. Sharifa Mckeon last night per RN report  Acute on Chronic CHF  Acute Respiratory Failure with Hypoxia from Acute Pulmonary Edema (on CXR)  -TTE: Left ventricle: The ventricle was mildly dilated. Ejection fraction was  estimated in the range of 25 % to 30 %. There was moderate diffuse  hypokinesis. Right ventricle: The ventricle was moderately dilated. Systolic function  was mildly reduced. Left atrium: The atrium was moderately dilated. Mitral valve: There was moderate regurgitation. The effective orifice of  mitral regurgitation by proximal isovelocity surface area was 0.3 cmï¾². Tricuspid valve: There was moderate regurgitation. Pulmonary artery  systolic pressure: 57 mmHg. There was moderate pulmonary hypertension. Inferior vena cava, hepatic veins: The respirophasic change in diameter  was less than 50%. \"  -hold home Xarelto  -continue O2, wean as tolerated  -TSH wnl  -hold home Coreg and Xarelto    Hypernatremia  Hypoglycemia  -IVF's per [employer]  -am cortisol wnl    Acute Kidney Injury: improving  CKD stage III  -stable, IVF's above, renal Ultrasound unremarkable for acute pathology, monitor renal function and avoid nephrotoxins    Pancytopenia  -s/p 2 units plts on  for acute bleeding from central line with plts improving from 45K to 111K and decreased to 88K today  -Leukopenia resolved  -await Vitamin B12 level    Blood in Stool PTA  -monitor, likely from Xarelto and thrombocytopenia    Metabolic Encephalopathy  Delirium with hallucinations  -still having hallucinations; haldol and sitter per [employer]-also on Precedex  -continue qhs melatonin    Patient with history of Left Parotid Mass s/p resection approximately 1 year ago by Dr. Marin Raymond and XRT, July 2017 PET negative  -Heme/Onc following    History of Prostate Cancer    Psoriasis    Body mass index is 28.65 kg/(m^2). Code status: Patient is partial code per my discussion with wife today. We reviewed his advanced directive together. He is no Compressions or Defibrillation. She is okay with temporary pacing, ACLS meds (like atropine) and temporary intubation. Prophylaxis: SCD's and H2B  Recommended Disposition: TBD     Subjective:     Chief Complaint / Reason for Physician Visit: follow-up septic shock  Patient sedated from haldol and precedex    Review of Systems:  Symptom Y/N Comments  Symptom Y/N Comments   Fever/Chills    Chest Pain     Poor Appetite    Edema     Cough    Abdominal Pain     Sputum    Joint Pain     SOB/GRADY    Pruritis/Rash     Nausea/vomit    Tolerating PT/OT     Diarrhea    Tolerating Diet     Constipation    Other       Limited due to Delirium/sedation: Y     Objective:     VITALS:   Last 24hrs VS reviewed since prior progress note.  Most recent are:  Patient Vitals for the past 24 hrs:   Temp Pulse Resp BP SpO2   02/05/18 1400 - (!) 56 18 94/69 100 %   02/05/18 1320 95.7 °F (35.4 °C) (!) 53 16 - 99 %   02/05/18 1300 - (!) 58 18 115/80 -   02/05/18 1200 - (!) 56 24 108/76 -   02/05/18 1100 95.8 °F (35.4 °C) (!) 58 16 111/76 98 %   02/05/18 1025 - (!) 58 16 106/73 100 %   02/05/18 1022 - (!) 59 20 - -   02/05/18 0900 - 68 20 - -   02/05/18 0700 - 60 18 103/65 97 %   02/05/18 0600 - 61 17 106/66 96 %   02/05/18 0500 - 63 17 106/65 92 %   02/05/18 0400 97 °F (36.1 °C) 62 21 104/69 93 % 02/05/18 0300 - (!) 58 23 103/71 95 %   02/05/18 0200 - 65 18 99/64 91 %   02/05/18 0100 - (!) 59 18 96/69 96 %   02/05/18 0000 97.3 °F (36.3 °C) (!) 59 26 96/61 95 %   02/04/18 2300 96.8 °F (36 °C) 65 21 103/70 95 %   02/04/18 2200 - (!) 58 14 111/69 96 %   02/04/18 2100 - (!) 54 22 112/64 95 %   02/04/18 2030 - (!) 57 21 103/63 96 %   02/04/18 2000 95.7 °F (35.4 °C) (!) 54 13 97/63 94 %   02/04/18 1900 - (!) 54 13 93/51 93 %   02/04/18 1800 - 61 19 96/66 -   02/04/18 1700 - 60 15 102/67 97 %   02/04/18 1600 96.2 °F (35.7 °C) (!) 55 24 103/58 95 %   02/04/18 1530 - (!) 55 26 94/58 95 %   02/04/18 1515 - (!) 59 18 98/58 96 %   02/04/18 1500 - (!) 55 15 (!) 89/52 90 %       Intake/Output Summary (Last 24 hours) at 02/05/18 1452  Last data filed at 02/05/18 1425   Gross per 24 hour   Intake          2825.32 ml   Output             1465 ml   Net          1360.32 ml        PHYSICAL EXAM:  General: WD, Chronically ill appearing. Alert, cooperative, no acute distress    EENT:  EOMI. Anicteric sclerae. MM dry; Right facial droop and void from removal of parotid mass notable. Right eye with patch (due to inability to close eyelid)  Resp:  CTA bilaterally on anterior and lateral assessment, no wheezing or rales. No accessory muscle use  CV:  irregular  Rhythm with normal rate, + edema noted with SCD's in palce  GI:  Soft, Non distended, Non tender.  +Bowel sounds  Neurologic:  Sedated, further neurologic status confounded by patient's sedated status   Psych:   Unable to assess. Not anxious nor agitated at this time  Skin:  No rashes.   No jaundice    Reviewed most current lab test results and cultures  YES  Reviewed most current radiology test results   YES  Review and summation of old records today    NO  Reviewed patient's current orders and MAR    YES  PMH/ reviewed - no change compared to H&P  ________________________________________________________________________  Care Plan discussed with:    Comments   Patient x Family  x Wife and Daughter   RN x    Care Manager     Consultant                        Multidiciplinary team rounds were held today with , nursing, pharmacist and clinical coordinator. Patient's plan of care was discussed; medications were reviewed and discharge planning was addressed. ________________________________________________________________________  Total NON critical care TIME:  40 Minutes    Total CRITICAL CARE TIME Spent:   Minutes non procedure based      Comments   >50% of visit spent in counseling and coordination of care x Prolonged discussion with wife at bedside   ________________________________________________________________________  Reyes Mittal MD     Procedures: see electronic medical records for all procedures/Xrays and details which were not copied into this note but were reviewed prior to creation of Plan. LABS:  I reviewed today's most current labs and imaging studies.   Pertinent labs include:  Recent Labs      02/05/18 0427 02/04/18 0418  02/03/18   0533   WBC  4.6  6.0  5.7   HGB  10.4*  10.0*  10.3*   HCT  31.3*  31.0*  31.6*   PLT  83*  96*  111*     Recent Labs      02/05/18 0427 02/04/18 0418  02/03/18   0533  02/02/18   1845   NA  139  141  146*  145   K  4.7  4.6  5.1  5.1   CL  107  109*  111*  110*   CO2  26  29  26  26   GLU  94  113*  63*  92   BUN  42*  50*  64*  63*   CREA  1.77*  2.15*  2.28*  2.15*   CA  8.5  8.4*  8.8  8.5   MG  1.9  2.2   --   2.4   PHOS  2.9  3.4   --    --    ALB   --   2.8*   --   3.3*   TBILI   --   1.3*   --   0.9   SGOT   --   30   --   32   ALT   --   22   --   24   INR   --   1.5*   --   2.3*       Signed: Reyes Mittal MD

## 2018-02-05 NOTE — PROGRESS NOTES
Patient found trying to get out of bed,pulling on vargas and central line, aggressive towards staff. Ripped SCD sleeve in half.   precedex drip inceased, sitter now at bedside. Dr. Rachael Isaacs and unit manager aware.

## 2018-02-05 NOTE — PROGRESS NOTES
Speech pathology note  9:18: Reviewed chart and discussed case with RN. Note patient confused, agitated, and aggressive at this time. Not appropriate for PO consideration secondary to cognitive status. Will follow up later as appropriate. Thank you. 13:29: Discussed case with RN who reported patient drowsy from Haldol. Not appropriate for PO consideration secondary to level of alertness. Will follow up tomorrow for swallowing evaluation.     Coco Munguia., CCC-SLP

## 2018-02-05 NOTE — PROGRESS NOTES
[facility]  Pulmonary, Critical Care, and Sleep Medicine    Name: Yunior Cevallos MRN: [id number]   : 1928 Hospital: [facility]   Date: 2018        Critical Care  Patient Consult    IMPRESSION:   · Encephalopathy, may be more delirium, this is an ongoing and active issues, he is threat to himself. Calm on precedex infusion, Will avoid BZDs. Suspect this may be due to his acute illness. Has some confusing comments, Has decreased short term memory. Prior to precedex was Cursing and  somewhat combative with nurse. · Severe Sepsis on pressors still on IV levophed. · Pancytopenia, ?aplastic anemia, ? MDS WBC: 5.7, Hgb: 10.3 Plt: 111. Reviewed Hem/Onc note. · Acute Bacteremia, + blood Cx noted. So far has  Enterococcus  And Alpha Strep in blood Cx. · Acute on Chronic Renal Insufficiency. Cr has been stable last 24. · Left IJ CVC in place  · Parathyroid mass. S/p resection per Dr. Brooks Daily. · Hypoglycemia, improved. · A fib, CHF followed by [facility]. Noted to have bradycardia last pm. Now with increased peripheral edema. · Coagulopathy. Inr has decreased. · Poor oral dentition. · UA negative for UTI. · Pt has been having several weeks of intermittent blood stools and constipation. · Decreased po intake for last 1-2 days. · Hx of prostate Ca. · Psoriasis  · Pt at moderate to high risk of decompensation. Risk of multiple organ failure. Critically ill, very high risk of multiple organ failure, on pressors. Bacteremia. 35 min CC, EOP. Discussed with pt, his daughter, and nurse. RECOMMENDATIONS:   · Try to reoriented  · ON Vanc, Merrem, Levolfox, will tailer abx when final cx return. · wean precedex drip. ?  · Monitor Blood Cultures, may need repeat set in 24 hrs. · Will monitor for further Hypoglycemia. · Try to wean Levophed as need for hypotension. SBP > 90  · Monitor Cr, LFTs, CBC. · Follow Coags  · Assess for him to take po.   · Add CHG oral.   · Hold blood pressure medications. · Hold Coreg for now  · Monitor for ongoing bloody stools. Subjective/History:   Last 24 hrs:     2/5 asked what hospital he was in. No specific complaints. Not hungry. Not thirsty. No chest pan, SOB, nausea. Not cooperative with nursing. No eye pain. Right eye patched    2/4 Pt is more delirious this am. Not really able to get any hx from pt. Denies any pain in head, neck, back, abdomen, chest or legs. Has bandage to his right eye. This patient has been seen and evaluated at the request of Dr. Shanda Joshua for above. Patient is a 80 y.o. male who presents for above. Was noted to be bradycardic. Noted to be hypothermic, hypotensive. NO nausea, no vomiting. No sick contacts. Noted to have bradycardia on presentation. Initially was noted to have some lethargy in ER. Had a syncopal episode per ER. Possible [place] palsy with facial droop verus parathyroid tumor. Has an eye patch in place on right eye. Was felt to have some right sided weakness, CT of head in Er was negative for an acute change. Per pt: noted to be weak and had difficulty walking. Has been easily bruising. Had decreased po intake for several days. WAs noted to have generalized fatigue. Had increased shortness of breath. Has been retaining more fluid in his legs and arms recently. Kg Child and had an open wound to his left forearm. Has orthopnea. No chest pain, has been having bloody stools for several days. Hx obtain as above. His daughter helped. Pt is confused and not able to give full details. ROS not able to be complete due to pts medical condition.      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 Authorizing Provider   furosemide (LASIX) 20 mg tablet Take 1 Tab by mouth every Tuesday and Friday. 1/9/18  Yes Inez Cordova MD   potassium chloride (KLOR-CON) 10 mEq tablet Take 1 Tab by mouth every Tuesday and Friday. 1/9/18  Yes Inez Cordova MD   amiodarone (PACERONE) 100 mg tablet Take 1 Tab by mouth daily. 9/21/17  Yes Inez Cordova MD   SSD 1 % topical cream  9/5/17  Yes Historical Provider   traMADol (ULTRAM) 50 mg tablet  6/1/17  Yes Historical Provider   triamcinolone acetonide (KENALOG) 0.1 % ointment Apply  to affected area two (2) times a day. use thin layer   Indications: psoariasis   Yes Historical Provider   albuterol (PROVENTIL HFA, VENTOLIN HFA, PROAIR HFA) 90 mcg/actuation inhaler Take 2 Puffs by inhalation every four (4) hours as needed for Wheezing. 4/26/17  Yes French Boogie MD   rivaroxaban (XARELTO) 20 mg tab tablet Take 1 Tab by mouth daily. Indications: prevent stroke  Patient taking differently: Take 15 mg by mouth daily (with dinner). Indications: prevent stroke 4/26/17  Yes French Boogie MD   diphenhydrAMINE (BENADRYL) 25 mg capsule Take 25 mg by mouth nightly as needed for Sleep. Indications: ALLERGIC RHINITIS   Yes Historical Provider   carvedilol (COREG) 3.125 mg tablet Take  by mouth two (2) times daily (with meals).    Yes Historical Provider     Current Facility-Administered Medications   Medication Dose Route Frequency    dexmedeTOMidine (PRECEDEX) 400 mcg in 0.9% sodium chloride 100 mL infusion  0.2-1.4 mcg/kg/hr IntraVENous TITRATE    dextrose 5% infusion  75 mL/hr IntraVENous CONTINUOUS    meropenem (MERREM) 500 mg in 0.9% sodium chloride (MBP/ADV) 50 mL  0.5 g IntraVENous Q12H    chlorhexidine (PERIDEX) 0.12 % mouthwash 15 mL  15 mL Oral Q12H    NOREPINephrine (LEVOPHED) 8 mg in dextrose 5% 250 mL infusion  2-30 mcg/min IntraVENous TITRATE    melatonin tablet 9 mg  9 mg Oral QHS    nystatin (MYCOSTATIN) 100,000 unit/gram powder   Topical TID    famotidine (PF) (PEPCID) 20 mg in sodium chloride 0.9 % 10 mL injection  20 mg IntraVENous DAILY    insulin lispro (HUMALOG) injection   SubCUTAneous Q6H    DOPamine (INTROPIN) 800 mg/250 mL (3,200 mcg/mL) infusion  0-20 mcg/kg/min IntraVENous TITRATE    sodium chloride (NS) flush 5-10 mL  5-10 mL IntraVENous Q8H    levoFLOXacin (LEVAQUIN) 750 mg in D5W IVPB  750 mg IntraVENous Q48H    vancomycin (VANCOCIN) 1750 mg in  ml infusion  1,750 mg IntraVENous Q24H    mupirocin (BACTROBAN) 2 % ointment   Both Nostrils BID     Allergies   Allergen Reactions    Ancef [Cefazolin] Unknown (comments)     \"drops my blood pressure\"    Neosporin [Benzalkonium Chloride] Unknown (comments)    Polysporin [Bacitracin-Polymyxin B] Other (comments)     \"WOUNDS DO NOT HEAL\"      Social History   Substance Use Topics    Smoking status: Former Smoker    Smokeless tobacco: Never Used    Alcohol use 4.2 oz/week     7 Standard drinks or equivalent per week      Comment: \"1 drink a night\"      Family History   Problem Relation Age of Onset    Cancer Mother      BRAIN TUMOR    Cancer Brother      PROSTATE        Review of Systems:  Review of systems not obtained due to patient factors.     Objective:   Vital Signs:    Visit Vitals    /65    Pulse 60    Temp 97 °F (36.1 °C)    Resp 18    Ht 5' 9\" (1.753 m)    Wt 88 kg (194 lb 0.1 oz)    SpO2 97%    BMI 28.65 kg/m2       O2 Device: Nasal cannula   O2 Flow Rate (L/min): 2 l/min   Temp (24hrs), Av.4 °F (35.8 °C), Min:95.6 °F (35.3 °C), Max:97.3 °F (36.3 °C) Intake/Output:   Last shift:         Last 3 shifts: 02/03 1901 - 02/05 0700  In: 4449.8 [I.V.:4449.8]  Out: 2055 [Urine:2055]    Intake/Output Summary (Last 24 hours) at 02/05/18 0804  Last data filed at 02/05/18 0700   Gross per 24 hour   Intake          2825.32 ml   Output             1190 ml   Net          1635.32 ml   Physical Exam:    General:  Alert, Not really  cooperative, no distress, appears stated age. Delirious. Head:  Normocephalic, without obvious abnormality, atraumatic. Eyes:  Right eye patched   Nose: Nares normal. Septum midline. No drainage    Throat: Lips, mucosa, and tongue dry. POOR Teeth    Neck: Supple, symmetrical, trachea midline,  no JVD. Back:      Lungs: No wheeze. Chest wall:  No deformity. Heart:  Regular rate and rhythm. Abdomen:   Non distended. Extremities: Extremities no cyanosis or edema. Pulses:    Skin: Skin color, texture, turgor normal. Has areas of bruising, has psoriasis changes. Bandage to his LUE. Lymph nodes: Cervical, supraclavicular, and axillary nodes normal.   Neurologic: Grossly nonfocal, moving all extremities. Psych: Refusing to allow full evaluation.  Intermittently oriented       Data:     Recent Results (from the past 24 hour(s))   GLUCOSE, POC    Collection Time: 02/04/18 11:52 AM   Result Value Ref Range    Glucose (POC) 110 (H) 65 - 100 mg/dL    Performed by Rosealee Habermann    GLUCOSE, POC    Collection Time: 02/04/18  5:13 PM   Result Value Ref Range    Glucose (POC) 138 (H) 65 - 100 mg/dL    Performed by Billie Quintana, TROUGH    Collection Time: 02/04/18 10:40 PM   Result Value Ref Range    Vancomycin,trough 19.6 (H) 5.0 - 10.0 ug/mL    Reported dose date: NOT PROVIDED      Reported dose time: NOT PROVIDED      Reported dose: NOT PROVIDED UNITS   GLUCOSE, POC    Collection Time: 02/04/18 11:48 PM   Result Value Ref Range    Glucose (POC) 117 (H) 65 - 100 mg/dL    Performed by Mahnaz Reza    CBC WITH AUTOMATED DIFF Collection Time: 02/05/18  4:27 AM   Result Value Ref Range    WBC 4.6 4.1 - 11.1 K/uL    RBC 2.96 (L) 4.10 - 5.70 M/uL    HGB 10.4 (L) 12.1 - 17.0 g/dL    HCT 31.3 (L) 36.6 - 50.3 %    .7 (H) 80.0 - 99.0 FL    MCH 35.1 (H) 26.0 - 34.0 PG    MCHC 33.2 30.0 - 36.5 g/dL    RDW 16.5 (H) 11.5 - 14.5 %    PLATELET 83 (L) 043 - 400 K/uL    MPV 12.6 8.9 - 12.9 FL    NRBC 0.9 (H) 0  WBC    ABSOLUTE NRBC 0.04 (H) 0.00 - 0.01 K/uL    NEUTROPHILS 69 32 - 75 %    LYMPHOCYTES 11 (L) 12 - 49 %    MONOCYTES 18 (H) 5 - 13 %    EOSINOPHILS 0 0 - 7 %    BASOPHILS 0 0 - 1 %    IMMATURE GRANULOCYTES 2 (H) 0.0 - 0.5 %    ABS. NEUTROPHILS 3.2 1.8 - 8.0 K/UL    ABS. LYMPHOCYTES 0.5 (L) 0.8 - 3.5 K/UL    ABS. MONOCYTES 0.8 0.0 - 1.0 K/UL    ABS. EOSINOPHILS 0.0 0.0 - 0.4 K/UL    ABS. BASOPHILS 0.0 0.0 - 0.1 K/UL    ABS. IMM. GRANS. 0.1 (H) 0.00 - 0.04 K/UL    DF AUTOMATED      PLATELET COMMENTS Large Platelets      RBC COMMENTS MACROCYTOSIS  PRESENT       MAGNESIUM    Collection Time: 02/05/18  4:27 AM   Result Value Ref Range    Magnesium 1.9 1.6 - 2.4 mg/dL   PHOSPHORUS    Collection Time: 02/05/18  4:27 AM   Result Value Ref Range    Phosphorus 2.9 2.6 - 4.7 MG/DL   METABOLIC PANEL, BASIC    Collection Time: 02/05/18  4:27 AM   Result Value Ref Range    Sodium 139 136 - 145 mmol/L    Potassium 4.7 3.5 - 5.1 mmol/L    Chloride 107 97 - 108 mmol/L    CO2 26 21 - 32 mmol/L    Anion gap 6 5 - 15 mmol/L    Glucose 94 65 - 100 mg/dL    BUN 42 (H) 6 - 20 MG/DL    Creatinine 1.77 (H) 0.70 - 1.30 MG/DL    BUN/Creatinine ratio 24 (H) 12 - 20      GFR est AA 44 (L) >60 ml/min/1.73m2    GFR est non-AA 36 (L) >60 ml/min/1.73m2    Calcium 8.5 8.5 - 10.1 MG/DL             Telemetry:AFIB    Imaging:  I have personally reviewed the patients radiographs and have reviewed the reports:  CXR 2/2 reviewed  Lungs: There is mild interstitial edema throughout the lungs. Pleura: Is a right pleural effusion. Ct of Head: 2-2-18:  IMPRESSION: No acute intracranial abnormality. Age-related volume loss and  microvascular disease unchanged. PET Scan: 7-7-17: IMPRESSION: Status post right parotidectomy with no abnormal hypermetabolic  activity to suggest residual, recurrent or metastatic disease.        Total critical care time exclusive of procedures: 35 minutes  Jerrell Peralta MD

## 2018-02-06 LAB
ALBUMIN SERPL-MCNC: 2.6 G/DL (ref 3.5–5)
ALBUMIN/GLOB SERPL: 0.7 {RATIO} (ref 1.1–2.2)
ALP SERPL-CCNC: 91 U/L (ref 45–117)
ALT SERPL-CCNC: 18 U/L (ref 12–78)
ANION GAP SERPL CALC-SCNC: 6 MMOL/L (ref 5–15)
AST SERPL-CCNC: 28 U/L (ref 15–37)
BASOPHILS # BLD: 0 K/UL (ref 0–0.1)
BASOPHILS NFR BLD: 0 % (ref 0–1)
BILIRUB SERPL-MCNC: 1.4 MG/DL (ref 0.2–1)
BUN SERPL-MCNC: 33 MG/DL (ref 6–20)
BUN/CREAT SERPL: 20 (ref 12–20)
CALCIUM SERPL-MCNC: 8.5 MG/DL (ref 8.5–10.1)
CHLORIDE SERPL-SCNC: 110 MMOL/L (ref 97–108)
CO2 SERPL-SCNC: 25 MMOL/L (ref 21–32)
CREAT SERPL-MCNC: 1.65 MG/DL (ref 0.7–1.3)
DIFFERENTIAL METHOD BLD: ABNORMAL
EOSINOPHIL # BLD: 0 K/UL (ref 0–0.4)
EOSINOPHIL NFR BLD: 0 % (ref 0–7)
ERYTHROCYTE [DISTWIDTH] IN BLOOD BY AUTOMATED COUNT: 16.4 % (ref 11.5–14.5)
GLOBULIN SER CALC-MCNC: 3.5 G/DL (ref 2–4)
GLUCOSE BLD STRIP.AUTO-MCNC: 106 MG/DL (ref 65–100)
GLUCOSE BLD STRIP.AUTO-MCNC: 92 MG/DL (ref 65–100)
GLUCOSE BLD STRIP.AUTO-MCNC: 93 MG/DL (ref 65–100)
GLUCOSE BLD STRIP.AUTO-MCNC: 96 MG/DL (ref 65–100)
GLUCOSE SERPL-MCNC: 96 MG/DL (ref 65–100)
HCT VFR BLD AUTO: 33.1 % (ref 36.6–50.3)
HGB BLD-MCNC: 10.7 G/DL (ref 12.1–17)
IMM GRANULOCYTES # BLD: 0 K/UL (ref 0–0.04)
IMM GRANULOCYTES NFR BLD AUTO: 0 % (ref 0–0.5)
LYMPHOCYTES # BLD: 0.5 K/UL (ref 0.8–3.5)
LYMPHOCYTES NFR BLD: 19 % (ref 12–49)
MAGNESIUM SERPL-MCNC: 1.7 MG/DL (ref 1.6–2.4)
MCH RBC QN AUTO: 34.3 PG (ref 26–34)
MCHC RBC AUTO-ENTMCNC: 32.3 G/DL (ref 30–36.5)
MCV RBC AUTO: 106.1 FL (ref 80–99)
MONOCYTES # BLD: 0.4 K/UL (ref 0–1)
MONOCYTES NFR BLD: 15 % (ref 5–13)
NEUTS SEG # BLD: 1.6 K/UL (ref 1.8–8)
NEUTS SEG NFR BLD: 66 % (ref 32–75)
NRBC # BLD: 0 K/UL (ref 0–0.01)
NRBC BLD-RTO: 0 PER 100 WBC
PHOSPHATE SERPL-MCNC: 2.7 MG/DL (ref 2.6–4.7)
PLATELET # BLD AUTO: 59 K/UL (ref 150–400)
PLATELET COMMENTS,PCOM: ABNORMAL
PMV BLD AUTO: 12.4 FL (ref 8.9–12.9)
POTASSIUM SERPL-SCNC: 4.3 MMOL/L (ref 3.5–5.1)
PROT SERPL-MCNC: 6.1 G/DL (ref 6.4–8.2)
RBC # BLD AUTO: 3.12 M/UL (ref 4.1–5.7)
RBC MORPH BLD: ABNORMAL
SERVICE CMNT-IMP: ABNORMAL
SERVICE CMNT-IMP: NORMAL
SODIUM SERPL-SCNC: 141 MMOL/L (ref 136–145)
WBC # BLD AUTO: 2.5 K/UL (ref 4.1–11.1)

## 2018-02-06 PROCEDURE — 82962 GLUCOSE BLOOD TEST: CPT

## 2018-02-06 PROCEDURE — 74011250636 HC RX REV CODE- 250/636: Performed by: INTERNAL MEDICINE

## 2018-02-06 PROCEDURE — 74011250637 HC RX REV CODE- 250/637: Performed by: INTERNAL MEDICINE

## 2018-02-06 PROCEDURE — 85025 COMPLETE CBC W/AUTO DIFF WBC: CPT | Performed by: INTERNAL MEDICINE

## 2018-02-06 PROCEDURE — 36415 COLL VENOUS BLD VENIPUNCTURE: CPT | Performed by: INTERNAL MEDICINE

## 2018-02-06 PROCEDURE — 74011000258 HC RX REV CODE- 258: Performed by: INTERNAL MEDICINE

## 2018-02-06 PROCEDURE — 83735 ASSAY OF MAGNESIUM: CPT | Performed by: INTERNAL MEDICINE

## 2018-02-06 PROCEDURE — 65620000000 HC RM CCU GENERAL

## 2018-02-06 PROCEDURE — 74011258636 HC RX REV CODE- 258/636: Performed by: INTERNAL MEDICINE

## 2018-02-06 PROCEDURE — 80053 COMPREHEN METABOLIC PANEL: CPT | Performed by: INTERNAL MEDICINE

## 2018-02-06 PROCEDURE — 74011000250 HC RX REV CODE- 250: Performed by: INTERNAL MEDICINE

## 2018-02-06 PROCEDURE — 74011250636 HC RX REV CODE- 250/636: Performed by: EMERGENCY MEDICINE

## 2018-02-06 PROCEDURE — 84100 ASSAY OF PHOSPHORUS: CPT | Performed by: INTERNAL MEDICINE

## 2018-02-06 PROCEDURE — 87040 BLOOD CULTURE FOR BACTERIA: CPT | Performed by: INTERNAL MEDICINE

## 2018-02-06 PROCEDURE — 76450000000

## 2018-02-06 PROCEDURE — 92610 EVALUATE SWALLOWING FUNCTION: CPT

## 2018-02-06 RX ORDER — CYANOCOBALAMIN 1000 UG/ML
1000 INJECTION, SOLUTION INTRAMUSCULAR; SUBCUTANEOUS DAILY
Status: COMPLETED | OUTPATIENT
Start: 2018-02-06 | End: 2018-02-08

## 2018-02-06 RX ADMIN — Medication 10 ML: at 06:15

## 2018-02-06 RX ADMIN — FAMOTIDINE 20 MG: 10 INJECTION, SOLUTION INTRAVENOUS at 08:10

## 2018-02-06 RX ADMIN — SODIUM CHLORIDE 0.8 MCG/KG/HR: 900 INJECTION, SOLUTION INTRAVENOUS at 02:50

## 2018-02-06 RX ADMIN — SODIUM CHLORIDE, SODIUM LACTATE, POTASSIUM CHLORIDE, CALCIUM CHLORIDE, AND DEXTROSE MONOHYDRATE 75 ML/HR: 600; 310; 30; 20; 5 INJECTION, SOLUTION INTRAVENOUS at 04:52

## 2018-02-06 RX ADMIN — VANCOMYCIN HYDROCHLORIDE 1750 MG: 10 INJECTION, POWDER, LYOPHILIZED, FOR SOLUTION INTRAVENOUS at 23:24

## 2018-02-06 RX ADMIN — DOBUTAMINE IN DEXTROSE 5 MCG/KG/MIN: 400 INJECTION, SOLUTION INTRAVENOUS at 13:00

## 2018-02-06 RX ADMIN — MUPIROCIN: 20 OINTMENT TOPICAL at 17:41

## 2018-02-06 RX ADMIN — MEROPENEM 500 MG: 500 INJECTION, POWDER, FOR SOLUTION INTRAVENOUS at 01:43

## 2018-02-06 RX ADMIN — NYSTATIN: 100000 POWDER TOPICAL at 08:11

## 2018-02-06 RX ADMIN — AMIODARONE HYDROCHLORIDE 100 MG: 200 TABLET ORAL at 08:10

## 2018-02-06 RX ADMIN — DOBUTAMINE IN DEXTROSE 6 MCG/KG/MIN: 400 INJECTION, SOLUTION INTRAVENOUS at 14:11

## 2018-02-06 RX ADMIN — LEVOFLOXACIN 750 MG: 5 INJECTION, SOLUTION INTRAVENOUS at 21:47

## 2018-02-06 RX ADMIN — SODIUM CHLORIDE, SODIUM LACTATE, POTASSIUM CHLORIDE, CALCIUM CHLORIDE, AND DEXTROSE MONOHYDRATE 75 ML/HR: 600; 310; 30; 20; 5 INJECTION, SOLUTION INTRAVENOUS at 17:40

## 2018-02-06 RX ADMIN — MELATONIN 3 MG ORAL TABLET 9 MG: 3 TABLET ORAL at 21:38

## 2018-02-06 RX ADMIN — CHLORHEXIDINE GLUCONATE 15 ML: 1.2 RINSE ORAL at 08:11

## 2018-02-06 RX ADMIN — MUPIROCIN: 20 OINTMENT TOPICAL at 08:10

## 2018-02-06 RX ADMIN — HALOPERIDOL LACTATE 5 MG: 5 INJECTION, SOLUTION INTRAMUSCULAR at 05:22

## 2018-02-06 RX ADMIN — SODIUM CHLORIDE 0.2 MCG/KG/HR: 900 INJECTION, SOLUTION INTRAVENOUS at 10:44

## 2018-02-06 RX ADMIN — Medication 10 ML: at 21:38

## 2018-02-06 RX ADMIN — NYSTATIN: 100000 POWDER TOPICAL at 21:49

## 2018-02-06 RX ADMIN — ENOXAPARIN SODIUM 80 MG: 40 INJECTION SUBCUTANEOUS at 10:14

## 2018-02-06 RX ADMIN — Medication 10 ML: at 15:59

## 2018-02-06 RX ADMIN — CYANOCOBALAMIN 1000 MCG: 1000 INJECTION, SOLUTION INTRAMUSCULAR; SUBCUTANEOUS at 17:59

## 2018-02-06 RX ADMIN — CHLORHEXIDINE GLUCONATE 15 ML: 1.2 RINSE ORAL at 21:42

## 2018-02-06 RX ADMIN — NYSTATIN: 100000 POWDER TOPICAL at 15:59

## 2018-02-06 NOTE — PROGRESS NOTES
Problem: Dysphagia (Adult)  Goal: *Acute Goals and Plan of Care (Insert Text)  Speech pathology goals:  1) Patient will tolerate mechanical soft/thin liquid diet without overt s/s of aspiration within 7 days. Speech LAnguage Pathology bedside swallow evaluation  Patient: Jimy Mares (47 y.o. male)  Date: 2/6/2018  Primary Diagnosis: Hypothermia  Sepsis ([facility]  Atrial fibrillation ([facility]        Precautions: swallow       ASSESSMENT :  Patient received in bed. Patient was alert, agreeable, and cooperative, but confused. Oriented to person and time only. Patient had soft mitten restraints on hands and has patch on right eye. Patient's wife was present during the evaluation. Wife reported regular diet at baseline. Patient's wife also reported patient has been using compensatory strategies of tongue sweep and washing down puree and solid consistencies with water to not pocket food on right side. Patient's wife reports right side droop has been present since parotidectomy and radiation in June. Patient with xerostomia s/p radiation. Based on the objective data described below, the patient presents with mild-moderate oral dysphagia and mild pharyngeal dysphagia. Oral dysphagia is characterized by mild right anterior spillage, prolonged mastication, and delayed posterior propulsion. No right pocketing observed. Patient independently used lingual sweep. Pharyngeal dysphagia is characterized by delayed swallow initiation and decreased laryngeal elevation. Patient with throat clear x1 after thin liquid sip via straw; however, it was not reproducible. Recommend continue with mechanical soft/thin liquid diet. SLP provided patient and his wife education on compensatory strategies to decrease risk of aspiration.  Strategies include doing a lingual or a finger sweep to clear reside from mouth, alternating solids and liquids to help wash down residue, small bites and sips of PO, presenting PO items on left side of mouth (strongest side), and having 1:1 supervision while eating to make sure patient is clearing residue on right side of mouth. Patient is at risk for aspiration secondary to right-side droop and cognitive status. Patient will benefit from skilled intervention to address the above impairments. Patients rehabilitation potential is considered to be Good  Factors which may influence rehabilitation potential include:   []            None noted  [x]            Mental ability/status  [x]            Medical condition  []            Home/family situation and support systems  [x]            Safety awareness  []            Pain tolerance/management  []            Other:      PLAN :  Recommendations and Planned Interventions:  --Mechanical soft/thin liquid diet  --Straws preferable  --Meds per patient preference  --1:1 assistance for eating while he has restraints and 1:1 supervision while eating to verify that patient does not pocket food on right side of mouth  --Present PO on left (stronger) side of patient's mouth  --small bites and sips of PO  --SLP to follow up for diet tolerance    Frequency/Duration: Patient will be followed by speech-language pathology 3 times a week to address goals. Discharge Recommendations: To Be Determined     SUBJECTIVE:   Patient stated I need more water re: while eating solid PO trial. Patient was alert, agreeable, and cooperative, but confused. Oriented to person and time only.      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    Psoriasis     lower arms, legs (above knees)     Past Surgical History:   Procedure Laterality Date    HX CATARACT REMOVAL Bilateral     HX COLONOSCOPY      HX PROSTATECTOMY  1997    HX TONSILLECTOMY  as a child     Prior Level of Function/Home Situation:   Home Situation  Home Environment: Private residence  One/Two Story Residence: One story  Living Alone: No  Support Systems: Child(linda), Spouse/Significant Other/Partner  Patient Expects to be Discharged to[de-identified] Private residence  Current DME Used/Available at Home: Wheelchair, [address] [place] prior to admission: regular/thin liquids  Current Diet:  Mechanical soft/thin liquids   Cognitive and Communication Status:  Neurologic State: Alert, Confused  Orientation Level: Oriented to person, Oriented to time, Disoriented to place, Disoriented to situation  Cognition: Decreased command following  Perception: Appears intact (had right eye under gauze )  Perseveration: No perseveration noted  Safety/Judgement: Decreased insight into deficits, Decreased awareness of need for safety  Oral Assessment:  Oral Assessment  Labial: Right droop  Dentition: Natural;Limited  Oral Hygiene: dry oral mucosa (with dried secretions throughout mouth)  Lingual: Right deviation;Decreased rate;Decreased strength  Velum: No impairment  Mandible: No impairment  P.O. Trials:  Patient Position: upright in bed  Vocal quality prior to P.O.: Other (comment); No impairment (dysarthric due to right side droop)  Consistency Presented: Ice chips; Thin liquid;Puree; Solid  How Presented: SLP-fed/presented;Straw;Spoon; Successive swallows     Bolus Acceptance: No impairment  Bolus Formation/Control: Impaired  Type of Impairment: Delayed;Mastication; Anterior;Spillage  Propulsion: Delayed (# of seconds)  Oral Residue: None  Initiation of Swallow: Delayed (# of seconds)  Laryngeal Elevation: Decreased  Aspiration Signs/Symptoms: Clear throat (x1 after thin liquid sip via straw (non-reproducible))  Pharyngeal Phase Characteristics: Multiple swallows  Effective Modifications: Alternate liquids/solids  Cues for Modifications: None       Oral Phase Severity: Mild-moderate  Pharyngeal Phase Severity : Mild    NOMS:   The NOMS functional outcome measure was used to quantify this patient's level of swallowing impairment. Based on the NOMS, the patient was determined to be at level 5 for swallow function     G Codes: In compliance with CMSs Claims Based Outcome Reporting, the following G-code set was chosen for this patient based the use of the NOMS functional outcome to quantify this patient's level of swallowing impairment. Using the NOMS, the patient was determined to be at level 5 for swallow function which correlates with the CJ= 20-39% level of severity. Based on the objective assessment provided within this note, the current, goal, and discharge g-codes are as follows:    Swallow  Swallowing:   Swallow Current Status CJ= 20-39%   Swallow Goal Status CJ= 20-39%      NOMS Swallowing Levels:  Level 1 (CN): NPO  Level 2 (CM): NPO but takes consistency in therapy  Level 3 (CL): Takes less than 50% of nutrition p.o. and continues with nonoral feedings; and/or safe with mod cues; and/or max diet restriction  Level 4 (CK): Safe swallow but needs mod cues; and/or mod diet restriction; and/or still requires some nonoral feeding/supplements  Level 5 (CJ): Safe swallow with min diet restriction; and/or needs min cues  Level 6 (CI): Independent with p.o.; rare cues; usually self cues; may need to avoid some foods or needs extra time  Level 7 ([address] [place] [address]): Independent for all p.o.  ROSENDA. (2003). National Outcomes Measurement System (NOMS): Adult Speech-Language Pathology User's Guide.        Pain:  Pain Scale 1: Numeric (0 - 10)  Pain Intensity 1: 0     After treatment:   []            Patient left in no apparent distress sitting up in chair  [x]            Patient left in no apparent distress in bed  [x]            Call bell left within reach  [x]            Nursing notified  [x]            Caregiver present  []            Bed alarm activated    COMMUNICATION/EDUCATION:   The patients plan of care including recommendations, planned interventions, and recommended diet changes were discussed with: Registered Nurse. [x]            Patient/family have participated as able in goal setting and plan of care. [x]            Patient/family agree to work toward stated goals and plan of care. []            Patient understands intent and goals of therapy, but is neutral about his/her participation. []            Patient is unable to participate in goal setting and plan of care.     Thank you for this referral.  Sabiha Velazquez  Time Calculation: 20 mins

## 2018-02-06 NOTE — PROGRESS NOTES
1945 -- Bedside and Verbal shift change report given to Jaki Ace. (oncoming nurse) by Marylee Chamber (offgoing nurse). Report included the following information SBAR, Kardex, Intake/Output, MAR, Recent Results and Cardiac Rhythm NSR.   2000 -- Assessment completed. See chart for details. VSS [Dobutamine gtt @ 3 mcg/kg/min]. Oriented to person and time only with much agitation and restlessness [Precedex gtt @ 0.8 mcg/kg/hr]. Mitts to bilateral hands. Sitter at bedside. A-fib with PVC's on monitor. Lung sounds diminished and coarse on room air with SpO2 94%. Abdomen soft and intact [In need of SLP swallow eval due to pocketing]. Ivy catheter in place with adequate urine output. Musculoskeletal weakness x 4. Erythema and redness to groin and scrotum with edema that is painful. Nystatin to groin and scrotum. Scattered bruising and scabs noted to extremities. Mepilex to sacrum without issue to that area. Will continue to monitor patient and continue with plan of care. 0000 -- Reassessment completed. No change to previous assessment. Will continue to monitor patient. 0400 -- Reassessment completed. No change to previous assessment. Will continue to monitor patient. 0710 -- Bedside and Verbal shift change report given to Rahat PATIÑO (oncoming nurse) by Jaki Hernandez (offgoing nurse). Report included the following information SBAR, Kardex, Intake/Output, MAR, Recent Results and Cardiac Rhythm A-Fib w/ PVC's. Gato Rivera

## 2018-02-06 NOTE — PROGRESS NOTES
Interdisciplinary team rounds were held 2/6/2018  with the following team members: Care management, Diabetes Treatment Specialist, Nursing, Nutrition, Pharmacy, Physical Therapy, Physician, Respiratory therapy and Clinical Coordinator. Plan of care discussed. Goal: Palliative consult. Adjust medications. See MD orders and progress notes for further  interventions and desired outcomes.

## 2018-02-06 NOTE — PROGRESS NOTES
Hospitalist Progress Note    NAME: Najma Gardner   :  1928   MRN:  [id number]       Assessment / Plan:  Septic Shock   Hypothermia (hypothermia resolved)  Bacteremia (GPC's on BCx)  Staph, Strep and Enterococcus bacteremia; suspect from cutaneous source as patient picks his psoriasis relentlessness at home (per wife's description)  Managed in ICU  Continue on Vancomycin, Levaquin until culture speciation and sensitivities available  Continue Dobutamine  Levophed weakness off  F/u repeat blood cultures  May need ID input once more stable for long term abx option and if further workup warranted    Chronic Atrial Fibrillation with Junctional Bradycardia  Appreciate cardiology input  Amiodarone and BB now on hold  Continue to monitor    Acute on Chronic CHF  Acute Respiratory Failure with Hypoxia from Acute Pulmonary Edema (on CXR)  2D echo with EF 25 % to 30 %. On Dobutamine as unable to give IV diuresis due to shock  Holding home Xarelto  Continue O2, wean as tolerated  TSH wnl  Holding home Coreg and Xarelto    Hypernatremia  Hypoglycemia  IVF's per Intensivest  am cortisol wnl    Acute Kidney Injury with baseline CKD3  Cr improving  Continue to monitor lytes    Pancytopenia  S/p 2 units plts on  for acute bleeding from central line with plts improving from 45K to 111K and decreased to 88K today  Leukopenia resolved  Await Vitamin B12 253, will start daily B12 shots x3    Blood in Stool PTA  Likely from Xarelto and thrombocytopenia  On therapeutic lovenox per pulmonary    Metabolic Encephalopathy  Delirium with hallucinations  Possible related to sepsis  Also on precedex    Patient with history of Left Parotid Mass s/p resection approximately 1 year ago by Dr. Carlo Montenegro and XRT, 2017 PET negative  Heme/Onc following    History of Prostate Cancer    Psoriasis    Body mass index is 28.65 kg/(m^2). Code status: Partial code , no Compressions or Defibrillation.  She is okay with temporary pacing, ACLS meds (like atropine) and temporary intubation. Palliative is following    Prophylaxis: SCD's and H2B  Recommended Disposition: TBD     Subjective: Pt seen and examined at bedside. Poor insight. Overnight events d/w RN     Chief Complaint / Reason for Physician Visit: follow-up septic shock  Review of Systems:  Symptom Y/N Comments  Symptom Y/N Comments   Fever/Chills    Chest Pain     Poor Appetite    Edema     Cough    Abdominal Pain     Sputum    Joint Pain     SOB/GRADY    Pruritis/Rash     Nausea/vomit    Tolerating PT/OT     Diarrhea    Tolerating Diet     Constipation    Other       Limited due to Delirium/sedation: Y     Objective:     VITALS:   Last 24hrs VS reviewed since prior progress note.  Most recent are:  Patient Vitals for the past 24 hrs:   Temp Pulse Resp BP SpO2   02/06/18 1600 97.5 °F (36.4 °C) 86 20 97/58 93 %   02/06/18 1500 - 91 18 92/56 92 %   02/06/18 1400 - 80 14 91/48 (!) 88 %   02/06/18 1300 - 86 19 91/43 90 %   02/06/18 1200 99.4 °F (37.4 °C) 84 22 98/62 92 %   02/06/18 1100 - 80 20 90/57 92 %   02/06/18 1000 - 76 21 93/53 -   02/06/18 0900 - 67 14 95/49 92 %   02/06/18 0800 97.4 °F (36.3 °C) 72 17 103/65 94 %   02/06/18 0700 - 68 20 99/58 100 %   02/06/18 0600 - 67 16 103/61 95 %   02/06/18 0500 - 69 15 104/61 90 %   02/06/18 0400 - 68 13 106/56 92 %   02/06/18 0300 - 75 17 101/65 94 %   02/06/18 0200 - 76 16 104/68 91 %   02/06/18 0100 - 74 15 101/64 93 %   02/06/18 0001 97.3 °F (36.3 °C) 67 15 93/49 92 %   02/05/18 2300 - 74 20 99/56 92 %   02/05/18 2200 - 72 17 101/70 91 %   02/05/18 2100 - 74 15 115/69 91 %   02/05/18 2000 95.5 °F (35.3 °C) 62 13 102/53 94 %   02/05/18 1900 - 69 20 109/70 -   02/05/18 1833 (!) 94.8 °F (34.9 °C) - - - -   02/05/18 1700 - 80 21 116/73 (!) 73 %       Intake/Output Summary (Last 24 hours) at 02/06/18 1646  Last data filed at 02/06/18 1600   Gross per 24 hour   Intake          3569.33 ml   Output             2050 ml   Net          1519.33 ml        PHYSICAL EXAM:  General: WD, Chronically ill appearing. Alert, cooperative, no acute distress    EENT:  EOMI. Anicteric sclerae. MM dry; Right facial droop and void from removal of parotid mass notable. Right eye with patch (due to inability to close eyelid)  Resp:  CTA bilaterally on anterior and lateral assessment, no wheezing or rales. No accessory muscle use  CV:  irregular  Rhythm with normal rate, + edema noted with SCD's in palce  GI:  Soft, Non distended, Non tender.  +Bowel sounds  Neurologic:  Sedated, further neurologic status confounded by patient's sedated status   Psych:   Unable to assess. Not anxious nor agitated at this time  Skin:  No rashes. No jaundice    Reviewed most current lab test results and cultures  YES  Reviewed most current radiology test results   YES  Review and summation of old records today    NO  Reviewed patient's current orders and MAR    YES  PMH/SH reviewed - no change compared to H&P  ________________________________________________________________________  Care Plan discussed with:    Comments   Patient x    Family      RN x    Care Manager     Consultant                        Multidiciplinary team rounds were held today with , nursing, pharmacist and clinical coordinator. Patient's plan of care was discussed; medications were reviewed and discharge planning was addressed. ________________________________________________________________________  Total NON critical care TIME:  35 Minutes    Total CRITICAL CARE TIME Spent:   Minutes non procedure based      Comments   >50% of visit spent in counseling and coordination of care x EMR review   ________________________________________________________________________  Noemí Reyna MD     Procedures: see electronic medical records for all procedures/Xrays and details which were not copied into this note but were reviewed prior to creation of Plan.       LABS:  I reviewed today's most current labs and imaging studies.   Pertinent labs include:  Recent Labs      02/06/18 0523 02/05/18 0427 02/04/18 0418   WBC  2.5*  4.6  6.0   HGB  10.7*  10.4*  10.0*   HCT  33.1*  31.3*  31.0*   PLT  59*  83*  96*     Recent Labs      02/06/18 0523 02/05/18 0427 02/04/18 0418   NA  141  139  141   K  4.3  4.7  4.6   CL  110*  107  109*   CO2  25  26  29   GLU  96  94  113*   BUN  33*  42*  50*   CREA  1.65*  1.77*  2.15*   CA  8.5  8.5  8.4*   MG  1.7  1.9  2.2   PHOS  2.7  2.9  3.4   ALB  2.6*   --   2.8*   TBILI  1.4*   --   1.3*   SGOT  28   --   30   ALT  18   --   22   INR   --    --   1.5*       Signed: Cristina Lu MD

## 2018-02-06 NOTE — PROGRESS NOTES
[facility]  Pulmonary, Critical Care, and Sleep Medicine    Name: Kaur Mazariegos MRN: [id number]   : 1928 Hospital: [facility]   Date: 2018          IMPRESSION:   · Encephalopathy   · Severe Sepsis. · Pancytopenia   · Acute Bacteremia, + blood Cx noted. · Acute on Chronic Renal Insufficiency   · Parathyroid mass. S/p resection per Dr. Annita Cooney. · Hypoglycemia   · A fib  · CHF   · Coagulopathy   · Poor oral dentition. · Hx of prostate Ca. · Psoriasis      RECOMMENDATIONS:   · Try to reorient  · IV abx. · Wean precedex drip as tolerated  · Glycemic control   · Off Levophed   · On dobutamine  · Renal fx same  · PO intake as able  · Partial code? ?  · Palliative care consult today     Subjective/History:     Weak and debilitated, confused      Current Facility-Administered Medications   Medication Dose Route Frequency    dexmedeTOMidine (PRECEDEX) 400 mcg in 0.9% sodium chloride 100 mL infusion  0.2-1.4 mcg/kg/hr IntraVENous TITRATE    dextrose 5% lactated ringers infusion  75 mL/hr IntraVENous CONTINUOUS    enoxaparin (LOVENOX) injection 80 mg  80 mg SubCUTAneous Q24H    DOBUTamine (DOBUTREX) 1,000 mg/250 mL (4,000 mcg/mL) infusion  5 mcg/kg/min IntraVENous TITRATE    amiodarone (CORDARONE) tablet 100 mg  100 mg Oral DAILY    meropenem (MERREM) 500 mg in 0.9% sodium chloride (MBP/ADV) 50 mL  0.5 g IntraVENous Q12H    chlorhexidine (PERIDEX) 0.12 % mouthwash 15 mL  15 mL Oral Q12H    NOREPINephrine (LEVOPHED) 8 mg in dextrose 5% 250 mL infusion  2-30 mcg/min IntraVENous TITRATE    melatonin tablet 9 mg  9 mg Oral QHS    nystatin (MYCOSTATIN) 100,000 unit/gram powder   Topical TID    famotidine (PF) (PEPCID) 20 mg in sodium chloride 0.9 % 10 mL injection  20 mg IntraVENous DAILY    insulin lispro (HUMALOG) injection   SubCUTAneous Q6H    sodium chloride (NS) flush 5-10 mL  5-10 mL IntraVENous Q8H    levoFLOXacin (LEVAQUIN) 750 mg in D5W IVPB 750 mg IntraVENous Q48H    vancomycin (VANCOCIN) 1750 mg in  ml infusion  1,750 mg IntraVENous Q24H    mupirocin (BACTROBAN) 2 % ointment   Both Nostrils BID          Review of Systems:  Review of systems not obtained due to patient factors. Objective:   Vital Signs:    Visit Vitals    BP 99/58    Pulse 68    Temp 97.3 °F (36.3 °C)    Resp 20    Ht 5' 9\" (1.753 m)    Wt 88 kg (194 lb 0.1 oz)    SpO2 100%    BMI 28.65 kg/m2       O2 Device: Room air   O2 Flow Rate (L/min): 2 l/min   Temp (24hrs), Av.8 °F (35.4 °C), Min:94.8 °F (34.9 °C), Max:97.3 °F (36.3 °C)       Intake/Output:   Last shift:         Last 3 shifts:  1901 -  0700  In: 4245.2 [I.V.:4245.2]  Out: 3160 [Urine:3160]    Intake/Output Summary (Last 24 hours) at 18 0735  Last data filed at 18 0700   Gross per 24 hour   Intake          2699.14 ml   Output             2320 ml   Net           379.14 ml   Physical Exam:    General:  Lethargic, weak, debilitated   Head:  Normocephalic, without obvious abnormality, atraumatic. Eyes:  Right eye patched   Nose: Nares normal. Septum midline. No drainage    Throat: Lips, mucosa, and tongue dry. POOR Teeth    Neck: Supple, symmetrical, trachea midline,  no JVD. Back:      Lungs: No wheeze. Chest wall:  No deformity. Heart:  Regular rate and rhythm. Abdomen:   Non distended. Extremities: Extremities no cyanosis or edema. Pulses:    Skin: Skin color, texture, turgor normal. Has areas of bruising, has psoriasis changes. Bandage to his LUE. Lymph nodes: Cervical, supraclavicular, and axillary nodes normal.   Neurologic: Grossly nonfocal, moving all extremities. Psych: Refusing to allow full evaluation.  Intermittently oriented       Data:     Recent Results (from the past 24 hour(s))   GLUCOSE, POC    Collection Time: 18  1:15 PM   Result Value Ref Range    Glucose (POC) 126 (H) 65 - 100 mg/dL    Performed by Alvarado Black    GLUCOSE, POC    Collection Time: 02/05/18  6:12 PM   Result Value Ref Range    Glucose (POC) 111 (H) 65 - 100 mg/dL    Performed by Doris Nelson    GLUCOSE, POC    Collection Time: 02/05/18 11:28 PM   Result Value Ref Range    Glucose (POC) 107 (H) 65 - 100 mg/dL    Performed by Kinza Cavanaugh    CBC WITH AUTOMATED DIFF    Collection Time: 02/06/18  5:23 AM   Result Value Ref Range    WBC 2.5 (L) 4.1 - 11.1 K/uL    RBC 3.12 (L) 4.10 - 5.70 M/uL    HGB 10.7 (L) 12.1 - 17.0 g/dL    HCT 33.1 (L) 36.6 - 50.3 %    .1 (H) 80.0 - 99.0 FL    MCH 34.3 (H) 26.0 - 34.0 PG    MCHC 32.3 30.0 - 36.5 g/dL    RDW 16.4 (H) 11.5 - 14.5 %    PLATELET 59 (L) 236 - 400 K/uL    MPV 12.4 8.9 - 12.9 FL    NRBC 0.0 0  WBC    ABSOLUTE NRBC 0.00 0.00 - 0.01 K/uL    NEUTROPHILS 66 32 - 75 %    LYMPHOCYTES 19 12 - 49 %    MONOCYTES 15 (H) 5 - 13 %    EOSINOPHILS 0 0 - 7 %    BASOPHILS 0 0 - 1 %    IMMATURE GRANULOCYTES 0 0.0 - 0.5 %    ABS. NEUTROPHILS 1.6 (L) 1.8 - 8.0 K/UL    ABS. LYMPHOCYTES 0.5 (L) 0.8 - 3.5 K/UL    ABS. MONOCYTES 0.4 0.0 - 1.0 K/UL    ABS. EOSINOPHILS 0.0 0.0 - 0.4 K/UL    ABS. BASOPHILS 0.0 0.0 - 0.1 K/UL    ABS. IMM. GRANS. 0.0 0.00 - 0.04 K/UL    DF MANUAL      PLATELET COMMENTS Large Platelets      RBC COMMENTS MACROCYTOSIS  1+       METABOLIC PANEL, COMPREHENSIVE    Collection Time: 02/06/18  5:23 AM   Result Value Ref Range    Sodium 141 136 - 145 mmol/L    Potassium 4.3 3.5 - 5.1 mmol/L    Chloride 110 (H) 97 - 108 mmol/L    CO2 25 21 - 32 mmol/L    Anion gap 6 5 - 15 mmol/L    Glucose 96 65 - 100 mg/dL    BUN 33 (H) 6 - 20 MG/DL    Creatinine 1.65 (H) 0.70 - 1.30 MG/DL    BUN/Creatinine ratio 20 12 - 20      GFR est AA 48 (L) >60 ml/min/1.73m2    GFR est non-AA 39 (L) >60 ml/min/1.73m2    Calcium 8.5 8.5 - 10.1 MG/DL    Bilirubin, total 1.4 (H) 0.2 - 1.0 MG/DL    ALT (SGPT) 18 12 - 78 U/L    AST (SGOT) 28 15 - 37 U/L    Alk.  phosphatase 91 45 - 117 U/L    Protein, total 6.1 (L) 6.4 - 8.2 g/dL    Albumin 2.6 (L) 3.5 - 5.0 g/dL    Globulin 3.5 2.0 - 4.0 g/dL    A-G Ratio 0.7 (L) 1.1 - 2.2     MAGNESIUM    Collection Time: 02/06/18  5:23 AM   Result Value Ref Range    Magnesium 1.7 1.6 - 2.4 mg/dL   PHOSPHORUS    Collection Time: 02/06/18  5:23 AM   Result Value Ref Range    Phosphorus 2.7 2.6 - 4.7 MG/DL             Telemetry:AFIB    Imaging:  I have personally reviewed the patients radiographs and have reviewed the reports:             Total critical care time exclusive of procedures:  minutes  Henri Paulson MD

## 2018-02-06 NOTE — PROGRESS NOTES
Pt is an 79 yo male transferred from [facility] for evaluation of afib, weakness, cough, hypothermia. Pt was found to have septic shock/bacteremia (possibly from skin infections from psoriasis sores), chronic afib with bradycardia, acute on chronic CHF, ARF, pancytopenia, encephalopathy and delirium with hallucinations. Pt is being followed by Intensivists, Cardiology, General Medical, Hematology & Palliative Care. Pt has required sitter, soft restraints for agitation/confusion. Pt lives w/ his wife in [place], [place] in [facility]. Per family, he was fairly active for his age until past 2 weeks or so. Pt has a WC and RW. Pt has 2 adult children who live in [facility] area and are supportive. SLP eval ordered. CM will follow progress and asisst with appropriate dc plans. Care Management Interventions  PCP Verified by CM: Yes  Palliative Care Criteria Met (RRAT>21 & CHF Dx)?: Yes  Palliative Consult Recommended?: Yes  Mode of Transport at Discharge:  Other (see comment) (TBD)  Transition of Care Consult (CM Consult): Discharge Planning (Pt was assessed for possible dc needs)  Discharge Durable Medical Equipment: No  Physical Therapy Consult: No  Occupational Therapy Consult: No  Current Support Network: Lives with Spouse  Discharge Location  Discharge Placement: Skilled nursing facility [facility]     MONTEZ Ruano

## 2018-02-06 NOTE — CONSULTS
Palliative Medicine Consult  [place]: [phone])    Patient Name: Terrell Rahman  YOB: 1928    Date of Initial Consult: 2/6/18  Reason for Consult: care decisions  Requesting Provider: Dr. Opal Gu  Primary Care Physician: Ariana Kamara, [place]     SUMMARY:   Terrell Rahman is a 80 y.o. 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 Current medical issues leading to Palliative Medicine involvement include: sepsis/ bacteremia, encephalopathy, pancytopenia (improving w tx of sepsis), ARF on CRF (improving). 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 He lives with his wife of over 48 years, Silvina Wolfe (her brother is Franco Leon- retired family physician). They live on [facility] in [facility]. They have two adult children who both live in [place] (Elzbieta Bhatia and Sammye Homans)    PALLIATIVE DIAGNOSES:   1. Sepsis - bacteremia (staph/strep and enterococcus)  2. Pancytopenia, improving w tx of sepsis  3. ARF on CRF  4. Delirium, metabolic encephalopathy, agitated at times  5. Chronic constipation       PLAN:   1. Palliative Medicine services introduced to patient and spouse at bedside. 1. Goals are clear at this time to continue current supports and see how he recovers  2. Limits already defined -- no CPR, no shock if his heart stops. 3. Will plan to continue to follow for support and to further clarify goals if needed as clinical course plays out. 2. Delirium managed with PRN haldol., mitts have been required as patient attempting to remove central line. 3. Initial consult note routed to primary continuity provider  4.  Communicated plan of care with: Palliative IDT       GOALS OF CARE / TREATMENT PREFERENCES:     GOALS OF CARE:  Patient/Health Care Proxy Stated Goals: Other (comment)      TREATMENT PREFERENCES:   Code Status: Partial Code    Advance Care Planning:  Advance Care Planning 2/3/2018   Patient's Healthcare Decision Maker is: Legal Next of Kin   Primary Decision Maker Name Barb Lezama   Primary Decision Maker Phone Number [phone]   Primary Decision Maker Relationship to Patient Spouse   Confirm Advance Directive Yes, not on file       Medical Interventions: Other (comment)   Other Instructions: Other:    As far as possible, the palliative care team has discussed with patient / health care proxy about goals of care / treatment preferences for patient. HISTORY:     History obtained from: chart, wife    CHIEF COMPLAINT: admitted with weakness    HPI/SUBJECTIVE:    The patient is:   [] Verbal and participatory  [x] Non-participatory due to: delirium  (conversant but confused)    80year old male with debility from multiple medical issues as above. He was seen in [facility] ER, found to have bradycardia, hypothermia, hypotension. At baseline, wife describes him as active --  Up most of the day, works on his computer on a good day. He no longer does zoomba (no in last 2 years) but ambulation not difficult. No major changes recently. Clinic notes reviewed -- seen for LE swelling, recent cellulitis treated as outpatient. Prior to admission, was having some ongoing constipation, had large BM with some bright red bleeding.     Clinical Pain Assessment (nonverbal scale for severity on nonverbal patients):   Clinical Pain Assessment  Severity: 0          Duration: for how long has pt been experiencing pain (e.g., 2 days, 1 month, years)  Frequency: how often pain is an issue (e.g., several times per day, once every few days, constant)     FUNCTIONAL ASSESSMENT:     Palliative Performance Scale (PPS):  PPS: 30       PSYCHOSOCIAL/SPIRITUAL SCREENING:     Palliative IDT has assessed this patient for cultural preferences / practices and a referral made as appropriate to needs (Cultural Services, Patient Advocacy, Ethics, etc.)    Advance Care Planning:  Advance Care Planning 2/3/2018   Patient's Healthcare Decision Maker is: Legal Next of Corin Handy   Primary Decision Maker Name Jair Garcia   Primary Decision Maker Phone Number [phone]   Primary Decision Maker Relationship to Patient Spouse   Confirm Advance Directive Yes, not on file       Any spiritual / Yarsani concerns:  [] Yes /  [x] No    Caregiver Burnout:  [] Yes /  [x] No /  [] No Caregiver Present      Anticipatory grief assessment:   [x] Normal  / [] Maladaptive       ESAS Anxiety: Anxiety: 0    ESAS Depression: Depression: 0        REVIEW OF SYSTEMS:     Positive and pertinent negative findings in ROS are noted above in HPI. The following systems were [x] reviewed / [] unable to be reviewed as noted in HPI  Other findings are noted below. Systems: constitutional, ears/nose/mouth/throat, respiratory, gastrointestinal, genitourinary, musculoskeletal, integumentary, neurologic, psychiatric, endocrine. Positive findings noted below. Modified ESAS Completed by: provider   Fatigue: 0 Drowsiness: 0   Depression: 0 Pain: 0   Anxiety: 0 Nausea: 0   Anorexia: 0 Dyspnea: 0     Constipation: Yes              PHYSICAL EXAM:     From RN flowsheet:  Wt Readings from Last 3 Encounters:   02/02/18 88 kg (194 lb 0.1 oz)   02/02/18 88 kg (194 lb)   01/09/18 90.3 kg (199 lb)     Blood pressure 90/57, pulse 80, temperature 97.4 °F (36.3 °C), resp. rate 20, height 5' 9\" (1.753 m), weight 88 kg (194 lb 0.1 oz), SpO2 92 %. Pain Scale 1: Numeric (0 - 10)  Pain Intensity 1: 0     Pain Location 1: Neck        Pain Intervention(s) 1: Repositioned  Last bowel movement, if known:     Constitutional: Pt is awake  Speech is a bit difficult to understand. Asking repeatedly to go home, \"when can I go home? \"  [place] Lights with conversation from his wife. Right eye patch in place, R facial droop. No respiratory distress. Hands in mittens, which he is trying to remove at times. HISTORY:     Active Problems:    Atrial fibrillation ([facility] (9/21/2017)      Hypothermia (2/2/2018)      Sepsis ([address] (2/2/2018)      Past Medical History:   Diagnosis Date    Atrial fibrillation with RVR (HCC)     Dr Augustina Chen - cardiologist    CAD (coronary artery disease)     Cardiomyopathy ([facility] [address]     Diverticula of colon     Heart failure ([facility] [address]     Hypercholesterolemia     Ill-defined condition     psorosis    Malignant neoplasm of prostate ([facility] [address]     prostrate removed    Parotid tumor 06/13/2017    Surgery, XRT; resulting right Cropsey' palsy    Psoriasis     lower arms, legs (above knees)      Past Surgical History:   Procedure Laterality Date    HX CATARACT REMOVAL Bilateral     HX COLONOSCOPY      HX PROSTATECTOMY  1997    HX TONSILLECTOMY  as a child      Family History   Problem Relation Age of Onset    Cancer Mother      BRAIN TUMOR    Cancer Brother      PROSTATE      History reviewed, no pertinent family history.   Social History   Substance Use Topics    Smoking status: Former Smoker    Smokeless tobacco: Never Used    Alcohol use 4.2 oz/week     7 Standard drinks or equivalent per week      Comment: \"1 drink a night\"     Allergies   Allergen Reactions    Ancef [Cefazolin] Unknown (comments)     \"drops my blood pressure\"    Neosporin [Benzalkonium Chloride] Unknown (comments)    Polysporin [Bacitracin-Polymyxin B] Other (comments)     \"WOUNDS DO NOT HEAL\"      Current Facility-Administered Medications   Medication Dose Route Frequency    dexmedeTOMidine (PRECEDEX) 400 mcg in 0.9% sodium chloride 100 mL infusion  0.2-1.4 mcg/kg/hr IntraVENous TITRATE    haloperidol lactate (HALDOL) injection 5 mg  5 mg IntraVENous Q6H PRN    dextrose 5% lactated ringers infusion  75 mL/hr IntraVENous CONTINUOUS    enoxaparin (LOVENOX) injection 80 mg  80 mg SubCUTAneous Q24H    DOBUTamine (DOBUTREX) 1,000 mg/250 mL (4,000 mcg/mL) infusion  5 mcg/kg/min IntraVENous TITRATE    amiodarone (CORDARONE) tablet 100 mg  100 mg Oral DAILY    glucose chewable tablet 16 g  4 Tab Oral PRN    dextrose (D50W) injection syrg 12.5-25 g  12.5-25 g IntraVENous PRN    glucagon (GLUCAGEN) injection 1 mg  1 mg IntraMUSCular PRN    chlorhexidine (PERIDEX) 0.12 % mouthwash 15 mL  15 mL Oral Q12H    melatonin tablet 9 mg  9 mg Oral QHS    nystatin (MYCOSTATIN) 100,000 unit/gram powder   Topical TID    famotidine (PF) (PEPCID) 20 mg in sodium chloride 0.9 % 10 mL injection  20 mg IntraVENous DAILY    insulin lispro (HUMALOG) injection   SubCUTAneous Q6H    0.9% sodium chloride infusion 250 mL  250 mL IntraVENous PRN    sodium chloride (NS) flush 5-10 mL  5-10 mL IntraVENous Q8H    sodium chloride (NS) flush 5-10 mL  5-10 mL IntraVENous PRN    levoFLOXacin (LEVAQUIN) 750 mg in D5W IVPB  750 mg IntraVENous Q48H    vancomycin (VANCOCIN) 1750 mg in  ml infusion  1,750 mg IntraVENous Q24H    mupirocin (BACTROBAN) 2 % ointment   Both Nostrils BID          LAB AND IMAGING FINDINGS:     Lab Results   Component Value Date/Time    WBC 2.5 02/06/2018 05:23 AM    HGB 10.7 02/06/2018 05:23 AM    PLATELET 59 36/98/8417 05:23 AM     Lab Results   Component Value Date/Time    Sodium 141 02/06/2018 05:23 AM    Potassium 4.3 02/06/2018 05:23 AM    Chloride 110 02/06/2018 05:23 AM    CO2 25 02/06/2018 05:23 AM    BUN 33 02/06/2018 05:23 AM    Creatinine 1.65 02/06/2018 05:23 AM    Calcium 8.5 02/06/2018 05:23 AM    Magnesium 1.7 02/06/2018 05:23 AM    Phosphorus 2.7 02/06/2018 05:23 AM      Lab Results   Component Value Date/Time    AST (SGOT) 28 02/06/2018 05:23 AM    Alk.  phosphatase 91 02/06/2018 05:23 AM    Protein, total 6.1 02/06/2018 05:23 AM    Albumin 2.6 02/06/2018 05:23 AM    Globulin 3.5 02/06/2018 05:23 AM     Lab Results   Component Value Date/Time    INR 1.5 02/04/2018 04:18 AM    Prothrombin time 15.4 02/04/2018 04:18 AM    aPTT 42.5 02/04/2018 04:18 AM      No results found for: IRON, FE, TIBC, IBCT, PSAT, FERR   No results found for: PH, PCO2, PO2  No components found for: [place]   Lab Results   Component Value Date/Time    CK 69 02/02/2018 06:45 PM    CK - MB 9.9 02/02/2018 06:45 PM                Total time:   Counseling / coordination time, spent as noted above:   > 50% counseling / coordination?:     Prolonged service was provided for  []30 min   []75 min in face to face time in the presence of the patient, spent as noted above. Time Start:   Time End:   Note: this can only be billed with 94593 (initial) or 78091 (follow up). If multiple start / stop times, list each separately.

## 2018-02-06 NOTE — PROGRESS NOTES
EP/ Arrhythmia/ Cardiology Progress Note    Patient ID:  Patient: Jenniffer Townsend  MRN: [id number]  Age: 80 y.o.  : 1928   Admit Date: 2018    Assessment: 1. Probably atrial fibrillation with slow ventricular response. This may be predominantly due to intrinsic AV conduction disease. To a small extent, it is due to some residual beta blocker as well as amiodarone which can have a beta blocker effect in part. 2. Gram-positive cocci bacteremia and sepsis. 3. Acute on chronic unspecified congestive heart failure. 4. Acute kidney injury atop chronic kidney disease stage III. Slowly improving. 5. Pancytopenia (watching platelets fall). 6. Encephalopathy/delirium. On med to limit combativeness. 7. History of R face/neck mass resected. 8. Partial code status (No shock or chest compressions). Plan:     1. HR is slow, adequate enough at this time. Too early for pacemaker given infection, etc.  2. Continue to hold beta blocker. 3. Restarted amiodarone 100 daily. 4. Continue dobutamine at 6 mcg. This can be used on the floor as well. 5. Agree with treating him for bacteremia. Discussed status with nursing. [x]       High complexity decision making was performed in this patient at high risk for decompensation with multiple organ involvement. Subjective:     Jenniffer Townsend denies chest pain. No longer in mitts. More collected today. Review of Systems - He cannot relay a reliable ROS due to encephalopathy.     Objective:     Physical Exam:  Temp (24hrs), Av °F (36.1 °C), Min:94.8 °F (34.9 °C), Max:99.4 °F (37.4 °C)    Patient Vitals for the past 8 hrs:   Pulse   18 1600 86   18 1500 91   18 1400 80   18 1300 86   18 1200 84   18 1100 80   18 1000 76   18 0900 67    Patient Vitals for the past 8 hrs:   Resp   18 1600 20   18 1500 18   02/06/18 1400 14   02/06/18 1300 19   02/06/18 1200 22   02/06/18 1100 20   02/06/18 1000 21   02/06/18 0900 14    Patient Vitals for the past 8 hrs:   BP   02/06/18 1600 97/58   02/06/18 1500 92/56   02/06/18 1400 91/48   02/06/18 1300 91/43   02/06/18 1200 98/62   02/06/18 1100 90/57   02/06/18 1000 93/53   02/06/18 0900 95/49          Intake/Output Summary (Last 24 hours) at 02/06/18 1632  Last data filed at 02/06/18 1500   Gross per 24 hour   Intake          3245.74 ml   Output             2010 ml   Net          1235.74 ml       Nondiaphoretic, not in acute distress. MMM, no jaundice, HEENT stable. L eye patch. Central line in the neck. Unlabored, clear to auscultation bilaterally, symmetric air movement. Regular rate and rhythm, no murmur, pericardial rub, knock, or gallop. No JVD or peripheral edema. Palpable radial and pulses bilaterally. Abdomen soft, nontender, nondistended. Extremities without cyanosis or clubbing. Skin warm and dry. No ulcers or rash. Musculoskeletal exam stable. Awake, confused. No tremor. Cardiographics and Studies, I personally reviewed:    Telemetry:  Afib with HR 80's. ECHO 2/3:    LEFT VENTRICLE: The ventricle was mildly dilated. Ejection fraction was  estimated in the range of 25 % to 30 %. There was moderate diffuse  hypokinesis. RIGHT VENTRICLE: The ventricle was moderately dilated. Systolic function  was mildly reduced. LEFT ATRIUM: The atrium was moderately dilated. RIGHT ATRIUM: The atrium was mildly dilated. MITRAL VALVE: There was mild thickening. DOPPLER: There was moderate  regurgitation. AORTIC VALVE: Leaflets exhibited mildly increased thickness. DOPPLER:  There was no significant regurgitation. TRICUSPID VALVE: Normal valve structure. DOPPLER: There was moderate  regurgitation. Pulmonary artery systolic pressure: 57 mmHg. There was  moderate pulmonary hypertension. PULMONIC VALVE: Normal valve structure.     AORTA: The root exhibited normal size. SYSTEMIC VEINS: IVC: The respirophasic change in diameter was less than  50%. PERICARDIUM: There was no pericardial effusion. The pericardium was normal  in appearance. LAB Review:     No results for input(s): CPK, CKMB, CKNDX, TROIQ in the last 72 hours. No lab exists for component: CPKMB  Lab Results   Component Value Date/Time    Cholesterol, total 128 06/29/2017 10:41 AM    HDL Cholesterol 49 06/29/2017 10:41 AM    LDL, calculated 60 06/29/2017 10:41 AM    Triglyceride 94 06/29/2017 10:41 AM     Recent Labs      02/04/18 0418   INR  1.5*   PTP  15.4*   APTT  42.5*      Recent Labs      02/06/18 0523 02/05/18 0427 02/04/18 0418   NA  141  139  141   K  4.3  4.7  4.6   CL  110*  107  109*   CO2  25  26  29   BUN  33*  42*  50*   CREA  1.65*  1.77*  2.15*   GLU  96  94  113*   PHOS  2.7  2.9  3.4   CA  8.5  8.5  8.4*   ALB  2.6*   --   2.8*   WBC  2.5*  4.6  6.0   HGB  10.7*  10.4*  10.0*   HCT  33.1*  31.3*  31.0*   PLT  59*  83*  96*     Recent Labs      02/06/18 0523 02/04/18 0418   SGOT  28  30   AP  91  93   TP  6.1*  6.5   ALB  2.6*  2.8*   GLOB  3.5  3.7     No components found for: GLPOC  No results for input(s): PH, PCO2, PO2 in the last 72 hours. Medications Reviewed:      Allergies   Allergen Reactions    Ancef [Cefazolin] Unknown (comments)     \"drops my blood pressure\"    Neosporin [Benzalkonium Chloride] Unknown (comments)    Polysporin [Bacitracin-Polymyxin B] Other (comments)     \"WOUNDS DO NOT HEAL\"        Current Facility-Administered Medications   Medication Dose Route Frequency    dexmedeTOMidine (PRECEDEX) 400 mcg in 0.9% sodium chloride 100 mL infusion  0.2-1.4 mcg/kg/hr IntraVENous TITRATE    haloperidol lactate (HALDOL) injection 5 mg  5 mg IntraVENous Q6H PRN    dextrose 5% lactated ringers infusion  75 mL/hr IntraVENous CONTINUOUS    enoxaparin (LOVENOX) injection 80 mg  80 mg SubCUTAneous Q24H    DOBUTamine (DOBUTREX) 1,000 mg/250 mL (4,000 mcg/mL) infusion  5 mcg/kg/min IntraVENous TITRATE    amiodarone (CORDARONE) tablet 100 mg  100 mg Oral DAILY    glucose chewable tablet 16 g  4 Tab Oral PRN    dextrose (D50W) injection syrg 12.5-25 g  12.5-25 g IntraVENous PRN    glucagon (GLUCAGEN) injection 1 mg  1 mg IntraMUSCular PRN    chlorhexidine (PERIDEX) 0.12 % mouthwash 15 mL  15 mL Oral Q12H    melatonin tablet 9 mg  9 mg Oral QHS    nystatin (MYCOSTATIN) 100,000 unit/gram powder   Topical TID    famotidine (PF) (PEPCID) 20 mg in sodium chloride 0.9 % 10 mL injection  20 mg IntraVENous DAILY    insulin lispro (HUMALOG) injection   SubCUTAneous Q6H    0.9% sodium chloride infusion 250 mL  250 mL IntraVENous PRN    sodium chloride (NS) flush 5-10 mL  5-10 mL IntraVENous Q8H    sodium chloride (NS) flush 5-10 mL  5-10 mL IntraVENous PRN    levoFLOXacin (LEVAQUIN) 750 mg in D5W IVPB  750 mg IntraVENous Q48H    vancomycin (VANCOCIN) 1750 mg in  ml infusion  1,750 mg IntraVENous Q24H    mupirocin (BACTROBAN) 2 % ointment   Both Nostrils BID          Bre Suarez MD  2/6/2018

## 2018-02-07 ENCOUNTER — APPOINTMENT (OUTPATIENT)
Dept: GENERAL RADIOLOGY | Age: 83
DRG: 871 | End: 2018-02-07
Attending: INTERNAL MEDICINE
Payer: MEDICARE

## 2018-02-07 LAB
ANION GAP SERPL CALC-SCNC: 7 MMOL/L (ref 5–15)
BACTERIA SPEC CULT: ABNORMAL
BUN SERPL-MCNC: 30 MG/DL (ref 6–20)
BUN/CREAT SERPL: 19 (ref 12–20)
CALCIUM SERPL-MCNC: 8.3 MG/DL (ref 8.5–10.1)
CHLORIDE SERPL-SCNC: 110 MMOL/L (ref 97–108)
CO2 SERPL-SCNC: 25 MMOL/L (ref 21–32)
CREAT SERPL-MCNC: 1.6 MG/DL (ref 0.7–1.3)
ERYTHROCYTE [DISTWIDTH] IN BLOOD BY AUTOMATED COUNT: 16.5 % (ref 11.5–14.5)
GLUCOSE BLD STRIP.AUTO-MCNC: 100 MG/DL (ref 65–100)
GLUCOSE BLD STRIP.AUTO-MCNC: 104 MG/DL (ref 65–100)
GLUCOSE BLD STRIP.AUTO-MCNC: 135 MG/DL (ref 65–100)
GLUCOSE BLD STRIP.AUTO-MCNC: 86 MG/DL (ref 65–100)
GLUCOSE SERPL-MCNC: 89 MG/DL (ref 65–100)
HCT VFR BLD AUTO: 30 % (ref 36.6–50.3)
HGB BLD-MCNC: 9.6 G/DL (ref 12.1–17)
MCH RBC QN AUTO: 34 PG (ref 26–34)
MCHC RBC AUTO-ENTMCNC: 32 G/DL (ref 30–36.5)
MCV RBC AUTO: 106.4 FL (ref 80–99)
NRBC # BLD: 0 K/UL (ref 0–0.01)
NRBC BLD-RTO: 0 PER 100 WBC
PLATELET # BLD AUTO: 60 K/UL (ref 150–400)
PMV BLD AUTO: 11.8 FL (ref 8.9–12.9)
POTASSIUM SERPL-SCNC: 4 MMOL/L (ref 3.5–5.1)
RBC # BLD AUTO: 2.82 M/UL (ref 4.1–5.7)
SERVICE CMNT-IMP: ABNORMAL
SERVICE CMNT-IMP: NORMAL
SERVICE CMNT-IMP: NORMAL
SODIUM SERPL-SCNC: 142 MMOL/L (ref 136–145)
WBC # BLD AUTO: 2.8 K/UL (ref 4.1–11.1)

## 2018-02-07 PROCEDURE — 74011250637 HC RX REV CODE- 250/637: Performed by: INTERNAL MEDICINE

## 2018-02-07 PROCEDURE — 36415 COLL VENOUS BLD VENIPUNCTURE: CPT | Performed by: INTERNAL MEDICINE

## 2018-02-07 PROCEDURE — 74011000250 HC RX REV CODE- 250: Performed by: INTERNAL MEDICINE

## 2018-02-07 PROCEDURE — 82962 GLUCOSE BLOOD TEST: CPT

## 2018-02-07 PROCEDURE — 74011250636 HC RX REV CODE- 250/636: Performed by: INTERNAL MEDICINE

## 2018-02-07 PROCEDURE — 80048 BASIC METABOLIC PNL TOTAL CA: CPT | Performed by: INTERNAL MEDICINE

## 2018-02-07 PROCEDURE — 74011258636 HC RX REV CODE- 258/636: Performed by: INTERNAL MEDICINE

## 2018-02-07 PROCEDURE — 71045 X-RAY EXAM CHEST 1 VIEW: CPT

## 2018-02-07 PROCEDURE — 92526 ORAL FUNCTION THERAPY: CPT

## 2018-02-07 PROCEDURE — 85027 COMPLETE CBC AUTOMATED: CPT | Performed by: INTERNAL MEDICINE

## 2018-02-07 PROCEDURE — 80202 ASSAY OF VANCOMYCIN: CPT | Performed by: INTERNAL MEDICINE

## 2018-02-07 PROCEDURE — 74011250636 HC RX REV CODE- 250/636: Performed by: EMERGENCY MEDICINE

## 2018-02-07 PROCEDURE — P9045 ALBUMIN (HUMAN), 5%, 250 ML: HCPCS | Performed by: INTERNAL MEDICINE

## 2018-02-07 PROCEDURE — 65620000000 HC RM CCU GENERAL

## 2018-02-07 RX ORDER — POLYETHYLENE GLYCOL 3350 17 G/17G
17 POWDER, FOR SOLUTION ORAL DAILY
Status: DISCONTINUED | OUTPATIENT
Start: 2018-02-07 | End: 2018-02-23 | Stop reason: HOSPADM

## 2018-02-07 RX ORDER — PHENYLEPHRINE HCL IN 0.9% NACL 30MG/250ML
10-100 PLASTIC BAG, INJECTION (ML) INTRAVENOUS
Status: DISCONTINUED | OUTPATIENT
Start: 2018-02-07 | End: 2018-02-07

## 2018-02-07 RX ORDER — INSULIN LISPRO 100 [IU]/ML
INJECTION, SOLUTION INTRAVENOUS; SUBCUTANEOUS
Status: DISCONTINUED | OUTPATIENT
Start: 2018-02-07 | End: 2018-02-23 | Stop reason: HOSPADM

## 2018-02-07 RX ORDER — ALBUMIN HUMAN 50 G/1000ML
25 SOLUTION INTRAVENOUS ONCE
Status: COMPLETED | OUTPATIENT
Start: 2018-02-07 | End: 2018-02-10

## 2018-02-07 RX ORDER — PHENYLEPHRINE HCL IN 0.9% NACL 30MG/250ML
10-100 PLASTIC BAG, INJECTION (ML) INTRAVENOUS
Status: DISCONTINUED | OUTPATIENT
Start: 2018-02-07 | End: 2018-02-08

## 2018-02-07 RX ADMIN — MELATONIN 3 MG ORAL TABLET 9 MG: 3 TABLET ORAL at 21:35

## 2018-02-07 RX ADMIN — Medication 10 ML: at 06:12

## 2018-02-07 RX ADMIN — ENOXAPARIN SODIUM 80 MG: 40 INJECTION SUBCUTANEOUS at 09:41

## 2018-02-07 RX ADMIN — NYSTATIN: 100000 POWDER TOPICAL at 17:22

## 2018-02-07 RX ADMIN — CYANOCOBALAMIN 1000 MCG: 1000 INJECTION, SOLUTION INTRAMUSCULAR; SUBCUTANEOUS at 09:41

## 2018-02-07 RX ADMIN — Medication 10 ML: at 14:31

## 2018-02-07 RX ADMIN — ALBUMIN (HUMAN) 25 G: 12.5 INJECTION, SOLUTION INTRAVENOUS at 08:39

## 2018-02-07 RX ADMIN — Medication 10 ML: at 21:36

## 2018-02-07 RX ADMIN — PHENYLEPHRINE HYDROCHLORIDE 10 MCG/MIN: 10 INJECTION, SOLUTION INTRAMUSCULAR; INTRAVENOUS; SUBCUTANEOUS at 00:39

## 2018-02-07 RX ADMIN — MUPIROCIN: 20 OINTMENT TOPICAL at 09:42

## 2018-02-07 RX ADMIN — SODIUM CHLORIDE, SODIUM LACTATE, POTASSIUM CHLORIDE, CALCIUM CHLORIDE, AND DEXTROSE MONOHYDRATE 50 ML/HR: 600; 310; 30; 20; 5 INJECTION, SOLUTION INTRAVENOUS at 14:29

## 2018-02-07 RX ADMIN — NYSTATIN: 100000 POWDER TOPICAL at 21:46

## 2018-02-07 RX ADMIN — AMIODARONE HYDROCHLORIDE 100 MG: 200 TABLET ORAL at 08:40

## 2018-02-07 RX ADMIN — NYSTATIN: 100000 POWDER TOPICAL at 09:42

## 2018-02-07 RX ADMIN — FAMOTIDINE 20 MG: 10 INJECTION, SOLUTION INTRAVENOUS at 08:40

## 2018-02-07 RX ADMIN — VANCOMYCIN HYDROCHLORIDE 1750 MG: 10 INJECTION, POWDER, LYOPHILIZED, FOR SOLUTION INTRAVENOUS at 22:53

## 2018-02-07 RX ADMIN — POLYETHYLENE GLYCOL 3350 17 G: 17 POWDER, FOR SOLUTION ORAL at 11:41

## 2018-02-07 NOTE — PROGRESS NOTES
0715-Bedside and Verbal shift change report given to Rafal Rivera RN (oncoming nurse) by James Ahumada RN (offgoing nurse). Report included the following information SBAR, Kardex, ED Summary, Procedure Summary, Intake/Output, MAR, Recent Results and Cardiac Rhythm A fib.     0800-Patient A/O. Able to take pills without issue crushed in apple sauce. Per Dr. Madie Barajas, D/C DoButamine and Jameel and give Albumin. 1000-SLP in to see patient. No changes made to diet. 1140-No BM since 2-1-18. Miralax ordered and given. 1200-Dr. eKnnedy at beside who verbalized that patient     1230-Patient passing gas and has requested bedpan multiple times with no BM. 1300-ID consulted per Dr. Terrance Scott. 1445-Dr. Snider at bedside. Aware the urine output is starting to decrease. Verbalized that she may modify ABX to protect kidneys if kidney numbers start to increase. 1700-Patient bathed, linen changed, right elbow skin tear dressing changed and wrapped with xeroform, gauze, and kerlex. 1830-Patient's dressing to right elbow dislodge while patient eating and blood noted to be on bed pad. Dressing changed and bed pad changed out.

## 2018-02-07 NOTE — CONSULTS
Palliative Medicine Consult  Oscar: [phone] ([phone])    Patient Name: Merary Souza  YOB: 1928    Date of Initial Consult: 2/6/18  Reason for Consult: care decisions  Requesting Provider: Dr. Silvina Gonzalez  Primary Care Physician: Garfield Rascon NP     SUMMARY:   Merary Souza is a 80 y.o. 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 Current medical issues leading to Palliative Medicine involvement include: sepsis/ bacteremia, encephalopathy, pancytopenia (improving w tx of sepsis), ARF on CRF (improving). 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 He lives with his wife of over 48 years, Iglesia White (her brother is Simon Barajasorest- retired family physician). They live on [address] [facility] in [facility]. They have two adult children who both live in [place] ([facility] and [facility])    PALLIATIVE DIAGNOSES:   1. Sepsis - bacteremia (staph/strep and enterococcus)  2. Pancytopenia, improving w tx of sepsis  3. ARF on CRF  4. Delirium, metabolic encephalopathy, agitated at times  5. Chronic constipation       PLAN:   1. Ongoing hypotension noted- required erna overnight - (He does have hx of chronic hypotension in outpatient setting)  . Currently asymptomatic. 2. Delirium resolving -- major improvement since yesterday. Patient currently working on a [employer] on his [facility]. 3. Will plan for our  to check in with his wife. 4.   5. Will follow peripherally. GOALS OF CARE / TREATMENT PREFERENCES:     GOALS OF CARE:  Patient/Health Care Proxy Stated Goals:  Other (comment)      TREATMENT PREFERENCES:   Code Status: Partial Code    Advance Care Planning:  Advance Care Planning 2/3/2018   Patient's Healthcare Decision Maker is: Legal Next of Kin   Primary Decision Maker Name Constance Garcia   Primary Decision Maker Phone Number [phone]   Primary Decision Maker Relationship to Patient Spouse   Confirm Advance Directive Yes, not on file       Medical Interventions: Other (comment)   Other Instructions: Other:    As far as possible, the palliative care team has discussed with patient / health care proxy about goals of care / treatment preferences for patient. HISTORY:     History obtained from: chart, wife    CHIEF COMPLAINT: admitted with weakness    HPI/SUBJECTIVE:    The patient is:   [] Verbal and participatory  [x] Non-participatory due to: delirium  (conversant but confused)    80year old male with debility from multiple medical issues as above. He was seen in [facility] ER, found to have bradycardia, hypothermia, hypotension. At baseline, wife describes him as active --  Up most of the day, works on his computer on a good day. He no longer does zoomba (no in last 2 years) but ambulation not difficult. No major changes recently. Clinic notes reviewed -- seen for LE swelling, recent cellulitis treated as outpatient. Prior to admission, was having some ongoing constipation, had large BM with some bright red bleeding.     Clinical Pain Assessment (nonverbal scale for severity on nonverbal patients):   Clinical Pain Assessment  Severity: 0          Duration: for how long has pt been experiencing pain (e.g., 2 days, 1 month, years)  Frequency: how often pain is an issue (e.g., several times per day, once every few days, constant)     FUNCTIONAL ASSESSMENT:     Palliative Performance Scale (PPS):  PPS: 30       PSYCHOSOCIAL/SPIRITUAL SCREENING:     Palliative IDT has assessed this patient for cultural preferences / practices and a referral made as appropriate to needs (Cultural Services, Patient Advocacy, Ethics, etc.)    Advance Care Planning:  Advance Care Planning 2/3/2018   Patient's Healthcare Decision Maker is: Legal Next of Corin Handy   Primary Decision Maker Name Jt Pickard   Primary Decision Maker Phone Number [phone]   Primary Decision Maker Relationship to Patient Spouse   Confirm Advance Directive Yes, not on file       Any spiritual / Latter-day concerns:  [] Yes /  [x] No    Caregiver Burnout:  [] Yes /  [x] No /  [] No Caregiver Present      Anticipatory grief assessment:   [x] Normal  / [] Maladaptive       ESAS Anxiety: Anxiety: 0    ESAS Depression: Depression: 0        REVIEW OF SYSTEMS:     Positive and pertinent negative findings in ROS are noted above in HPI. The following systems were [x] reviewed / [] unable to be reviewed as noted in HPI  Other findings are noted below. Systems: constitutional, ears/nose/mouth/throat, respiratory, gastrointestinal, genitourinary, musculoskeletal, integumentary, neurologic, psychiatric, endocrine. Positive findings noted below. Modified ESAS Completed by: provider   Fatigue: 0 Drowsiness: 0   Depression: 0 Pain: 0   Anxiety: 0 Nausea: 0   Anorexia: 0 Dyspnea: 0     Constipation: Yes              PHYSICAL EXAM:     From RN flowsheet:  Wt Readings from Last 3 Encounters:   02/02/18 88 kg (194 lb 0.1 oz)   02/02/18 88 kg (194 lb)   01/09/18 90.3 kg (199 lb)     Blood pressure 96/63, pulse 85, temperature 96 °F (35.6 °C), resp. rate 19, height 5' 9\" (1.753 m), weight 88 kg (194 lb 0.1 oz), SpO2 92 %. Pain Scale 1: Numeric (0 - 10)  Pain Intensity 1: 0     Pain Location 1: Neck        Pain Intervention(s) 1: Repositioned  Last bowel movement, if known:     Constitutional: Pt is awake, sitting up in bed working on his ipad  He is more clear, speech improved  Engaging in conversation, remembers meeting me yesterday and able to give recent history. Attention is much improved, agitation resolved.        HISTORY:     Active Problems:    Atrial fibrillation ([address] (9/21/2017)      Hypothermia (2/2/2018) Sepsis ([facility] (2/2/2018)      Past Medical History:   Diagnosis Date    Atrial fibrillation with RVR (HCC)     Dr Patrick Vasquez - cardiologist    CAD (coronary artery disease)     Cardiomyopathy ([facility]     Diverticula of colon     Heart failure ([facility]     Hypercholesterolemia     Ill-defined condition     psorosis    Malignant neoplasm of prostate ([facility]     prostrate removed    Parotid tumor 06/13/2017    Surgery, XRT; 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   Social History   Substance Use Topics    Smoking status: Former Smoker    Smokeless tobacco: Never Used    Alcohol use 4.2 oz/week     7 Standard drinks or equivalent per week      Comment: \"1 drink a night\"     Allergies   Allergen Reactions    Ancef [Cefazolin] Unknown (comments)     \"drops my blood pressure\"    Neosporin [Benzalkonium Chloride] Unknown (comments)    Polysporin [Bacitracin-Polymyxin B] Other (comments)     \"WOUNDS DO NOT HEAL\"      Current Facility-Administered Medications   Medication Dose Route Frequency    polyethylene glycol (MIRALAX) packet 17 g  17 g Oral DAILY    PHENYLephrine (PF)(AUTUMN-SYNEPHRINE) 30 mg in 0.9% sodium chloride 250 mL infusion   mcg/min IntraVENous TITRATE    VANCOMYCIN TROUGH  1 Each Other ONCE    cyanocobalamin (VITAMIN B12) injection 1,000 mcg  1,000 mcg IntraMUSCular DAILY    haloperidol lactate (HALDOL) injection 5 mg  5 mg IntraVENous Q6H PRN    dextrose 5% lactated ringers infusion  50 mL/hr IntraVENous CONTINUOUS    enoxaparin (LOVENOX) injection 80 mg  80 mg SubCUTAneous Q24H    amiodarone (CORDARONE) tablet 100 mg  100 mg Oral DAILY    glucose chewable tablet 16 g  4 Tab Oral PRN    dextrose (D50W) injection syrg 12.5-25 g  12.5-25 g IntraVENous PRN    glucagon (GLUCAGEN) injection 1 mg  1 mg IntraMUSCular PRN    melatonin tablet 9 mg  9 mg Oral QHS    nystatin (MYCOSTATIN) 100,000 unit/gram powder   Topical TID    insulin lispro (HUMALOG) injection   SubCUTAneous Q6H    sodium chloride (NS) flush 5-10 mL  5-10 mL IntraVENous Q8H    sodium chloride (NS) flush 5-10 mL  5-10 mL IntraVENous PRN    vancomycin (VANCOCIN) 1750 mg in  ml infusion  1,750 mg IntraVENous Q24H    mupirocin (BACTROBAN) 2 % ointment   Both Nostrils BID          LAB AND IMAGING FINDINGS:     Lab Results   Component Value Date/Time    WBC 2.8 (L) 02/07/2018 03:53 AM    HGB 9.6 (L) 02/07/2018 03:53 AM    PLATELET 60 (L) 30/42/0161 03:53 AM     Lab Results   Component Value Date/Time    Sodium 142 02/07/2018 03:53 AM    Potassium 4.0 02/07/2018 03:53 AM    Chloride 110 (H) 02/07/2018 03:53 AM    CO2 25 02/07/2018 03:53 AM    BUN 30 (H) 02/07/2018 03:53 AM    Creatinine 1.60 (H) 02/07/2018 03:53 AM    Calcium 8.3 (L) 02/07/2018 03:53 AM    Magnesium 1.7 02/06/2018 05:23 AM    Phosphorus 2.7 02/06/2018 05:23 AM      Lab Results   Component Value Date/Time    AST (SGOT) 28 02/06/2018 05:23 AM    Alk.  phosphatase 91 02/06/2018 05:23 AM    Protein, total 6.1 (L) 02/06/2018 05:23 AM    Albumin 2.6 (L) 02/06/2018 05:23 AM    Globulin 3.5 02/06/2018 05:23 AM     Lab Results   Component Value Date/Time    INR 1.5 (H) 02/04/2018 04:18 AM    Prothrombin time 15.4 (H) 02/04/2018 04:18 AM    aPTT 42.5 (H) 02/04/2018 04:18 AM      No results found for: IRON, FE, TIBC, IBCT, PSAT, FERR   No results found for: PH, PCO2, PO2  No components found for: [place]   Lab Results   Component Value Date/Time    CK 69 02/02/2018 06:45 PM    CK - MB 9.9 (H) 02/02/2018 06:45 PM                Total time:   Counseling / coordination time, spent as noted above:   > 50% counseling / coordination?:     Prolonged service was provided for  []30 min   []75 min in face to face time in the presence of the patient, spent as noted above. Time Start:   Time End:   Note: this can only be billed with 85953 (initial) or 75341 (follow up). If multiple start / stop times, list each separately.

## 2018-02-07 NOTE — CONSULTS
Infectious Disease Consult Note    Reason for Consult: Polymicrobial bacteremia   Date of Consultation: February 7, 2018  Date of Admission: 2/2/2018  Referring Physician: Jose Manuel Basilio       HPI:  Mr Iman Lovelace  is a 80y.o. 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 Appears from notes that he had \"syncope\" the day before presentation there and had heart rhytym that was alternating between  \"junctional bradycardia \" and rate controlled Afib. He was transferred to [address] [place], [place] [address]as [facility] and on presentation found to be septic with hypothermia. Was started on pressors as well and inititated on Meropenem, Levofloxacin and Vancomycin. Blood cultures sent 2/2 returned + for polymicrobial organisms including MSSA, Strep salivarus and ampicillin sensitive E faecalis. He is also noted to be leukopenic without neutropenia, thrombocytopenia and macrocytic anemia  and seen by Hematology/oncology this admission. Renal function is imporving. He has had imaging of CT head that showed no acute findings. US renal done with 2.2 cm L renal cyst noted. CXR has a small R pleural effusion. He is currently on Vancomycin with last trough on 2/4/18 at 19.6. TTE has shown AV and MV thickening. From discussion with him, he says that he has no specific symptoms. Denied any known hardware in his body. Denied falls at home. Says he was supposed to take care of his teeth when asked but got admitted. Denied pain. I called and spoke to his wife to obtain history as well. She reports that about 2 weeks ago, he went to oral surgeon for poor dentition and broken/chipping off teeth. At that visit, no dental procedure was done per wife.  Plan was to follow up in a few weeks for teeth extraction under anesthesia. She reports that he told her about 2 weeks ago that he was constipated and had difficulty with bowel movements. He strained to have bowel movements and had bloody stools at that time. From review of chart and  Medical records, he saw his PCP 2018 with dyspnea and notes indicate volume overload. At that time, notes do not indicate concern for pneumonia. Notes this admission, indicate concerns for bleeding around central line and BRBPR as well. He was transfused this admission. I am consulted today regarding further recommendations. Past Medical History:  Past Medical History:   Diagnosis Date    Atrial fibrillation with RVR ([facility]     Dr Radha Oswald - cardiologist    CAD (coronary artery disease)     Cardiomyopathy ([facility]     Diverticula of colon     Heart failure ([facility]     Hypercholesterolemia     Ill-defined condition     psorosis    Malignant neoplasm of prostate (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    Psoriasis     lower arms, legs (above knees)         Surgical History:  Past Surgical History:   Procedure Laterality Date    HX CATARACT REMOVAL Bilateral     HX COLONOSCOPY      HX PROSTATECTOMY      HX TONSILLECTOMY  as a child         Family History:   Family History   Problem Relation Age of Onset    Cancer Mother      BRAIN TUMOR    Cancer Brother      PROSTATE         Social History:     · Living Situation: lives with his wife   · Tobacco:denied  · Alcohol:denied  · Illicit Drugs:denied   · Sexual History:    · Travel History: denied   · Exposures:   · Outdoor/Hiking: denied  · Animal/Pet: had a dog that    · Construction: denied  · Hot Tub: denied   · Brackish/stagnant exposure: denied  · TB exposure: denied  · Sick Contacts: denied     Allergies:   Allergies   Allergen Reactions    Ancef [Cefazolin] Unknown (comments)     \"drops my blood pressure\"    Neosporin [Benzalkonium Chloride] Unknown (comments)    Polysporin [Bacitracin-Polymyxin B] Other (comments)     \"WOUNDS DO NOT HEAL\"         Review of Systems:     Gen: Negative for chills, fevers, weight loss, weight gain   HEENT: Negative for headache, vision changes, ear ache or discharge, tingling,  nasal discharge, swelling, lumps in neck, sores on tongue   CV:  Negative for chest pain, dyspnea on exertion, leg edema   Lungs: Negative for shortness of breath, cough, wheezing   Abdomen: Negative for abdominal pain, nausea, vomiting, diarrhea, constipation   Genitourinary: Negative for genital pain or genital discharge     Neuro: Negative for headache, numbness, tingling, extremity weakness,  syncope, seizures    Skin: Negative for rash, sores/open wounds   Musculoskeletal: Negative for joint pain, joint swelling, joint erythema    Endocrine: Negative for high or low blood sugars, heat or cold intolerance    Psych: Negative for manic behavior     Medications:    Current Facility-Administered Medications:     polyethylene glycol (MIRALAX) packet 17 g, 17 g, Oral, DAILY, Rachel Peterson MD, 17 g at 02/07/18 1141    PHENYLephrine (PF)(AUTUMN-SYNEPHRINE) 30 mg in 0.9% sodium chloride 250 mL infusion,  mcg/min, IntraVENous, TITRATE, Andrea Aviles MD, Last Rate: 5 mL/hr at 02/07/18 1431, 10 mcg/min at 02/07/18 1431    VANCOMYCIN TROUGH, 1 Each, Other, ONCE, Rachel Peterson MD    cyanocobalamin (VITAMIN B12) injection 1,000 mcg, 1,000 mcg, IntraMUSCular, DAILY, Maxi Sam MD, 1,000 mcg at 02/07/18 0941    haloperidol lactate (HALDOL) injection 5 mg, 5 mg, IntraVENous, Q6H PRN, Rachel Peterson MD, 5 mg at 02/06/18 0522    dextrose 5% lactated ringers infusion, 50 mL/hr, IntraVENous, CONTINUOUS, Andrea Aviles MD, Last Rate: 50 mL/hr at 02/07/18 1429, 50 mL/hr at 02/07/18 1429    enoxaparin (LOVENOX) injection 80 mg, 80 mg, SubCUTAneous, Q24H, Rachel Peterson MD, 80 mg at 02/07/18 0941   amiodarone (CORDARONE) tablet 100 mg, 100 mg, Oral, DAILY, Andressa Coleman MD, 100 mg at 02/07/18 0840    glucose chewable tablet 16 g, 4 Tab, Oral, PRN, Vannesa Aponte MD    dextrose (D50W) injection syrg 12.5-25 g, 12.5-25 g, IntraVENous, PRN, Vannesa Aponte MD    glucagon [facility]) injection 1 mg, 1 mg, IntraMUSCular, PRN, Vannesa Aponte MD    melatonin tablet 9 mg, 9 mg, Oral, QHS, Carlotta Varghese MD, 9 mg at 02/06/18 2138    nystatin (MYCOSTATIN) 100,000 unit/gram powder, , Topical, TID, Vannesa Aponte MD    insulin lispro (HUMALOG) injection, , SubCUTAneous, Q6H, Carlotta Varghese MD, Stopped at 02/04/18 0600    sodium chloride (NS) flush 5-10 mL, 5-10 mL, IntraVENous, Marcelino Patel MD, 10 mL at 02/07/18 1431    sodium chloride (NS) flush 5-10 mL, 5-10 mL, IntraVENous, PRN, Aparna Morataya MD, 10 mL at 02/04/18 2036    vancomycin (VANCOCIN) 1750 mg in  ml infusion, 1,750 mg, IntraVENous, Q24H, Lianne Pearson MD, Last Rate: 250 mL/hr at 02/06/18 2324, 1,750 mg at 02/06/18 2324    mupirocin (BACTROBAN) 2 % ointment, , Both Nostrils, BID, Nj Edwards MD        Physical Exam:    Vitals: Patient Vitals for the past 24 hrs:   Temp Pulse Resp BP SpO2   02/07/18 0900 - 85 19 96/63 -   02/07/18 0800 96 °F (35.6 °C) 88 17 114/60 -   02/07/18 0700 - 92 17 106/63 92 %   02/07/18 0600 - 87 16 93/55 -   02/07/18 0500 - 98 25 (!) 65/51 -   02/07/18 0400 95.7 °F (35.4 °C) 81 15 98/56 95 %   02/07/18 0300 - 83 16 107/63 94 %   02/07/18 0200 - 85 14 109/61 93 %   02/07/18 0100 - 84 17 (!) 76/58 94 %   02/07/18 0004 - 76 - (!) 80/43 -   02/07/18 0001 95.8 °F (35.4 °C) 76 15 (!) 79/41 95 %   02/06/18 2300 - 75 12 (!) 116/98 95 %   02/06/18 2200 - 74 13 100/60 95 %   02/06/18 2100 - 79 14 90/57 95 %   02/06/18 2009 96.8 °F (36 °C) 79 18 96/54 95 %   02/06/18 1800 - 77 15 93/48 92 %   02/06/18 1700 - 78 20 (!) 85/46 94 %   02/06/18 1600 97.5 °F (36.4 °C) 86 20 97/58 93 % 02/06/18 1500 - 91 18 92/56 92 %   ·   · GEN: NAD  · HEENT: + eye dressing and facial asymetry,  no scleral icterus L eye,   no cervical lymphadenopathy, no sinus tenderness, no thrush, very poor dentition , + L neck central line   · CV: S1, S2 heard with RADHA  · Lungs: Clear to auscultation bilaterally anteriorly   · Abdomen: soft, non distended, non tender  · Genitourinary: + no vargas  · Extremities: + edema   · Neuro: Alert, oriented to self situation, moves all extremities to commands, verbal   · Skin: no rash  · Psych: good affect,non tearful   · Lines: L neck line       Labs:   Recent Results (from the past 24 hour(s))   GLUCOSE, POC    Collection Time: 02/06/18  5:03 PM   Result Value Ref Range    Glucose (POC) 93 65 - 100 mg/dL    Performed by Rosalina Redd    GLUCOSE, POC    Collection Time: 02/06/18 11:39 PM   Result Value Ref Range    Glucose (POC) 96 65 - 100 mg/dL    Performed by [address] [place] [address], BASIC    Collection Time: 02/07/18  3:53 AM   Result Value Ref Range    Sodium 142 136 - 145 mmol/L    Potassium 4.0 3.5 - 5.1 mmol/L    Chloride 110 (H) 97 - 108 mmol/L    CO2 25 21 - 32 mmol/L    Anion gap 7 5 - 15 mmol/L    Glucose 89 65 - 100 mg/dL    BUN 30 (H) 6 - 20 MG/DL    Creatinine 1.60 (H) 0.70 - 1.30 MG/DL    BUN/Creatinine ratio 19 12 - 20      GFR est AA 50 (L) >60 ml/min/1.73m2    GFR est non-AA 41 (L) >60 ml/min/1.73m2    Calcium 8.3 (L) 8.5 - 10.1 MG/DL   CBC W/O DIFF    Collection Time: 02/07/18  3:53 AM   Result Value Ref Range    WBC 2.8 (L) 4.1 - 11.1 K/uL    RBC 2.82 (L) 4.10 - 5.70 M/uL    HGB 9.6 (L) 12.1 - 17.0 g/dL    HCT 30.0 (L) 36.6 - 50.3 %    .4 (H) 80.0 - 99.0 FL    MCH 34.0 26.0 - 34.0 PG    MCHC 32.0 30.0 - 36.5 g/dL    RDW 16.5 (H) 11.5 - 14.5 %    PLATELET 60 (L) 667 - 400 K/uL    MPV 11.8 8.9 - 12.9 FL    NRBC 0.0 0  WBC    ABSOLUTE NRBC 0.00 0.00 - 0.01 K/uL   GLUCOSE, POC    Collection Time: 02/07/18  6:11 AM   Result Value Ref Range Glucose (POC) 86 65 - 100 mg/dL    Performed by Radha Montgomery POC    Collection Time: 02/07/18 11:42 AM   Result Value Ref Range    Glucose (POC) 135 (H) 65 - 100 mg/dL    Performed by [facility]        Microbiology Data:    UA 2/2/18 0-4 WBC, negative bacteria, negative leukocyte esterase and negative nitrite        Blood: 2/2/18  STREPTOCOCCUS SALIVARIUS GROWING IN BOTH BOTTLES DRAWN (SITES = RFA) (SENSITIVITIES PERFORMED BY E-TEST) (A)      Culture result:    Final     STAPHYLOCOCCUS AUREUS ALSO GROWING IN THE 1ST OF 2 BOTTLES DRAWN (SITE = R FA) (A)     Culture result:    Final     ENTEROCOCCUS FAECALIS GROUP D ALSO GROWING IN THE 1ST OUT OF 2 BOTTLES DRAWN.  (SITE = RFA) GENTAMICIN SYNERGY SCREEN IS SENSITIVE @ < OR = 500 mcg/ml STREPTOMYCIN SYNERGY SCREEN IS SENSITIVE @ < OR =1000 mcg/ml (A)       Culture & Susceptibility        Antibiotic   Organism Organism Organism       Enterococcus faecalis group D Staphylococcus aureus Streptococcus salivarius     AMPICILLIN ($)   <=2   ug/mL   S Final         AMPICILLIN/SULBACTAM ($)     <=8/4   ug/mL   S Final       CEFAZOLIN ($)     <=4   ug/mL   S Final       CEFTRIAXONE ($)       0.023   ug/mL   S Final     CIPROFLOXACIN ($)     <=1   ug/mL   S Final       CLINDAMYCIN ($)     <=0.5   ug/mL   S Final       DAPTOMYCIN ($$$$$)   2   ug/mL   S Final  1   ug/mL   S Final       ERYTHROMYCIN ($$$$)     <=0.5   ug/mL   S Final  0.125   ug/mL   S Final     GENTAMICIN ($)     <=4   ug/mL   S Final       LEVOFLOXACIN ($)       1.5   ug/mL   S Final     LINEZOLID ($$$$$)   2   ug/mL   S Final  4   ug/mL   S Final       OXACILLIN     <=0.25   ug/mL   S Final       PENICILLIN G ($$)   2   ug/mL   S Final    0.023   ug/mL   S Final     RIFAMPIN ($$$$)     <=1   ug/mL   S Final       TETRACYCLINE     <=4   ug/mL   S Final       TRIMETH-SULFAMETHOXA     <=0.5/9.5   ug/mL   S Final       VANCOMYCIN ($)   2   ug/mL   S Final  2   ug/mL   S Final  0.50   ug/mL   S Final                    Component Value Ref Range & Units Status     Special Requests: NO SPECIAL REQUESTS   Preliminary     Culture result: NO GROWTH 1 DAY   Preliminary         Pathology Results:     FINAL PATHOLOGIC DIAGNOSIS   Right parotid gland, total parotidectomy with partial neck dissection:   Squamous cell carcinoma extending to the superior and deep margins   One intraparenchymal lymph node is involved via direct extension   Fourteen lymph nodes negative for metastatic carcinoma (0/14)   See comment   MAJOR [employer]IVARY GLANDS     Imaging:   CT Head without contrast 2/2/18     FINDINGS:  The ventricles and sulci are enlarged in a generalized fashion consistent with  volume loss. Is minimal decreased attenuation in the periventricular white  matter which is nonspecific but consistent with small vessel disease. There is  no intracranial hemorrhage. There is no extra-axial collection, mass, mass  effect or midline shift. The basilar cisterns are open. No acute infarct is  identified. The bone windows demonstrate no abnormalities. The visualized  portions of the paranasal sinuses and mastoid air cells are clear.     IMPRESSION  IMPRESSION: No acute intracranial abnormality. Age-related volume loss and  microvascular disease unchanged.        CXR 2/7/18  FINDINGS: Portable chest shows support lines/devices appear unchanged since  February 4. There is no apparent pneumothorax. Lungs show improved pulmonary  edema with persistent bibasilar haziness favoring effusions and atelectasis. Heart size is large, stable. There is no midline shift.     IMPRESSION  IMPRESSION: Improved pulmonary edema. Persistent pleural effusions and  Cardiomegaly. Renal US 2/3/18  FINDINGS: The right kidney measures 9.6 cm in length. The left kidney measures  10.5 cm in length. Both kidneys demonstrate normal cortical echogenicity. No  renal calculus is seen. There is no hydronephrosis.  There is a 2.2 cm left renal  cyst. The aorta and iliac arteries are not visualized due to body habitus. Visualized portions of the IVC are normal. The bladder is decompressed by Ivy  catheter.     IMPRESSION  IMPRESSION:   No acute genitourinary pathology. Procedures:   L neck central line insertion       Assessment / Plan:     Mr Susie Hackett  is a 80y.o. 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 Appears from notes that he had \"syncope\" the day before presentation there and had heart rhytym that was alternating between  \"junctional bradycardia \" and rate controlled Afib. He was transferred to [facility] and on presentation found to be septic with hypothermia. Was started on pressors as well and inititated on Meropenem, Levofloxacin and Vancomycin. Blood cultures sent 2/2 returned + for polymicrobial organisms including MSSA, Strep salivarus and ampicillin sensitive E faecalis. He is also noted to be leukopenic without neutropenia, thrombocytopenia and macrocytic anemia  and seen by Hematology/oncology this admission. Renal function is imporving. He has had imaging of CT head that showed no acute findings. US renal done with 2.2 cm L renal cyst noted. CXR has a small R pleural effusion. He is currently on Vancomycin with last trough on 2/4/18 at 19.6. TTE has shown AV and MV thickening. From discussion with him, he says that he has no specific symptoms. Denied any known hardware in his body. Denied falls at home. Says he was supposed to take care of his teeth when asked but got admitted. Denied pain. From review of chart and  Medical records, he saw his PCP 1/2018 with dyspnea and notes indicate volume overload.  At that time, notes do not indicate concern for pneumonia. Notes this admission, indicate concerns for bleeding around central line and BRBPR as well. He was transfused this admission. I am consulted today regarding further recommendations. 1) Polymicrobial bacteremia ( MSSA, Streptococcus salivarius and E faecalis)  Possible source: GI/oral in origin silviano given hx of constipation wt straining and bloody bowel movements , skin entry as well given thin skin with multiple bruising       Recommendations:   Await 2/6/18 blood cultures   No interim cultures from 2/2-2/6 to see if he cleared bacteremia quickly  He does not have known prosthesis  but the 3 organisms found in his blood are all virulent organisms that can cause endocarditis   TTE showed thick AV and MV   Low threshold for MARIA if can be done safely given the polymicrobial bacteremia as above . At the minimum would need 2 weeks of IV antibiotics once bacteremia cleared but may need more based on findings . The duration of antibiotics would be markedly different 2 versus 6 weeks if endocarditis found  Would also suggest CT Abdomen for occult infectious nidus  When he is stable to get CT A/P. However study may be limited due to lack of contrast with renal issues at this time   Has effusions on CXR and if he is worse, may need further evaluation for this as well but suspect effusions related to volume overload status at this time   Agree with IV Vancomcyin for now with trough goal of 15-20 as renal function is improving given issues wt Beta lactam in past   Monitor renal function closely   Vancomycin levels and dosing per pharmacy   Side effects of renal, GI and risk for CDI explained   Will need to monitor cell counts closely as Vancomycin can make this worse     2)   Allergies:  Noted Cefazolin allergy. Question if true allergy as he recalls his \"vision went all black and white from color\" when he received Cefazolin.  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 Please contact with questions or concerns.      Tali Rodriguez DO  3:48 PM

## 2018-02-07 NOTE — PROGRESS NOTES
[facility]  Pulmonary, Critical Care, and Sleep Medicine    Name: Terrell Rahman MRN: [id number]   : 1928 Hospital: [facility]   Date: 2018          IMPRESSION:   · Encephalopathy   · Severe Sepsis. · Pancytopenia   · Acute Bacteremia, + blood Cx noted. · Acute on Chronic Renal Insufficiency   · Parathyroid mass. S/p resection per Dr. Madison Gu. · Hypoglycemia   · A fib  · CHF   · Coagulopathy   · Poor oral dentition. · Hx of prostate Ca. · Psoriasis      RECOMMENDATIONS:   · Off precedex  · MS improved  · IV abx. · Wean off pressors today  · Glycemic control   · Renal fx same  · PO intake as able  · Partial code? ?  · Palliative care following     Subjective/History:     Placed on autumn last night because of low BP    Today weak and debilitated, less confused      Current Facility-Administered Medications   Medication Dose Route Frequency    PHENYLephrine (PF)(AUTUMN-SYNEPHRINE) 30 mg in 0.9% sodium chloride 250 mL infusion   mcg/min IntraVENous TITRATE    dexmedeTOMidine (PRECEDEX) 400 mcg in 0.9% sodium chloride 100 mL infusion  0.2-1.4 mcg/kg/hr IntraVENous TITRATE    cyanocobalamin (VITAMIN B12) injection 1,000 mcg  1,000 mcg IntraMUSCular DAILY    dextrose 5% lactated ringers infusion  75 mL/hr IntraVENous CONTINUOUS    enoxaparin (LOVENOX) injection 80 mg  80 mg SubCUTAneous Q24H    DOBUTamine (DOBUTREX) 1,000 mg/250 mL (4,000 mcg/mL) infusion  5 mcg/kg/min IntraVENous TITRATE    amiodarone (CORDARONE) tablet 100 mg  100 mg Oral DAILY    chlorhexidine (PERIDEX) 0.12 % mouthwash 15 mL  15 mL Oral Q12H    melatonin tablet 9 mg  9 mg Oral QHS    nystatin (MYCOSTATIN) 100,000 unit/gram powder   Topical TID    famotidine (PF) (PEPCID) 20 mg in sodium chloride 0.9 % 10 mL injection  20 mg IntraVENous DAILY    insulin lispro (HUMALOG) injection   SubCUTAneous Q6H    sodium chloride (NS) flush 5-10 mL  5-10 mL IntraVENous Q8H    levoFLOXacin (LEVAQUIN) 750 mg in D5W IVPB  750 mg IntraVENous Q48H    vancomycin (VANCOCIN) 1750 mg in  ml infusion  1,750 mg IntraVENous Q24H    mupirocin (BACTROBAN) 2 % ointment   Both Nostrils BID          Review of Systems:  Review of systems not obtained due to patient factors. Objective:   Vital Signs:    Visit Vitals    /63    Pulse 92    Temp 95.7 °F (35.4 °C)    Resp 17    Ht 5' 9\" (1.753 m)    Wt 88 kg (194 lb 0.1 oz)    SpO2 92%    BMI 28.65 kg/m2       O2 Device: Room air   O2 Flow Rate (L/min): 2 l/min   Temp (24hrs), Av.1 °F (36.2 °C), Min:95.7 °F (35.4 °C), Max:99.4 °F (37.4 °C)       Intake/Output:   Last shift:         Last 3 shifts:  1901 -  0700  In: 4613.4 [I.V.:4613.4]  Out: 0091 [Urine:2585]    Intake/Output Summary (Last 24 hours) at 18 0742  Last data filed at 18 0700   Gross per 24 hour   Intake          2833.19 ml   Output             1090 ml   Net          1743.19 ml   Physical Exam:    General:  awake, weak, debilitated   Head:  Normocephalic, without obvious abnormality, atraumatic. Eyes:  Right eye patched   Nose: Nares normal. Septum midline. No drainage    Throat: Lips, mucosa, and tongue dry. POOR Teeth    Neck: Supple, symmetrical, trachea midline,  no JVD. Back:      Lungs: No wheeze. Chest wall:  No deformity. Heart:  Regular rate and rhythm. Abdomen:   Non distended. Extremities: Extremities no cyanosis or edema. Pulses: present   Skin: Skin color, texture, turgor normal. Has areas of bruising, has psoriasis changes. Bandage to his LUE. Lymph nodes: Cervical, supraclavicular, and axillary nodes normal.   Neurologic: Grossly nonfocal, moving all extremities.          Data:     Recent Results (from the past 24 hour(s))   GLUCOSE, POC    Collection Time: 18 11:35 AM   Result Value Ref Range    Glucose (POC) 92 65 - 100 mg/dL    Performed by [address] [place] [address], POC    Collection Time: 18  5:03 PM   Result Value Ref Range    Glucose (POC) 93 65 - 100 mg/dL    Performed by Arjose elias Fraga    GLUCOSE, POC    Collection Time: 02/06/18 11:39 PM   Result Value Ref Range    Glucose (POC) 96 65 - 100 mg/dL    Performed by [address] [place] [address], [facility]    Collection Time: 02/07/18  3:53 AM   Result Value Ref Range    Sodium 142 136 - 145 mmol/L    Potassium 4.0 3.5 - 5.1 mmol/L    Chloride 110 (H) 97 - 108 mmol/L    CO2 25 21 - 32 mmol/L    Anion gap 7 5 - 15 mmol/L    Glucose 89 65 - 100 mg/dL    BUN 30 (H) 6 - 20 MG/DL    Creatinine 1.60 (H) 0.70 - 1.30 MG/DL    BUN/Creatinine ratio 19 12 - 20      GFR est AA 50 (L) >60 ml/min/1.73m2    GFR est non-AA 41 (L) >60 ml/min/1.73m2    Calcium 8.3 (L) 8.5 - 10.1 MG/DL   CBC W/O DIFF    Collection Time: 02/07/18  3:53 AM   Result Value Ref Range    WBC 2.8 (L) 4.1 - 11.1 K/uL    RBC 2.82 (L) 4.10 - 5.70 M/uL    HGB 9.6 (L) 12.1 - 17.0 g/dL    HCT 30.0 (L) 36.6 - 50.3 %    .4 (H) 80.0 - 99.0 FL    MCH 34.0 26.0 - 34.0 PG    MCHC 32.0 30.0 - 36.5 g/dL    RDW 16.5 (H) 11.5 - 14.5 %    PLATELET 60 (L) 168 - 400 K/uL    MPV 11.8 8.9 - 12.9 FL    NRBC 0.0 0  WBC    ABSOLUTE NRBC 0.00 0.00 - 0.01 K/uL   GLUCOSE, POC    Collection Time: 02/07/18  6:11 AM   Result Value Ref Range    Glucose (POC) 86 65 - 100 mg/dL    Performed by Aditi Goodrich              Telemetry:AFIB    Imaging:  I have personally reviewed the patients radiographs and have reviewed the reports:  CXR: edema improved, bilateral pleural effusions/atx same          Total critical care time exclusive of procedures:  minutes  Hilton Hutson MD

## 2018-02-07 NOTE — PROGRESS NOTES
Palliative Medicine  [facility]: [phone])  [facility]: [phone] ([phone])      Resuscitation Status: Partial Code   Durable DNR addressed? [] Yes   [x] No    [] Not Applicable    Advance Care Planning 2/3/2018   Patient's Healthcare Decision Maker is: Legal Next of Corin 69   Primary Decision Maker Name Jt Pickard   Primary Decision Maker Phone Number [phone]   Primary Decision Maker Relationship to Patient Spouse   Confirm Advance Directive Yes, not on file     Patient is alert, oriented, able to converse and was on his ipad, playing Viddyad. He was verbal, shared a little about himself and his family,some mild confusion noted today or perhaps he did not know plans for his wife. He told us she was staying at a hotel in town and may be coming in this afternoon. Wife is actually at home and won't be in until tomorrow. No family in room, his daughter was in earlier. Introduced selves as palliative team (Dr. Kushal Barajas and this LCSW). Patient doing much better cognitively today as compared to yesterday. Phone call made to wife Jt Pickard, who did not have any needs. She noted that she was told that patient would go to rehab before he comes home and she is feels this is a good plan. If patient and she need to hire help, they likely have the resources to do so. Cherie Cha noted she would be back tomorrow and LCSW noted that perhaps I would meet her tomorrow. She did remember meeting Dr. June Blackwell yesterday.

## 2018-02-07 NOTE — PROGRESS NOTES
Problem: Dysphagia (Adult)  Goal: *Acute Goals and Plan of Care (Insert Text)  Speech pathology goals:  1) Patient will tolerate mechanical soft/thin liquid diet without overt s/s of aspiration within 7 days. MET     Speech language pathology dysphagia treatment/discharge  Patient: Kaur Mazariegos (4 y.o. male)  Date: 2/7/2018  Diagnosis: Hypothermia  Sepsis ([facility] [address]  Atrial fibrillation ([facility] [address] <principal problem not specified>       Precautions:       ASSESSMENT:  Patient with a baseline oropharyngeal swallow. Patient with mild-moderate oral dysphagia due partially to severe right facial droop. Patient with prolonged mastication, delayed posterior propulsion, and mild right pocketing that patient was aware of and cleared. Patient also with mild pharyngeal dysphagia characterized by delayed swallow initiation and decreased hyolaryngeal elevation/excursion. No overt s/s aspiration observed. Patient independently demonstrated or verbalized compensatory strategies, including lingual/finger sweep and presenting food items on left side. Progression toward goals:  [x]           Improving appropriately and progressing toward goals  []           Improving slowly and progressing toward goals  []           Not making progress toward goals and plan of care will be adjusted     PLAN:  --Mechanical soft/thin liquid diet  --Straws ok  --Meds per patient preference  --Continue compensatory strategies as you are  Patient will be discharged from speech therapy at this time. Rationale for discharge:  [x]      Goals Achieved  []      [address]  []      Patient not participating in therapy  []      Other:  Discharge Recommendations:  None     SUBJECTIVE:   Patient stated Oh thank you!     OBJECTIVE:   Cognitive and Communication Status:  Neurologic State: Alert  Orientation Level: Oriented to person  Cognition: Decreased attention/concentration, Follows commands    Perception: Appears intact    Perseveration: No perseveration noted    Safety/Judgement: Not assessed  Dysphagia Treatment:  Oral Assessment:  Oral Assessment  Labial: Right droop (severe)  P.O. Trials:  Patient Position: upright in bed  Vocal quality prior to P.O.: No impairment; Other (comment) (dysarthric)  Consistency Presented: Puree; Thin liquid; Solid  How Presented: Self-fed/presented;Spoon;Straw     Bolus Acceptance: No impairment  Bolus Formation/Control: Impaired  Type of Impairment: Delayed;Mastication  Propulsion: Delayed (# of seconds)  Oral Residue: Right;Pocketing;Less than 10% of bolus (patient aware; independent use of strategies)  Initiation of Swallow: Delayed (# of seconds)  Laryngeal Elevation: Decreased  Aspiration Signs/Symptoms: None  Pharyngeal Phase Characteristics: No impairment, issues, or problems   Effective Modifications: Alternate liquids/solids; Other (comment) (lingual sweep; present on right)  Cues for Modifications: None          Using the NOMS, the patient was determined to be at level 5 for swallow function     NOMS Swallowing Levels:  Level 1 (CN): NPO  Level 2 (CM): NPO but takes consistency in therapy  Level 3 (CL): Takes less than 50% of nutrition p.o. and continues with nonoral feedings; and/or safe with mod cues; and/or max diet restriction  Level 4 (CK): Safe swallow but needs mod cues; and/or mod diet restriction; and/or still requires some nonoral feeding/supplements  Level 5 (CJ): Safe swallow with min diet restriction; and/or needs min cues  Level 6 (CI): Independent with p.o.; rare cues; usually self cues; may need to avoid some foods or needs extra time  Level 7 ([address] [place] [address]): Independent for all p.o.  ROSENDA. (2003). National Outcomes Measurement System (NOMS): Adult Speech-Language Pathology User's Guide.        Pain:  Pain Scale 1: Numeric (0 - 10)  Pain Intensity 1: 0     After treatment:   []                Patient left in no apparent distress sitting up in chair  [x]                Patient left in no apparent distress in bed  [x] Call bell left within reach  [x]                Nursing notified  [x]                Caregiver present  []                Bed alarm activated    COMMUNICATION/EDUCATION:   The patients plan of care including recommendations, planned interventions, and recommended diet changes were discussed with: Registered Nurse.     FANTASMA Jo  Time Calculation: 15 mins

## 2018-02-07 NOTE — PROGRESS NOTES
1910 -- Bedside and Verbal shift change report given to Kaylin Simms. (oncoming nurse) by Rosalinda Montalvo (offgoing nurse). Report included the following information SBAR, Kardex, Intake/Output, MAR, Recent Results and Cardiac Rhythm A-Fib w/ PVC. 2000 -- Assessment completed. See chart for details. VSS [Dobutamine gtt @ 6 mcg/kg/min]. A&Ox4. Pleasant and calm. [Precedex gtt @ 0.2 mcg/kg/hr]. Sitter at bedside. A-fib with PVC's on monitor. Lung sounds diminished and coarse in bilateral bases on room air with SpO2 94%. Abdomen soft and intact. Ivy catheter in place with adequate urine output. Musculoskeletal weakness x 4. Erythema and redness to groin and scrotum with edema that is painful. Nystatin to groin and scrotum. Scattered bruising and scabs noted to extremities. Mepilex to sacrum without issue to that area. Will continue to monitor patient and continue with plan of care. 0000 -- Reassessment completed. No change to previous assessment. Patient hypotensive at this time [79/41]. BP retaken/repositioned and changed. Patient asymptomatic and denies discomfort. 0015 -- Paged VCS for hypotension and dobutamine increased to 7 mcg/kg/min.   0022 -- Spoke with Dr. Vera Gil and received orders to stop Precedex; Decrease Dobutamine back to 6 mcg/kg/min; Start Jameel-synephrine at 10 mcg/min to maintain SBP . 90. Will continue to monitor patient and BP.  0100 -- Patient BP now with SBP > 90. Patient comfortable in bed without complaints of discomfort. 0400 -- Reassessment completed. No change to previous assessment. Precedex remains off. Patient calm and cooperative. D5 LR infusion @ 75 mL/hr; Dobutamine infusion @ 6 mcg/kg/min; Jameel-Synephrine @ 10 mcg/min with SBP > 90. Will continue to monitor BP and patient. Repositioned in bed.  0615 -- Skin tear to right elbow noticed when turning patient. Bleeding controlled with 4x4 and tape.   0720 -- Bedside and Verbal shift change report given to Madelin West  (oncoming nurse) by Bonita BORREGO (offgoing nurse). Report included the following information SBAR, Kardex, Intake/Output, MAR, Recent Results and Cardiac Rhythm A-Fib.

## 2018-02-07 NOTE — PROGRESS NOTES
Hospitalist Progress Note    NAME: Taylor Jacobson   :  1928   MRN:  [id number]       Assessment / Plan:  Septic Shock   Hypothermia (hypothermia resolved)  Bacteremia (GPC's on BCx)  Staph, Strep and Enterococcus bacteremia; suspect from cutaneous source as patient picks his psoriasis relentlessness at home (per wife's description)  Managed in ICU  Continue on Vancomycin  Continue Dobutamine  Levophed weakness off  F/u repeat blood cultures  Will ask ID input    Chronic Atrial Fibrillation with Junctional Bradycardia  Appreciate cardiology input  Amiodarone and BB now on hold  Continue to monitor    Acute on Chronic CHF  Acute Respiratory Failure with Hypoxia from Acute Pulmonary Edema (on CXR)  2D echo with EF 25 % to 30 %. On Dobutamine as unable to give IV diuresis due to shock  Holding home Xarelto  Continue O2, wean as tolerated  TSH wnl  Holding home Coreg and Xarelto    Hypernatremia  Hypoglycemia  IVF's per Intensivest  am cortisol wnl    Acute Kidney Injury with baseline CKD3  Cr improving  Continue to monitor lytes    Pancytopenia  S/p 2 units plts on  for acute bleeding from central line with plts improving from 45K to 111K and decreased to 88K today  Leukopenia resolved  Await Vitamin B12 253, will start daily B12 shots x3    Blood in Stool PTA  Likely from Xarelto and thrombocytopenia  On therapeutic lovenox per pulmonary    Metabolic Encephalopathy  Delirium with hallucinations  Possible related to sepsis  Also on precedex    Patient with history of Left Parotid Mass s/p resection approximately 1 year ago by Dr. Lisa Fraser and XRT, 2017 PET negative  Heme/Onc following    History of Prostate Cancer    Psoriasis    Body mass index is 28.65 kg/(m^2). Code status: Partial code , no Compressions or Defibrillation. She is okay with temporary pacing, ACLS meds (like atropine) and temporary intubation.  Palliative is following    Prophylaxis: SCD's and H2B  Recommended Disposition: TBD Subjective: Pt seen and examined at bedside. NAD. Overnight events d/w RN     Chief Complaint / Reason for Physician Visit: follow-up septic shock  Review of Systems:  Symptom Y/N Comments  Symptom Y/N Comments   Fever/Chills    Chest Pain     Poor Appetite    Edema     Cough    Abdominal Pain     Sputum    Joint Pain     SOB/GRADY    Pruritis/Rash     Nausea/vomit    Tolerating PT/OT     Diarrhea    Tolerating Diet     Constipation    Other       Limited due to  Poor insight     Objective:     VITALS:   Last 24hrs VS reviewed since prior progress note. Most recent are:  Patient Vitals for the past 24 hrs:   Temp Pulse Resp BP SpO2   02/07/18 1600 - 81 21 109/80 -   02/07/18 1500 - 78 23 99/86 -   02/07/18 1400 - 84 23 99/71 -   02/07/18 1300 - 78 21 (!) 85/63 -   02/07/18 1200 - 75 17 100/71 -   02/07/18 1100 - 78 17 (!) 77/52 -   02/07/18 0900 - 85 19 96/63 -   02/07/18 0800 96 °F (35.6 °C) 88 17 114/60 -   02/07/18 0700 - 92 17 106/63 92 %   02/07/18 0600 - 87 16 93/55 -   02/07/18 0500 - 98 25 (!) 65/51 -   02/07/18 0400 95.7 °F (35.4 °C) 81 15 98/56 95 %   02/07/18 0300 - 83 16 107/63 94 %   02/07/18 0200 - 85 14 109/61 93 %   02/07/18 0100 - 84 17 (!) 76/58 94 %   02/07/18 0004 - 76 - (!) 80/43 -   02/07/18 0001 95.8 °F (35.4 °C) 76 15 (!) 79/41 95 %   02/06/18 2300 - 75 12 (!) 116/98 95 %   02/06/18 2200 - 74 13 100/60 95 %   02/06/18 2100 - 79 14 90/57 95 %   02/06/18 2009 96.8 °F (36 °C) 79 18 96/54 95 %   02/06/18 1800 - 77 15 93/48 92 %   02/06/18 1700 - 78 20 (!) 85/46 94 %       Intake/Output Summary (Last 24 hours) at 02/07/18 1629  Last data filed at 02/07/18 1500   Gross per 24 hour   Intake           2550.9 ml   Output              870 ml   Net           1680.9 ml        PHYSICAL EXAM:  General: WD, Chronically ill appearing. Alert, cooperative, no acute distress    EENT:  EOMI. Anicteric sclerae. MM dry; Right facial droop and void from removal of parotid mass notable.  Right eye with patch (due to inability to close eyelid)  Resp:  CTA bilaterally on anterior and lateral assessment, no wheezing or rales. No accessory muscle use  CV:  irregular  Rhythm with normal rate, + edema noted with SCD's in palce  GI:  Soft, Non distended, Non tender.  +Bowel sounds  Neurologic:  Sedated, further neurologic status confounded by patient's sedated status   Psych:   Unable to assess. Not anxious nor agitated at this time  Skin:  No rashes. No jaundice    Reviewed most current lab test results and cultures  YES  Reviewed most current radiology test results   YES  Review and summation of old records today    NO  Reviewed patient's current orders and MAR    YES  PMH/SH reviewed - no change compared to H&P  ________________________________________________________________________  Care Plan discussed with:    Comments   Patient x    Family      RN x    Care Manager     Consultant  x Dr Deshpande Pj team rounds were held today with , nursing, pharmacist and clinical coordinator. Patient's plan of care was discussed; medications were reviewed and discharge planning was addressed. ________________________________________________________________________  Total NON critical care TIME: 30 Minutes    Total CRITICAL CARE TIME Spent:   Minutes non procedure based      Comments   >50% of visit spent in counseling and coordination of care     ________________________________________________________________________  Samantha Ordonez MD     Procedures: see electronic medical records for all procedures/Xrays and details which were not copied into this note but were reviewed prior to creation of Plan. LABS:  I reviewed today's most current labs and imaging studies.   Pertinent labs include:  Recent Labs      02/07/18   0353  02/06/18   0523  02/05/18   0427   WBC  2.8*  2.5*  4.6   HGB  9.6*  10.7*  10.4*   HCT  30.0*  33.1*  31.3*   PLT  60*  59*  83*     Recent Labs      02/07/18   0353 02/06/18   0523  02/05/18   0427   NA  142  141  139   K  4.0  4.3  4.7   CL  110*  110*  107   CO2  25  25  26   GLU  89  96  94   BUN  30*  33*  42*   CREA  1.60*  1.65*  1.77*   CA  8.3*  8.5  8.5   MG   --   1.7  1.9   PHOS   --   2.7  2.9   ALB   --   2.6*   --    TBILI   --   1.4*   --    SGOT   --   28   --    ALT   --   18   --        Signed: Noah Singleton MD

## 2018-02-07 NOTE — PROGRESS NOTES
Problem: Falls - Risk of  Goal: *Absence of Falls  Document Jenna Fall Risk and appropriate interventions in the flowsheet.    Outcome: Progressing Towards Goal  Fall Risk Interventions:  Mobility Interventions: Bed/chair exit alarm    Mentation Interventions: Adequate sleep, hydration, pain control, Bed/chair exit alarm, Increase mobility, More frequent rounding, Reorient patient, Room close to nurse's station, Toileting rounds, Update white board    Medication Interventions: Bed/chair exit alarm    Elimination Interventions: Bed/chair exit alarm, Call light in reach, Toileting schedule/hourly rounds    History of Falls Interventions: Bed/chair exit alarm, Consult care management for discharge planning

## 2018-02-08 LAB
ANION GAP SERPL CALC-SCNC: 6 MMOL/L (ref 5–15)
BUN SERPL-MCNC: 28 MG/DL (ref 6–20)
BUN/CREAT SERPL: 17 (ref 12–20)
CALCIUM SERPL-MCNC: 8.5 MG/DL (ref 8.5–10.1)
CHLORIDE SERPL-SCNC: 110 MMOL/L (ref 97–108)
CO2 SERPL-SCNC: 23 MMOL/L (ref 21–32)
CREAT SERPL-MCNC: 1.62 MG/DL (ref 0.7–1.3)
DATE LAST DOSE: ABNORMAL
ERYTHROCYTE [DISTWIDTH] IN BLOOD BY AUTOMATED COUNT: 16.4 % (ref 11.5–14.5)
GLUCOSE BLD STRIP.AUTO-MCNC: 102 MG/DL (ref 65–100)
GLUCOSE BLD STRIP.AUTO-MCNC: 111 MG/DL (ref 65–100)
GLUCOSE BLD STRIP.AUTO-MCNC: 96 MG/DL (ref 65–100)
GLUCOSE BLD STRIP.AUTO-MCNC: 99 MG/DL (ref 65–100)
GLUCOSE SERPL-MCNC: 107 MG/DL (ref 65–100)
HCT VFR BLD AUTO: 30.7 % (ref 36.6–50.3)
HGB BLD-MCNC: 9.9 G/DL (ref 12.1–17)
MCH RBC QN AUTO: 34.5 PG (ref 26–34)
MCHC RBC AUTO-ENTMCNC: 32.2 G/DL (ref 30–36.5)
MCV RBC AUTO: 107 FL (ref 80–99)
NRBC # BLD: 0 K/UL (ref 0–0.01)
NRBC BLD-RTO: 0 PER 100 WBC
PLATELET # BLD AUTO: 59 K/UL (ref 150–400)
PMV BLD AUTO: 12.5 FL (ref 8.9–12.9)
POTASSIUM SERPL-SCNC: 4.1 MMOL/L (ref 3.5–5.1)
RBC # BLD AUTO: 2.87 M/UL (ref 4.1–5.7)
REPORTED DOSE,DOSE: ABNORMAL UNITS
REPORTED DOSE/TIME,TMG: ABNORMAL
SERVICE CMNT-IMP: ABNORMAL
SERVICE CMNT-IMP: ABNORMAL
SERVICE CMNT-IMP: NORMAL
SERVICE CMNT-IMP: NORMAL
SODIUM SERPL-SCNC: 139 MMOL/L (ref 136–145)
VANCOMYCIN TROUGH SERPL-MCNC: 25 UG/ML (ref 5–10)
WBC # BLD AUTO: 3.4 K/UL (ref 4.1–11.1)

## 2018-02-08 PROCEDURE — 77030011256 HC DRSG MEPILEX <16IN NO BORD MOLN -A

## 2018-02-08 PROCEDURE — 65660000000 HC RM CCU STEPDOWN

## 2018-02-08 PROCEDURE — 36415 COLL VENOUS BLD VENIPUNCTURE: CPT | Performed by: INTERNAL MEDICINE

## 2018-02-08 PROCEDURE — 74011250637 HC RX REV CODE- 250/637: Performed by: INTERNAL MEDICINE

## 2018-02-08 PROCEDURE — 93005 ELECTROCARDIOGRAM TRACING: CPT

## 2018-02-08 PROCEDURE — 74011250636 HC RX REV CODE- 250/636: Performed by: INTERNAL MEDICINE

## 2018-02-08 PROCEDURE — 82962 GLUCOSE BLOOD TEST: CPT

## 2018-02-08 PROCEDURE — 85027 COMPLETE CBC AUTOMATED: CPT | Performed by: INTERNAL MEDICINE

## 2018-02-08 PROCEDURE — 80048 BASIC METABOLIC PNL TOTAL CA: CPT | Performed by: INTERNAL MEDICINE

## 2018-02-08 PROCEDURE — 77010033678 HC OXYGEN DAILY

## 2018-02-08 RX ORDER — HALOPERIDOL 5 MG/ML
4 INJECTION INTRAMUSCULAR
Status: DISCONTINUED | OUTPATIENT
Start: 2018-02-08 | End: 2018-02-09

## 2018-02-08 RX ORDER — FUROSEMIDE 10 MG/ML
40 INJECTION INTRAMUSCULAR; INTRAVENOUS ONCE
Status: COMPLETED | OUTPATIENT
Start: 2018-02-08 | End: 2018-02-08

## 2018-02-08 RX ORDER — VANCOMYCIN HYDROCHLORIDE
1250 EVERY 24 HOURS
Status: DISCONTINUED | OUTPATIENT
Start: 2018-02-09 | End: 2018-02-11

## 2018-02-08 RX ADMIN — AMIODARONE HYDROCHLORIDE 100 MG: 200 TABLET ORAL at 09:01

## 2018-02-08 RX ADMIN — NYSTATIN: 100000 POWDER TOPICAL at 08:55

## 2018-02-08 RX ADMIN — POLYETHYLENE GLYCOL 3350 17 G: 17 POWDER, FOR SOLUTION ORAL at 08:55

## 2018-02-08 RX ADMIN — MINERAL OIL, PETROLATUM: 425; 568 OINTMENT OPHTHALMIC at 22:00

## 2018-02-08 RX ADMIN — HALOPERIDOL LACTATE 5 MG: 5 INJECTION, SOLUTION INTRAMUSCULAR at 07:37

## 2018-02-08 RX ADMIN — NYSTATIN: 100000 POWDER TOPICAL at 21:16

## 2018-02-08 RX ADMIN — Medication 10 ML: at 13:36

## 2018-02-08 RX ADMIN — MELATONIN 3 MG ORAL TABLET 9 MG: 3 TABLET ORAL at 21:15

## 2018-02-08 RX ADMIN — Medication 10 ML: at 05:27

## 2018-02-08 RX ADMIN — HALOPERIDOL LACTATE 5 MG: 5 INJECTION, SOLUTION INTRAMUSCULAR at 13:35

## 2018-02-08 RX ADMIN — RIVAROXABAN 15 MG: 15 TABLET, FILM COATED ORAL at 17:14

## 2018-02-08 RX ADMIN — CYANOCOBALAMIN 1000 MCG: 1000 INJECTION, SOLUTION INTRAMUSCULAR; SUBCUTANEOUS at 10:40

## 2018-02-08 RX ADMIN — FUROSEMIDE 40 MG: 10 INJECTION, SOLUTION INTRAMUSCULAR; INTRAVENOUS at 16:23

## 2018-02-08 RX ADMIN — Medication 10 ML: at 21:15

## 2018-02-08 RX ADMIN — NYSTATIN: 100000 POWDER TOPICAL at 17:24

## 2018-02-08 NOTE — PROGRESS NOTES
Hospitalist Progress Note    NAME: Stacie Jain   :  1928   MRN:  [id number]       Assessment / Plan:  Metabolic Encephalopathy  Delirium with hallucinations  Possible related to sepsis  On PRN IV haldol per pulmanry while in ICU  QTc high on EKG   Reduce Haldol and change to PRN IM and repeat EKG to check QTc    Septic Shock   Hypothermia (hypothermia resolved)  Bacteremia (GPC's on BCx)  Staph, Strep and Enterococcus bacteremia; suspect from cutaneous source as patient picks his psoriasis relentlessness at home (per wife's description)  Was managed in ICU, now transferred to tele  Continue on Vancomycin  Currently off Dobutamine  Levophed weakness off  F/u repeat blood cultures (no growth so far)  Appreciate ID input  Eventual plan for MARIA once remain stable  Mitten's in plan  Will try to take off central line tomorrow if blood pressure remain stable    Chronic Atrial Fibrillation with Junctional Bradycardia  Appreciate cardiology input  On Amiodarone  BB on hold for now  Continue to monitor    Acute on Chronic CHF  Acute Respiratory Failure with Hypoxia from Acute Pulmonary Edema (on CXR)  2D echo with EF 25 % to 30 %.    On Dobutamine as unable to give IV diuresis due to shock  Continue Xarelto  Continue O2, wean as tolerated  TSH wnl  Holding home Coreg and Xarelto    Hypernatremia  Hypoglycemia  IVF's per Intensivest  am cortisol wnl    Acute Kidney Injury with baseline CKD3  Cr improving  Continue to monitor lytes    Pancytopenia  S/p 2 units plts on  for acute bleeding from central line with plts improving from 45K to 111K and decreased to 88K today  Leukopenia resolved  Await Vitamin B12 253, will start daily B12 shots x3    Blood in Stool PTA  Likely from Xarelto and thrombocytopenia  On therapeutic lovenox per pulmonary    Patient with history of Left Parotid Mass s/p resection approximately 1 year ago by Dr. Jeannie Monge and XRT, 2017 PET negative  Heme/Onc following    History of Prostate Cancer    Psoriasis    Body mass index is 28.65 kg/(m^2). Code status: Partial code , no Compressions or Defibrillation. She is okay with temporary pacing, ACLS meds (like atropine) and temporary intubation. Palliative is following    Prophylaxis: SCD's and H2B  Recommended Disposition: TBD     Subjective: Pt seen and examined at bedside. NAD, poor insight. Overnight events d/w RN     Chief Complaint / Reason for Physician Visit: follow-up septic shock  Review of Systems:  Symptom Y/N Comments  Symptom Y/N Comments   Fever/Chills    Chest Pain     Poor Appetite    Edema     Cough    Abdominal Pain     Sputum    Joint Pain     SOB/GRADY    Pruritis/Rash     Nausea/vomit    Tolerating PT/OT     Diarrhea    Tolerating Diet     Constipation    Other       Limited due to  Poor insight     Objective:     VITALS:   Last 24hrs VS reviewed since prior progress note.  Most recent are:  Patient Vitals for the past 24 hrs:   Temp Pulse Resp BP SpO2   02/08/18 1413 97.8 °F (36.6 °C) 70 20 (!) 158/107 -   02/08/18 1300 - 75 20 101/69 -   02/08/18 1200 97.3 °F (36.3 °C) 78 19 98/71 -   02/08/18 1100 - 78 22 102/64 -   02/08/18 0900 - 98 24 124/67 97 %   02/08/18 0800 96.6 °F (35.9 °C) 80 17 91/74 96 %   02/08/18 0700 - 80 19 92/69 98 %   02/08/18 0600 - 79 25 98/75 97 %   02/08/18 0524 - 80 18 93/76 96 %   02/08/18 0500 - 79 17 (!) 73/36 97 %   02/08/18 0400 97.5 °F (36.4 °C) 78 18 106/73 96 %   02/08/18 0300 - 83 21 109/76 -   02/08/18 0200 - 85 22 115/72 94 %   02/08/18 0100 - 80 22 115/66 91 %   02/08/18 0000 95.9 °F (35.5 °C) 81 18 118/79 92 %   02/07/18 2300 - 77 18 100/67 94 %   02/07/18 2200 - 76 19 106/71 98 %   02/07/18 2100 - 75 16 112/74 98 %   02/07/18 2000 97.5 °F (36.4 °C) 73 19 116/62 99 %   02/07/18 1900 - 74 20 103/48 (!) 89 %   02/07/18 1812 - 78 22 98/63 92 %   02/07/18 1730 - 85 20 (!) 89/68 96 %       Intake/Output Summary (Last 24 hours) at 02/08/18 1644  Last data filed at 02/08/18 1502   Gross per 24 hour   Intake             1660 ml   Output              980 ml   Net              680 ml        PHYSICAL EXAM:  General: WD, Chronically ill appearing. Alert, cooperative, no acute distress    EENT:  EOMI. Anicteric sclerae. MM dry; Right facial droop and void from removal of parotid mass notable. Right eye with patch (due to inability to close eyelid)  Resp:  CTA bilaterally on anterior and lateral assessment, no wheezing or rales. No accessory muscle use  CV:  irregular  Rhythm with normal rate, + edema noted with SCD's in palce  GI:  Soft, Non distended, Non tender.  +Bowel sounds  Neurologic:  Sedated, further neurologic status confounded by patient's sedated status   Psych:   Unable to assess. Not anxious nor agitated at this time  Skin:  No rashes. No jaundice    Reviewed most current lab test results and cultures  YES  Reviewed most current radiology test results   YES  Review and summation of old records today    NO  Reviewed patient's current orders and MAR    YES  PMH/ reviewed - no change compared to H&P  ________________________________________________________________________  Care Plan discussed with:    Comments   Patient x    Family  x Wife at bedside   RN x    Care Manager     Consultant                        Multidiciplinary team rounds were held today with , nursing, pharmacist and clinical coordinator. Patient's plan of care was discussed; medications were reviewed and discharge planning was addressed.      ________________________________________________________________________  Total NON critical care TIME: 30 Minutes    Total CRITICAL CARE TIME Spent:   Minutes non procedure based      Comments   >50% of visit spent in counseling and coordination of care     ________________________________________________________________________  Chestine Quan, MD     Procedures: see electronic medical records for all procedures/Xrays and details which were not copied into this note but were reviewed prior to creation of Plan. LABS:  I reviewed today's most current labs and imaging studies.   Pertinent labs include:  Recent Labs      02/08/18 0406  02/07/18 0353 02/06/18   0523   WBC  3.4*  2.8*  2.5*   HGB  9.9*  9.6*  10.7*   HCT  30.7*  30.0*  33.1*   PLT  59*  60*  59*     Recent Labs      02/08/18 0406  02/07/18 0353 02/06/18   0523   NA  139  142  141   K  4.1  4.0  4.3   CL  110*  110*  110*   CO2  23  25  25   GLU  107*  89  96   BUN  28*  30*  33*   CREA  1.62*  1.60*  1.65*   CA  8.5  8.3*  8.5   MG   --    --   1.7   PHOS   --    --   2.7   ALB   --    --   2.6*   TBILI   --    --   1.4*   SGOT   --    --   28   ALT   --    --   18       Signed: Cl Ball MD

## 2018-02-08 NOTE — ROUTINE PROCESS
1930- Bedside and Verbal shift change report given to KAYLYN Vera RN (oncoming nurse) by Maria De Jesus Faulkner RN (offgoing nurse). Report included the following information Kardex, Procedure Summary, Intake/Output, MAR and Recent Results. 2000- assessment as noted. Pt off pressors with 's. Son visiting at bedside. Pt oriented to person and place and obeys commands. 2300- pt requested bedpan and had small formed BP. Bed pad changed. R elbow wound bleeding through dressing, dressing changed. Pt tolerated well/   0100- pt more confused. Having hallucinations of other people in the room and in bed with patient. Reorient patient and turned low light on in room. 0300- pt had BM linen and gown changed. Pt repetitively picking off pulse ox and ekg leads. Mitts applied to patient. New dressing applied to patient elbow wound. Shift Summary: pt more confused overnight, agitated and picking at pulse ox and EKG leads. Mitt on R hand currently as patient will not keep L mitt on. Pt had 2 small BM's overnight. BP remained stable off of pressors.

## 2018-02-08 NOTE — PROGRESS NOTES
0715: bedside and verbal report received from horace jarrell. Patient very picky, balling up sheets, and attempting to get out of bed. Haldol given  0800: assessment completed, patient alert and oriented to self and time. 1315: report given to Raza Reed RN. Discussed lines with Dr. Marquez Carbajal, creatinine elevated at 1.6 this morning and hourly urine output ~ 30, one PIV that expires tomorrow, will leave both vargas and central line in place. 1345: patient transported to [address] [place] [address] via bed with transport monitor. HORACE Chavez in room to accept patient.

## 2018-02-08 NOTE — PROGRESS NOTES
Cardiology Progress Note    Patient ID:  Patient: Lili Crenshaw  MRN: [id number]  Age: 80 y.o.  : 1928   Admit Date: 2018    Assessment: 1. Probably atrial fibrillation with slow ventricular response requiring dobutamine, but now off of this. 2. Gram-positive cocci bacteremia and sepsis. See ID consult notes. 3. Cardiomyopathy NOS, EF 25%. 4. Acute on chronic systolic congestive heart failure. 5. Acute kidney injury atop chronic kidney disease stage III. Slowly improving. 6. Pancytopenia (watching platelets, still >57Q). 7. Encephalopathy/delirium. 8. History of R face/neck mass resected. 9. Partial code status (No shock or chest compressions). Plan:     1. HR is OK, off dobutamine currently. Can be used as needed. 2. Continue to hold beta blocker. 3. Continue amiodarone 100 daily. 4. Agree with treating him for bacteremia. ID consult appreciated. Will try to find a good window for MARIA to evaluate the valves. 5. Furosemide 40 IV x1. No changes today. On floor, out of ICU. [x]       High complexity decision making was performed in this patient at high risk for decompensation. Subjective:     Lili Crenshaw denies chest pain. Review of Systems - He cannot relay a reliable ROS due to encephalopathy.     Objective:     Physical Exam:  Temp (24hrs), Av.1 °F (36.2 °C), Min:95.9 °F (35.5 °C), Max:97.8 °F (36.6 °C)    Patient Vitals for the past 8 hrs:   Pulse   18 1413 70   18 1300 75   18 1200 78   18 1100 78   18 0900 98   18 0800 80    Patient Vitals for the past 8 hrs:   Resp   18 1413 20   18 1300 20   18 1200 19   18 1100 22   18 0900 24   18 0800 17    Patient Vitals for the past 8 hrs:   BP   18 1413 (!) 158/107   18 1300 101/69   18 1200 98/71   18 1100 102/64 02/08/18 0900 124/67   02/08/18 0800 91/74          Intake/Output Summary (Last 24 hours) at 02/08/18 1538  Last data filed at 02/08/18 1502   Gross per 24 hour   Intake          1752.58 ml   Output              980 ml   Net           772.58 ml       Nondiaphoretic, not in acute distress. MMM, no jaundice, HEENT stable. L eye patch removed. Central line in the neck. Unlabored, clear to auscultation bilaterally, symmetric air movement. Regular rate and rhythm, no murmur, pericardial rub, knock, or gallop. No JVD but there is +peripheral edema. Palpable radial pulses bilaterally. Abdomen soft, nontender, nondistended. Extremities without cyanosis or clubbing. Skin warm and dry. Venostasis changes in legs. Musculoskeletal exam stable. Awake, confused. No tremor. Cardiographics and Studies, I personally reviewed:    Telemetry:  Afib with HR 70's. ECHO 2/3:    LEFT VENTRICLE: The ventricle was mildly dilated. Ejection fraction was  estimated in the range of 25 % to 30 %. There was moderate diffuse  hypokinesis. RIGHT VENTRICLE: The ventricle was moderately dilated. Systolic function  was mildly reduced. LEFT ATRIUM: The atrium was moderately dilated. RIGHT ATRIUM: The atrium was mildly dilated. MITRAL VALVE: There was mild thickening. DOPPLER: There was moderate  regurgitation. AORTIC VALVE: Leaflets exhibited mildly increased thickness. DOPPLER:  There was no significant regurgitation. TRICUSPID VALVE: Normal valve structure. DOPPLER: There was moderate  regurgitation. Pulmonary artery systolic pressure: 57 mmHg. There was  moderate pulmonary hypertension. PULMONIC VALVE: Normal valve structure. AORTA: The root exhibited normal size. SYSTEMIC VEINS: IVC: The respirophasic change in diameter was less than  50%. PERICARDIUM: There was no pericardial effusion. The pericardium was normal  in appearance.     LAB Review:     No results for input(s): CPK, CKMB, CKNDX, TROIQ in the last 72 hours. No lab exists for component: CPKMB  Lab Results   Component Value Date/Time    Cholesterol, total 128 06/29/2017 10:41 AM    HDL Cholesterol 49 06/29/2017 10:41 AM    LDL, calculated 60 06/29/2017 10:41 AM    Triglyceride 94 06/29/2017 10:41 AM     No results for input(s): INR, PTP, APTT in the last 72 hours. No lab exists for component: Shorty Maya   Recent Labs      02/08/18   0406  02/07/18   0353  02/06/18   0523   NA  139  142  141   K  4.1  4.0  4.3   CL  110*  110*  110*   CO2  23  25  25   BUN  28*  30*  33*   CREA  1.62*  1.60*  1.65*   GLU  107*  89  96   PHOS   --    --   2.7   CA  8.5  8.3*  8.5   ALB   --    --   2.6*   WBC  3.4*  2.8*  2.5*   HGB  9.9*  9.6*  10.7*   HCT  30.7*  30.0*  33.1*   PLT  59*  60*  59*     Recent Labs      02/06/18   0523   SGOT  28   AP  91   TP  6.1*   ALB  2.6*   GLOB  3.5     No components found for: GLPOC  No results for input(s): PH, PCO2, PO2 in the last 72 hours. Medications Reviewed:      Allergies   Allergen Reactions    Ancef [Cefazolin] Unknown (comments)     \"drops my blood pressure\"    Neosporin [Benzalkonium Chloride] Unknown (comments)    Polysporin [Bacitracin-Polymyxin B] Other (comments)     \"WOUNDS DO NOT HEAL\"        Current Facility-Administered Medications   Medication Dose Route Frequency    [START ON 2/9/2018] vancomycin (VANCOCIN) 1250 mg in  ml infusion  1,250 mg IntraVENous Q24H    rivaroxaban (XARELTO) tablet 15 mg  15 mg Oral DAILY WITH DINNER    white petrolatum-mineral oil (LACRILUBE S.O.P.) ointment   Right Eye Q8H    polyethylene glycol (MIRALAX) packet 17 g  17 g Oral DAILY    insulin lispro (HUMALOG) injection   SubCUTAneous AC&HS    haloperidol lactate (HALDOL) injection 5 mg  5 mg IntraVENous Q6H PRN    amiodarone (CORDARONE) tablet 100 mg  100 mg Oral DAILY    glucose chewable tablet 16 g  4 Tab Oral PRN    dextrose (D50W) injection syrg 12.5-25 g  12.5-25 g IntraVENous PRN    glucagon (GLUCAGEN) injection 1 mg  1 mg IntraMUSCular PRN    melatonin tablet 9 mg  9 mg Oral QHS    nystatin (MYCOSTATIN) 100,000 unit/gram powder   Topical TID    sodium chloride (NS) flush 5-10 mL  5-10 mL IntraVENous Q8H    sodium chloride (NS) flush 5-10 mL  5-10 mL IntraVENous PRN          Torin Moncada MD  2/8/2018

## 2018-02-08 NOTE — PROGRESS NOTES
TRANSFER - IN REPORT:    Verbal report received from Nurse(name) on [facility] Fees  being received from CCU(unit) for routine progression of care      Report consisted of patients Situation, Background, Assessment and   Recommendations(SBAR). Information from the following report(s) SBAR, Kardex, Intake/Output, MAR, Recent Results and Cardiac Rhythm AFIB was reviewed with the receiving nurse. Opportunity for questions and clarification was provided. Assessment completed upon patients arrival to unit and care assumed.

## 2018-02-08 NOTE — PROGRESS NOTES
Pharmacy Automatic Renal Dosing Protocol - Antimicrobials    Indication for Antimicrobials: Bacteremia    Current Regimen of Each Antimicrobial:  Vancomycin 1750 mg IV every 24 hours (Started 18; Day #7)    Previous Antimicrobial Therapy:  Levofloxacin 750 mg IV every 48 hours (Started 18; Stopped 18)  Meropenem 1 gram IV every 12 hours (Started 2/3/18; Stopped 18)    Goal Vancomycin Trough: 15-20 mcg/mL    Vancomycin Trough (18 at 2240): 19.6 mcg/mL (True trough = 18.8 mcg/mL)  Vancomycin Trough (18 at 2149): 25 mcg/mL (True trough = 23.6 mcg/mL)    Significant Cultures:   18 Blood culture = No growth x 1 day (Results pending)  18 Blood culture = Strep in 2/2; MSSA in 1/2; Enterococcus in 1/2 (FINAL)    Labs:  Recent Labs      18   0406  18   0353  18   0523   CREA  1.62*  1.60*  1.65*   BUN  28*  30*  33*   WBC  3.4*  2.8*  2.5*   Temp (24hrs), Av.8 °F (36 °C), Min:95.9 °F (35.5 °C), Max:97.5 °F (36.4 °C)    Creatinine Clearance (mL/min) or Dialysis: 31 mL/min     No results found for: PCT    Impression/Plan:   - Vancomycin trough is above goal. Vancomycin dose adjusted to 1250 mg IV every 24 hours to achieve trough of 15-20 mcg/mL. Pharmacy will follow daily and adjust medications as appropriate for renal function and/or serum levels.     Thank you,  Trevor Mosley, PHARMD

## 2018-02-08 NOTE — INTERDISCIPLINARY ROUNDS
Interdisciplinary team rounds were held 2/8/18 with the following team members:Care Management, Diabetes Treatment Specialist, Nursing, Nutrition, Pharmacy, Physical Therapy, Physician and Clinical Coordinator. Plan of care discussed. Goal: See MD orders and progress notes for further  interventions and desired outcomes.

## 2018-02-08 NOTE — PROGRESS NOTES
Infectious Disease Progress Note       Subjective:   Seen in the ICU.  Seems a little confused this am.        Objective:    Vitals:   Patient Vitals for the past 24 hrs:   Temp Pulse Resp BP SpO2   02/08/18 1100 - 78 22 102/64 -   02/08/18 0900 - 98 24 124/67 97 %   02/08/18 0800 96.6 °F (35.9 °C) 80 17 91/74 96 %   02/08/18 0700 - 80 19 92/69 98 %   02/08/18 0600 - 79 25 98/75 97 %   02/08/18 0524 - 80 18 93/76 96 %   02/08/18 0500 - 79 17 (!) 73/36 97 %   02/08/18 0400 97.5 °F (36.4 °C) 78 18 106/73 96 %   02/08/18 0300 - 83 21 109/76 -   02/08/18 0200 - 85 22 115/72 94 %   02/08/18 0100 - 80 22 115/66 91 %   02/08/18 0000 95.9 °F (35.5 °C) 81 18 118/79 92 %   02/07/18 2300 - 77 18 100/67 94 %   02/07/18 2200 - 76 19 106/71 98 %   02/07/18 2100 - 75 16 112/74 98 %   02/07/18 2000 97.5 °F (36.4 °C) 73 19 116/62 99 %   02/07/18 1900 - 74 20 103/48 (!) 89 %   02/07/18 1812 - 78 22 98/63 92 %   02/07/18 1730 - 85 20 (!) 89/68 96 %   02/07/18 1600 96.9 °F (36.1 °C) 81 21 109/80 95 %   02/07/18 1500 - 78 23 99/86 97 %   02/07/18 1400 - 84 23 99/71 98 %   02/07/18 1300 - 78 21 (!) 85/63 92 %   02/07/18 1200 96.7 °F (35.9 °C) 75 17 100/71 92 %       ¨ GEN: NAD  ¨ HEENT: + eye dressing and facial asymetry,  no scleral icterus L eye,   very poor dentition with many missing/chipped off teeth , + L neck central line   ¨ CV: S1, S2 heard with RADHA  ¨ Lungs: Clear to auscultation bilaterally anteriorly   ¨ Abdomen: soft, non distended, non tender  ¨ Genitourinary: + no vargas  ¨ Extremities: + edema   ¨ Neuro: Alert, oriented to self situation, moves all extremities to commands, verbal   ¨ Skin: no rash  ¨ Psych: good affect,non tearful   ¨ Lines: L neck line     Medications:    Current Facility-Administered Medications:     [START ON 2/9/2018] vancomycin (VANCOCIN) 1250 mg in  ml infusion, 1,250 mg, IntraVENous, Q24H, Rachel Peterson MD    rivaroxaban (XARELTO) tablet 15 mg, 15 mg, Oral, DAILY WITH Gregoria Rahman Dimple Dodson MD    polyethylene glycol (MIRALAX) packet 17 g, 17 g, Oral, DAILY, Almaz Conteh MD, 17 g at 02/08/18 0855    insulin lispro (HUMALOG) injection, , SubCUTAneous, AC&HS, Aisha Khalil MD, Stopped at 02/07/18 2200    haloperidol lactate (HALDOL) injection 5 mg, 5 mg, IntraVENous, Q6H PRN, Almaz Conteh MD, 5 mg at 02/08/18 0737    dextrose 5% lactated ringers infusion, 50 mL/hr, IntraVENous, CONTINUOUS, Marianne Santos MD, Last Rate: 50 mL/hr at 02/07/18 1429, 50 mL/hr at 02/07/18 1429    amiodarone (CORDARONE) tablet 100 mg, 100 mg, Oral, DAILY, Mabel Shea MD, 100 mg at 02/08/18 0901    glucose chewable tablet 16 g, 4 Tab, Oral, PRN, Sherley Wyman MD    dextrose (D50W) injection syrg 12.5-25 g, 12.5-25 g, IntraVENous, PRN, Sherley Wyman MD    glucagon [facility]) injection 1 mg, 1 mg, IntraMUSCular, PRN, Sherley Wyman MD    melatonin tablet 9 mg, 9 mg, Oral, QHS, Karan Garcia MD, 9 mg at 02/07/18 2135    nystatin (MYCOSTATIN) 100,000 unit/gram powder, , Topical, TID, Sherley Wyman MD    sodium chloride (NS) flush 5-10 mL, 5-10 mL, IntraVENous, Q8H, [address], MD, 10 mL at 02/08/18 3626    sodium chloride (NS) flush 5-10 mL, 5-10 mL, IntraVENous, PRN, [address], MD, 10 mL at 02/04/18 2036      Labs:  Recent Results (from the past 24 hour(s))   GLUCOSE, POC    Collection Time: 02/07/18 11:42 AM   Result Value Ref Range    Glucose (POC) 135 (H) 65 - 100 mg/dL    Performed by Shirleyann Eisenmenger, POC    Collection Time: 02/07/18  5:21 PM   Result Value Ref Range    Glucose (POC) 100 65 - 100 mg/dL    Performed by Shirleyann Eisenmenger, POC    Collection Time: 02/07/18  9:17 PM   Result Value Ref Range    Glucose (POC) 104 (H) 65 - 100 mg/dL    Performed by [place], TROUGH    Collection Time: 02/07/18  9:49 PM   Result Value Ref Range    Vancomycin,trough 25.0 (HH) 5.0 - 10.0 ug/mL    Reported dose date: NOT PROVIDED Reported dose time: NOT PROVIDED      Reported dose: NOT PROVIDED UNITS   METABOLIC PANEL, BASIC    Collection Time: 02/08/18  4:06 AM   Result Value Ref Range    Sodium 139 136 - 145 mmol/L    Potassium 4.1 3.5 - 5.1 mmol/L    Chloride 110 (H) 97 - 108 mmol/L    CO2 23 21 - 32 mmol/L    Anion gap 6 5 - 15 mmol/L    Glucose 107 (H) 65 - 100 mg/dL    BUN 28 (H) 6 - 20 MG/DL    Creatinine 1.62 (H) 0.70 - 1.30 MG/DL    BUN/Creatinine ratio 17 12 - 20      GFR est AA 49 (L) >60 ml/min/1.73m2    GFR est non-AA 40 (L) >60 ml/min/1.73m2    Calcium 8.5 8.5 - 10.1 MG/DL   CBC W/O DIFF    Collection Time: 02/08/18  4:06 AM   Result Value Ref Range    WBC 3.4 (L) 4.1 - 11.1 K/uL    RBC 2.87 (L) 4.10 - 5.70 M/uL    HGB 9.9 (L) 12.1 - 17.0 g/dL    HCT 30.7 (L) 36.6 - 50.3 %    .0 (H) 80.0 - 99.0 FL    MCH 34.5 (H) 26.0 - 34.0 PG    MCHC 32.2 30.0 - 36.5 g/dL    RDW 16.4 (H) 11.5 - 14.5 %    PLATELET 59 (L) 326 - 400 K/uL    MPV 12.5 8.9 - 12.9 FL    NRBC 0.0 0  WBC    ABSOLUTE NRBC 0.00 0.00 - 0.01 K/uL   GLUCOSE, POC    Collection Time: 02/08/18  8:52 AM   Result Value Ref Range    Glucose (POC) 111 (H) 65 - 100 mg/dL    Performed by Rita Stokes            Micro:   UA 2/2/18 0-4 WBC, negative bacteria, negative leukocyte esterase and negative nitrite         Blood 2/6/18 negative so far                            Blood: 2/2/18          STREPTOCOCCUS SALIVARIUS GROWING IN BOTH BOTTLES DRAWN (SITES = RFA) (SENSITIVITIES PERFORMED BY E-TEST) (A)        Culture result:      Final      STAPHYLOCOCCUS AUREUS ALSO GROWING IN THE 1ST OF 2 BOTTLES DRAWN (SITE = R FA) (A)      Culture result:      Final      ENTEROCOCCUS FAECALIS GROUP D ALSO GROWING IN THE 1ST OUT OF 2 BOTTLES DRAWN.  (SITE = RFA) GENTAMICIN SYNERGY SCREEN IS SENSITIVE @ < OR = 500 mcg/ml STREPTOMYCIN SYNERGY SCREEN IS SENSITIVE @ < OR =1000 mcg/ml (A)        Culture & Susceptibility                     Antibiotic    Organism Organism Organism          Enterococcus faecalis group D Staphylococcus aureus Streptococcus salivarius      AMPICILLIN ($)    <=2   ug/mL   S Final              AMPICILLIN/SULBACTAM ($)        <=8/4   ug/mL   S Final          CEFAZOLIN ($)        <=4   ug/mL   S Final          CEFTRIAXONE ($)            0.023   ug/mL   S Final      CIPROFLOXACIN ($)        <=1   ug/mL   S Final          CLINDAMYCIN ($)        <=0.5   ug/mL   S Final          DAPTOMYCIN ($$$$$)    2   ug/mL   S Final  1   ug/mL   S Final          ERYTHROMYCIN ($$$$)        <=0.5   ug/mL   S Final  0.125   ug/mL   S Final      GENTAMICIN ($)        <=4   ug/mL   S Final          LEVOFLOXACIN ($)            1.5   ug/mL   S Final      LINEZOLID ($$$$$)    2   ug/mL   S Final  4   ug/mL   S Final          OXACILLIN        <=0.25   ug/mL   S Final          PENICILLIN G ($$)    2   ug/mL   S Final      0.023   ug/mL   S Final      RIFAMPIN ($$$$)        <=1   ug/mL   S Final          TETRACYCLINE        <=4   ug/mL   S Final          TRIMETH-SULFAMETHOXA        <=0.5/9.5   ug/mL   S Final          VANCOMYCIN ($)    2   ug/mL   S Final  2   ug/mL   S Final  0.50   ug/mL   S Final                                        Component Value Ref Range & Units Status      Special Requests: NO SPECIAL REQUESTS    Preliminary      Culture result: NO GROWTH 1 DAY    Preliminary            Pathology Results:      FINAL PATHOLOGIC DIAGNOSIS   Right parotid gland, total parotidectomy with partial neck dissection:   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  Is minimal decreased attenuation in the periventricular white  matter which is nonspecific but consistent with small vessel disease. Alvina Po is  no intracranial hemorrhage. Alvina Po is no extra-axial collection, mass, mass  effect or midline shift.  The basilar cisterns are open.  No acute infarct is  identified. The bone windows demonstrate no abnormalities.  The visualized  portions of the paranasal sinuses and mastoid air cells are clear.      IMPRESSION  IMPRESSION: No acute intracranial abnormality. Age-related volume loss and  microvascular disease unchanged.         CXR 2/7/18  FINDINGS: Portable chest shows support lines/devices appear unchanged since  February 4. There is no apparent pneumothorax.  Lungs show improved pulmonary  edema with persistent bibasilar haziness favoring effusions and atelectasis. Heart size is large, stable. There is no midline shift.      IMPRESSION  IMPRESSION: Improved pulmonary edema. Persistent pleural effusions and  Cardiomegaly.     Renal US 2/3/18  FINDINGS: The right kidney measures 9.6 cm in length. The left kidney measures  10.5 cm in length. Both kidneys demonstrate normal cortical echogenicity. No  renal calculus is seen. There is no hydronephrosis. There is a 2.2 cm left renal  cyst. The aorta and iliac arteries are not visualized due to body habitus. Visualized portions of the IVC are normal. The bladder is decompressed by Ivy  catheter.      IMPRESSION  IMPRESSION:   No acute genitourinary pathology.     Procedures:   L neck central line insertion         Assessment / Plan:      Mr William Olmedo  is a 80y.o. 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   Appears from notes that he had \"syncope\" the day before presentation there and had heart rhytym that was alternating between \"junctional bradycardia \" and rate controlled Afib. He was transferred to [facility] and on presentation found to be septic with hypothermia. Was started on pressors as well and inititated on Meropenem, Levofloxacin and Vancomycin. Blood cultures sent 2/2 returned + for polymicrobial organisms including MSSA, Strep salivarus and ampicillin sensitive E faecalis.       He is also noted to be leukopenic without neutropenia, thrombocytopenia and macrocytic anemia  and seen by Hematology/oncology this admission. Renal function is imporving. He has had imaging of CT head that showed no acute findings. US renal done with 2.2 cm L renal cyst noted. CXR has a small R pleural effusion. He is currently on Vancomycin with last trough on 2/4/18 at 19.6. TTE has shown AV and MV thickening.      From discussion with him, he says that he has no specific symptoms. Denied any known hardware in his body. Denied falls at home. Says he was supposed to take care of his teeth when asked but got admitted. Denied pain.      From review of chart and  Medical records, he saw his PCP 1/2018 with dyspnea and notes indicate volume overload. At that time, notes do not indicate concern for pneumonia. Notes this admission, indicate concerns for bleeding around central line and BRBPR as well. He was transfused this admission.      I am consulted today regarding further recommendations.        1) Polymicrobial bacteremia ( MSSA, Streptococcus salivarius and E faecalis)  Possible source: GI/oral in origin silviano given hx of constipation wt straining and bloody bowel movements , skin entry as well given thin skin with multiple bruising         Recommendations:   No interim cultures from 2/2-2/6 to see if he cleared bacteremia quickly  Noted cardiology notes for possible MARIA when right window of opportunity   Would also suggest CT Abdomen for occult infectious nidus  when he is stable to obtain imaging  Has effusions on CXR and if he is worse, may need further evaluation for this as well but suspect effusions related to volume overload status at this time   Agree with IV Vancomcyin for now with trough goal of 15-20 as renal function is improving given issues wt Beta lactam in past   Monitor renal function closely   Vancomycin levels and dosing per pharmacy   Will need to monitor cell counts closely as Vancomycin can make this worse      2)   Allergies:  Noted Cefazolin allergy. Question if true allergy as he recalls his \"vision went all black and white from color\" when he received Cefazolin. 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 Please contact with questions or concerns.       Marco Tilley DO   11:38 AM

## 2018-02-08 NOTE — PROGRESS NOTES
Problem: Non-Violent Restraints  Goal: *Removal from restraints as soon as assessed to be safe  Outcome: Progressing Towards Goal  1:1 sitter placed with patient. Removed mitt restraints.

## 2018-02-08 NOTE — PROGRESS NOTES
1500  Removed Ivy, per Dr. Lore Kimble. Primary Nurse Jake Hess and Patricia Pritchard RN performed a dual skin assessment on this patient Impairment noted- see wound doc flow sheet  Luis score is 13  Patient has a skin tear on his R elbow, dark red non blanchable area on Left ear, sacrum pink/red but blanches. Multiple scattered bruises. Very fragile skin. [place] in room with patient. Patient pulling mitt restraints off and pulling at central line and trying to climb out of bed. Dr. Lore Kimble stated patient needs a sitter. Request placed with staffing office. Informed that CNO had to approve. Notified CCL and nurse manager notified. Tech remains in room for patient safety. 1630  Removed mitt restraints from patient. Bedside shift change report given to Jaziel George (oncoming nurse) by Kristin Rao (offgoing nurse). Report included the following information SBAR, Kardex, ED Summary, Procedure Summary, Intake/Output, MAR, Accordion, Recent Results, Med Rec Status and Cardiac Rhythm a-fib. SHIFT SUMMARY:  Patient very restless and is determined to climb out of bed. Sitter at bedside. Distraction with TV and music attempted. [address] NURSING NOTE   Admission Date 2/2/2018   Admission Diagnosis Hypothermia  Sepsis ([address]  Atrial fibrillation ([address]   Consults IP CONSULT TO CARDIOLOGY  IP CONSULT TO HEMATOLOGY  IP CONSULT TO PALLIATIVE CARE - PROVIDER  IP CONSULT TO INFECTIOUS DISEASES      Cardiac Monitoring [x] Yes [] No      Purposeful Hourly Rounding [x] Yes    Jenna Score Total Score: 3   Jenna score 3 or > [x] Bed Alarm [] Avasys [x] 1:1 sitter [] Patient refused (Place signed refusal form in chart)   Luis Score Luis Score: 13   Luis score 14 or < [] PMT consult [] Wound Care consult    []  Specialty bed  [] Nutrition consult      Influenza Vaccine Received Flu Vaccine for Current Season (usually Sept-March): Unsure    Patient/Guardian Refused (Notify MD): No      Oxygen needs?  [] Room air Oxygen @  []1L    []2L    []3L   [x]4L    []5L   []6L     Use home O2? [] Yes [x] No  Perform O2 challenge test using  smartphrase (.Homeoxygen)      Last bowel movement Last Bowel Movement Date: 02/08/18      Urinary Catheter [REMOVED] Urinary Catheter 02/02/18 Ivy - Temperature-Criteria for Appropriate Use: Strict I/Os     [REMOVED] Urinary Catheter 02/02/18 Ivy - Temperature-Urine Output (mL): 300 ml     LDAs           Triple Lumen 02/02/18 Left Neck (Active)   Central Line Being Utilized Yes 2/8/2018  2:30 PM   Criteria for Appropriate Use Limited/no vessel suitable for conventional peripheral access 2/8/2018  2:30 PM   Site Assessment Clean, dry, & intact 2/8/2018  2:30 PM   Infiltration Assessment 0 2/8/2018  2:30 PM   Affected Extremity/Extremities Color distal to insertion site pink (or appropriate for race); Pulses palpable;Range of motion performed 2/8/2018  2:30 PM   Date of Last Dressing Change 02/03/18 2/8/2018  2:30 PM   Dressing Status Clean, dry, & intact 2/8/2018  2:30 PM   Dressing Type Disk with Chlorhexadine gluconate (CHG); Transparent 2/8/2018  2:30 PM   Action Taken Open ports on tubing capped 2/8/2018 12:00 AM   Proximal Hub Color/Line Status White 2/8/2018  2:30 PM   Positive Blood Return (Medial Site) Yes 2/8/2018  4:00 AM   Medial Hub Color/Line Status Blue 2/8/2018  2:30 PM   Positive Blood Return (Lateral Site) Yes 2/8/2018  4:00 AM   Distal Hub Color/Line Status Brown 2/8/2018  2:30 PM   Positive Blood Return (Site #3) Yes 2/8/2018  4:00 AM   Alcohol Cap Used Yes 2/8/2018  2:30 PM        Peripheral IV 02/02/18 Anterior;Right Forearm (Active)   Site Assessment Dry 2/8/2018  2:30 PM   Phlebitis Assessment 0 2/8/2018  2:30 PM   Infiltration Assessment 0 2/8/2018  2:30 PM   Dressing Status Old drainage 2/8/2018  2:30 PM   Dressing Type Tape;Transparent 2/8/2018  2:30 PM   Hub Color/Line Status Pink;Flushed 2/8/2018  2:30 PM   Action Taken Open ports on tubing capped 2/8/2018 12:00 AM Alcohol Cap Used Yes 2/7/2018  8:00 PM                         Readmission Risk Assessment Tool Score Low Risk            10       Total Score        2 . Living with Significant Other. Assisted Living. LTAC. SNF. or   Rehab    3 Patient Length of Stay (>5 days = 3)    5 Pt.  Coverage (Medicare=5 , Medicaid, or Self-Pay=4)        Criteria that do not apply:    Has Seen PCP in Last 6 Months (Yes=3, No=0)    IP Visits Last 12 Months (1-3=4, 4=9, >4=11)    Charlson Comorbidity Score (Age + Comorbid Conditions)       Expected Length of Stay 5d 14h   Actual Length of Stay 6

## 2018-02-09 ENCOUNTER — APPOINTMENT (OUTPATIENT)
Dept: CT IMAGING | Age: 83
DRG: 871 | End: 2018-02-09
Attending: INTERNAL MEDICINE
Payer: MEDICARE

## 2018-02-09 LAB
ANION GAP SERPL CALC-SCNC: 7 MMOL/L (ref 5–15)
ARTERIAL PATENCY WRIST A: NORMAL
ATRIAL RATE: 50 BPM
BASE DEFICIT BLDA-SCNC: 1.6 MMOL/L
BDY SITE: NORMAL
BREATHS.SPONTANEOUS ON VENT: 18
BUN SERPL-MCNC: 29 MG/DL (ref 6–20)
BUN/CREAT SERPL: 16 (ref 12–20)
CALCIUM SERPL-MCNC: 8.8 MG/DL (ref 8.5–10.1)
CALCULATED R AXIS, ECG10: -96 DEGREES
CALCULATED T AXIS, ECG11: 35 DEGREES
CHLORIDE SERPL-SCNC: 109 MMOL/L (ref 97–108)
CO2 SERPL-SCNC: 26 MMOL/L (ref 21–32)
CREAT SERPL-MCNC: 1.86 MG/DL (ref 0.7–1.3)
DIAGNOSIS, 93000: NORMAL
ERYTHROCYTE [DISTWIDTH] IN BLOOD BY AUTOMATED COUNT: 16.7 % (ref 11.5–14.5)
GAS FLOW.O2 O2 DELIVERY SYS: 4 L/MIN
GLUCOSE BLD STRIP.AUTO-MCNC: 102 MG/DL (ref 65–100)
GLUCOSE BLD STRIP.AUTO-MCNC: 109 MG/DL (ref 65–100)
GLUCOSE BLD STRIP.AUTO-MCNC: 83 MG/DL (ref 65–100)
GLUCOSE BLD STRIP.AUTO-MCNC: 87 MG/DL (ref 65–100)
GLUCOSE BLD STRIP.AUTO-MCNC: 87 MG/DL (ref 65–100)
GLUCOSE SERPL-MCNC: 106 MG/DL (ref 65–100)
HCO3 BLDA-SCNC: 24 MMOL/L (ref 22–26)
HCT VFR BLD AUTO: 33.5 % (ref 36.6–50.3)
HGB BLD-MCNC: 10.8 G/DL (ref 12.1–17)
MCH RBC QN AUTO: 34.4 PG (ref 26–34)
MCHC RBC AUTO-ENTMCNC: 32.2 G/DL (ref 30–36.5)
MCV RBC AUTO: 106.7 FL (ref 80–99)
NRBC # BLD: 0 K/UL (ref 0–0.01)
NRBC BLD-RTO: 0 PER 100 WBC
PCO2 BLDA: 41 MMHG (ref 35–45)
PH BLDA: 7.37 [PH] (ref 7.35–7.45)
PLATELET # BLD AUTO: 79 K/UL (ref 150–400)
PMV BLD AUTO: 12.9 FL (ref 8.9–12.9)
PO2 BLDA: 88 MMHG (ref 80–100)
POTASSIUM SERPL-SCNC: 4.1 MMOL/L (ref 3.5–5.1)
Q-T INTERVAL, ECG07: 492 MS
QRS DURATION, ECG06: 164 MS
QTC CALCULATION (BEZET), ECG08: 527 MS
RBC # BLD AUTO: 3.14 M/UL (ref 4.1–5.7)
SAO2 % BLD: 97 % (ref 92–97)
SAO2% DEVICE SAO2% SENSOR NAME: NORMAL
SERVICE CMNT-IMP: ABNORMAL
SERVICE CMNT-IMP: ABNORMAL
SERVICE CMNT-IMP: NORMAL
SODIUM SERPL-SCNC: 142 MMOL/L (ref 136–145)
SPECIMEN SITE: NORMAL
VENTRICULAR RATE, ECG03: 69 BPM
WBC # BLD AUTO: 5.5 K/UL (ref 4.1–11.1)

## 2018-02-09 PROCEDURE — 77010033678 HC OXYGEN DAILY

## 2018-02-09 PROCEDURE — 85027 COMPLETE CBC AUTOMATED: CPT | Performed by: INTERNAL MEDICINE

## 2018-02-09 PROCEDURE — 74011250637 HC RX REV CODE- 250/637: Performed by: INTERNAL MEDICINE

## 2018-02-09 PROCEDURE — 74011000250 HC RX REV CODE- 250: Performed by: INTERNAL MEDICINE

## 2018-02-09 PROCEDURE — 82962 GLUCOSE BLOOD TEST: CPT

## 2018-02-09 PROCEDURE — 71250 CT THORAX DX C-: CPT

## 2018-02-09 PROCEDURE — 74011250636 HC RX REV CODE- 250/636: Performed by: INTERNAL MEDICINE

## 2018-02-09 PROCEDURE — G8988 SELF CARE GOAL STATUS: HCPCS | Performed by: OCCUPATIONAL THERAPIST

## 2018-02-09 PROCEDURE — 97161 PT EVAL LOW COMPLEX 20 MIN: CPT

## 2018-02-09 PROCEDURE — 94640 AIRWAY INHALATION TREATMENT: CPT

## 2018-02-09 PROCEDURE — 74176 CT ABD & PELVIS W/O CONTRAST: CPT

## 2018-02-09 PROCEDURE — G8987 SELF CARE CURRENT STATUS: HCPCS | Performed by: OCCUPATIONAL THERAPIST

## 2018-02-09 PROCEDURE — 65660000000 HC RM CCU STEPDOWN

## 2018-02-09 PROCEDURE — 97530 THERAPEUTIC ACTIVITIES: CPT | Performed by: OCCUPATIONAL THERAPIST

## 2018-02-09 PROCEDURE — G8978 MOBILITY CURRENT STATUS: HCPCS

## 2018-02-09 PROCEDURE — 36600 WITHDRAWAL OF ARTERIAL BLOOD: CPT | Performed by: INTERNAL MEDICINE

## 2018-02-09 PROCEDURE — 97165 OT EVAL LOW COMPLEX 30 MIN: CPT | Performed by: OCCUPATIONAL THERAPIST

## 2018-02-09 PROCEDURE — G8979 MOBILITY GOAL STATUS: HCPCS

## 2018-02-09 PROCEDURE — 36415 COLL VENOUS BLD VENIPUNCTURE: CPT | Performed by: INTERNAL MEDICINE

## 2018-02-09 PROCEDURE — 97116 GAIT TRAINING THERAPY: CPT

## 2018-02-09 PROCEDURE — 80048 BASIC METABOLIC PNL TOTAL CA: CPT | Performed by: INTERNAL MEDICINE

## 2018-02-09 PROCEDURE — 82803 BLOOD GASES ANY COMBINATION: CPT | Performed by: INTERNAL MEDICINE

## 2018-02-09 PROCEDURE — 97530 THERAPEUTIC ACTIVITIES: CPT

## 2018-02-09 RX ORDER — IPRATROPIUM BROMIDE AND ALBUTEROL SULFATE 2.5; .5 MG/3ML; MG/3ML
3 SOLUTION RESPIRATORY (INHALATION)
Status: DISCONTINUED | OUTPATIENT
Start: 2018-02-09 | End: 2018-02-14

## 2018-02-09 RX ORDER — METRONIDAZOLE 500 MG/100ML
500 INJECTION, SOLUTION INTRAVENOUS EVERY 8 HOURS
Status: DISCONTINUED | OUTPATIENT
Start: 2018-02-09 | End: 2018-02-12

## 2018-02-09 RX ORDER — IPRATROPIUM BROMIDE AND ALBUTEROL SULFATE 2.5; .5 MG/3ML; MG/3ML
3 SOLUTION RESPIRATORY (INHALATION)
Status: DISCONTINUED | OUTPATIENT
Start: 2018-02-09 | End: 2018-02-23 | Stop reason: HOSPADM

## 2018-02-09 RX ORDER — DEXTROSE MONOHYDRATE 50 MG/ML
25 INJECTION, SOLUTION INTRAVENOUS CONTINUOUS
Status: DISPENSED | OUTPATIENT
Start: 2018-02-09 | End: 2018-02-10

## 2018-02-09 RX ORDER — LEVOFLOXACIN 750 MG/1
750 TABLET ORAL
Status: DISCONTINUED | OUTPATIENT
Start: 2018-02-09 | End: 2018-02-10

## 2018-02-09 RX ADMIN — NYSTATIN: 100000 POWDER TOPICAL at 10:09

## 2018-02-09 RX ADMIN — MINERAL OIL, PETROLATUM: 425; 568 OINTMENT OPHTHALMIC at 14:00

## 2018-02-09 RX ADMIN — NYSTATIN: 100000 POWDER TOPICAL at 17:35

## 2018-02-09 RX ADMIN — METRONIDAZOLE 500 MG: 500 INJECTION, SOLUTION INTRAVENOUS at 22:14

## 2018-02-09 RX ADMIN — Medication 10 ML: at 14:12

## 2018-02-09 RX ADMIN — VANCOMYCIN HYDROCHLORIDE 1250 MG: 10 INJECTION, POWDER, LYOPHILIZED, FOR SOLUTION INTRAVENOUS at 02:37

## 2018-02-09 RX ADMIN — Medication 10 ML: at 02:37

## 2018-02-09 RX ADMIN — POLYETHYLENE GLYCOL 3350 17 G: 17 POWDER, FOR SOLUTION ORAL at 10:07

## 2018-02-09 RX ADMIN — DEXTROSE MONOHYDRATE 25 ML/HR: 5 INJECTION, SOLUTION INTRAVENOUS at 21:59

## 2018-02-09 RX ADMIN — IPRATROPIUM BROMIDE AND ALBUTEROL SULFATE 3 ML: .5; 3 SOLUTION RESPIRATORY (INHALATION) at 20:04

## 2018-02-09 RX ADMIN — AMIODARONE HYDROCHLORIDE 100 MG: 200 TABLET ORAL at 10:07

## 2018-02-09 RX ADMIN — Medication 10 ML: at 22:03

## 2018-02-09 RX ADMIN — IPRATROPIUM BROMIDE AND ALBUTEROL SULFATE 3 ML: .5; 3 SOLUTION RESPIRATORY (INHALATION) at 23:03

## 2018-02-09 NOTE — PROGRESS NOTES
Infectious Disease Progress Note       Subjective:   Transferred out of the ICU. Coughing a lot after trying to take medications with apple sauce today am. D/W with his wife.          Vitals:   Patient Vitals for the past 24 hrs:   Temp Pulse Resp BP SpO2   02/09/18 0849 97.3 °F (36.3 °C) 80 20 107/59 91 %   02/09/18 0303 97.5 °F (36.4 °C) 80 20 101/73 93 %   02/08/18 2317 97.4 °F (36.3 °C) 74 20 100/68 95 %   02/08/18 1832 97.5 °F (36.4 °C) 86 20 98/60 98 %   02/08/18 1413 97.8 °F (36.6 °C) 70 20 (!) 158/107 -   02/08/18 1300 - 75 20 101/69 -   02/08/18 1200 97.3 °F (36.3 °C) 78 19 98/71 -   02/08/18 1100 - 78 22 102/64 -       ¨ GEN: Coughing   ¨ HEENT: + eye dressing and facial asymetry,  no scleral icterus L eye,   very poor dentition with many missing/chipped off teeth , + L neck central line   ¨ CV: S1, S2 heard with RADHA  ¨ Lungs: Coarse and crackles   ¨ Abdomen: soft, non distended, non tender  ¨ Genitourinary: + no vargas  ¨ Extremities: + edema   ¨ Neuro: Awake, alert,   ¨ Skin: no rash  ¨ Psych: good affect,non tearful   ¨ Lines: L neck line     Medications:    Current Facility-Administered Medications:     vancomycin (VANCOCIN) 1250 mg in  ml infusion, 1,250 mg, IntraVENous, Q24H, Nathan Wheeler MD, Last Rate: 125 mL/hr at 02/09/18 0237, 1,250 mg at 02/09/18 0237    rivaroxaban (XARELTO) tablet 15 mg, 15 mg, Oral, DAILY WITH DINNER, Nathan Wheeler MD, 15 mg at 02/08/18 1714    white petrolatum-mineral oil (LACRILUBE S.O.P.) ointment, , Right Eye, Q8H, Meghann Zaman MD    haloperidol lactate (HALDOL) injection 4 mg, 4 mg, IntraMUSCular, Q6H PRN, Rock Monica MD    polyethylene glycol (MIRALAX) packet 17 g, 17 g, Oral, DAILY, Nathan Wheeler MD, 17 g at 02/09/18 1007    insulin lispro (HUMALOG) injection, , SubCUTAneous, AC&HS, Rock Monica MD, Stopped at 02/07/18 2200    amiodarone (CORDARONE) tablet 100 mg, 100 mg, Oral, DAILY, Carolina Hunt MD, 100 mg at 02/09/18 1007    glucose chewable tablet 16 g, 4 Tab, Oral, PRN, Linnea Meraz MD    dextrose (D50W) injection syrg 12.5-25 g, 12.5-25 g, IntraVENous, PRN, Linnea Meraz MD    glucagon [facility]) injection 1 mg, 1 mg, IntraMUSCular, PRN, Linnea Meraz MD    melatonin tablet 9 mg, 9 mg, Oral, QHS, Heather Fields MD, 9 mg at 02/08/18 2115    nystatin (MYCOSTATIN) 100,000 unit/gram powder, , Topical, TID, Linnea Meraz MD    sodium chloride (NS) flush 5-10 mL, 5-10 mL, IntraVENous, Q8H, Dada Muller MD, 10 mL at 02/09/18 0237    sodium chloride (NS) flush 5-10 mL, 5-10 mL, IntraVENous, PRN, Chilango Sosa MD, 10 mL at 02/04/18 2036      Labs:  Recent Results (from the past 24 hour(s))   GLUCOSE, POC    Collection Time: 02/08/18 11:53 AM   Result Value Ref Range    Glucose (POC) 99 65 - 100 mg/dL    Performed by [employer], POC    Collection Time: 02/08/18  5:23 PM   Result Value Ref Range    Glucose (POC) 96 65 - 100 mg/dL    Performed by [facility]    EKG, 12 LEAD, SUBSEQUENT    Collection Time: 02/08/18  5:43 PM   Result Value Ref Range    Ventricular Rate 69 BPM    Atrial Rate 50 BPM    QRS Duration 164 ms    Q-T Interval 492 ms    QTC Calculation (Bezet) 527 ms    Calculated R Axis -96 degrees    Calculated T Axis 35 degrees    Diagnosis       Atrial fibrillation  Right bundle branch block  When compared with ECG of 02-FEB-2018 18:25,  Vent.  rate has increased BY  30 BPM  QRS duration has decreased  Confirmed by Annamarie Fang, P.V. ([id number]) on 2/9/2018 9:14:05 AM     GLUCOSE, POC    Collection Time: 02/08/18  8:37 PM   Result Value Ref Range    Glucose (POC) 102 (H) 65 - 100 mg/dL    Performed by [address] [place] [id number], BASIC    Collection Time: 02/09/18  2:31 AM   Result Value Ref Range    Sodium 142 136 - 145 mmol/L    Potassium 4.1 3.5 - 5.1 mmol/L    Chloride 109 (H) 97 - 108 mmol/L    CO2 26 21 - 32 mmol/L    Anion gap 7 5 - 15 mmol/L    Glucose 106 (H) 65 - 100 mg/dL    BUN 29 (H) 6 - 20 MG/DL    Creatinine 1.86 (H) 0.70 - 1.30 MG/DL    BUN/Creatinine ratio 16 12 - 20      GFR est AA 42 (L) >60 ml/min/1.73m2    GFR est non-AA 34 (L) >60 ml/min/1.73m2    Calcium 8.8 8.5 - 10.1 MG/DL   CBC W/O DIFF    Collection Time: 02/09/18  2:31 AM   Result Value Ref Range    WBC 5.5 4.1 - 11.1 K/uL    RBC 3.14 (L) 4.10 - 5.70 M/uL    HGB 10.8 (L) 12.1 - 17.0 g/dL    HCT 33.5 (L) 36.6 - 50.3 %    .7 (H) 80.0 - 99.0 FL    MCH 34.4 (H) 26.0 - 34.0 PG    MCHC 32.2 30.0 - 36.5 g/dL    RDW 16.7 (H) 11.5 - 14.5 %    PLATELET 79 (L) 577 - 400 K/uL    MPV 12.9 8.9 - 12.9 FL    NRBC 0.0 0  WBC    ABSOLUTE NRBC 0.00 0.00 - 0.01 K/uL   GLUCOSE, POC    Collection Time: 02/09/18  8:44 AM   Result Value Ref Range    Glucose (POC) 102 (H) 65 - 100 mg/dL    Performed by Reta Tripp            Micro:   UA 2/2/18 0-4 WBC, negative bacteria, negative leukocyte esterase and negative nitrite         Blood 2/6/18 negative so far                            Blood: 2/2/18          STREPTOCOCCUS SALIVARIUS GROWING IN BOTH BOTTLES DRAWN (SITES = RFA) (SENSITIVITIES PERFORMED BY E-TEST) (A)        Culture result:      Final      STAPHYLOCOCCUS AUREUS ALSO GROWING IN THE 1ST OF 2 BOTTLES DRAWN (SITE = R FA) (A)      Culture result:      Final      ENTEROCOCCUS FAECALIS GROUP D ALSO GROWING IN THE 1ST OUT OF 2 BOTTLES DRAWN.  (SITE = RFA) GENTAMICIN SYNERGY SCREEN IS SENSITIVE @ < OR = 500 mcg/ml STREPTOMYCIN SYNERGY SCREEN IS SENSITIVE @ < OR =1000 mcg/ml (A)        Culture & Susceptibility                     Antibiotic    Organism Organism Organism          Enterococcus faecalis group D Staphylococcus aureus Streptococcus salivarius      AMPICILLIN ($)    <=2   ug/mL   S Final              AMPICILLIN/SULBACTAM ($)        <=8/4   ug/mL   S Final          CEFAZOLIN ($)        <=4   ug/mL   S Final          CEFTRIAXONE ($)            0.023   ug/mL   S Final      CIPROFLOXACIN ($)        <=1   ug/mL   S Final          CLINDAMYCIN ($)        <=0.5   ug/mL   S Final          DAPTOMYCIN ($$$$$)    2   ug/mL   S Final  1   ug/mL   S Final          ERYTHROMYCIN ($$$$)        <=0.5   ug/mL   S Final  0.125   ug/mL   S Final      GENTAMICIN ($)        <=4   ug/mL   S Final          LEVOFLOXACIN ($)            1.5   ug/mL   S Final      LINEZOLID ($$$$$)    2   ug/mL   S Final  4   ug/mL   S Final          OXACILLIN        <=0.25   ug/mL   S Final          PENICILLIN G ($$)    2   ug/mL   S Final      0.023   ug/mL   S Final      RIFAMPIN ($$$$)        <=1   ug/mL   S Final          TETRACYCLINE        <=4   ug/mL   S Final          TRIMETH-SULFAMETHOXA        <=0.5/9.5   ug/mL   S Final          VANCOMYCIN ($)    2   ug/mL   S Final  2   ug/mL   S Final  0.50   ug/mL   S Final                                        Component Value Ref Range & Units Status      Special Requests: NO SPECIAL REQUESTS    Preliminary      Culture result: NO GROWTH 1 DAY    Preliminary            Pathology Results:      FINAL PATHOLOGIC DIAGNOSIS   Right parotid gland, total parotidectomy with partial neck dissection:   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  Is minimal decreased attenuation in the periventricular white  matter which is nonspecific but consistent with small vessel disease.  There is  no intracranial hemorrhage.  There is no extra-axial collection, mass, mass  effect or midline shift.  The basilar cisterns are open.  No acute infarct is  identified.  The bone windows demonstrate no abnormalities.  The visualized  portions of the paranasal sinuses and mastoid air cells are clear.      IMPRESSION  IMPRESSION: No acute intracranial abnormality. Age-related volume loss and  microvascular disease unchanged.         CXR 2/7/18  FINDINGS: Portable chest shows support lines/devices appear unchanged since  February 4. There is no apparent pneumothorax.  Lungs show improved pulmonary  edema with persistent bibasilar haziness favoring effusions and atelectasis. Heart size is large, stable. There is no midline shift.      IMPRESSION  IMPRESSION: Improved pulmonary edema. Persistent pleural effusions and  Cardiomegaly.     Renal US 2/3/18  FINDINGS: The right kidney measures 9.6 cm in length. The left kidney measures  10.5 cm in length. Both kidneys demonstrate normal cortical echogenicity. No  renal calculus is seen. There is no hydronephrosis. There is a 2.2 cm left renal  cyst. The aorta and iliac arteries are not visualized due to body habitus. Visualized portions of the IVC are normal. The bladder is decompressed by Ivy  catheter.      IMPRESSION  IMPRESSION:   No acute genitourinary pathology.     Procedures:   L neck central line insertion         Assessment / Plan:      Mr Hannah Faulkner  is a 80y.o. 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 Appears from notes that he had \"syncope\" the day before presentation there and had heart rhytym that was alternating between  \"junctional bradycardia \" and rate controlled Afib. He was transferred to [facility] and on presentation found to be septic with hypothermia. Was started on pressors as well and inititated on Meropenem, Levofloxacin and Vancomycin.   Blood cultures sent 2/2 returned + for polymicrobial organisms including MSSA, Strep salivarus and ampicillin sensitive E faecalis.       He is also noted to be leukopenic without neutropenia, thrombocytopenia and macrocytic anemia  and seen by Hematology/oncology this admission. Renal function is imporving. He has had imaging of CT head that showed no acute findings. US renal done with 2.2 cm L renal cyst noted. CXR has a small R pleural effusion. He is currently on Vancomycin with last trough on 2/4/18 at 19.6. TTE has shown AV and MV thickening.      From discussion with him, he says that he has no specific symptoms. Denied any known hardware in his body. Denied falls at home. Says he was supposed to take care of his teeth when asked but got admitted. Denied pain.      From review of chart and  Medical records, he saw his PCP 1/2018 with dyspnea and notes indicate volume overload. At that time, notes do not indicate concern for pneumonia. Notes this admission, indicate concerns for bleeding around central line and BRBPR as well. He was transfused this admission.      I am consulted today regarding further recommendations.        1) Polymicrobial bacteremia ( MSSA, Streptococcus salivarius and E faecalis)  Possible source: GI/oral in origin likley given hx of constipation wt straining and bloody bowel movements , skin entry as well given thin skin with multiple bruising         Recommendations:   No interim cultures from 2/2-2/6 to see if he cleared bacteremia quickly  Noted cardiology notes for possible MARIA when right window of opportunity   Would also suggest CT Chest,  Abdomen, Pelvis for occult infectious nidus as well   Renal function more elevated today and Vancomycin trough 25 on 2/7, adjustments per pharmacy  If renal function continues to worsen, discussed with wife regarding antibiotic changes such as carbapenem that has small cross reactivity to Cephalosporins   Monitor renal function closely   Aspiration risk and if he deteriorates, needs anaerobic coverage as well      2)   Allergies:  Noted Cefazolin allergy. Question if true allergy as he recalls his \"vision went all black and white from color\" when he received Cefazolin. 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 Please contact with questions or concerns.       Travon Cavanaugh,    10:58 AM

## 2018-02-09 NOTE — PROGRESS NOTES
Hospitalist Progress Note    NAME: Keara Obrien   :  1928   MRN:  [id number]       Assessment / Plan:  Septic Shock   Hypothermia (hypothermia resolved)  Bacteremia (GPC's on BCx)  Staph, Strep and Enterococcus bacteremia; suspect from cutaneous source as patient picks his psoriasis relentlessness at home (per wife's description)  Was managed in ICU, now on tele  Continue on Vancomycin  Now off pressors  F/u repeat blood cultures (no growth so far)  Appreciate ID input  Eventual plan for MARIA once remain stable  Suspect ongoing Aspiration, will ask speech re evaluation   Will keep NPO for now  Prognosis guarded, palliative involved    Acute on Chronic CHF  Acute Respiratory Failure with Hypoxia from Acute Pulmonary Edema (on CXR)  2D echo with EF 25 % to 30 %.    Off Dobutamine for now, PRN per cardiology  Now on PRN IV diuretics per cardiology  Continue Xarelto  Continue O2, wean as tolerated  TSH wnl  Holding home Coreg and Xarelto  D/c central line as BP remain stable    Metabolic Encephalopathy  Delirium with hallucinations  Possible related to sepsis  QTc high, now off haldol, was getting IV haldol in ICU without worsening QTc    Chronic Atrial Fibrillation with Junctional Bradycardia  Appreciate cardiology input  On Amiodarone  BB on hold for now  Continue to monitor    Hypernatremia  Hypoglycemia - Current resolved  NPO, monitor blood sugar closely, if becomes hypoglycemic again then consider D10 25ml/hr    Acute Kidney Injury with baseline CKD3  Cr stable  Continue to monitor lytes    Pancytopenia  S/p 2 units plts on  for acute bleeding from central line with plts improving from 45K to 111K and decreased to 88K today  Leukopenia resolved  S/p Vitamin daily B12 shots x3, may need montly upon discharge    Blood in Stool PTA  Likely from Xarelto and thrombocytopenia  Back on Xarelto    Patient with history of Left Parotid Mass s/p resection approximately 1 year ago by Dr. Rickie Mcmillan and XRT, 2017 PET negative  Heme/Onc following    History of Prostate Cancer    Psoriasis    Body mass index is 28.65 kg/(m^2). Code status: Partial code , no Compressions or Defibrillation. She is okay with temporary pacing, ACLS meds (like atropine) and temporary intubation. Palliative is following    Prophylaxis: SCD's and H2B  Recommended Disposition: TBD     Subjective: Pt seen and examined at bedside. NAD, poor insight. Overnight events d/w RN     Chief Complaint / Reason for Physician Visit: follow-up septic shock  Review of Systems:  Symptom Y/N Comments  Symptom Y/N Comments   Fever/Chills    Chest Pain     Poor Appetite    Edema     Cough    Abdominal Pain     Sputum    Joint Pain     SOB/GRADY    Pruritis/Rash     Nausea/vomit    Tolerating PT/OT     Diarrhea    Tolerating Diet     Constipation    Other       Limited due to  Poor insight     Objective:     VITALS:   Last 24hrs VS reviewed since prior progress note. Most recent are:  Patient Vitals for the past 24 hrs:   Temp Pulse Resp BP SpO2   02/09/18 1451 97.2 °F (36.2 °C) 73 20 103/72 97 %   02/09/18 1141 97.8 °F (36.6 °C) 78 - 110/62 98 %   02/09/18 1132 - 77 - (!) 94/34 98 %   02/09/18 1115 - 85 - 112/66 99 %   02/09/18 1107 - 78 - 118/73 (!) 89 %   02/09/18 0849 97.3 °F (36.3 °C) 80 20 107/59 91 %   02/09/18 0303 97.5 °F (36.4 °C) 80 20 101/73 93 %   02/08/18 2317 97.4 °F (36.3 °C) 74 20 100/68 95 %   02/08/18 1832 97.5 °F (36.4 °C) 86 20 98/60 98 %       Intake/Output Summary (Last 24 hours) at 02/09/18 1622  Last data filed at 02/09/18 1451   Gross per 24 hour   Intake                0 ml   Output              300 ml   Net             -300 ml        PHYSICAL EXAM:  General: WD, Chronically ill appearing. Alert, cooperative, no acute distress    EENT:  EOMI. Anicteric sclerae. MM dry; Right facial droop and void from removal of parotid mass notable.  Right eye with patch (due to inability to close eyelid)  Resp:  CTA bilaterally on anterior and lateral assessment, no wheezing or rales. No accessory muscle use  CV:  irregular  Rhythm with normal rate, + edema noted with SCD's in palce  GI:  Soft, Non distended, Non tender.  +Bowel sounds  Neurologic:  Sedated, further neurologic status confounded by patient's sedated status   Psych:   Unable to assess. Not anxious nor agitated at this time  Skin:  No rashes. No jaundice    Reviewed most current lab test results and cultures  YES  Reviewed most current radiology test results   YES  Review and summation of old records today    NO  Reviewed patient's current orders and MAR    YES  PMH/SH reviewed - no change compared to H&P  ________________________________________________________________________  Care Plan discussed with:    Comments   Patient x    Family  x Wife at bedside   RN x    Care Manager     Consultant                        Multidiciplinary team rounds were held today with , nursing, pharmacist and clinical coordinator. Patient's plan of care was discussed; medications were reviewed and discharge planning was addressed. ________________________________________________________________________  Total NON critical care TIME: 30 Minutes    Total CRITICAL CARE TIME Spent:   Minutes non procedure based      Comments   >50% of visit spent in counseling and coordination of care     ________________________________________________________________________  Carmen Owens MD     Procedures: see electronic medical records for all procedures/Xrays and details which were not copied into this note but were reviewed prior to creation of Plan. LABS:  I reviewed today's most current labs and imaging studies.   Pertinent labs include:  Recent Labs      02/09/18   0231  02/08/18   0406  02/07/18   0353   WBC  5.5  3.4*  2.8*   HGB  10.8*  9.9*  9.6*   HCT  33.5*  30.7*  30.0*   PLT  79*  59*  60*     Recent Labs      02/09/18   0231  02/08/18   0406  02/07/18   0353   NA  142  139  142   K  4.1  4.1  4.0 CL  109*  110*  110*   CO2  26  23  25   GLU  106*  107*  89   BUN  29*  28*  30*   CREA  1.86*  1.62*  1.60*   CA  8.8  8.5  8.3*       Signed: Jinx Opitz, MD

## 2018-02-09 NOTE — PROGRESS NOTES
Problem: Mobility Impaired (Adult and Pediatric)  Goal: *Acute Goals and Plan of Care (Insert Text)  Physical Therapy Goals  Initiated 2/9/2018  1. Patient will move from supine to sit and sit to supine , scoot up and down and roll side to side in bed with moderate assistance  within 7 day(s). 2.  Patient will transfer from bed to chair and chair to bed with minimal assistance/contact guard assist using the least restrictive device within 7 day(s). 3.  Patient will perform sit to stand with minimal assistance/contact guard assist within 7 day(s). 4.  Patient will ambulate with minimal assistance/contact guard assist for 25 feet with the least restrictive device within 7 day(s). physical Therapy EVALUATION  Patient: Kristyn Vera (43 y.o. male)  Date: 2/9/2018  Primary Diagnosis: Hypothermia  Sepsis ([facility]  Atrial fibrillation ([facility]        Precautions: Fall       ASSESSMENT :  Based on the objective data described below, the patient presents with increased weakness, impaired sitting and standing balance, impaired gait mechanics, decreased endurance/activity tolerance, mild confusion, impulsiveness, baseline vision impairment in R eye, and overall impaired functional mobility. Pt received supine in bed w/ wife present and pt agreeable to participation with therapy. Pt required maxAx2 in order to assume seated position EOB. Sitting balance initially poor w/ pt requiring maxA to maintain upright, midline position however with time, pt able to maintain seated position w/ close supervision only. Pt sit>>stand w/ min-modAx2 with strong posterior lean noted upon assuming initial standing position. Pt ambulated 2ft from EOB to recliner chair w/ min-modAx2 and HHAx2. Gait unsteady overall with increased trunk sway in all planes, shuffled & mildly ataxic gait pattern, and impulsiveness. Pt initially hypotensive to 80s/50s however BP stabilized to 110s/60s with pt reclined.  All mobility occurred on 4 LPM O2 with O2 sats 98% post activity. Given how well pt tolerated therapy session this date and his prior level of functional independence, recommend inpatient rehab at discharge. Patient will benefit from skilled intervention to address the above impairments. Patients rehabilitation potential is considered to be Good  Factors which may influence rehabilitation potential include:   []         None noted  [x]         Mental ability/status  [x]         Medical condition  []         Home/family situation and support systems  []         Safety awareness  []         Pain tolerance/management  []         Other:      PLAN :  Recommendations and Planned Interventions:  [x]           Bed Mobility Training             []    Neuromuscular Re-Education  [x]           Transfer Training                   []    Orthotic/Prosthetic Training  [x]           Gait Training                         []    Modalities  [x]           Therapeutic Exercises           []    Edema Management/Control  [x]           Therapeutic Activities            [x]    Patient and Family Training/Education  []           Other (comment):    Frequency/Duration: Patient will be followed by physical therapy  5 times a week to address goals. Discharge Recommendations: Inpatient Rehab  Further Equipment Recommendations for Discharge: TBD by facility     SUBJECTIVE:   Patient stated Girma. 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 Denies history of falls. Denies home O2 use. Still driving. Lives at home w/ wife. Personal factors and/or comorbidities impacting plan of care:     Home Situation  Home Environment: Rehabilitation facility  # Steps to Enter: 3  Rails to Enter: Yes  One/Two Story Residence: One story  Living Alone: No  Support Systems: Spouse/Significant Other/Partner  Patient Expects to be Discharged to[de-identified] Private residence  Current DME Used/Available at Home: None  Tub or Shower Type: Tub/Shower combination    EXAMINATION/PRESENTATION/DECISION MAKING:   Critical Behavior:  Neurologic State: Alert, Confused  Orientation Level: Disoriented to place, Disoriented to situation, Oriented to person, Oriented to time  Cognition: Follows commands, Impaired decision making, Poor safety awareness  Safety/Judgement: Not assessed  Hearing: Auditory  Auditory Impairment: Hard of hearing, bilateral  Skin:  Multiple scattered bruises  Edema: BUEs, BLEs  Range Of Motion:  AROM: Generally decreased, functional                       Strength:    Strength: Generally decreased, functional                    Tone & Sensation:   Tone: Normal                              Coordination:  Coordination: Generally decreased, functional  Vision:      Functional Mobility:  Bed Mobility:  Rolling: Maximum assistance;Assist x2  Supine to Sit: Maximum assistance;Assist x2     Scooting: Maximum assistance;Assist x2  Transfers:  Sit to Stand: Minimum assistance;Assist x2; Moderate assistance  Stand to Sit: Minimum assistance; Moderate assistance;Assist x2        Bed to Chair: Minimum assistance; Moderate assistance;Assist x2              Balance:   Sitting: Impaired  Sitting - Static: Good (unsupported)  Sitting - Dynamic: Fair (occasional)  Standing: Impaired; With support  Standing - Static: Fair;Constant support  Standing - Dynamic : Poor  Ambulation/Gait Training:  Distance (ft): 2 Feet (ft)  Assistive Device: Gait belt (HHAx2)  Ambulation - Level of Assistance: Minimal assistance; Moderate assistance;Assist x2        Gait Abnormalities: Ataxic;Decreased step clearance;Shuffling gait;Trunk sway increased        Base of Support: Widened     Speed/Christina: Fluctuations; Pace decreased (<100 feet/min); Accelerated  Step Length: Left shortened;Right shortened                      Functional Measure:  Barthel Index:    Bathin  Bladder: 0  Bowels: 0  Groomin  Dressin  Feedin  Mobility: 0  Stairs: 0  Toilet Use: 0  Transfer (Bed to Chair and Back): 5  Total: 5       Barthel and G-code impairment scale:  Percentage of impairment CH  0% CI  1-19% CJ  20-39% CK  40-59% CL  60-79% CM  80-99% CN  100%   Barthel Score 0-100 100 99-80 79-60 59-40 20-39 1-19   0   Barthel Score 0-20 20 17-19 13-16 9-12 5-8 1-4 0      The Barthel ADL Index: Guidelines  1. The index should be used as a record of what a patient does, not as a record of what a patient could do. 2. The main aim is to establish degree of independence from any help, physical or verbal, however minor and for whatever reason. 3. The need for supervision renders the patient not independent. 4. A patient's performance should be established using the best available evidence. Asking the patient, friends/relatives and nurses are the usual sources, but direct observation and common sense are also important. However direct testing is not needed. 5. Usually the patient's performance over the preceding 24-48 hours is important, but occasionally longer periods will be relevant. 6. Middle categories imply that the patient supplies over 50 per cent of the effort. 7. Use of aids to be independent is allowed. Essence Gant., Barthel, D.W. ([id number]).  Functional evaluation: the Barthel Index. [address] [facility] (14)2. RICHARD Moore, Geoffrey Obrien., Billy Ramos., [place], [address] (1999). Measuring the change indisability after inpatient rehabilitation; comparison of the responsiveness of the Barthel Index and Functional [place] Measure. Journal of Neurology, Neurosurgery, and Psychiatry, 66(4), 317-663. EARLINE Wilkes, CHENTE Richter, & King Sudha M.A. (2004.) Assessment of post-stroke quality of life in cost-effectiveness studies: The usefulness of the Barthel Index and the EuroQoL-5D. Quality of Life Research, 13, 273-78       G codes: In compliance with CMSs Claims Based Outcome Reporting, the following G-code set was chosen for this patient based on their primary functional limitation being treated: The outcome measure chosen to determine the severity of the functional limitation was the Barthel Index with a score of 5/100 which was correlated with the impairment scale.     ? Mobility - Walking and Moving Around:     - CURRENT STATUS: CM - 80%-99% impaired, limited or restricted    - GOAL STATUS: CJ - 20%-39% impaired, limited or restricted    - D/C STATUS:  ---------------To be determined---------------      Physical Therapy Evaluation Charge Determination   History Examination Presentation Decision-Making   MEDIUM  Complexity : 1-2 comorbidities / personal factors will impact the outcome/ POC  MEDIUM Complexity : 3 Standardized tests and measures addressing body structure, function, activity limitation and / or participation in recreation  MEDIUM Complexity : Evolving with changing characteristics  MEDIUM Complexity : FOTO score of 26-74      Based on the above components, the patient evaluation is determined to be of the following complexity level: MEDIUM    Pain:  Pain Scale 1: Numeric (0 - 10)  Pain Intensity 1: 0              Activity Tolerance:   Initially hypotension however stabilized with pt seated in reclined position, O2 sats 98% on 4 LPM O2  Please refer to the flowsheet for vital signs taken during this treatment. After treatment:   [x]         Patient left in no apparent distress sitting up in chair  []         Patient left in no apparent distress in bed  [x]         Call bell left within reach  [x]         Nursing notified  [x]         Caregiver present  [x]         Bed alarm activated    COMMUNICATION/EDUCATION:   The patients plan of care was discussed with: Occupational Therapist, Registered Nurse and . [x]         Fall prevention education was provided and the patient/caregiver indicated understanding. [x]         Patient/family have participated as able in goal setting and plan of care. [x]         Patient/family agree to work toward stated goals and plan of care. []         Patient understands intent and goals of therapy, but is neutral about his/her participation. []         Patient is unable to participate in goal setting and plan of care.     Thank you for this referral.  Judie Chowdary, PT, DPT   Time Calculation: 40 mins

## 2018-02-09 NOTE — PROGRESS NOTES
Problem: Self Care Deficits Care Plan (Adult)  Goal: *Acute Goals and Plan of Care (Insert Text)  Occupational Therapy Goals  Initiated 2/9/2018  1. Patient will perform self-feeding with independence within 7 day(s). 2.  Patient will perform grooming with minimal assistance/contact guard assist within 7 day(s). 3.  Patient will perform bathing with moderate assistance  within 7 day(s). 4.  Patient will perform toilet transfers with moderate assistance  within 7 day(s). 5.  Patient will perform all aspects of toileting with moderate assistance  within 7 day(s). 6.  Patient will participate in upper extremity therapeutic exercise/activities with supervision/set-up for 5 minutes within 7 day(s). Occupational Therapy EVALUATION  Patient: David Pittman (48 y.o. male)  Date: 2/9/2018  Primary Diagnosis: Hypothermia  Sepsis ([facility] [address]  Atrial fibrillation ([facility] [address]        Precautions: fall       ASSESSMENT :  Based on the objective data described below, the patient presents with impaired cognition, altered mental status, decreased attention/concentration, decreased balance, generalized weakness, and decreased endurance impairing independence and safety during adls and mobility. Pt is functioning below his independent baseline requiring maximal assistance for adls and assistance X2 for functional mobility at this time. Pt will benefit from intensive inpatient rehab at discharge. .    Patient will benefit from skilled intervention to address the above impairments.   Patients rehabilitation potential is considered to be Good  Factors which may influence rehabilitation potential include:   []             None noted  [x]             Mental ability/status  [x]             Medical condition  []             Home/family situation and support systems  []             Safety awareness  []             Pain tolerance/management  []             Other:      PLAN :  Recommendations and Planned Interventions:  [x]               Self Care Training                  [x]        Therapeutic Activities  [x]               Functional Mobility Training    [x]        Cognitive Retraining  [x]               Therapeutic Exercises           [x]        Endurance Activities  [x]               Balance Training                   [x]        Neuromuscular Re-Education  [x]               Visual/Perceptual Training     [x]   Home Safety Training  [x]               Patient Education                 [x]        Family Training/Education  []               Other (comment):    Frequency/Duration: Patient will be followed by occupational therapy 4 times a week to address goals. Discharge Recommendations: Inpatient Rehab  Further Equipment Recommendations for Discharge: tbd     SUBJECTIVE:   Patient was difficult to understand as he kept his mouth open and eyes closed (max Cues to open) was able to state that his favorite dance was the alessandro alessandro.    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 Pt;s wife was the  as pt in not reliable at this time. Pt was independent in adls and IADLs to include driving.   Pt enjoyed dancing and now is turning his attic into an exercise studio  (instead of Gui dancing which was it's function in the past. 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 He pockets food, but could independently manage. He received treatments post surgery in June. Occupations in which the patient is/was successful, what are the barriers preventing that success: AMS/medical condition  Performance Patterns (routines, roles, habits, and rituals):   Personal Interests and/or values: likes to dance the [employer], making exercise room in attic  Expanded or extensive additional review of patient history: as above, parotid tumor removal-bells palsy, XRT, psoriasis, bialt. Cataract removal, patches R eye at night post bells palsy     Home Situation  Home Environment: Rehabilitation facility  # Steps to Enter: 3  Rails to Enter: Yes  One/Two Story Residence: One story  Living Alone: No  Support Systems: Spouse/Significant Other/Partner  Patient Expects to be Discharged to[de-identified] Private residence  Current DME Used/Available at Home: None  Tub or Shower Type: Tub/Shower combination  [x]  Right hand dominant   []  Left hand dominant    EXAMINATION OF PERFORMANCE DEFICITS:  Cognitive/Behavioral Status:  Neurologic State: Alert;Confused (wife reports hallucinations at times)  Orientation Level: Oriented to person  Cognition: Decreased attention/concentration; Follows commands; Impaired decision making  Perception:  (unable to accurately assess, pt R eye patched, keeps L closedf, despite cues during tx session)  Perseveration: No perseveration noted  Safety/Judgement: Not assessed    Skin: darkened BLEs, psoriasis,    Edema: BLEs    Hearing:   Auditory  Auditory Impairment: Hard of hearing, bilateral    Vision/Perceptual:    Tracking:  (unable to assess frequent cues to keep L eye open, R eye patched by wife at night)                      Acuity:  (unable to accurately assess this date)    Corrective Lenses: Glasses    Range of Motion:  BUEs  AROM: Generally decreased, functional Strength:  BUEs:  Strength: Generally decreased, functional                Coordination:  Coordination: Generally decreased, functional  Fine Motor Skills-Upper: Left Impaired;Right Impaired    Gross Motor Skills-Upper: Left Intact; Right Intact    Tone & Sensation:  Sensation;  Reports intact, not formally tested  Tone: Normal                         Balance:  Sitting: Impaired  Sitting - Static: Good (unsupported)  Sitting - Dynamic: Fair (occasional)  Standing: Impaired; With support  Standing - Static: Fair;Constant support  Standing - Dynamic : Poor    Functional Mobility and Transfers for ADLs:  Bed Mobility:  Rolling: Maximum assistance;Assist x2  Supine to Sit: Maximum assistance;Assist x2  Scooting: Maximum assistance;Assist x2    Transfers:  Sit to Stand: Minimum assistance;Assist x2; Moderate assistance  Stand to Sit: Minimum assistance; Moderate assistance;Assist x2  Bed to Chair: Minimum assistance; Moderate assistance;Assist x2    ADL Assessment:  Feeding: Maximum assistance    Oral Facial Hygiene/Grooming: Maximum assistance    Bathing: Total assistance    Upper Body Dressing: Maximum assistance    Lower Body Dressing: Maximum assistance    Toileting: Maximum assistance                ADL Intervention and task modifications:   Pt performed bed mobility, sit to and from standing followed by taking steps to the chair--recliner. VSS monitored and BP decreased during mobility, but with rest, BP normalized and pt was left sitting upright in recliner. Pt has secretions and may benefit from suction.   He keeps his mouth open, which may be his normal since his parotid surgery and bells palsy                                  Cognitive Retraining  Safety/Judgement: Not assessed    Therapeutic Exercise:  Encouraged sitting up in the chair and mechanical lift to return to bed    Functional Measure:  Barthel Index:    Bathin  Bladder: 0  Bowels: 0  Groomin  Dressin  Feedin  Mobility: 0  Stairs: 0  Toilet Use: 0  Transfer (Bed to Chair and Back): 5  Total: 5       Barthel and G-code impairment scale:  Percentage of impairment CH  0% CI  1-19% CJ  20-39% CK  40-59% CL  60-79% CM  80-99% CN  100%   Barthel Score 0-100 100 99-80 79-60 59-40 20-39 1-19   0   Barthel Score 0-20 20 17-19 13-16 9-12 5-8 1-4 0      The Barthel ADL Index: Guidelines  1. The index should be used as a record of what a patient does, not as a record of what a patient could do. 2. The main aim is to establish degree of independence from any help, physical or verbal, however minor and for whatever reason. 3. The need for supervision renders the patient not independent. 4. A patient's performance should be established using the best available evidence. Asking the patient, friends/relatives and nurses are the usual sources, but direct observation and common sense are also important. However direct testing is not needed. 5. Usually the patient's performance over the preceding 24-48 hours is important, but occasionally longer periods will be relevant. 6. Middle categories imply that the patient supplies over 50 per cent of the effort. 7. Use of aids to be independent is allowed. Arnaldo Schilder., Barthel, D.W. ([id number]). Functional evaluation: the Barthel Index. [address] [facility] (14)2. RICHARD Moore, Geoffrey Obrien., Billy Ramos., [address] (1999). Measuring the change indisability after inpatient rehabilitation; comparison of the responsiveness of the Barthel Index and Functional [place] Measure. Journal of Neurology, Neurosurgery, and Psychiatry, 66(4), 196-053. Frederick Renee, N.J.A, Zaki Gerber,  W.J.M, & King Sudha M.A. (2004.) Assessment of post-stroke quality of life in cost-effectiveness studies: The usefulness of the Barthel Index and the EuroQoL-5D. Quality of Life Research, 13, 337-43       G codes:   In compliance with CMSs Claims Based Outcome Reporting, the following G-code set was chosen for this patient based on their primary functional limitation being treated: The outcome measure chosen to determine the severity of the functional limitation was the Barthel Index with a score of 5/100 which was correlated with the impairment scale. ? Self Care:     - CURRENT STATUS: CM - 80%-99% impaired, limited or restricted    - GOAL STATUS: CL - 60%-79% impaired, limited or restricted    - D/C STATUS:  ---------------To be determined---------------     Occupational Therapy Evaluation Charge Determination   History Examination Decision-Making   MEDIUM Complexity : Expanded review of history including physical, cognitive and psychosocial  history  MEDIUM Complexity : 3-5 performance deficits relating to physical, cognitive , or psychosocial skils that result in activity limitations and / or participation restrictions MEDIUM Complexity : Patient may present with comorbidities that affect occupational performnce. Miniml to moderate modification of tasks or assistance (eg, physical or verbal ) with assesment(s) is necessary to enable patient to complete evaluation       Based on the above components, the patient evaluation is determined to be of the following complexity level: MEDIUM  Pain:  Pain Scale 1: Numeric (0 - 10)  Pain Intensity 1: 0              Activity Tolerance:   Fair, some decreased in BP during mobility, but normalized with rest and reclined position. Pt then set more  upright in chair. Please refer to the flowsheet for vital signs taken during this treatment.   After treatment:   [x] Patient left in no apparent distress sitting up in chair  [] Patient left in no apparent distress in bed  [x] Call bell left within reach  [x] Nursing notified  [x] Caregiver present  [x] Bed alarm activated    COMMUNICATION/EDUCATION:   The patients plan of care was discussed with: Physical Therapist and Registered Nurse.  [] Home safety education was provided and the patient/caregiver indicated understanding. [x] family have participated as able in goal setting and plan of care. [] Patient/family agree to work toward stated goals and plan of care. [] Patient understands intent and goals of therapy, but is neutral about his/her participation. [x] Patient is unable to participate in goal setting and plan of care. This patients plan of care is appropriate for delegation to [facility].     Thank you for this referral.  Jorge Alberto Moy, OTR/L  Time Calculation: 40 mins

## 2018-02-09 NOTE — PROGRESS NOTES
SHIFT SUMMARY:  5130: Patient unable to swallow; cough with intakes; lungs sounds diminished on right lobes; Dr. Soumya Figueroa notified; orders received for ST consult and Chest CT.    7720: Patient transported off unit via stretcher for chest CT    1430: Patient transported back to the unit s/p diagnostic test    1455: Verbal orders received to discontinue triple lumen central line; nurse dc central line to left IJ and applied gentle pressure to prevent bleeding; pressure dressing applied    1900: Bedside shift change report given to Vickie Kelley (oncoming nurse) by Quinn Hopkins (offgoing nurse). Report included the following information SBAR, Kardex, Intake/Output, MAR and Recent Results. [facility] NURSING NOTE   Admission Date 2/2/2018   Admission Diagnosis Hypothermia  Sepsis ([facility] [address]  Atrial fibrillation ([facility] [address]   Consults IP CONSULT TO CARDIOLOGY  IP CONSULT TO HEMATOLOGY  IP CONSULT TO PALLIATIVE CARE - PROVIDER  IP CONSULT TO INFECTIOUS DISEASES      Cardiac Monitoring [x] Yes [] No      Purposeful Hourly Rounding [x] Yes    Jenna Score Total Score: 4   Jenna score 3 or > [x] Bed Alarm [] Avasys [] 1:1 sitter [] Patient refused (Place signed refusal form in chart)   Luis Score Luis Score: 13   Luis score 14 or < [] PMT consult [] Wound Care consult    []  Specialty bed  [] Nutrition consult      Influenza Vaccine Received Flu Vaccine for Current Season (usually Sept-March): Unsure    Patient/Guardian Refused (Notify MD): No      Oxygen needs?  [] Room air Oxygen @  []1L    []2L    []3L   [x]4L    []5L   []6L     Use home O2? [] Yes [x] No  Perform O2 challenge test using  smartphrase (.Homeoxygen)      Last bowel movement Last Bowel Movement Date: 02/08/18      Urinary Catheter [REMOVED] Urinary Catheter 02/02/18 Ivy - Temperature-Criteria for Appropriate Use: Strict I/Os     [REMOVED] Urinary Catheter 02/02/18 Ivy - Temperature-Urine Output (mL): 300 ml     LDAs               Peripheral IV 02/02/18 Anterior;Right Forearm (Active)   Site Assessment Clean, dry, & intact 2/9/2018  2:51 PM   Phlebitis Assessment 0 2/9/2018  2:51 PM   Infiltration Assessment 0 2/9/2018  2:51 PM   Dressing Status Intact; Old drainage 2/9/2018  3:01 AM   Dressing Type Transparent 2/9/2018  3:01 AM   Hub Color/Line Status Pink;Capped;Flushed 2/9/2018  3:01 AM   Action Taken Open ports on tubing capped 2/8/2018 12:00 AM   Alcohol Cap Used Yes 2/7/2018  8:00 PM                         Readmission Risk Assessment Tool Score Low Risk            10       Total Score        2 . Living with Significant Other. Assisted Living. LTAC. SNF. or   Rehab    3 Patient Length of Stay (>5 days = 3)    5 Pt.  Coverage (Medicare=5 , Medicaid, or Self-Pay=4)        Criteria that do not apply:    Has Seen PCP in Last 6 Months (Yes=3, No=0)    IP Visits Last 12 Months (1-3=4, 4=9, >4=11)    Charlson Comorbidity Score (Age + Comorbid Conditions)       Expected Length of Stay 5d 14h   Actual Length of Stay 7

## 2018-02-09 NOTE — PROGRESS NOTES
Speech pathology note  SLP re-consulted. Discussed case with RN who reported patient coughing with thin liquids. RN provided meds in puree presented to left side and patient unable to fully clear oral cavity. When provided thin liquid to clear oral cavity, patient again with coughing. SLP entered room and patient sleeping. Patient mumbled unintelligibly when SLP aroused patient with verbal/tactile stimulation. When SLP asked patient to open his eyes, he did briefly. Patient then immediately fell back asleep. Patient not appropriate for PO consideration at this time secondary to poor alertness. Recommend continue NPO. If alertness improves next week, may benefit from MBS to fully assess pharyngeal phase of swallow. Thank you.     Ankit Matthews., CCC-SLP

## 2018-02-09 NOTE — ROUTINE PROCESS
PCP MALLIKA appt scheduled with Dr. Zbigniew Norris on 2/14/18 at 9:00AM. Appt added to Amy Garcia, CM Specialist

## 2018-02-09 NOTE — PROGRESS NOTES
Cardiology Progress Note    Patient ID:  Patient: Yunior Cevallos  MRN: [id number]  Age: 80 y.o.  : 1928   Admit Date: 2018    Assessment: 1. Probably atrial fibrillation with slow ventricular response requiring dobutamine, but now off of this. 2. Gram-positive cocci bacteremia and sepsis. See ID consult notes. 3. Cardiomyopathy NOS, EF 25%. 4. Acute on chronic systolic congestive heart failure. 5. Acute kidney injury atop chronic kidney disease stage III. Slowly improving. 6. Pancytopenia (watching platelets, still >35Y). 7. Encephalopathy/delirium. 8. History of R face/neck mass resected. 9. Partial code status (No shock or chest compressions). Plan:     1. Continue to hold beta blocker. 2. Continue amiodarone 100 daily. 3. Agree with treating him for bacteremia. ID consult appreciated. Will try to find a good window for MARIA to evaluate the valves. 4.     No changes today. Maybe MARIA next week, discussed with wife, Patrice Goodrich. She was present today, but if not in room, can be reached at [phone]. []       High complexity decision making was performed in this patient at high risk for decompensation. Subjective:     Yunior Cevallos denies chest pain. Review of Systems - He cannot relay a reliable ROS due to encephalopathy.     Objective:     Physical Exam:  Temp (24hrs), Av.5 °F (36.4 °C), Min:97.2 °F (36.2 °C), Max:97.8 °F (36.6 °C)    Patient Vitals for the past 8 hrs:   Pulse   18 1451 73   18 1141 78   18 1132 77   18 1115 85   18 1107 78   18 0849 80    Patient Vitals for the past 8 hrs:   Resp   18 1451 20   18 0849 20    Patient Vitals for the past 8 hrs:   BP   18 1451 103/72   18 1141 110/62   18 1132 (!) 94/34   18 1115 112/66   18 1107 118/73   18 0849 107/59          Intake/Output Summary (Last 24 hours) at 02/09/18 1637  Last data filed at 02/09/18 1451   Gross per 24 hour   Intake                0 ml   Output              300 ml   Net             -300 ml       Nondiaphoretic, not in acute distress. MMM, no jaundice, HEENT stable. L eye patch removed. Central line in the neck. Unlabored, clear to auscultation bilaterally, symmetric air movement. Regular rate and rhythm, no murmur, pericardial rub, knock, or gallop. No JVD but there is +peripheral edema. Palpable radial pulses bilaterally. Abdomen soft, nontender, nondistended. Extremities without cyanosis or clubbing. Skin warm and dry. Venostasis changes in legs. Musculoskeletal exam stable. Awake, confused. No tremor. Cardiographics and Studies, I personally reviewed:    Telemetry:  Afib with HR 70's. ECHO 2/3:    LEFT VENTRICLE: The ventricle was mildly dilated. Ejection fraction was  estimated in the range of 25 % to 30 %. There was moderate diffuse  hypokinesis. RIGHT VENTRICLE: The ventricle was moderately dilated. Systolic function  was mildly reduced. LEFT ATRIUM: The atrium was moderately dilated. RIGHT ATRIUM: The atrium was mildly dilated. MITRAL VALVE: There was mild thickening. DOPPLER: There was moderate  regurgitation. AORTIC VALVE: Leaflets exhibited mildly increased thickness. DOPPLER:  There was no significant regurgitation. TRICUSPID VALVE: Normal valve structure. DOPPLER: There was moderate  regurgitation. Pulmonary artery systolic pressure: 57 mmHg. There was  moderate pulmonary hypertension. PULMONIC VALVE: Normal valve structure. AORTA: The root exhibited normal size. SYSTEMIC VEINS: IVC: The respirophasic change in diameter was less than  50%. PERICARDIUM: There was no pericardial effusion. The pericardium was normal  in appearance. LAB Review:     No results for input(s): CPK, CKMB, CKNDX, TROIQ in the last 72 hours.     No lab exists for component: CPKMB  Lab Results   Component Value Date/Time    Cholesterol, total 128 06/29/2017 10:41 AM    HDL Cholesterol 49 06/29/2017 10:41 AM    LDL, calculated 60 06/29/2017 10:41 AM    Triglyceride 94 06/29/2017 10:41 AM     No results for input(s): INR, PTP, APTT in the last 72 hours. No lab exists for component: INREXT, INREXT   Recent Labs      02/09/18   0231  02/08/18   0406  02/07/18   0353   NA  142  139  142   K  4.1  4.1  4.0   CL  109*  110*  110*   CO2  26  23  25   BUN  29*  28*  30*   CREA  1.86*  1.62*  1.60*   GLU  106*  107*  89   CA  8.8  8.5  8.3*   WBC  5.5  3.4*  2.8*   HGB  10.8*  9.9*  9.6*   HCT  33.5*  30.7*  30.0*   PLT  79*  59*  60*     No results for input(s): SGOT, GPT, AP, TBIL, TP, ALB, GLOB, GGT, AML, LPSE in the last 72 hours. No lab exists for component: AMYP, HLPSE  No components found for: GLPOC  No results for input(s): PH, PCO2, PO2 in the last 72 hours. Medications Reviewed:      Allergies   Allergen Reactions    Ancef [Cefazolin] Unknown (comments)     \"drops my blood pressure\"    Neosporin [Benzalkonium Chloride] Unknown (comments)    Polysporin [Bacitracin-Polymyxin B] Other (comments)     \"WOUNDS DO NOT HEAL\"        Current Facility-Administered Medications   Medication Dose Route Frequency    vancomycin (VANCOCIN) 1250 mg in  ml infusion  1,250 mg IntraVENous Q24H    rivaroxaban (XARELTO) tablet 15 mg  15 mg Oral DAILY WITH DINNER    white petrolatum-mineral oil (LACRILUBE S.O.P.) ointment   Right Eye Q8H    polyethylene glycol (MIRALAX) packet 17 g  17 g Oral DAILY    insulin lispro (HUMALOG) injection   SubCUTAneous AC&HS    amiodarone (CORDARONE) tablet 100 mg  100 mg Oral DAILY    glucose chewable tablet 16 g  4 Tab Oral PRN    dextrose (D50W) injection syrg 12.5-25 g  12.5-25 g IntraVENous PRN    glucagon (GLUCAGEN) injection 1 mg  1 mg IntraMUSCular PRN    melatonin tablet 9 mg  9 mg Oral QHS    nystatin (MYCOSTATIN) 100,000 unit/gram powder   Topical TID  sodium chloride (NS) flush 5-10 mL  5-10 mL IntraVENous Q8H    sodium chloride (NS) flush 5-10 mL  5-10 mL IntraVENous PRN          Jude Crum MD  2/9/2018

## 2018-02-09 NOTE — PROGRESS NOTES
Palliative Medicine  [facility]: [phone])  [facility]: [phone] ([phone])      Resuscitation Status: Partial Code   Durable DNR addressed? [] Yes   [x] No    [] Not Applicable    Advance Care Planning 2/3/2018   Patient's Healthcare Decision Maker is: Legal Next of Corin 69   Primary Decision Maker Name Reymundo Simpson   Primary Decision Maker Phone Number [phone]   Primary Decision Maker Relationship to Patient Spouse   Confirm Advance Directive Yes, not on file           Patient / Family Encounter Documentation    Participants (names): Patient (sleeping, did not wake up despite stimulation and medical intervention by RN), Reymundo Simpson (wife), Lucian Hicks LCSW    Narrative: Patient appeared to be minimally responsive at time of this meeting and when seen about two hours previously. He seemed to have some fluids built up in his throat. Cierra Ashleigherendira (wife) noted that patient was more alert earlier today when PT came in to work with him (\"he stood up and walked a little\"). Patient appears very lethargic and not as alert as he was when last seen on 2/7/18. Asked wife what her thoughts were and she noted that she was unsure as patient was \"doing so well earlier and now he is not awake\". Discussed with wife that we may have to wait and see whether patient arouses more or not. RN on floor noted that patient has mostly been lethargic since this a m. Briefly asked wife about patient and her wishes regarding care, if patient does not improve. She noted \"we have talked about this, he has an Advanced Directive\". Gently shared with wife that if patient continues to decline, comfort care would be an option for them. Wife understands this, she is just surprised how different he presents now as compared to earlier in the day. She feels he may be \"tired from all the activity\". Explained to wife that palliative medicine will be involved while patient is here in the hospital and if she has any questions, she can call us.  She has the number. She does not have any other needs today. She plans to return home on 2/11/18 for a couple of days and may return after that. Patient has two adult children who visit him daily. Wife is staying at a hotel close to the hospital. Patient did not wake up at all through meeting. Psychosocial Issues Identified: Unclear status in terms of patient, whether this is a continuing decline or a temporary phase. Patient had periods of restlessness and agitation earlier, has had a sitter all day. Elderly wife who visits for a few days and then returns to her home. If declines continue, address goals of family and whether they wish for comfort. Goals of Care / Plan: Continue with current plan of care. Discuss comfort care if appropriate, should patient continue to decline and if treatment is not indicated.

## 2018-02-09 NOTE — PROGRESS NOTES
Cardiopulmonary Care Interdisciplinary rounds were held today to discuss patient plan of care and outcomes. The following members were present: NP/Physician, PT, Pharmacy, Nursing, Nutritionist and Case Management.     Expected Length of Stay:  5d 14h  Geometric Length of Stay: DRG GLOS: 5.6    Plan of Care: Continue current treatment plan, Inpt rehab once discharged

## 2018-02-09 NOTE — PROGRESS NOTES
CM attempted to discuss inpatient rehab recommendations with pt and family. Pt's family is not present at this time, pt appears to be somewhat confused. CM contacted pt's spouse to discuss recommendations so that appropriate referrals can be made. CM left confidential voicemail requesting follow-up.      MONTEZ Mtz Supervisee in Social Work, [employer]  [phone]

## 2018-02-09 NOTE — PROGRESS NOTES
Initial Nutrition Assessment:    INTERVENTIONS/RECOMMENDATIONS:   · Meals/Snacks: General/healthful diet: Adv diet per MD to Cardiac Diet     ASSESSMENT:   Chart reviewed. Pt medically noted for Afib, hypothermia and sepsis. PMHx of CAD/CHF/cardiomyopathy, CKD3 and afib. Unable to speak w/pt as he was being cleaned up. No concerns at this time. Diet Order: NPO  % Eaten:  Patient Vitals for the past 72 hrs:   % Diet Eaten   02/09/18 0849 0 %   02/07/18 1727 50 %   02/07/18 1200 100 %   02/07/18 0809 100 %     Pertinent Medications: [x]Reviewed []Other: melatonin, nystatin, miralax  Pertinent Labs: [x]Reviewed []Other: gluc [id number], Cl 109  Food Allergies: [x]None []Other   Last BM: 2/8  []Active     []Hyperactive  []Hypoactive       [] Absent BS  Skin:    [] Intact   [] Incision  [] Breakdown  [x] Other: skin tear and pressure injury    Anthropometrics:   Height: 5' 9\" (175.3 cm) Weight: 88 kg (194 lb 0.1 oz)   IBW (%IBW):   ( ) UBW (%UBW):   (  %)   Last Weight Metrics:  Weight Loss Metrics 2/2/2018 2/2/2018 1/9/2018 9/28/2017 9/21/2017 9/13/2017 9/10/2017   Today's Wt 194 lb 0.1 oz 194 lb 199 lb 182 lb 183 lb 185 lb 192 lb 10.9 oz   BMI 28.65 kg/m2 28.65 kg/m2 26.99 kg/m2 24.68 kg/m2 24.82 kg/m2 25.09 kg/m2 26.13 kg/m2       BMI: Body mass index is 28.65 kg/(m^2). This BMI is indicative of:   []Underweight    []Normal    [x]Overweight    [] Obesity   [] Extreme Obesity (BMI>40)     Estimated Nutrition Needs (Based on):   1994 Kcals/day (BMR x AF 1.3) , 88 g (1.0g/kg) Protein  Carbohydrate:  At Least 130 g/day  Fluids: 2000 mL/day (1ml/kcal)    NUTRITION DIAGNOSES:   Problem:  No nutritional diagnosis at this time      Etiology: related to       Signs/Symptoms: as evidenced by        NUTRITION INTERVENTIONS:  Meals/Snacks: General/healthful diet                  GOAL:   to consume greater than 50% of meals in 3-7 days    LEARNING NEEDS (Diet, Food/Nutrient-Drug Interaction):    [x] None Identified   [] Identified and Education Provided/Documented   [] Identified and Pt declined/was not appropriate     Cultureal, Holiness, OR Ethnic Dietary Needs:    [x] None Identified   [] Identified and Addressed     [x] Interdisciplinary Care Plan Reviewed/Documented    [x] Discharge Planning: Continue a general/healthful diet       MONITORING /EVALUATION:    Food/Nutrient Intake Outcomes:  Total energy intake  Physical Signs/Symptoms Outcomes: Weight/weight change, Electrolyte and renal profile, GI profile, Glucose profile    NUTRITION RISK:    [x] Patient At Nutritional Risk    [] High              [] Moderate/Mild           [x]  Low     [] Patient Not At Nutritional Risk    PT SEEN FOR:    []  MD Consult: []Calorie Count      []Diabetic Diet Education        []Diet Education     []Electrolyte Management     []General Nutrition Management and Supplements     []Management of Tube Feeding     []TPN Recommendations    []  RN Referral:  []MST score >=2     []Enteral/Parenteral Nutrition PTA     []Pregnant: Gestational DM or Multigestation     []Pressure Ulcer/Wound Care needs        []  Low BMI  [x]  JANET Cai  Pager [phone]  Weekend Pager [phone]

## 2018-02-10 ENCOUNTER — APPOINTMENT (OUTPATIENT)
Dept: GENERAL RADIOLOGY | Age: 83
DRG: 871 | End: 2018-02-10
Attending: INTERNAL MEDICINE
Payer: MEDICARE

## 2018-02-10 LAB
ANION GAP SERPL CALC-SCNC: 8 MMOL/L (ref 5–15)
BASOPHILS # BLD: 0 K/UL (ref 0–0.1)
BASOPHILS NFR BLD: 0 % (ref 0–1)
BUN SERPL-MCNC: 31 MG/DL (ref 6–20)
BUN/CREAT SERPL: 18 (ref 12–20)
CALCIUM SERPL-MCNC: 8.6 MG/DL (ref 8.5–10.1)
CHLORIDE SERPL-SCNC: 111 MMOL/L (ref 97–108)
CO2 SERPL-SCNC: 23 MMOL/L (ref 21–32)
CREAT SERPL-MCNC: 1.7 MG/DL (ref 0.7–1.3)
DIFFERENTIAL METHOD BLD: ABNORMAL
EOSINOPHIL # BLD: 0 K/UL (ref 0–0.4)
EOSINOPHIL NFR BLD: 0 % (ref 0–7)
ERYTHROCYTE [DISTWIDTH] IN BLOOD BY AUTOMATED COUNT: 16.8 % (ref 11.5–14.5)
GLUCOSE BLD STRIP.AUTO-MCNC: 153 MG/DL (ref 65–100)
GLUCOSE BLD STRIP.AUTO-MCNC: 76 MG/DL (ref 65–100)
GLUCOSE BLD STRIP.AUTO-MCNC: 80 MG/DL (ref 65–100)
GLUCOSE BLD STRIP.AUTO-MCNC: 82 MG/DL (ref 65–100)
GLUCOSE BLD STRIP.AUTO-MCNC: 87 MG/DL (ref 65–100)
GLUCOSE BLD STRIP.AUTO-MCNC: 90 MG/DL (ref 65–100)
GLUCOSE SERPL-MCNC: 81 MG/DL (ref 65–100)
HCT VFR BLD AUTO: 31.5 % (ref 36.6–50.3)
HGB BLD-MCNC: 10.4 G/DL (ref 12.1–17)
IMM GRANULOCYTES # BLD: 0.3 K/UL (ref 0–0.04)
IMM GRANULOCYTES NFR BLD AUTO: 5 % (ref 0–0.5)
LYMPHOCYTES # BLD: 0.5 K/UL (ref 0.8–3.5)
LYMPHOCYTES NFR BLD: 9 % (ref 12–49)
MCH RBC QN AUTO: 34.6 PG (ref 26–34)
MCHC RBC AUTO-ENTMCNC: 33 G/DL (ref 30–36.5)
MCV RBC AUTO: 104.7 FL (ref 80–99)
MONOCYTES # BLD: 1 K/UL (ref 0–1)
MONOCYTES NFR BLD: 18 % (ref 5–13)
NEUTS SEG # BLD: 3.9 K/UL (ref 1.8–8)
NEUTS SEG NFR BLD: 68 % (ref 32–75)
NRBC # BLD: 0 K/UL (ref 0–0.01)
NRBC BLD-RTO: 0 PER 100 WBC
PLATELET # BLD AUTO: 61 K/UL (ref 150–400)
PMV BLD AUTO: 13.1 FL (ref 8.9–12.9)
POTASSIUM SERPL-SCNC: 4 MMOL/L (ref 3.5–5.1)
RBC # BLD AUTO: 3.01 M/UL (ref 4.1–5.7)
RBC MORPH BLD: ABNORMAL
RBC MORPH BLD: ABNORMAL
SERVICE CMNT-IMP: ABNORMAL
SERVICE CMNT-IMP: NORMAL
SODIUM SERPL-SCNC: 142 MMOL/L (ref 136–145)
WBC # BLD AUTO: 5.7 K/UL (ref 4.1–11.1)

## 2018-02-10 PROCEDURE — 74011000250 HC RX REV CODE- 250: Performed by: INTERNAL MEDICINE

## 2018-02-10 PROCEDURE — 36415 COLL VENOUS BLD VENIPUNCTURE: CPT | Performed by: INTERNAL MEDICINE

## 2018-02-10 PROCEDURE — 85025 COMPLETE CBC W/AUTO DIFF WBC: CPT | Performed by: INTERNAL MEDICINE

## 2018-02-10 PROCEDURE — 80048 BASIC METABOLIC PNL TOTAL CA: CPT | Performed by: EMERGENCY MEDICINE

## 2018-02-10 PROCEDURE — 74011250637 HC RX REV CODE- 250/637: Performed by: INTERNAL MEDICINE

## 2018-02-10 PROCEDURE — 71045 X-RAY EXAM CHEST 1 VIEW: CPT

## 2018-02-10 PROCEDURE — 77010033678 HC OXYGEN DAILY

## 2018-02-10 PROCEDURE — 74011250636 HC RX REV CODE- 250/636: Performed by: INTERNAL MEDICINE

## 2018-02-10 PROCEDURE — 82962 GLUCOSE BLOOD TEST: CPT

## 2018-02-10 PROCEDURE — 94640 AIRWAY INHALATION TREATMENT: CPT

## 2018-02-10 PROCEDURE — 65660000000 HC RM CCU STEPDOWN

## 2018-02-10 RX ADMIN — DEXTROSE MONOHYDRATE 25 G: 25 INJECTION, SOLUTION INTRAVENOUS at 05:32

## 2018-02-10 RX ADMIN — IPRATROPIUM BROMIDE AND ALBUTEROL SULFATE 3 ML: .5; 3 SOLUTION RESPIRATORY (INHALATION) at 19:11

## 2018-02-10 RX ADMIN — Medication 10 ML: at 22:35

## 2018-02-10 RX ADMIN — POLYETHYLENE GLYCOL 3350 17 G: 17 POWDER, FOR SOLUTION ORAL at 09:30

## 2018-02-10 RX ADMIN — METRONIDAZOLE 500 MG: 500 INJECTION, SOLUTION INTRAVENOUS at 05:31

## 2018-02-10 RX ADMIN — IPRATROPIUM BROMIDE AND ALBUTEROL SULFATE 3 ML: .5; 3 SOLUTION RESPIRATORY (INHALATION) at 23:05

## 2018-02-10 RX ADMIN — Medication 10 ML: at 05:32

## 2018-02-10 RX ADMIN — IPRATROPIUM BROMIDE AND ALBUTEROL SULFATE 3 ML: .5; 3 SOLUTION RESPIRATORY (INHALATION) at 08:06

## 2018-02-10 RX ADMIN — NYSTATIN: 100000 POWDER TOPICAL at 16:35

## 2018-02-10 RX ADMIN — MINERAL OIL, PETROLATUM: 425; 568 OINTMENT OPHTHALMIC at 14:55

## 2018-02-10 RX ADMIN — NYSTATIN: 100000 POWDER TOPICAL at 09:34

## 2018-02-10 RX ADMIN — Medication 10 ML: at 14:39

## 2018-02-10 RX ADMIN — IPRATROPIUM BROMIDE AND ALBUTEROL SULFATE 3 ML: .5; 3 SOLUTION RESPIRATORY (INHALATION) at 03:58

## 2018-02-10 RX ADMIN — METRONIDAZOLE 500 MG: 500 INJECTION, SOLUTION INTRAVENOUS at 13:25

## 2018-02-10 RX ADMIN — METRONIDAZOLE 500 MG: 500 INJECTION, SOLUTION INTRAVENOUS at 20:27

## 2018-02-10 RX ADMIN — IPRATROPIUM BROMIDE AND ALBUTEROL SULFATE 3 ML: .5; 3 SOLUTION RESPIRATORY (INHALATION) at 15:21

## 2018-02-10 RX ADMIN — AMIODARONE HYDROCHLORIDE 100 MG: 200 TABLET ORAL at 09:30

## 2018-02-10 RX ADMIN — VANCOMYCIN HYDROCHLORIDE 1250 MG: 10 INJECTION, POWDER, LYOPHILIZED, FOR SOLUTION INTRAVENOUS at 03:21

## 2018-02-10 RX ADMIN — NYSTATIN: 100000 POWDER TOPICAL at 22:36

## 2018-02-10 RX ADMIN — IPRATROPIUM BROMIDE AND ALBUTEROL SULFATE 3 ML: .5; 3 SOLUTION RESPIRATORY (INHALATION) at 11:43

## 2018-02-10 NOTE — PROGRESS NOTES
Rapid response called 8PM for acute encephalopathy with probable on going aspiration  On arrival, pt sounds gurly, on 4LNC. Appeared to have difficulty controlling his secretions. He was able to answer some questions. Per nursing staff, pt more awake and alert today. Able to worked with PT/OT  Vitals: T97.5, P71, BP 95/58. CVS: irregular rhythm  Lungs: congestion, diminished lung sound b/l  Neuro: awake, able to answer some questions. Acute encephalopathy  Acute on chronic hypoxic respiratory failure  -BG 80s  -likely due to aspiration PNA  -CT chest performed earlier today showed possible atelectasis vs consolidation  -obtain ABG to r/o CO2 retention  -keep NPO  -start D5 infusion at 25cc/hr, monitor volume status  -add flagyl and levaquin to vancomycin  -repeat CXR in the AM  -deep suction to be performed by nursing  -palliative care already on board  -discussed with pt's daughter at bedside.   She has numerous questions regarding duration of abx for bacteremia that will need to be addressed by primary team in the AM  -will transfer to PCU  -scheduled and prn nebs, aspiration precaution   Total CC time: 35 mins  Jackie Rodriguez MD

## 2018-02-10 NOTE — PROGRESS NOTES
Mr Barbara Maciel seen earlier today    He was not coughing.  Discussed with wife and daughter today    Events and chart reviewed     Stopped Levaquin given he is on Vancomycin and already has renal issues     High concern for aspiration, which is usually oral norman and therefore agree with adding anaerobic coverage    If he worsens clinically, can add Gram negative coverage but given risk for CDI and renal function, would continue Vancomycin and Flagyl for now     Abhijit Reyes, DO  5:21 PM

## 2018-02-10 NOTE — PROGRESS NOTES
Hospitalist Progress Note    NAME: Get Manriquez   :  1928   MRN:  [id number]       Assessment / Plan:  Septic Shock   Hypothermia (hypothermia resolved)  Bacteremia (GPC's on BCx)  Staph, Strep and Enterococcus bacteremia; suspect from cutaneous source as patient picks his psoriasis relentlessness at home (per wife's description)  Now aspirating with aspiration PNA  Was managed in ICU, then tele, now in PCU  Continue on Vancomycin and Flagyl (added for aspiration)  Levaquin stopped by ID  Off pressors  Repeat blood cultures with no growth so far  Appreciate ID input  Eventual plan for MARIA once if ever stable  Will keep NPO for now  Prognosis guarded, palliative involved  Will ask for frequent RT suctioning  Discussed prognosis and current treatment with daughter and wife today     Acute on Chronic Systolic CHF with EF 82%  Acute Respiratory Failure with Hypoxia from Acute Pulmonary Edema (on CXR)  Now with aspiration PNA  Off Dobutamine for now, PRN per cardiology  Now on PRN IV diuretics per cardiology  Continue Xarelto  Continue O2, wean as tolerated  TSH wnl  Holding home Coreg and Xarelto  abx as above  O2 support    Metabolic Encephalopathy  Delirium with hallucinations  Possible related to sepsis  QTc high, now off haldol, was getting IV haldol in ICU without worsening QTc    Chronic Atrial Fibrillation with Junctional Bradycardia  Appreciate cardiology input  On Amiodarone  BB on hold for now  Continue to monitor    Hypernatremia  Hypoglycemia - Current resolved  NPO, monitor blood sugar closely, if becomes hypoglycemic again then consider D10 25ml/hr    Acute Kidney Injury with baseline CKD3  Cr stable  Continue to monitor lytes    Pancytopenia  S/p 2 units plts on  for acute bleeding from central line with plts improving from 45K to 111K and decreased to 88K today  Leukopenia resolved  S/p Vitamin daily B12 shots x3, may need montly upon discharge    Blood in Stool PTA  Likely from Xarelto and thrombocytopenia  Back on Xarelto    Patient with history of Left Parotid Mass s/p resection approximately 1 year ago by Dr. Harris Prudent and XRT, July 2017 PET negative  Heme/Onc following    History of Prostate Cancer    Psoriasis    Body mass index is 31.71 kg/(m^2). Code status: Partial code , no Compressions or Defibrillation. She is okay with temporary pacing, ACLS meds (like atropine) and temporary intubation. Palliative is following    Prophylaxis: SCD's and H2B  Recommended Disposition: TBD     Subjective: Pt seen and examined at bedside. Lethargic looks. Overnight events d/w RN     Chief Complaint / Reason for Physician Visit: follow-up septic shock  Review of Systems:  Symptom Y/N Comments  Symptom Y/N Comments   Fever/Chills    Chest Pain     Poor Appetite    Edema     Cough    Abdominal Pain     Sputum    Joint Pain     SOB/GRADY    Pruritis/Rash     Nausea/vomit    Tolerating PT/OT     Diarrhea    Tolerating Diet     Constipation    Other       Limited due to  Poor insight     Objective:     VITALS:   Last 24hrs VS reviewed since prior progress note. Most recent are:  Patient Vitals for the past 24 hrs:   Temp Pulse Resp BP SpO2   02/10/18 1143 - - - - 95 %   02/10/18 1108 97.2 °F (36.2 °C) 78 22 96/71 98 %   02/10/18 0807 - - - - 98 %   02/10/18 0739 97.6 °F (36.4 °C) 72 22 104/66 94 %   02/10/18 0400 97.5 °F (36.4 °C) 80 22 (!) 127/91 91 %   02/10/18 0358 - - - - 96 %   02/10/18 0000 97.7 °F (36.5 °C) 77 16 104/67 94 %   02/09/18 2303 - - - - 98 %   02/09/18 2145 97.8 °F (36.6 °C) 68 16 92/62 98 %   02/09/18 2005 - - - - 99 %   02/09/18 1854 97.5 °F (36.4 °C) 71 - 95/58 97 %   02/09/18 1451 97.2 °F (36.2 °C) 73 20 103/72 97 %       Intake/Output Summary (Last 24 hours) at 02/10/18 1335  Last data filed at 02/10/18 1300   Gross per 24 hour   Intake           941.67 ml   Output              475 ml   Net           466.67 ml        PHYSICAL EXAM:  General: WD, Chronically ill appearing.  Alert, cooperative, no acute distress    EENT:  EOMI. Anicteric sclerae. MM dry; Right facial droop and void from removal of parotid mass notable. Right eye with patch (due to inability to close eyelid)  Resp:  CTA bilaterally on anterior and lateral assessment, no wheezing or rales. No accessory muscle use  CV:  irregular  Rhythm with normal rate, + edema noted with SCD's in palce  GI:  Soft, Non distended, Non tender.  +Bowel sounds  Neurologic:  Sedated, further neurologic status confounded by patient's sedated status   Psych:   Unable to assess. Not anxious nor agitated at this time  Skin:  No rashes. No jaundice    Reviewed most current lab test results and cultures  YES  Reviewed most current radiology test results   YES  Review and summation of old records today    NO  Reviewed patient's current orders and MAR    YES  PMH/ reviewed - no change compared to H&P  ________________________________________________________________________  Care Plan discussed with:    Comments   Patient x    Family  x Wife and daughter at bedside   RN x    Care Manager     Consultant                        Multidiciplinary team rounds were held today with , nursing, pharmacist and clinical coordinator. Patient's plan of care was discussed; medications were reviewed and discharge planning was addressed. ________________________________________________________________________  Total NON critical care TIME: 25 Minutes    Total CRITICAL CARE TIME Spent:   Minutes non procedure based      Comments   >50% of visit spent in counseling and coordination of care     ________________________________________________________________________  Alok Garcia MD     Procedures: see electronic medical records for all procedures/Xrays and details which were not copied into this note but were reviewed prior to creation of Plan. LABS:  I reviewed today's most current labs and imaging studies.   Pertinent labs include:  Recent Labs      02/10/18 9801  02/09/18   0231  02/08/18   0406   WBC  5.7  5.5  3.4*   HGB  10.4*  10.8*  9.9*   HCT  31.5*  33.5*  30.7*   PLT  61*  79*  59*     Recent Labs      02/10/18   0502  02/09/18   0231  02/08/18   0406   NA  142  142  139   K  4.0  4.1  4.1   CL  111*  109*  110*   CO2  23  26  23   GLU  81  106*  107*   BUN  31*  29*  28*   CREA  1.70*  1.86*  1.62*   CA  8.6  8.8  8.5       Signed: Abril Parrish MD

## 2018-02-10 NOTE — PROGRESS NOTES
[id number]: responded to RRT call for gurgling and lethargy. On arrival patient resting in bed, lung sounds clear, congestion in throat, o2 sats %. Deep suction performed with thick tan secretions out. Dr. Tonya Melissa in to see patient and updated. Patient wakes to answer questions and follows commands. Patient has had several day history of no sleep and confusion. ABG ordered and normal.  Dr. Tonya Melissa placed orders for repeat CXR in AM and tx to PCU.

## 2018-02-10 NOTE — PROGRESS NOTES
PCU SHIFT NURSING NOTE      Bedside and Verbal shift change report given to Armando Aceves RN (oncoming nurse) by Tucker Mackey RN (offgoing nurse). Report included the following information SBAR, Kardex, ED Summary, Procedure Summary, Intake/Output, MAR, Recent Results, Med Rec Status, Cardiac Rhythm A-fib  and Alarm Parameters . Shift Summary:         Admission Date 2/2/2018   Admission Diagnosis Hypothermia  Sepsis ([facility] [address]  Atrial fibrillation ([facility] [address]   Consults IP CONSULT TO CARDIOLOGY  IP CONSULT TO HEMATOLOGY  IP CONSULT TO PALLIATIVE CARE - PROVIDER  IP CONSULT TO INFECTIOUS DISEASES        Consults   [x]PT   [x]OT   [x]Speech   [x]Case Management      [] Palliative      Cardiac Monitoring Order   [x]Yes   []No     IV drips   []Yes    Drip:                            Dose:  Drip:                            Dose:  Drip:                            Dose:   [x]No     GI Prophylaxis   [x]Yes   []No         DVT Prophylaxis   SCDs:  Sequential Compression Device: Bilateral     Patient Refused VTE Prophylaxis: Yes    Edwin stockings:         [x] Medication   []Contraindicated   []None      Activity Level Activity Level: Bed Rest     Activity Assistance: Complete care   Purposeful Rounding every 1-2 hour? [x]Yes   Hutchison Score  Total Score: 3   Bed Alarm (If score 3 or >)   [x]Yes   [] Refused (See signed refusal form in chart)   Luis Score  Luis Score: 13   Lusi Score (if score 14 or less)   [x]PMT consult   [x]Wound Care consult      []Specialty bed   [x] Nutrition consult          Needs prior to discharge:   Home O2 required:    []Yes   [x]No    If yes, how much O2 required? Other:    Last Bowel Movement: Last Bowel Movement Date: 02/08/18      Influenza Vaccine Received Flu Vaccine for Current Season (usually Sept-March):  Unsure    Patient/Guardian Refused (Notify MD): No   Pneumonia Vaccine           Diet Active Orders   Diet    DIET NPO      LDAs               Peripheral IV 02/02/18 Anterior;Right Forearm (Active)   Site Assessment Clean, dry, & intact 2/10/2018 11:08 AM   Phlebitis Assessment 0 2/10/2018 11:08 AM   Infiltration Assessment 0 2/10/2018 11:08 AM   Dressing Status Clean, dry, & intact 2/10/2018 11:08 AM   Dressing Type Tape;Transparent 2/10/2018 11:08 AM   Hub Color/Line Status Pink; Infusing;Flushed 2/10/2018 11:08 AM   Action Taken Open ports on tubing capped 2/10/2018 11:08 AM   Alcohol Cap Used No 2/10/2018 11:08 AM                      Urinary Catheter [REMOVED] Urinary Catheter 02/02/18 Ivy - Temperature-Criteria for Appropriate Use: Strict I/Os    Intake & Output   Date 02/09/18 0700 - 02/10/18 0659 02/10/18 0700 - 02/11/18 0659   Shift 4440-5131 9146-2678 24 Hour Total 0529-6477 6138-3763 24 Hour Total   I  N  T  A  K  E   P. O. 0  0         P. O. 0  0       I.V.  (mL/kg/hr)  925  (0.9) 925  (0.4) 16.7  16.7      I.V.  250 250         Volume (dextrose 5% infusion)  225 225 16.7  16.7      Volume (vancomycin (VANCOCIN) 1250 mg in  ml infusion)  250 250 0  0      Volume (metroNIDAZOLE (FLAGYL) IVPB premix 500 mg)  200 200 0  0    Shift Total  (mL/kg) 0  (0) 925  (10.5) 925  (10.5) 16.7  (0.2)  16.7  (0.2)   O  U  T  P  U  T   Urine  (mL/kg/hr)  475  (0.4) 475  (0.2)         Urine Voided  475 475         Urine Occurrence(s) 3 x 1 x 4 x 2 x  2 x    Stool            Stool Occurrence(s)  1 x 1 x       Shift Total  (mL/kg)  475  (5.4) 475  (5.4)      NET 0 450 450 16.7  16.7   Weight (kg) 88 88 88 97.4 97.4 97.4         Readmission Risk Assessment Tool Score Low Risk            10       Total Score        2 . Living with Significant Other. Assisted Living. LTAC. SNF. or   Rehab    3 Patient Length of Stay (>5 days = 3)    5 Pt.  Coverage (Medicare=5 , Medicaid, or Self-Pay=4)        Criteria that do not apply:    Has Seen PCP in Last 6 Months (Yes=3, No=0)    IP Visits Last 12 Months (1-3=4, 4=9, >4=11)    Charlson Comorbidity Score (Age + Comorbid Conditions)       Expected Length of Stay 5d 14h   Actual Length of Stay 8

## 2018-02-10 NOTE — PROGRESS NOTES
Problem: Falls - Risk of  Goal: *Absence of Falls  Document Jenna Fall Risk and appropriate interventions in the flowsheet.    Outcome: Progressing Towards Goal  Fall Risk Interventions:  Mobility Interventions: Assess mobility with egress test, Bed/chair exit alarm, Patient to call before getting OOB, PT Consult for assist device competence, Utilize walker, cane, or other assitive device, Utilize gait belt for transfers/ambulation, Strengthening exercises (ROM-active/passive), PT Consult for mobility concerns, OT consult for ADLs, Communicate number of staff needed for ambulation/transfer    Mentation Interventions: Adequate sleep, hydration, pain control, Bed/chair exit alarm, Door open when patient unattended, Familiar objects from home, Reorient patient, Toileting rounds, More frequent rounding, Eyeglasses and hearing aids, Update white board, Room close to nurse's station, Increase mobility, Family/sitter at bedside, Evaluate medications/consider consulting pharmacy    Medication Interventions: Bed/chair exit alarm, Evaluate medications/consider consulting pharmacy, Teach patient to arise slowly, Patient to call before getting OOB    Elimination Interventions: Bed/chair exit alarm, Call light in reach, Elevated toilet seat, Patient to call for help with toileting needs, Urinal in reach    History of Falls Interventions: Bed/chair exit alarm, Consult care management for discharge planning, Evaluate medications/consider consulting pharmacy, Room close to nurse's station, Utilize gait belt for transfer/ambulation, Door open when patient unattended

## 2018-02-11 LAB
ANION GAP SERPL CALC-SCNC: 6 MMOL/L (ref 5–15)
BASOPHILS # BLD: 0 K/UL (ref 0–0.1)
BASOPHILS NFR BLD: 0 % (ref 0–1)
BUN SERPL-MCNC: 29 MG/DL (ref 6–20)
BUN/CREAT SERPL: 19 (ref 12–20)
CALCIUM SERPL-MCNC: 8.4 MG/DL (ref 8.5–10.1)
CHLORIDE SERPL-SCNC: 111 MMOL/L (ref 97–108)
CO2 SERPL-SCNC: 26 MMOL/L (ref 21–32)
CREAT SERPL-MCNC: 1.53 MG/DL (ref 0.7–1.3)
DATE LAST DOSE: ABNORMAL
DIFFERENTIAL METHOD BLD: ABNORMAL
EOSINOPHIL # BLD: 0 K/UL (ref 0–0.4)
EOSINOPHIL NFR BLD: 0 % (ref 0–7)
ERYTHROCYTE [DISTWIDTH] IN BLOOD BY AUTOMATED COUNT: 16.8 % (ref 11.5–14.5)
GLUCOSE BLD STRIP.AUTO-MCNC: 101 MG/DL (ref 65–100)
GLUCOSE BLD STRIP.AUTO-MCNC: 110 MG/DL (ref 65–100)
GLUCOSE BLD STRIP.AUTO-MCNC: 77 MG/DL (ref 65–100)
GLUCOSE BLD STRIP.AUTO-MCNC: 94 MG/DL (ref 65–100)
GLUCOSE BLD STRIP.AUTO-MCNC: 95 MG/DL (ref 65–100)
GLUCOSE BLD STRIP.AUTO-MCNC: 99 MG/DL (ref 65–100)
GLUCOSE SERPL-MCNC: 88 MG/DL (ref 65–100)
HCT VFR BLD AUTO: 30.3 % (ref 36.6–50.3)
HGB BLD-MCNC: 9.9 G/DL (ref 12.1–17)
IMM GRANULOCYTES # BLD: 0.3 K/UL (ref 0–0.04)
IMM GRANULOCYTES NFR BLD AUTO: 6 % (ref 0–0.5)
LYMPHOCYTES # BLD: 0.4 K/UL (ref 0.8–3.5)
LYMPHOCYTES NFR BLD: 7 % (ref 12–49)
MCH RBC QN AUTO: 34.3 PG (ref 26–34)
MCHC RBC AUTO-ENTMCNC: 32.7 G/DL (ref 30–36.5)
MCV RBC AUTO: 104.8 FL (ref 80–99)
MONOCYTES # BLD: 1 K/UL (ref 0–1)
MONOCYTES NFR BLD: 18 % (ref 5–13)
NEUTS SEG # BLD: 3.7 K/UL (ref 1.8–8)
NEUTS SEG NFR BLD: 69 % (ref 32–75)
NRBC # BLD: 0.02 K/UL (ref 0–0.01)
NRBC BLD-RTO: 0.4 PER 100 WBC
PLATELET # BLD AUTO: 75 K/UL (ref 150–400)
PMV BLD AUTO: 12.4 FL (ref 8.9–12.9)
POTASSIUM SERPL-SCNC: 3.9 MMOL/L (ref 3.5–5.1)
RBC # BLD AUTO: 2.89 M/UL (ref 4.1–5.7)
RBC MORPH BLD: ABNORMAL
RBC MORPH BLD: ABNORMAL
REPORTED DOSE,DOSE: ABNORMAL UNITS
REPORTED DOSE/TIME,TMG: ABNORMAL
SERVICE CMNT-IMP: ABNORMAL
SERVICE CMNT-IMP: ABNORMAL
SERVICE CMNT-IMP: NORMAL
SODIUM SERPL-SCNC: 143 MMOL/L (ref 136–145)
VANCOMYCIN TROUGH SERPL-MCNC: 25.3 UG/ML (ref 5–10)
WBC # BLD AUTO: 5.4 K/UL (ref 4.1–11.1)

## 2018-02-11 PROCEDURE — 74011250637 HC RX REV CODE- 250/637: Performed by: INTERNAL MEDICINE

## 2018-02-11 PROCEDURE — 36415 COLL VENOUS BLD VENIPUNCTURE: CPT | Performed by: INTERNAL MEDICINE

## 2018-02-11 PROCEDURE — 82962 GLUCOSE BLOOD TEST: CPT

## 2018-02-11 PROCEDURE — 74011000250 HC RX REV CODE- 250: Performed by: INTERNAL MEDICINE

## 2018-02-11 PROCEDURE — 85025 COMPLETE CBC W/AUTO DIFF WBC: CPT | Performed by: INTERNAL MEDICINE

## 2018-02-11 PROCEDURE — 74011250636 HC RX REV CODE- 250/636: Performed by: INTERNAL MEDICINE

## 2018-02-11 PROCEDURE — 94640 AIRWAY INHALATION TREATMENT: CPT

## 2018-02-11 PROCEDURE — 77010033678 HC OXYGEN DAILY

## 2018-02-11 PROCEDURE — 65660000000 HC RM CCU STEPDOWN

## 2018-02-11 PROCEDURE — 80202 ASSAY OF VANCOMYCIN: CPT | Performed by: INTERNAL MEDICINE

## 2018-02-11 PROCEDURE — 80048 BASIC METABOLIC PNL TOTAL CA: CPT | Performed by: INTERNAL MEDICINE

## 2018-02-11 RX ORDER — VANCOMYCIN/0.9 % SOD CHLORIDE 1.5G/250ML
1500 PLASTIC BAG, INJECTION (ML) INTRAVENOUS
Status: DISCONTINUED | OUTPATIENT
Start: 2018-02-12 | End: 2018-02-15

## 2018-02-11 RX ADMIN — METRONIDAZOLE 500 MG: 500 INJECTION, SOLUTION INTRAVENOUS at 12:17

## 2018-02-11 RX ADMIN — IPRATROPIUM BROMIDE AND ALBUTEROL SULFATE 3 ML: .5; 3 SOLUTION RESPIRATORY (INHALATION) at 15:42

## 2018-02-11 RX ADMIN — Medication 10 ML: at 22:11

## 2018-02-11 RX ADMIN — METRONIDAZOLE 500 MG: 500 INJECTION, SOLUTION INTRAVENOUS at 22:10

## 2018-02-11 RX ADMIN — VANCOMYCIN HYDROCHLORIDE 1250 MG: 10 INJECTION, POWDER, LYOPHILIZED, FOR SOLUTION INTRAVENOUS at 02:04

## 2018-02-11 RX ADMIN — NYSTATIN: 100000 POWDER TOPICAL at 09:12

## 2018-02-11 RX ADMIN — MINERAL OIL, PETROLATUM: 425; 568 OINTMENT OPHTHALMIC at 12:21

## 2018-02-11 RX ADMIN — NYSTATIN: 100000 POWDER TOPICAL at 22:10

## 2018-02-11 RX ADMIN — IPRATROPIUM BROMIDE AND ALBUTEROL SULFATE 3 ML: .5; 3 SOLUTION RESPIRATORY (INHALATION) at 11:47

## 2018-02-11 RX ADMIN — MINERAL OIL, PETROLATUM: 425; 568 OINTMENT OPHTHALMIC at 22:10

## 2018-02-11 RX ADMIN — Medication 10 ML: at 14:49

## 2018-02-11 RX ADMIN — Medication 10 ML: at 05:25

## 2018-02-11 RX ADMIN — NYSTATIN: 100000 POWDER TOPICAL at 16:40

## 2018-02-11 RX ADMIN — Medication 10 ML: at 09:13

## 2018-02-11 RX ADMIN — IPRATROPIUM BROMIDE AND ALBUTEROL SULFATE 3 ML: .5; 3 SOLUTION RESPIRATORY (INHALATION) at 19:25

## 2018-02-11 RX ADMIN — DEXTROSE MONOHYDRATE 25 G: 25 INJECTION, SOLUTION INTRAVENOUS at 03:07

## 2018-02-11 RX ADMIN — POLYETHYLENE GLYCOL 3350 17 G: 17 POWDER, FOR SOLUTION ORAL at 09:11

## 2018-02-11 RX ADMIN — METRONIDAZOLE 500 MG: 500 INJECTION, SOLUTION INTRAVENOUS at 05:25

## 2018-02-11 RX ADMIN — IPRATROPIUM BROMIDE AND ALBUTEROL SULFATE 3 ML: .5; 3 SOLUTION RESPIRATORY (INHALATION) at 23:17

## 2018-02-11 RX ADMIN — AMIODARONE HYDROCHLORIDE 100 MG: 200 TABLET ORAL at 09:11

## 2018-02-11 RX ADMIN — IPRATROPIUM BROMIDE AND ALBUTEROL SULFATE 3 ML: .5; 3 SOLUTION RESPIRATORY (INHALATION) at 03:01

## 2018-02-11 RX ADMIN — IPRATROPIUM BROMIDE AND ALBUTEROL SULFATE 3 ML: .5; 3 SOLUTION RESPIRATORY (INHALATION) at 07:53

## 2018-02-11 NOTE — PROGRESS NOTES
Problem: Falls - Risk of  Goal: *Absence of Falls  Document Jenna Fall Risk and appropriate interventions in the flowsheet.    Fall Risk Interventions:  Mobility Interventions: Assess mobility with egress test, Bed/chair exit alarm, Patient to call before getting OOB, PT Consult for assist device competence, Utilize walker, cane, or other assitive device, Utilize gait belt for transfers/ambulation, Strengthening exercises (ROM-active/passive), PT Consult for mobility concerns, OT consult for ADLs, Communicate number of staff needed for ambulation/transfer    Mentation Interventions: Adequate sleep, hydration, pain control, Bed/chair exit alarm, Door open when patient unattended, Familiar objects from home, Reorient patient, Toileting rounds, More frequent rounding, Gait belt with transfers/ambulation, Eyeglasses and hearing aids, Update white board, Room close to nurse's station, Increase mobility, Family/sitter at bedside, Evaluate medications/consider consulting pharmacy    Medication Interventions: Evaluate medications/consider consulting pharmacy, Bed/chair exit alarm, Teach patient to arise slowly, Patient to call before getting OOB    Elimination Interventions: Bed/chair exit alarm, Call light in reach, Elevated toilet seat, Patient to call for help with toileting needs, Toilet paper/wipes in reach, Urinal in reach, Toileting schedule/hourly rounds    History of Falls Interventions: Bed/chair exit alarm, Consult care management for discharge planning, Evaluate medications/consider consulting pharmacy, Room close to nurse's station, Utilize gait belt for transfer/ambulation, Door open when patient unattended

## 2018-02-11 NOTE — PROGRESS NOTES
Hospitalist Progress Note    NAME: Terrell Rahman   :  1928   MRN:  [id number]       Assessment / Plan:  Septic Shock - resolved  Hypothermia - resolved  Bacteremia (GPC's on BCx)  Staph, Strep and Enterococcus bacteremia; suspect from cutaneous source as patient picks his psoriasis relentlessness at home (per wife's description)  Now aspirating with aspiration PNA  Was managed in ICU, then tele, now in PCU  Continue on Vancomycin and Flagyl (added for aspiration)  Levaquin stopped by ID  Off pressors  Repeat blood cultures with no growth so far  Appreciate ID input  Eventual plan for MARIA once if ever stable  Will keep NPO for now  Prognosis guarded, palliative involved  Continue frequent RT suctioning  Family update about prognosis    Acute on Chronic Systolic CHF with EF 47%  Acute Respiratory Failure with Hypoxia from Acute Pulmonary Edema (on CXR)  Now with aspiration PNA  Off Dobutamine for now, PRN per cardiology  Now on PRN IV diuretics per cardiology  Continue Xarelto  Continue O2, wean as tolerated  TSH wnl  Holding home Coreg and Xarelto  abx as above  O2 support    Metabolic Encephalopathy  Delirium with hallucinations  Possible related to sepsis  QTc high, now off haldol, was getting IV haldol in ICU without worsening QTc    Chronic Atrial Fibrillation with Junctional Bradycardia  Appreciate cardiology input  On Amiodarone  BB on hold for now  Continue to monitor    Hypernatremia  Hypoglycemia - Current resolved  NPO, monitor blood sugar closely, if becomes hypoglycemic again then consider D10 25ml/hr    Acute Kidney Injury with baseline CKD3  Cr stable  Continue to monitor lytes    Pancytopenia  S/p 2 units plts on  for acute bleeding from central line with plts improving from 45K to 111K and decreased to 88K today  Leukopenia resolved  S/p Vitamin daily B12 shots x3, may need montly upon discharge    Blood in Stool PTA  Likely from Xarelto and thrombocytopenia  Back on Xarelto    Patient with history of Left Parotid Mass s/p resection approximately 1 year ago by Dr. Marcelino Gutiérrez and XRT, July 2017 PET negative  Heme/Onc following    History of Prostate Cancer    Psoriasis    Body mass index is 31.85 kg/(m^2). Code status: Partial code , no Compressions or Defibrillation. She is okay with temporary pacing, ACLS meds (like atropine) and temporary intubation. Palliative is following    Prophylaxis: SCD's and H2B  Recommended Disposition: TBD     Subjective: Pt seen and examined at bedside. More awake today. Overnight events d/w RN     Chief Complaint / Reason for Physician Visit: follow-up septic shock  Review of Systems:  Symptom Y/N Comments  Symptom Y/N Comments   Fever/Chills    Chest Pain     Poor Appetite    Edema     Cough    Abdominal Pain     Sputum    Joint Pain     SOB/GRADY    Pruritis/Rash     Nausea/vomit    Tolerating PT/OT     Diarrhea    Tolerating Diet     Constipation    Other       Limited due to  Poor insight     Objective:     VITALS:   Last 24hrs VS reviewed since prior progress note. Most recent are:  Patient Vitals for the past 24 hrs:   Temp Pulse Resp BP SpO2   02/11/18 0753 - - - - 98 %   02/11/18 0722 97.9 °F (36.6 °C) 88 16 91/67 100 %   02/11/18 0400 97.9 °F (36.6 °C) 77 16 97/52 100 %   02/11/18 0301 - - - - 98 %   02/10/18 2320 97.6 °F (36.4 °C) 88 20 103/63 99 %   02/10/18 2305 - - - - 98 %   02/10/18 2000 98.5 °F (36.9 °C) 82 16 92/48 98 %   02/10/18 1912 - - - - 98 %   02/10/18 1628 97.4 °F (36.3 °C) 92 20 118/60 98 %   02/10/18 1521 - - - - 97 %   02/10/18 1443 97.4 °F (36.3 °C) 76 20 95/55 98 %   02/10/18 1143 - - - - 95 %   02/10/18 1108 97.2 °F (36.2 °C) 78 22 96/71 98 %       Intake/Output Summary (Last 24 hours) at 02/11/18 1009  Last data filed at 02/11/18 0800   Gross per 24 hour   Intake              550 ml   Output              300 ml   Net              250 ml        PHYSICAL EXAM:  General: WD, Chronically ill appearing.  Alert, cooperative, no acute distress    EENT:  EOMI. Anicteric sclerae. MM dry; Right facial droop and void from removal of parotid mass notable. Right eye with patch (due to inability to close eyelid)  Resp:  Coarse breath sounds. No accessory muscle use  CV:  irregular  Rhythm with normal rate, + edema noted with SCD's in palce  GI:  Soft, Non distended, Non tender.  +Bowel sounds  Neurologic:  Sedated, further neurologic status confounded by patient's sedated status   Psych:   Unable to assess. Not anxious nor agitated at this time  Skin:  No rashes. No jaundice    Reviewed most current lab test results and cultures  YES  Reviewed most current radiology test results   YES  Review and summation of old records today    NO  Reviewed patient's current orders and MAR    YES  PMH/SH reviewed - no change compared to H&P  ________________________________________________________________________  Care Plan discussed with:    Comments   Patient x    Family      RN x    Care Manager     Consultant                        Multidiciplinary team rounds were held today with , nursing, pharmacist and clinical coordinator. Patient's plan of care was discussed; medications were reviewed and discharge planning was addressed. ________________________________________________________________________  Total NON critical care TIME: 25 Minutes    Total CRITICAL CARE TIME Spent:   Minutes non procedure based      Comments   >50% of visit spent in counseling and coordination of care     ________________________________________________________________________  Bret Elliott MD     Procedures: see electronic medical records for all procedures/Xrays and details which were not copied into this note but were reviewed prior to creation of Plan. LABS:  I reviewed today's most current labs and imaging studies.   Pertinent labs include:  Recent Labs      02/11/18   0106  02/10/18   0344  02/09/18   0231   WBC  5.4  5.7  5.5   HGB  9.9*  10.4*  10.8*   HCT 30.3*  31.5*  33.5*   PLT  75*  61*  79*     Recent Labs      02/11/18   0106  02/10/18   0502  02/09/18   0231   NA  143  142  142   K  3.9  4.0  4.1   CL  111*  111*  109*   CO2  26  23  26   GLU  88  81  106*   BUN  29*  31*  29*   CREA  1.53*  1.70*  1.86*   CA  8.4*  8.6  8.8       Signed: Noemí Reyna MD

## 2018-02-11 NOTE — PROGRESS NOTES
Pharmacy Automatic Renal Dosing Protocol - Antimicrobials    Indication for Antimicrobials: Bacteremia (ID is still ruling out endocarditis)    Current Regimen of Each Antimicrobial:  Vancomycin 1750 mg IV every 24 hours (Started 18; Day #10)  Metronidazole 500 mg Q8H (Started 18, Day 3)    Previous Antimicrobial Therapy:  Levofloxacin 750 mg IV every 48 hours (Started 18; Stopped 18)  Meropenem 1 gram IV every 12 hours (Started 2/3/18; Stopped 18)  Levofloxacin 750 mg Q48H (Started 18, Day 1)  Goal Vancomycin Trough: 15-20 mcg/mL    Vancomycin Trough (18 at 2240): 19.6 mcg/mL (True trough = 18.8 mcg/mL)  Vancomycin Trough (18 at 2149): 25 mcg/mL (True trough = 23.6 mcg/mL)  Vancomycin Trough (18 at 3873 [facility]): 25.3 mcg/ml (True trough = 23.89 mcg/ml)    Significant Cultures:   18 Blood culture = No growth x 5 days (Results pending)  18 Blood culture = Strep in 2/2; MSSA in 1/2; Enterococcus in 1/2 (FINAL)    Labs:  Recent Labs      18   0106  02/10/18   0502  02/10/18   0344  18   0231   CREA  1.53*  1.70*   --   1.86*   BUN  29*  31*   --   29*   WBC  5.4   --   5.7  5.5   Temp (24hrs), Av.7 °F (36.5 °C), Min:97.2 °F (36.2 °C), Max:98.5 °F (36.9 °C)    Creatinine Clearance (mL/min) or Dialysis: 33 mL/min     No results found for: PCT    Impression/Plan:     - Vancomycin trough still supra therapeutic. Will adjust to 1500 mg IV Q36H for predicted new trough of 16.09 mcg/ml and AUC of 580  - Flagyl dose is appropriate. - Will order repeat vancomycin trough. - ID is following. They are working to formally rule out endocarditis. Pharmacy will follow daily and adjust medications as appropriate for renal function and/or serum levels.     Thank you,  Cyn Alexander PHARMD

## 2018-02-11 NOTE — PROGRESS NOTES
PCU SHIFT NURSING NOTE      Bedside and Verbal shift change report given to Celine Davila RN  (oncoming nurse) by Elkin Guzman RN (offgoing nurse). Report included the following information SBAR, Kardex, ED Summary, Procedure Summary, Intake/Output, MAR, Recent Results, Med Rec Status, Cardiac Rhythm AFib and Alarm Parameters . Shift Summary:         Admission Date 2/2/2018   Admission Diagnosis Hypothermia  Sepsis ([facility] [address]  Atrial fibrillation ([facility] [address]   Consults IP CONSULT TO CARDIOLOGY  IP CONSULT TO HEMATOLOGY  IP CONSULT TO PALLIATIVE CARE - PROVIDER  IP CONSULT TO INFECTIOUS DISEASES        Consults   [x]PT   [x]OT   [x]Speech   [x]Case Management      [x] Palliative      Cardiac Monitoring Order   [x]Yes   []No     IV drips   [x]Yes    Drip:     D5 gtt                       Dose:  Drip:                            Dose:  Drip:                            Dose:   []No     GI Prophylaxis   [x]Yes   []No         DVT Prophylaxis   SCDs:  Sequential Compression Device: Bilateral     Patient Refused VTE Prophylaxis: Yes    Edwin stockings:         [x] Medication   []Contraindicated   []None      Activity Level Activity Level: Bed Rest     Activity Assistance: Complete care   Purposeful Rounding every 1-2 hour? [x]Yes   Hutchison Score  Total Score: 3   Bed Alarm (If score 3 or >)   [x]Yes   [] Refused (See signed refusal form in chart)   Luis Score  Luis Score: 12   Luis Score (if score 14 or less)   [x]PMT consult   [x]Wound Care consult      []Specialty bed   [x] Nutrition consult          Needs prior to discharge:   Home O2 required:    []Yes   [x]No    If yes, how much O2 required? Other:    Last Bowel Movement: Last Bowel Movement Date: 02/08/18      Influenza Vaccine Received Flu Vaccine for Current Season (usually Sept-March):  Unsure    Patient/Guardian Refused (Notify MD): No   Pneumonia Vaccine           Diet Active Orders   Diet    DIET NPO      LDAs               Peripheral IV 02/02/18 Anterior;Right Forearm (Active)   Site Assessment Clean, dry, & intact 2/11/2018  7:22 AM   Phlebitis Assessment 0 2/11/2018  7:22 AM   Infiltration Assessment 0 2/11/2018  7:22 AM   Dressing Status Clean, dry, & intact 2/11/2018  7:22 AM   Dressing Type Tape;Transparent 2/11/2018  7:22 AM   Hub Color/Line Status Pink; Infusing;Flushed 2/11/2018  7:22 AM   Action Taken Open ports on tubing capped 2/11/2018  7:22 AM   Alcohol Cap Used Yes 2/11/2018  7:22 AM                      Urinary Catheter [REMOVED] Urinary Catheter 02/02/18 Ivy - Temperature-Criteria for Appropriate Use: Strict I/Os    Intake & Output   Date 02/10/18 0700 - 02/11/18 0659 02/11/18 0700 - 02/12/18 0659   Shift 1304-2241 2814-6350 24 Hour Total 2317-4403 4209-3304 24 Hour Total   I  N  T  A  K  E   I.V.  (mL/kg/hr) 116.7  (0.1) 450  (0.4) 566.7  (0.2) 0  0      Volume (dextrose 5% infusion) 16.7  16.7         Volume (vancomycin (VANCOCIN) 1250 mg in  ml infusion) 0 250 250 0  0      Volume (metroNIDAZOLE (FLAGYL) IVPB premix 500 mg) 100 200 300 0  0    Shift Total  (mL/kg) 116.7  (1.2) 450  (4.6) 566.7  (5.8) 0  (0)  0  (0)   O  U  T  P  U  T   Urine  (mL/kg/hr)  300  (0.3) 300  (0.1)         Urine Voided  300 300         Urine Occurrence(s) 353 x 1 x 354 x 1 x  1 x    Stool            Stool Occurrence(s)  1 x 1 x       Shift Total  (mL/kg)  300  (3.1) 300  (3.1)      .7 150 266.7 0  0   Weight (kg) 97.4 97.4 97.4 97.8 97.8 97.8         Readmission Risk Assessment Tool Score Low Risk            10       Total Score        2 . Living with Significant Other. Assisted Living. LTAC. SNF. or   Rehab    3 Patient Length of Stay (>5 days = 3)    5 Pt.  Coverage (Medicare=5 , Medicaid, or Self-Pay=4)        Criteria that do not apply:    Has Seen PCP in Last 6 Months (Yes=3, No=0)    IP Visits Last 12 Months (1-3=4, 4=9, >4=11)    Charlson Comorbidity Score (Age + Comorbid Conditions)       Expected Length of Stay 5d 14h   Actual Length of Stay 9

## 2018-02-12 PROBLEM — K11.7 INCREASED OROPHARYNGEAL SECRETIONS: Status: ACTIVE | Noted: 2018-02-12

## 2018-02-12 PROBLEM — J69.0 ASPIRATION PNEUMONIA (HCC): Status: ACTIVE | Noted: 2018-02-12

## 2018-02-12 PROBLEM — R13.12 OROPHARYNGEAL DYSPHAGIA: Status: ACTIVE | Noted: 2018-02-12

## 2018-02-12 LAB
ANION GAP SERPL CALC-SCNC: 8 MMOL/L (ref 5–15)
BACTERIA SPEC CULT: NORMAL
BASOPHILS # BLD: 0 K/UL (ref 0–0.1)
BASOPHILS NFR BLD: 0 % (ref 0–1)
BUN SERPL-MCNC: 26 MG/DL (ref 6–20)
BUN/CREAT SERPL: 19 (ref 12–20)
CALCIUM SERPL-MCNC: 8.3 MG/DL (ref 8.5–10.1)
CHLORIDE SERPL-SCNC: 111 MMOL/L (ref 97–108)
CO2 SERPL-SCNC: 25 MMOL/L (ref 21–32)
CREAT SERPL-MCNC: 1.35 MG/DL (ref 0.7–1.3)
DIFFERENTIAL METHOD BLD: ABNORMAL
EOSINOPHIL # BLD: 0 K/UL (ref 0–0.4)
EOSINOPHIL NFR BLD: 0 % (ref 0–7)
ERYTHROCYTE [DISTWIDTH] IN BLOOD BY AUTOMATED COUNT: 17.2 % (ref 11.5–14.5)
GLUCOSE BLD STRIP.AUTO-MCNC: 114 MG/DL (ref 65–100)
GLUCOSE BLD STRIP.AUTO-MCNC: 89 MG/DL (ref 65–100)
GLUCOSE BLD STRIP.AUTO-MCNC: 89 MG/DL (ref 65–100)
GLUCOSE BLD STRIP.AUTO-MCNC: 93 MG/DL (ref 65–100)
GLUCOSE SERPL-MCNC: 93 MG/DL (ref 65–100)
HCT VFR BLD AUTO: 30.9 % (ref 36.6–50.3)
HGB BLD-MCNC: 9.9 G/DL (ref 12.1–17)
IMM GRANULOCYTES # BLD: 0 K/UL (ref 0–0.04)
IMM GRANULOCYTES NFR BLD AUTO: 0 % (ref 0–0.5)
LYMPHOCYTES # BLD: 0.5 K/UL (ref 0.8–3.5)
LYMPHOCYTES NFR BLD: 7 % (ref 12–49)
MCH RBC QN AUTO: 34.9 PG (ref 26–34)
MCHC RBC AUTO-ENTMCNC: 32 G/DL (ref 30–36.5)
MCV RBC AUTO: 108.8 FL (ref 80–99)
METAMYELOCYTES NFR BLD MANUAL: 1 %
MONOCYTES # BLD: 0.6 K/UL (ref 0–1)
MONOCYTES NFR BLD: 9 % (ref 5–13)
NEUTS SEG # BLD: 5.5 K/UL (ref 1.8–8)
NEUTS SEG NFR BLD: 83 % (ref 32–75)
NRBC # BLD: 0 K/UL (ref 0–0.01)
NRBC BLD-RTO: 0 PER 100 WBC
PLATELET # BLD AUTO: 66 K/UL (ref 150–400)
POTASSIUM SERPL-SCNC: 4 MMOL/L (ref 3.5–5.1)
RBC # BLD AUTO: 2.84 M/UL (ref 4.1–5.7)
RBC MORPH BLD: ABNORMAL
RBC MORPH BLD: ABNORMAL
SERVICE CMNT-IMP: ABNORMAL
SERVICE CMNT-IMP: NORMAL
SODIUM SERPL-SCNC: 144 MMOL/L (ref 136–145)
WBC # BLD AUTO: 6.6 K/UL (ref 4.1–11.1)

## 2018-02-12 PROCEDURE — 74011000250 HC RX REV CODE- 250: Performed by: INTERNAL MEDICINE

## 2018-02-12 PROCEDURE — 94640 AIRWAY INHALATION TREATMENT: CPT

## 2018-02-12 PROCEDURE — 80048 BASIC METABOLIC PNL TOTAL CA: CPT | Performed by: INTERNAL MEDICINE

## 2018-02-12 PROCEDURE — 97110 THERAPEUTIC EXERCISES: CPT | Performed by: PHYSICAL THERAPIST

## 2018-02-12 PROCEDURE — 82962 GLUCOSE BLOOD TEST: CPT

## 2018-02-12 PROCEDURE — 97110 THERAPEUTIC EXERCISES: CPT

## 2018-02-12 PROCEDURE — 92526 ORAL FUNCTION THERAPY: CPT

## 2018-02-12 PROCEDURE — 36415 COLL VENOUS BLD VENIPUNCTURE: CPT | Performed by: INTERNAL MEDICINE

## 2018-02-12 PROCEDURE — 97535 SELF CARE MNGMENT TRAINING: CPT

## 2018-02-12 PROCEDURE — 77030037878 HC DRSG MEPILEX >48IN BORD MOLN -B

## 2018-02-12 PROCEDURE — 74011250637 HC RX REV CODE- 250/637: Performed by: INTERNAL MEDICINE

## 2018-02-12 PROCEDURE — 97116 GAIT TRAINING THERAPY: CPT | Performed by: PHYSICAL THERAPIST

## 2018-02-12 PROCEDURE — 74011250636 HC RX REV CODE- 250/636: Performed by: INTERNAL MEDICINE

## 2018-02-12 PROCEDURE — 65660000000 HC RM CCU STEPDOWN

## 2018-02-12 PROCEDURE — 85025 COMPLETE CBC W/AUTO DIFF WBC: CPT | Performed by: INTERNAL MEDICINE

## 2018-02-12 RX ORDER — METRONIDAZOLE 500 MG/100ML
500 INJECTION, SOLUTION INTRAVENOUS EVERY 12 HOURS
Status: DISCONTINUED | OUTPATIENT
Start: 2018-02-12 | End: 2018-02-15

## 2018-02-12 RX ADMIN — Medication: at 08:17

## 2018-02-12 RX ADMIN — METRONIDAZOLE 500 MG: 500 INJECTION, SOLUTION INTRAVENOUS at 05:44

## 2018-02-12 RX ADMIN — METRONIDAZOLE 500 MG: 500 INJECTION, SOLUTION INTRAVENOUS at 17:18

## 2018-02-12 RX ADMIN — MINERAL OIL, PETROLATUM: 425; 568 OINTMENT OPHTHALMIC at 05:44

## 2018-02-12 RX ADMIN — IPRATROPIUM BROMIDE AND ALBUTEROL SULFATE 3 ML: .5; 3 SOLUTION RESPIRATORY (INHALATION) at 12:00

## 2018-02-12 RX ADMIN — Medication 10 ML: at 21:36

## 2018-02-12 RX ADMIN — IPRATROPIUM BROMIDE AND ALBUTEROL SULFATE 3 ML: .5; 3 SOLUTION RESPIRATORY (INHALATION) at 22:19

## 2018-02-12 RX ADMIN — Medication 10 ML: at 05:44

## 2018-02-12 RX ADMIN — MINERAL OIL, PETROLATUM: 425; 568 OINTMENT OPHTHALMIC at 17:28

## 2018-02-12 RX ADMIN — NYSTATIN: 100000 POWDER TOPICAL at 21:36

## 2018-02-12 RX ADMIN — NYSTATIN: 100000 POWDER TOPICAL at 17:29

## 2018-02-12 RX ADMIN — NYSTATIN: 100000 POWDER TOPICAL at 08:17

## 2018-02-12 RX ADMIN — VANCOMYCIN HYDROCHLORIDE 1500 MG: 10 INJECTION, POWDER, LYOPHILIZED, FOR SOLUTION INTRAVENOUS at 17:28

## 2018-02-12 RX ADMIN — IPRATROPIUM BROMIDE AND ALBUTEROL SULFATE 3 ML: .5; 3 SOLUTION RESPIRATORY (INHALATION) at 15:48

## 2018-02-12 RX ADMIN — CASTOR OIL AND BALSAM, PERU: 788; 87 OINTMENT TOPICAL at 17:30

## 2018-02-12 RX ADMIN — IPRATROPIUM BROMIDE AND ALBUTEROL SULFATE 3 ML: .5; 3 SOLUTION RESPIRATORY (INHALATION) at 05:48

## 2018-02-12 RX ADMIN — CASTOR OIL AND BALSAM, PERU: 788; 87 OINTMENT TOPICAL at 21:35

## 2018-02-12 RX ADMIN — Medication 10 ML: at 17:29

## 2018-02-12 RX ADMIN — IPRATROPIUM BROMIDE AND ALBUTEROL SULFATE 3 ML: .5; 3 SOLUTION RESPIRATORY (INHALATION) at 07:42

## 2018-02-12 RX ADMIN — MINERAL OIL, PETROLATUM: 425; 568 OINTMENT OPHTHALMIC at 21:36

## 2018-02-12 NOTE — PROGRESS NOTES
Problem: Mobility Impaired (Adult and Pediatric)  Goal: *Acute Goals and Plan of Care (Insert Text)  Physical Therapy Goals  Initiated 2/9/2018  1. Patient will move from supine to sit and sit to supine , scoot up and down and roll side to side in bed with moderate assistance  within 7 day(s). 2.  Patient will transfer from bed to chair and chair to bed with minimal assistance/contact guard assist using the least restrictive device within 7 day(s). 3.  Patient will perform sit to stand with minimal assistance/contact guard assist within 7 day(s). 4.  Patient will ambulate with minimal assistance/contact guard assist for 25 feet with the least restrictive device within 7 day(s). physical Therapy TREATMENT  Seen 1426 to 1518      Patient: Marcelina Wen (86 y.o. male)  Date: 2/12/2018  Diagnosis: Hypothermia  Sepsis ([facility] [address]  Atrial fibrillation ([facility] [address] <principal problem not specified>  Precautions: Falls  Chart, physical therapy assessment, plan of care and goals were reviewed. ASSESSMENT:  Patient seen for exercises, mobility skills and ambulation. LE exercises done with assist 5 reps each on BLE. Encouraged him to so some of them on his own while watching TV when the commercials come on. He stated that he would try to do that. Needed mod assist x 2 for bed mobility and coming to sit at bedside. Sitting balance at bedside was good statically and fair dynamically. Stood to attempt ambulation and he urinated in the floor. Once he was cleaned up and new socks applied we were ambulate 40' (10' forward/10' backward twice) with mod HHA x 2. Up in chair reclined at end of session. [address] pad in place in case needed to get him back. Nurse aware. BP on the low side, but has been running low. Not symptomatic.   Progression toward goals:  []    Improving appropriately and progressing toward goals  [x]    Improving slowly and progressing toward goals  []    Not making progress toward goals and plan of care will be adjusted     PLAN:  Patient continues to benefit from skilled intervention to address the above impairments. Continue treatment per established plan of care. Discharge Recommendations:  Skilled Nursing Facility  Further Equipment Recommendations for Discharge:  Defer to SNF rehab     SUBJECTIVE:   Patient stated I don't feel dizzy.   Stated when asked because his BP was low. OBJECTIVE DATA SUMMARY:   Critical Behavior:  Neurologic State: Alert, Confused  Orientation Level: Oriented to person  Cognition: Decreased attention/concentration  Safety/Judgement: Decreased insight into deficits, Decreased awareness of need for safety, Decreased awareness of need for assistance     Functional Mobility Training:  Bed Mobility:  Supine to Sit: Moderate assistance;Assist x2; Additional time    Transfers:  Sit to Stand: Moderate assistance;Assist x2; Additional time  Bed to Chair: Moderate assistance;Assist x2; Additional time        Balance:  Sitting: Impaired; Without support  Sitting - Static: Good (unsupported)  Sitting - Dynamic: Fair (occasional)  Standing: Impaired; With support  Standing - Static: Constant support; Fair  Standing - Dynamic : Poor    Ambulation/Gait Training:  Distance (ft): 40 Feet (ft)  Assistive Device: Gait belt (HHA x 2)  Ambulation - Level of Assistance: Moderate assistance;Assist x2  Gait Abnormalities: Decreased step clearance; Step to gait  Base of Support: Widened  Speed/Christina: Pace decreased (<100 feet/min)  Step Length: Left shortened;Right shortened    Therapeutic Exercises: Toe wiggles, ankle pumps, heel slides, hip internal/external rotation and gluteal sets x 5 each. Needed assist with all except gluteal sets. Pain:  Pain Scale 1: Numeric (0 - 10)  Pain Intensity 1: 0     Activity Tolerance: Tolerated PT treatment session well. Did have a low BP, but not symptomatic. Please refer to the flowsheet for vital signs taken during this treatment.   After treatment:   [x] Patient left in no apparent distress sitting up in chair reclined  []    Patient left in no apparent distress in bed  [x]    Call bell left within reach  [x]    Nursing notified  []    Caregiver present (wife and daughter were there for most of the treatment session)  [x]    Bed alarm activated    COMMUNICATION/COLLABORATION:   The patients plan of care was discussed with: Registered Nurse    Shelly Schuler, PT  Time Calculation: 52 mins

## 2018-02-12 NOTE — PROGRESS NOTES
Hospitalist Progress Note    NAME: Leilani Johnston   :  1928   MRN:  [id number]       Assessment / Plan:  Septic Shock   Hypothermia (hypothermia resolved)  Bacteremia (GPC's on BCx)  Staph, Strep and Enterococcus bacteremia; suspect from cutaneous source as patient picks his psoriasis relentlessness at home (per wife's description)  Now aspirating with aspiration PNA  Was managed in ICU, then tele, now in PCU  Continue on Vancomycin and Flagyl (added for aspiration)  ID managing abx  Off pressors  Repeat blood cultures with no growth so far  Appreciate ID followup  Eventual plan for MARIA once if ever stable  Will keep NPO for now  Prognosis guarded, palliative involved  Continue frequent RT suctioning  Discussed prognosis and current treatment with daughter and wife today   Plan for MBS today  Speech is following    Acute on Chronic Systolic CHF with EF 76%  Acute Respiratory Failure with Hypoxia from Acute Pulmonary Edema (on CXR)  Now with aspiration PNA  Off Dobutamine for now, PRN per cardiology  Now on PRN IV diuretics per cardiology  Continue Xarelto  Continue O2, wean as tolerated  TSH wnl  Holding home Coreg and Xarelto  abx as above  O2 support    Metabolic Encephalopathy  Delirium with hallucinations  Possible related to sepsis  QTc high, now off haldol, was getting IV haldol in ICU without worsening QTc    Chronic Atrial Fibrillation with Junctional Bradycardia  Appreciate cardiology input  On Amiodarone  BB on hold for now  Continue to monitor    Hypernatremia  Hypoglycemia - Current resolved  NPO, monitor blood sugar closely, if becomes hypoglycemic again then consider D10 25ml/hr    Acute Kidney Injury with baseline CKD3  Cr stable  Continue to monitor lytes    Pancytopenia  S/p 2 units plts on  for acute bleeding from central line with plts improving from 45K to 111K and decreased to 88K today  Leukopenia resolved  S/p Vitamin daily B12 shots x3, may need montly upon discharge  Count stable    Blood in Stool PTA  Likely from Xarelto and thrombocytopenia  Xarelto on hold    Patient with history of Left Parotid Mass s/p resection approximately 1 year ago by Dr. Jermain Dyer and XRT, July 2017 PET negative  Heme/Onc following    History of Prostate Cancer    Psoriasis    DTIs x2 left buttocks not POA  Woundcare following    Body mass index is 31.12 kg/(m^2). Code status: Partial code , no Compressions or Defibrillation. She is okay with temporary pacing, ACLS meds (like atropine) and temporary intubation. Palliative is following    Prophylaxis: SCD's and H2B  Recommended Disposition: TBD     Subjective: Pt seen and examined at bedside. More alert today. Insight remain poor. Overnight events d/w RN     Chief Complaint / Reason for Physician Visit: follow-up septic shock  Review of Systems:  Symptom Y/N Comments  Symptom Y/N Comments   Fever/Chills    Chest Pain     Poor Appetite    Edema     Cough    Abdominal Pain     Sputum    Joint Pain     SOB/GRADY    Pruritis/Rash     Nausea/vomit    Tolerating PT/OT     Diarrhea    Tolerating Diet     Constipation    Other       Limited due to  Poor insight     Objective:     VITALS:   Last 24hrs VS reviewed since prior progress note.  Most recent are:  Patient Vitals for the past 24 hrs:   Temp Pulse Resp BP SpO2   02/12/18 1200 - - - - 97 %   02/12/18 1045 97.1 °F (36.2 °C) 82 16 96/59 97 %   02/12/18 0743 - - - - 100 %   02/12/18 0712 96.8 °F (36 °C) 71 18 90/55 98 %   02/12/18 0546 - - - - 100 %   02/12/18 0321 97.7 °F (36.5 °C) 76 18 101/67 96 %   02/11/18 2316 - - - - 99 %   02/11/18 2313 97.8 °F (36.6 °C) 86 18 96/55 98 %   02/11/18 1946 97.9 °F (36.6 °C) 79 18 96/59 100 %   02/11/18 1922 - - - - 97 %   02/11/18 1729 97.6 °F (36.4 °C) 82 16 95/58 96 %   02/11/18 1542 - - - - 99 %   02/11/18 1449 97.5 °F (36.4 °C) 83 16 101/68 95 %       Intake/Output Summary (Last 24 hours) at 02/12/18 1328  Last data filed at 02/12/18 0400   Gross per 24 hour   Intake 100 ml   Output              425 ml   Net             -325 ml        PHYSICAL EXAM:  General: WD, Chronically ill appearing. Alert, cooperative, no acute distress    EENT:  EOMI. Anicteric sclerae. MM dry; Right facial droop and void from removal of parotid mass notable. Right eye with patch (due to inability to close eyelid)  Resp:  CTA bilaterally on anterior and lateral assessment, no wheezing or rales. No accessory muscle use  CV:  irregular  Rhythm with normal rate, + edema noted with SCD's in palce  GI:  Soft, Non distended, Non tender.  +Bowel sounds  Neurologic:  Sedated, further neurologic status confounded by patient's sedated status   Psych:   Unable to assess. Not anxious nor agitated at this time  Skin:  No rashes. No jaundice    Reviewed most current lab test results and cultures  YES  Reviewed most current radiology test results   YES  Review and summation of old records today    NO  Reviewed patient's current orders and MAR    YES  PMH/ reviewed - no change compared to H&P  ________________________________________________________________________  Care Plan discussed with:    Comments   Patient x    Family  x Wife and daughter at bedside   RN x    Care Manager     Consultant                        Multidiciplinary team rounds were held today with , nursing, pharmacist and clinical coordinator. Patient's plan of care was discussed; medications were reviewed and discharge planning was addressed.      ________________________________________________________________________  Total NON critical care TIME: 25 Minutes    Total CRITICAL CARE TIME Spent:   Minutes non procedure based      Comments   >50% of visit spent in counseling and coordination of care     ________________________________________________________________________  Zoe Summers MD     Procedures: see electronic medical records for all procedures/Xrays and details which were not copied into this note but were reviewed prior to creation of Plan. LABS:  I reviewed today's most current labs and imaging studies.   Pertinent labs include:  Recent Labs      02/12/18   0353  02/11/18   0106  02/10/18   0344   WBC  6.6  5.4  5.7   HGB  9.9*  9.9*  10.4*   HCT  30.9*  30.3*  31.5*   PLT  66*  75*  61*     Recent Labs      02/12/18   0353  02/11/18   0106  02/10/18   0502   NA  144  143  142   K  4.0  3.9  4.0   CL  111*  111*  111*   CO2  25  26  23   GLU  93  88  81   BUN  26*  29*  31*   CREA  1.35*  1.53*  1.70*   CA  8.3*  8.4*  8.6       Signed: Traci Tyler MD

## 2018-02-12 NOTE — PROGRESS NOTES
Palliative Medicine Consult  [place]: [phone] ([phone])    Patient Name: Amy Ramsey  YOB: 1928    Date of Initial Consult: 2/6/18  Reason for Consult: care decisions  Requesting Provider: Dr. Tamara Lombard  Primary Care Physician: Kalina Duarte NP     SUMMARY:   Amy Ramsey is a 80 y.o. 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 Current medical issues leading to Palliative Medicine involvement include: sepsis/ bacteremia, encephalopathy, pancytopenia (improving w tx of sepsis), ARF on CRF (improving). 2/12: MBS tomorrow. High risk for aspiration. RRT call on 2/9 for difficulty manging secreations and lethargy, improved with deep sxn. Today much improved    Patient is a retired from IT data processing. He lives with his wife of over 48 years, Aliza Mark (her brother is [facility]- retired family physician). They live on [address] in [place]. They have two adult children who both live in [place] ([facility] and [facility])    PALLIATIVE DIAGNOSES:   1. Sepsis - bacteremia (staph/strep and enterococcus)  2. Pancytopenia, improving w tx of sepsis  3. ARF on CRF  4. Delirium, metabolic encephalopathy, agitated at times  5. Chronic constipation       PLAN:   1. Met with pt; he is hoping to get stronger and return home. He looks good today. 2. I am concerned that he has been here 10 days with no PT/OT. Will most likely need inpatient rehab. .   Currently asymptomatic. 3. Delirium resolved today. 4. Will follow peripherally. GOALS OF CARE / TREATMENT PREFERENCES:     GOALS OF CARE:  Patient/Health Care Proxy Stated Goals:  Other (comment)      TREATMENT PREFERENCES:   Code Status: Partial Code    Advance Care Planning:  Advance Care Planning 2/3/2018   Patient's Healthcare Decision Maker is: Legal Next of Kin   Primary Decision Maker Name Apoorva Paiz   Primary Decision Maker Phone Number [phone]   Primary Decision Maker Relationship to Patient Spouse   Confirm Advance Directive Yes, not on file       Medical Interventions: Other (comment)   Other Instructions: Other:    As far as possible, the palliative care team has discussed with patient / health care proxy about goals of care / treatment preferences for patient. HISTORY:     History obtained from: chart, wife    CHIEF COMPLAINT: admitted with weakness    HPI/SUBJECTIVE:    The patient is:   [] Verbal and participatory  [x] Non-participatory due to: delirium  (conversant but confused)    80year old male with debility from multiple medical issues as above. He was seen in [facility] ER, found to have bradycardia, hypothermia, hypotension. At baseline, wife describes him as active --  Up most of the day, works on his computer on a good day. He no longer does zoomba (no in last 2 years) but ambulation not difficult. No major changes recently. Clinic notes reviewed -- seen for LE swelling, recent cellulitis treated as outpatient. Prior to admission, was having some ongoing constipation, had large BM with some bright red bleeding. 2/12: \"My ass hurts! \"  Has not been out of bed since arrival.  Will be seen by wound care, OT and PT today.      Clinical Pain Assessment (nonverbal scale for severity on nonverbal patients):   Clinical Pain Assessment  Severity: 0          Duration: for how long has pt been experiencing pain (e.g., 2 days, 1 month, years)  Frequency: how often pain is an issue (e.g., several times per day, once every few days, constant)     FUNCTIONAL ASSESSMENT:     Palliative Performance Scale (PPS):  PPS: 30       PSYCHOSOCIAL/SPIRITUAL SCREENING:     Palliative IDT has assessed this patient for cultural preferences / practices and a referral made as appropriate to needs (Cultural Services, Patient Advocacy, Ethics, etc.)    Advance Care Planning:  Advance Care Planning 2/3/2018   Patient's Healthcare Decision Maker is: Legal Next of Corin Handy   Primary Decision Maker Name Reymundo Simpson   Primary Decision Maker Phone Number [phone]   Primary Decision Maker Relationship to Patient Spouse   Confirm Advance Directive Yes, not on file       Any spiritual / Taoist concerns:  [] Yes /  [x] No    Caregiver Burnout:  [] Yes /  [x] No /  [] No Caregiver Present      Anticipatory grief assessment:   [x] Normal  / [] Maladaptive       ESAS Anxiety: Anxiety: 0    ESAS Depression: Depression: 0        REVIEW OF SYSTEMS:     Positive and pertinent negative findings in ROS are noted above in HPI. The following systems were [x] reviewed / [] unable to be reviewed as noted in HPI  Other findings are noted below. Systems: constitutional, ears/nose/mouth/throat, respiratory, gastrointestinal, genitourinary, musculoskeletal, integumentary, neurologic, psychiatric, endocrine. Positive findings noted below. Modified ESAS Completed by: provider   Fatigue: 0 Drowsiness: 0   Depression: 0 Pain: 0   Anxiety: 0 Nausea: 0   Anorexia: 0 Dyspnea: 0     Constipation: Yes     Stool Occurrence(s): 1        PHYSICAL EXAM:     From RN flowsheet:  Wt Readings from Last 3 Encounters:   02/12/18 210 lb 12.2 oz (95.6 kg)   02/02/18 194 lb (88 kg)   01/09/18 199 lb (90.3 kg)     Blood pressure 90/55, pulse 71, temperature 96.8 °F (36 °C), resp. rate 18, height 5' 9\" (1.753 m), weight 210 lb 12.2 oz (95.6 kg), SpO2 100 %.     Pain Scale 1: Numeric (0 - 10)  Pain Intensity 1: 0     Pain Location 1: Neck        Pain Intervention(s) 1: Repositioned  Last bowel movement, if known:     Constitutional: Pt is awake, sitting up in bed reading the News Paper  He is more clear, speech improved  Engaging in conversation, remembers meeting me yesterday and able to give recent history. Attention is much improved, agitation resolved. HISTORY:     Active Problems:    Atrial fibrillation ([facility] (9/21/2017)      Hypothermia (2/2/2018)      Sepsis ([facility] [address] (2/2/2018)      Past Medical History:   Diagnosis Date    Atrial fibrillation with RVR (HCC)     Dr Hernandez De Souza - cardiologist    CAD (coronary artery disease)     Cardiomyopathy ([facility]     Diverticula of colon     Heart failure ([facility]     Hypercholesterolemia     Ill-defined condition     psorosis    Malignant neoplasm of prostate ([facility]     prostrate removed    Parotid tumor 06/13/2017    Surgery, XRT; 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   Social History   Substance Use Topics    Smoking status: Former Smoker    Smokeless tobacco: Never Used    Alcohol use 4.2 oz/week     7 Standard drinks or equivalent per week      Comment: \"1 drink a night\"     Allergies   Allergen Reactions    Ancef [Cefazolin] Unknown (comments)     \"drops my blood pressure\"    Neosporin [Benzalkonium Chloride] Unknown (comments)    Polysporin [Bacitracin-Polymyxin B] Other (comments)     \"WOUNDS DO NOT HEAL\"      Current Facility-Administered Medications   Medication Dose Route Frequency    zinc oxide-cod liver oil (DESITIN) 40 % paste   Topical PRN    vancomycin (VANCOCIN) 1500 mg in  ml infusion  1,500 mg IntraVENous Q36H    albuterol-ipratropium (DUO-NEB) 2.5 MG-0.5 MG/3 ML  3 mL Nebulization Q4H PRN    metroNIDAZOLE (FLAGYL) IVPB premix 500 mg  500 mg IntraVENous Q8H    albuterol-ipratropium (DUO-NEB) 2.5 MG-0.5 MG/3 ML  3 mL Nebulization Q4H RT    white petrolatum-mineral oil (LACRILUBE S.O.P.) ointment   Right Eye Q8H    polyethylene glycol (MIRALAX) packet 17 g  17 g Oral DAILY    insulin lispro (HUMALOG) injection   SubCUTAneous AC&HS    amiodarone (CORDARONE) tablet 100 mg  100 mg Oral DAILY    glucose chewable tablet 16 g  4 Tab Oral PRN    dextrose (D50W) injection syrg 12.5-25 g  12.5-25 g IntraVENous PRN    glucagon (GLUCAGEN) injection 1 mg  1 mg IntraMUSCular PRN    nystatin (MYCOSTATIN) 100,000 unit/gram powder   Topical TID    sodium chloride (NS) flush 5-10 mL  5-10 mL IntraVENous Q8H    sodium chloride (NS) flush 5-10 mL  5-10 mL IntraVENous PRN          LAB AND IMAGING FINDINGS:     Lab Results   Component Value Date/Time    WBC 6.6 02/12/2018 03:53 AM    HGB 9.9 (L) 02/12/2018 03:53 AM    PLATELET 66 (L) 11/43/0772 03:53 AM     Lab Results   Component Value Date/Time    Sodium 144 02/12/2018 03:53 AM    Potassium 4.0 02/12/2018 03:53 AM    Chloride 111 (H) 02/12/2018 03:53 AM    CO2 25 02/12/2018 03:53 AM    BUN 26 (H) 02/12/2018 03:53 AM    Creatinine 1.35 (H) 02/12/2018 03:53 AM    Calcium 8.3 (L) 02/12/2018 03:53 AM    Magnesium 1.7 02/06/2018 05:23 AM    Phosphorus 2.7 02/06/2018 05:23 AM      Lab Results   Component Value Date/Time    AST (SGOT) 28 02/06/2018 05:23 AM    Alk.  phosphatase 91 02/06/2018 05:23 AM    Protein, total 6.1 (L) 02/06/2018 05:23 AM    Albumin 2.6 (L) 02/06/2018 05:23 AM    Globulin 3.5 02/06/2018 05:23 AM     Lab Results   Component Value Date/Time    INR 1.5 (H) 02/04/2018 04:18 AM    Prothrombin time 15.4 (H) 02/04/2018 04:18 AM    aPTT 42.5 (H) 02/04/2018 04:18 AM      No results found for: IRON, FE, TIBC, IBCT, PSAT, FERR   Lab Results   Component Value Date/Time    pH 7.37 02/09/2018 08:07 PM    PCO2 41 02/09/2018 08:07 PM    PO2 88 02/09/2018 08:07 PM     No components found for: [place]   Lab Results   Component Value Date/Time    CK 69 02/02/2018 06:45 PM    CK - MB 9.9 (H) 02/02/2018 06:45 PM                Total time: 40  Counseling / coordination time, spent as noted above: 35  > 50% counseling / coordination?: y    Prolonged service was provided for  []30 min   []75 min in face to face time in the presence of the patient, spent as noted above. Time Start:   Time End:   Note: this can only be billed with 69719 (initial) or 82868 (follow up). If multiple start / stop times, list each separately.

## 2018-02-12 NOTE — PROGRESS NOTES
..    PCU SHIFT NURSING NOTE      Bedside shift change report given to  Edilma Campo RN (oncoming nurse) by  Frandy Grewal (offgoing nurse). Report included the following information SBAR. Shift Summary:        Admission Date 2/2/2018   Admission Diagnosis Hypothermia  Sepsis ([facility]  Atrial fibrillation ([facility]   Consults IP CONSULT TO CARDIOLOGY  IP CONSULT TO HEMATOLOGY  IP CONSULT TO PALLIATIVE CARE - PROVIDER  IP CONSULT TO INFECTIOUS DISEASES        Consults   [x]PT   [x]OT   [x]Speech   [x]Case Management      [] Palliative      Cardiac Monitoring Order   [x]Yes   []No     IV drips   []Yes    Drip:                            Dose:  Drip:                            Dose:  Drip:                            Dose:   []No     GI Prophylaxis   [x]Yes   []No         DVT Prophylaxis   SCDs:  Sequential Compression Device: Bilateral     Patient Refused VTE Prophylaxis: Yes    Edwin stockings:         [] Medication   []Contraindicated   []None      Activity Level Activity Level: Bed Rest     Activity Assistance: Complete care   Purposeful Rounding every 1-2 hour? [x]Yes   Hutchison Score  Total Score: 3   Bed Alarm (If score 3 or >)   [x]Yes   [] Refused (See signed refusal form in chart)   Luis Score  Luis Score: 11   Luis Score (if score 14 or less)   [x]PMT consult   [x]Wound Care consult      []Specialty bed   [x] Nutrition consult          Needs prior to discharge:   Home O2 required:    []Yes   [x]No    If yes, how much O2 required? Other:    Last Bowel Movement: Last Bowel Movement Date: 02/08/18      Influenza Vaccine Received Flu Vaccine for Current Season (usually Sept-March):  Unsure    Patient/Guardian Refused (Notify MD): No   Pneumonia Vaccine           Diet Active Orders   Diet    DIET NPO      LDAs               Peripheral IV 02/02/18 Anterior;Right Forearm (Active)   Site Assessment Clean, dry, & intact 2/11/2018  8:00 PM   Phlebitis Assessment 0 2/11/2018  8:00 PM   Infiltration Assessment 0 2/11/2018  8:00 PM   Dressing Status Clean, dry, & intact 2/11/2018  8:00 PM   Dressing Type Transparent 2/11/2018  8:00 PM   Hub Color/Line Status Pink 2/11/2018  8:00 PM   Action Taken Open ports on tubing capped 2/11/2018  5:29 PM   Alcohol Cap Used Yes 2/11/2018  5:29 PM                      Urinary Catheter [REMOVED] Urinary Catheter 02/02/18 Ivy - Temperature-Criteria for Appropriate Use: Strict I/Os    Intake & Output   Date 02/11/18 0700 - 02/12/18 0659 02/12/18 0700 - 02/13/18 0659   Shift 1958-2319 0899-4587 24 Hour Total 0290-9921 5391-9665 24 Hour Total   I  N  T  A  K  E   I.V.  (mL/kg/hr) 100  (0.1) 100 200         Volume (vancomycin (VANCOCIN) 1250 mg in  ml infusion) 0  0         Volume (metroNIDAZOLE (FLAGYL) IVPB premix 500 mg) 100 100 200         Volume (vancomycin (VANCOCIN) 1500 mg in  ml infusion) 0  0       Shift Total  (mL/kg) 100  (1) 100  (1) 200  (2.1)      O  U  T  P  U  T   Urine  (mL/kg/hr) 1050  (0.9)  1050         Urine Voided 1050  1050         Urine Occurrence(s) 1 x 1 x 2 x       Shift Total  (mL/kg) 1050  (10.7)  1050  (11)      NET -950 100 -850      Weight (kg) 97.8 95.6 95.6 95.6 95.6 95.6         Readmission Risk Assessment Tool Score Low Risk            10       Total Score        2 . Living with Significant Other. Assisted Living. LTAC. SNF. or   Rehab    3 Patient Length of Stay (>5 days = 3)    5 Pt.  Coverage (Medicare=5 , Medicaid, or Self-Pay=4)        Criteria that do not apply:    Has Seen PCP in Last 6 Months (Yes=3, No=0)    IP Visits Last 12 Months (1-3=4, 4=9, >4=11)    Charlson Comorbidity Score (Age + Comorbid Conditions)       Expected Length of Stay 5d 14h   Actual Length of Stay 10

## 2018-02-12 NOTE — PROGRESS NOTES
Cardiology Progress Note    Patient ID:  Patient: Kristyn Vera  MRN: [id number]  Age: 80 y.o.  : 1928   Admit Date: 2018    Assessment: 1. Atrial fibrillation with slow ventricular response requiring dobutamine initially, but now off of this. Likely persistent episode. 2. Chronic bifascicular block (RBBB and LAFB). 3. Gram-positive cocci bacteremia (Staph, Streptococcus, Enterococcus) and sepsis. See ID consult notes. 4. Hypotension today, mild. 5. Cardiomyopathy NOS, EF 25%. 6. Acute on chronic systolic congestive heart failure, better. 7. Acute kidney injury atop chronic kidney disease stage III. Slowly improving. 8. Pancytopenia (watching platelets, still >99K). 9. Encephalopathy/delirium, better. 10. History of R face/neck mass resected. 11. Partial code status (No shock or chest compressions). Plan:     1. Continue to hold beta blocker. 2. Continue amiodarone 100 daily. 3. No temporary or permanent pacemaker recommended. 4. Agree with treating him for bacteremia. ID consult appreciated. Will try to find a good window for MARIA to evaluate the valves, best done when aspiration risk is at lowest.  5. Not a candidate for ACEI or ARB or spironolactone due to acute renal failure. 6. Would not do an ischemia evaluation at this time. 7. BP drifting down some, asymptomatic and afebrile, will watch. I discussed the above with his wife Mahsa Grande by phone [phone]. All questions answered. He's put together a couple days where he cognitively looks better. [x]       High complexity decision making was performed in this patient. Subjective:     Kristyn Vera denies chest pain, shortness of breath, dizziness. Wearing the eye patch today. Review of Systems - He cannot relay a reliable comprehensive ROS due to encephalopathy.     Objective:     Physical Exam:  Temp (24hrs), Av.3 °F (36.3 °C), Min:96.2 °F (35.7 °C), Max:97.9 °F (36.6 °C)    Patient Vitals for the past 8 hrs:   Pulse   18 1604 85   18 1443 87   18 1439 89   18 1429 84   18 1045 82    Patient Vitals for the past 8 hrs:   Resp   18 1604 14   18 1045 16    Patient Vitals for the past 8 hrs:   BP   18 1604 (!) 89/47   18 1443 93/58   18 1439 (!) 81/49   18 1429 92/58   18 1045 96/59          Intake/Output Summary (Last 24 hours) at 18 1723  Last data filed at 18 0400   Gross per 24 hour   Intake              100 ml   Output              200 ml   Net             -100 ml       Nondiaphoretic, not in acute distress. MMM, no jaundice, HEENT stable. R eye patch removed. Unlabored, clear to auscultation bilaterally, symmetric air movement. Regular rate and rhythm, no murmur, pericardial rub, knock, or gallop. No JVD but there is +peripheral edema. Palpable radial pulses bilaterally. Abdomen soft, nontender, nondistended. Extremities without cyanosis or clubbing. Skin warm and dry. Venostasis changes in legs. Musculoskeletal exam stable. Awake, less confused. No tremor. Cardiographics and Studies, I personally reviewed:    Telemetry:  Afib with HR 70's. ECHO 2/3:    LEFT VENTRICLE: The ventricle was mildly dilated. Ejection fraction was estimated in the range of 25 % to 30 %. There was moderate diffuse hypokinesis. RIGHT VENTRICLE: The ventricle was moderately dilated. Systolic function was mildly reduced. LEFT ATRIUM: The atrium was moderately dilated. RIGHT ATRIUM: The atrium was mildly dilated. MITRAL VALVE: There was mild thickening. DOPPLER: There was moderate regurgitation. AORTIC VALVE: Leaflets exhibited mildly increased thickness. DOPPLER: There was no significant regurgitation. TRICUSPID VALVE: Normal valve structure. DOPPLER: There was moderate regurgitation.  Pulmonary artery systolic pressure: 57 mmHg. There was moderate pulmonary hypertension. PULMONIC VALVE: Normal valve structure. AORTA: The root exhibited normal size. SYSTEMIC VEINS: IVC: The respirophasic change in diameter was less than 50%. PERICARDIUM: There was no pericardial effusion. The pericardium was normal in appearance. LAB Review:     No results for input(s): CPK, CKMB, CKNDX, TROIQ in the last 72 hours. No lab exists for component: CPKMB  Lab Results   Component Value Date/Time    Cholesterol, total 128 06/29/2017 10:41 AM    HDL Cholesterol 49 06/29/2017 10:41 AM    LDL, calculated 60 06/29/2017 10:41 AM    Triglyceride 94 06/29/2017 10:41 AM     No results for input(s): INR, PTP, APTT in the last 72 hours. No lab exists for component: Caleb Leeanne   Recent Labs      02/12/18   0353  02/11/18   0106  02/10/18   0502  02/10/18   0344   NA  144  143  142   --    K  4.0  3.9  4.0   --    CL  111*  111*  111*   --    CO2  25  26  23   --    BUN  26*  29*  31*   --    CREA  1.35*  1.53*  1.70*   --    GLU  93  88  81   --    CA  8.3*  8.4*  8.6   --    WBC  6.6  5.4   --   5.7   HGB  9.9*  9.9*   --   10.4*   HCT  30.9*  30.3*   --   31.5*   PLT  66*  75*   --   61*     No results for input(s): SGOT, GPT, AP, TBIL, TP, ALB, GLOB, GGT, AML, LPSE in the last 72 hours. No lab exists for component: AMYP, HLPSE  No components found for: [place]  Recent Labs      02/09/18 2007   PH  7.37   PCO2  41   PO2  88       Medications Reviewed:      Allergies   Allergen Reactions    Ancef [Cefazolin] Unknown (comments)     \"drops my blood pressure\"    Neosporin [Benzalkonium Chloride] Unknown (comments)    Polysporin [Bacitracin-Polymyxin B] Other (comments)     \"WOUNDS DO NOT HEAL\"        Current Facility-Administered Medications   Medication Dose Route Frequency    zinc oxide-cod liver oil (DESITIN) 40 % paste   Topical PRN    metroNIDAZOLE (FLAGYL) IVPB premix 500 mg  500 mg IntraVENous Q12H    balsam peru-castor oil [facility])  mg/gram ointment   Topical TID    vancomycin (VANCOCIN) 1500 mg in  ml infusion  1,500 mg IntraVENous Q36H    albuterol-ipratropium (DUO-NEB) 2.5 MG-0.5 MG/3 ML  3 mL Nebulization Q4H PRN    albuterol-ipratropium (DUO-NEB) 2.5 MG-0.5 MG/3 ML  3 mL Nebulization Q4H RT    white petrolatum-mineral oil (LACRILUBE S.O.P.) ointment   Right Eye Q8H    polyethylene glycol (MIRALAX) packet 17 g  17 g Oral DAILY    insulin lispro (HUMALOG) injection   SubCUTAneous AC&HS    amiodarone (CORDARONE) tablet 100 mg  100 mg Oral DAILY    glucose chewable tablet 16 g  4 Tab Oral PRN    dextrose (D50W) injection syrg 12.5-25 g  12.5-25 g IntraVENous PRN    glucagon (GLUCAGEN) injection 1 mg  1 mg IntraMUSCular PRN    nystatin (MYCOSTATIN) 100,000 unit/gram powder   Topical TID    sodium chloride (NS) flush 5-10 mL  5-10 mL IntraVENous Q8H    sodium chloride (NS) flush 5-10 mL  5-10 mL IntraVENous PRN          Beltran Dunaway MD  2/12/2018

## 2018-02-12 NOTE — PROGRESS NOTES
Problem: Dysphagia (Adult)  Goal: *Acute Goals and Plan of Care (Insert Text)  2/12/2018  Speech path  1. Pt will participate with MBS. Speech LAnguage Pathology bedside swallow evaluation  Patient: Jimy Mares (15 y.o. male)  Date: 2/12/2018  Primary Diagnosis: Hypothermia  Sepsis ([facility]  Atrial fibrillation ([facility]        Precautions:        ASSESSMENT :  Based on the objective data described below, the patient presents with mild to mod oral and mod pharyngeal dysphagia. He has a severe R facial droop from R parotid surgery about a year ago. He has been eating a chopped diet with no difficulty. He is hospitalized now with pneumonia. We saw him last week and recommended a diet but concern about aspiration was expressed. He was made NPO. Today when the pt was received in bed his voice was extremely wet. He could clear it eventually with coughing and suctioning. Once his voice was cleared he was given ice chips, water, and purees with slow oral prep and transit. Min oral residue on his tongue noted once. Pharyngeal phase was characterized by mild swallow delay, reduced hyolaryngeal excursion. There was no coughing or change in vocal quality after any texture. He appeared to be tolerating the po. He reported he takes his meds at home by placing the pill far back on his tongue. Recommend MBS but it cannot be completed until tomorrow at 1100. Patient will benefit from skilled intervention to address the above impairments.   Patients rehabilitation potential is considered to be Fair  Factors which may influence rehabilitation potential include:   []            None noted  [x]            Mental ability/status  [x]            Medical condition  [x]            Home/family situation and support systems  [x]            Safety awareness  []            Pain tolerance/management  []            Other:      PLAN :  Recommendations and Planned Interventions:  MBS to assess swallow physiology   Frequency/Duration: Patient will be followed by speech-language pathology 4 times a week to address goals. Discharge Recommendations: None     SUBJECTIVE:   Patient stated he may have had a test with barium but he is not sure. .    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    Psoriasis     lower arms, legs (above knees)     Past Surgical History:   Procedure Laterality Date    HX CATARACT REMOVAL Bilateral     HX COLONOSCOPY      HX PROSTATECTOMY  1997    HX TONSILLECTOMY  as a child     Prior Level of Function/Home Situation:   Home Situation  Home Environment: Rehabilitation facility  # Steps to Enter: 3  Rails to Enter: Yes  One/Two Story Residence: One story  Living Alone: No  Support Systems: Spouse/Significant Other/Partner  Patient Expects to be Discharged to[de-identified] Private residence  Current DME Used/Available at Home: None  Tub or Shower Type: Tub/Shower combination  Diet prior to admission: chopped /thins  Current Diet:  NPO  Cognitive and Communication Status:  Neurologic State: Alert  Orientation Level: Oriented to person  Cognition: Decreased attention/concentration  Perception: Appears intact  Perseveration: No perseveration noted  Safety/Judgement: Not assessed  Oral Assessment:  Oral Assessment  Labial: Right droop  Dentition: Natural;Limited  Oral Hygiene: thick brown mucus on his teeth and palatal area  Lingual: Decreased rate  Mandible: No impairment  P.O. Trials:  Patient Position: upright in bed  Vocal quality prior to P.O.: Wet; No impairment (extremely wet initially but once he coughed and cleared his throat, there was no wetness)  Consistency Presented: Thin liquid;Puree  How Presented: Self-fed/presented;Straw;Spoon     Bolus Acceptance: No impairment     Type of Impairment: Delayed  Propulsion: Delayed (# of seconds)  Oral Residue: None  Initiation of Swallow: Delayed (# of seconds)  Laryngeal Elevation: Decreased;Weak  Aspiration Signs/Symptoms: None                Oral Phase Severity: Mild-moderate  Pharyngeal Phase Severity : Mild    NOMS:   The NOMS functional outcome measure was used to quantify this patient's level of swallowing impairment. Based on the NOMS, the patient was determined to be at level 4 for swallow function     G Codes: In compliance with CMSs Claims Based Outcome Reporting, the following G-code set was chosen for this patient based the use of the NOMS functional outcome to quantify this patient's level of swallowing impairment. Using the NOMS, the patient was determined to be at level 4 for swallow function which correlates with the CK= 40-59% level of severity. Based on the objective assessment provided within this note, the current, goal, and discharge g-codes are as follows:    Swallow  Swallowing:   Swallow Current Status CK= 40-59%   Swallow Goal Status CJ= 20-39%      NOMS Swallowing Levels:  Level 1 (CN): NPO  Level 2 (CM): NPO but takes consistency in therapy  Level 3 (CL): Takes less than 50% of nutrition p.o. and continues with nonoral feedings; and/or safe with mod cues; and/or max diet restriction  Level 4 (CK): Safe swallow but needs mod cues; and/or mod diet restriction; and/or still requires some nonoral feeding/supplements  Level 5 (CJ): Safe swallow with min diet restriction; and/or needs min cues  Level 6 (CI): Independent with p.o.; rare cues; usually self cues; may need to avoid some foods or needs extra time  Level 7 ([address] [place] [address]): Independent for all p.o.  ROSENDA. (2003). National Outcomes Measurement System (NOMS): Adult Speech-Language Pathology User's Guide.        Pain:  Pain Scale 1: Numeric (0 - 10)  Pain Intensity 1: 0     After treatment:   []            Patient left in no apparent distress sitting up in chair  [x]            Patient left in no apparent distress in bed  [x]            Call bell left within reach  [x]            Nursing notified  []            Caregiver present  []            Bed alarm activated    COMMUNICATION/EDUCATION:   The patients plan of care including recommendations, planned interventions, and recommended diet changes were discussed with: Registered Nurse. Patient was educated regarding His deficit(s) of dysphagia as this relates to his R parotid surgery last year. []            Posted safety precautions in patient's room. [x]            Patient/family have participated as able in goal setting and plan of care. []            Patient/family agree to work toward stated goals and plan of care. []            Patient understands intent and goals of therapy, but is neutral about his/her participation. []            Patient is unable to participate in goal setting and plan of care.     Thank you for this referral.  Sanju Mckinley, SLP  Time Calculation: 20 mins

## 2018-02-12 NOTE — PROGRESS NOTES
Chart review. PTOT is recommending Inpatient Rehab for discharge planning. Patient is presently on 3LNCO2. CM met with patient to discuss recommendation. Patient was not on O2 prior to admission. Patient would like to discuss location of facilities for Rehab with his wife. CM left a list and will follow up next day.     Jeremiah Alves RN CM  Ext  [phone]

## 2018-02-12 NOTE — PROGRESS NOTES
Problem: Self Care Deficits Care Plan (Adult)  Goal: *Acute Goals and Plan of Care (Insert Text)  Occupational Therapy Goals  Initiated 2/9/2018  1. Patient will perform self-feeding with independence within 7 day(s). 2.  Patient will perform grooming with minimal assistance/contact guard assist within 7 day(s). 3.  Patient will perform bathing with moderate assistance  within 7 day(s). 4.  Patient will perform toilet transfers with moderate assistance  within 7 day(s). 5.  Patient will perform all aspects of toileting with moderate assistance  within 7 day(s). 6.  Patient will participate in upper extremity therapeutic exercise/activities with supervision/set-up for 5 minutes within 7 day(s). Occupational Therapy TREATMENT  Patient: Ana Tran (4 y.o. male)  Date: 2/12/2018  Diagnosis: Hypothermia  Sepsis ([facility]  Atrial fibrillation ([facility] <principal problem not specified>       Precautions:    Chart, occupational therapy assessment, plan of care, and goals were reviewed. ASSESSMENT:  Patient remains confused, impulsive at times with decreased safety awareness/need for assistance. Pt edematous in all extremities. Pt was mod A x 2 for supine to sit with verbal cues for hand placement with use of bed rails. Pt with fair dynamic balance, required demonstrated and verbal cues for scooting to EOB, mod A x 2 to stand via HHA x 2. Pt incontinent of urine upon standing, CGA EOB to wash LEs due to fair dynamic sitting balance with forward flexion, total A to don and doff socks. Pt was mod A x 2 to transfer to chair with yahaira pad in place. Reviewed UE exercises to perform due to edema, B UEs supported on pillow with bed alarm activated. Pt hypotensive but asymptomatic, rebounded with seated rest breaks and exercises, O2 stable via 3L. Recommend SNF rehab.   Progression toward goals:  []       Improving appropriately and progressing toward goals  [x]       Improving slowly and progressing toward goals  [] Not making progress toward goals and plan of care will be adjusted     PLAN:  Patient continues to benefit from skilled intervention to address the above impairments. Continue treatment per established plan of care. Discharge Recommendations:  SNF rehab  Further Equipment Recommendations for Discharge:  TBD     SUBJECTIVE:   Patient stated I like to dance.     OBJECTIVE DATA SUMMARY:   Cognitive/Behavioral Status:  Neurologic State: Alert;Confused  Orientation Level: Oriented to person     Perception: Appears intact  Perseveration: No perseveration noted  Safety/Judgement: Decreased insight into deficits; Decreased awareness of need for safety;Decreased awareness of need for assistance    Functional Mobility and Transfers for ADLs:  Bed Mobility:  Supine to Sit: Moderate assistance;Assist x2; Additional time    Transfers:  Sit to Stand: Moderate assistance;Assist x2; Additional time       Balance:  Sitting: Impaired; Without support  Sitting - Static: Good (unsupported)  Sitting - Dynamic: Fair (occasional)  Standing: Impaired; With support  Standing - Static: Constant support; Fair  Standing - Dynamic : Poor    ADL Intervention:     Pt incontinent of urine upon standing, provided washcloths and pt performed LE bathing with CGA for dynamic balance during forward flexion, total A to doff and don socks. Cognitive Retraining  Safety/Judgement: Decreased insight into deficits; Decreased awareness of need for safety;Decreased awareness of need for assistance       Therapeutic Exercises:   Educated pt on importance of hand, wrist, elbow ROM exercises for joint integrity,   Pain:  Pain Scale 1: Numeric (0 - 10)  Pain Intensity 1: 0              Activity Tolerance:   Hypotensive- asymptomatic  Please refer to the flowsheet for vital signs taken during this treatment.   After treatment:   [x] Patient left in no apparent distress sitting up in chair  [] Patient left in no apparent distress in bed  [x] Call bell left within reach  [x] Nursing notified  [] Caregiver present  [] Bed alarm activated    COMMUNICATION/COLLABORATION:   The patients plan of care was discussed with: Physical Therapist and Registered Nurse    Vallarie Claude, OT  Time Calculation: 48 mins

## 2018-02-12 NOTE — PROGRESS NOTES
Infectious Disease Progress Note       Subjective:   Mr Tylor Roa much more awake, alert today. Says he feels better and is \"improving\". Does admit to coughing intermittently.           Vitals:   Patient Vitals for the past 24 hrs:   Temp Pulse Resp BP SpO2   02/12/18 1045 97.1 °F (36.2 °C) 82 16 96/59 97 %   02/12/18 0743 - - - - 100 %   02/12/18 0712 96.8 °F (36 °C) 71 18 90/55 98 %   02/12/18 0546 - - - - 100 %   02/12/18 0321 97.7 °F (36.5 °C) 76 18 101/67 96 %   02/11/18 2316 - - - - 99 %   02/11/18 2313 97.8 °F (36.6 °C) 86 18 96/55 98 %   02/11/18 1946 97.9 °F (36.6 °C) 79 18 96/59 100 %   02/11/18 1922 - - - - 97 %   02/11/18 1729 97.6 °F (36.4 °C) 82 16 95/58 96 %   02/11/18 1542 - - - - 99 %   02/11/18 1449 97.5 °F (36.4 °C) 83 16 101/68 95 %   02/11/18 1222 97.6 °F (36.4 °C) 87 16 99/70 97 %       ¨ GEN: NAD   ¨ HEENT: + eye dressing and facial asymetry,  no scleral icterus L eye,   very poor dentition with many missing/chipped off teeth , L neck line since removed  ¨ CV: S1, S2 heard with RADHA  ¨ Lungs: Coarse and crackles   ¨ Abdomen: soft, non distended, non tender  ¨ Extremities: + edema , slight blanchable erythema noted on feet B/L   ¨ Neuro: Awake, alert, follows commands   ¨ Skin: no rash  ¨ Psych: good affect,non tearful       Medications:    Current Facility-Administered Medications:     zinc oxide-cod liver oil (DESITIN) 40 % paste, , Topical, PRN, Keisha Colunga MD    metroNIDAZOLE (FLAGYL) IVPB premix 500 mg, 500 mg, IntraVENous, Q12H, Maxi Sam MD    vancomycin (VANCOCIN) 1500 mg in  ml infusion, 1,500 mg, IntraVENous, Q36H, Keisha Colunga MD    albuterol-ipratropium (DUO-NEB) 2.5 MG-0.5 MG/3 ML, 3 mL, Nebulization, Q4H PRN, Wing Heimlich V, MD, 3 mL at 02/09/18 2004    albuterol-ipratropium (DUO-NEB) 2.5 MG-0.5 MG/3 ML, 3 mL, Nebulization, Q4H RT, Wing Heimlich V, MD, 3 mL at 02/12/18 0742    white petrolatum-mineral oil (LACRILUBE S.O.P.) ointment, , Right Eye, Q8H, Sri Copeland, MD    polyethylene glycol (MIRALAX) packet 17 g, 17 g, Oral, DAILY, Sagar Kuhn MD, Stopped at 02/12/18 0900    insulin lispro (HUMALOG) injection, , SubCUTAneous, AC&HS, Jinx Opitz, MD, Stopped at 02/07/18 2200    amiodarone (CORDARONE) tablet 100 mg, 100 mg, Oral, DAILY, Yesica Camargo MD, Stopped at 02/12/18 1023    glucose chewable tablet 16 g, 4 Tab, Oral, PRN, Brianna Dyer MD    dextrose (D50W) injection syrg 12.5-25 g, 12.5-25 g, IntraVENous, PRN, Brianna Dyer MD, 25 g at 02/11/18 0307    glucagon (GLUCAGEN) injection 1 mg, 1 mg, IntraMUSCular, PRN, Brianna Dyer MD    nystatin (MYCOSTATIN) 100,000 unit/gram powder, , Topical, TID, Brianna Dyer MD    sodium chloride (NS) flush 5-10 mL, 5-10 mL, IntraVENous, Q8H, Demond Guerrero MD, 10 mL at 02/12/18 0544    sodium chloride (NS) flush 5-10 mL, 5-10 mL, IntraVENous, PRN, Demond Guerrero MD, 10 mL at 02/11/18 0913      Labs:  Recent Results (from the past 24 hour(s))   GLUCOSE, POC    Collection Time: 02/11/18  4:08 PM   Result Value Ref Range    Glucose (POC) 95 65 - 100 mg/dL    Performed by Amarilis Burleson    GLUCOSE, POC    Collection Time: 02/11/18  9:11 PM   Result Value Ref Range    Glucose (POC) 101 (H) 65 - 100 mg/dL    Performed by Maria Del Carmen Bird [address], BASIC    Collection Time: 02/12/18  3:53 AM   Result Value Ref Range    Sodium 144 136 - 145 mmol/L    Potassium 4.0 3.5 - 5.1 mmol/L    Chloride 111 (H) 97 - 108 mmol/L    CO2 25 21 - 32 mmol/L    Anion gap 8 5 - 15 mmol/L    Glucose 93 65 - 100 mg/dL    BUN 26 (H) 6 - 20 MG/DL    Creatinine 1.35 (H) 0.70 - 1.30 MG/DL    BUN/Creatinine ratio 19 12 - 20      GFR est AA >60 >60 ml/min/1.73m2    GFR est non-AA 50 (L) >60 ml/min/1.73m2    Calcium 8.3 (L) 8.5 - 10.1 MG/DL   CBC WITH AUTOMATED DIFF    Collection Time: 02/12/18  3:53 AM   Result Value Ref Range    WBC 6.6 4.1 - 11.1 K/uL    RBC 2.84 (L) 4.10 - 5.70 M/uL    HGB 9.9 (L) 12.1 - 17.0 g/dL HCT 30.9 (L) 36.6 - 50.3 %    .8 (H) 80.0 - 99.0 FL    MCH 34.9 (H) 26.0 - 34.0 PG    MCHC 32.0 30.0 - 36.5 g/dL    RDW 17.2 (H) 11.5 - 14.5 %    PLATELET 66 (L) 637 - 400 K/uL    NRBC 0.0 0  WBC    ABSOLUTE NRBC 0.00 0.00 - 0.01 K/uL    NEUTROPHILS 83 (H) 32 - 75 %    LYMPHOCYTES 7 (L) 12 - 49 %    MONOCYTES 9 5 - 13 %    EOSINOPHILS 0 0 - 7 %    BASOPHILS 0 0 - 1 %    METAMYELOCYTES 1 %    IMMATURE GRANULOCYTES 0 0.0 - 0.5 %    ABS. NEUTROPHILS 5.5 1.8 - 8.0 K/UL    ABS. LYMPHOCYTES 0.5 (L) 0.8 - 3.5 K/UL    ABS. MONOCYTES 0.6 0.0 - 1.0 K/UL    ABS. EOSINOPHILS 0.0 0.0 - 0.4 K/UL    ABS. BASOPHILS 0.0 0.0 - 0.1 K/UL    ABS. IMM. GRANS. 0.0 0.00 - 0.04 K/UL    DF MANUAL      RBC COMMENTS ANISOCYTOSIS  1+        RBC COMMENTS MACROCYTOSIS  2+       GLUCOSE, POC    Collection Time: 02/12/18  7:51 AM   Result Value Ref Range    Glucose (POC) 93 65 - 100 mg/dL    Performed by Mauri Mace            Micro:   UA 2/2/18 0-4 WBC, negative bacteria, negative leukocyte esterase and negative nitrite         Blood 2/6/18 negative so far                            Blood: 2/2/18          STREPTOCOCCUS SALIVARIUS GROWING IN BOTH BOTTLES DRAWN (SITES = RFA) (SENSITIVITIES PERFORMED BY E-TEST) (A)        Culture result:      Final      STAPHYLOCOCCUS AUREUS ALSO GROWING IN THE 1ST OF 2 BOTTLES DRAWN (SITE = R FA) (A)      Culture result:      Final      ENTEROCOCCUS FAECALIS GROUP D ALSO GROWING IN THE 1ST OUT OF 2 BOTTLES DRAWN.  (SITE = RFA) GENTAMICIN SYNERGY SCREEN IS SENSITIVE @ < OR = 500 mcg/ml STREPTOMYCIN SYNERGY SCREEN IS SENSITIVE @ < OR =1000 mcg/ml (A)        Culture & Susceptibility                     Antibiotic    Organism Organism Organism          Enterococcus faecalis group D Staphylococcus aureus Streptococcus salivarius      AMPICILLIN ($)    <=2   ug/mL   S Final              AMPICILLIN/SULBACTAM ($)        <=8/4   ug/mL   S Final          CEFAZOLIN ($)        <=4   ug/mL   S Final          CEFTRIAXONE ($)            0.023   ug/mL   S Final      CIPROFLOXACIN ($)        <=1   ug/mL   S Final          CLINDAMYCIN ($)        <=0.5   ug/mL   S Final          DAPTOMYCIN ($$$$$)    2   ug/mL   S Final  1   ug/mL   S Final          ERYTHROMYCIN ($$$$)        <=0.5   ug/mL   S Final  0.125   ug/mL   S Final      GENTAMICIN ($)        <=4   ug/mL   S Final          LEVOFLOXACIN ($)            1.5   ug/mL   S Final      LINEZOLID ($$$$$)    2   ug/mL   S Final  4   ug/mL   S Final          OXACILLIN        <=0.25   ug/mL   S Final          PENICILLIN G ($$)    2   ug/mL   S Final      0.023   ug/mL   S Final      RIFAMPIN ($$$$)        <=1   ug/mL   S Final          TETRACYCLINE        <=4   ug/mL   S Final          TRIMETH-SULFAMETHOXA        <=0.5/9.5   ug/mL   S Final          VANCOMYCIN ($)    2   ug/mL   S Final  2   ug/mL   S Final  0.50   ug/mL   S Final                                        Component Value Ref Range & Units Status      Special Requests: NO SPECIAL REQUESTS    Preliminary      Culture result: NO GROWTH 1 DAY    Preliminary            Pathology Results:      FINAL PATHOLOGIC DIAGNOSIS   Right parotid gland, total parotidectomy with partial neck dissection:   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  Is minimal decreased attenuation in the periventricular white  matter which is nonspecific but consistent with small vessel disease.  There is  no intracranial hemorrhage.  There is no extra-axial collection, mass, mass  effect or midline shift.  The basilar cisterns are open.  No acute infarct is  identified.  The bone windows demonstrate no abnormalities.  The visualized  portions of the paranasal sinuses and mastoid air cells are clear.      IMPRESSION  IMPRESSION: No acute intracranial abnormality. Age-related volume loss and  microvascular disease unchanged.         CXR 2/7/18  FINDINGS: Portable chest shows support lines/devices appear unchanged since  February 4. There is no apparent pneumothorax.  Lungs show improved pulmonary  edema with persistent bibasilar haziness favoring effusions and atelectasis. Heart size is large, stable. There is no midline shift.      IMPRESSION  IMPRESSION: Improved pulmonary edema. Persistent pleural effusions and  Cardiomegaly.     Renal US 2/3/18  FINDINGS: The right kidney measures 9.6 cm in length. The left kidney measures  10.5 cm in length. Both kidneys demonstrate normal cortical echogenicity. No  renal calculus is seen. There is no hydronephrosis. There is a 2.2 cm left renal  cyst. The aorta and iliac arteries are not visualized due to body habitus. Visualized portions of the IVC are normal. The bladder is decompressed by Ivy  catheter.      IMPRESSION  IMPRESSION:   No acute genitourinary pathology.     Procedures:   L neck central line insertion         Assessment / Plan:      Mr Dalton Fuentes  is a 80y.o. 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 Appears from notes that he had \"syncope\" the day before presentation there and had heart rhytym that was alternating between  \"junctional bradycardia \" and rate controlled Afib. He was transferred to [address] [place], [place] [address]as [facility] and on presentation found to be septic with hypothermia. Was started on pressors as well and inititated on Meropenem, Levofloxacin and Vancomycin.   Blood cultures sent 2/2 returned + for polymicrobial organisms including MSSA, Strep salivarus and ampicillin sensitive E faecalis.       He is also noted to be leukopenic without neutropenia, thrombocytopenia and macrocytic anemia  and seen by Hematology/oncology this admission. Renal function is imporving. He has had imaging of CT head that showed no acute findings. US renal done with 2.2 cm L renal cyst noted. CXR has a small R pleural effusion. He is currently on Vancomycin with last trough on 2/4/18 at 19.6. TTE has shown AV and MV thickening.      From discussion with him, he says that he has no specific symptoms. Denied any known hardware in his body. Denied falls at home. Says he was supposed to take care of his teeth when asked but got admitted. Denied pain.      From review of chart and  Medical records, he saw his PCP 1/2018 with dyspnea and notes indicate volume overload. At that time, notes do not indicate concern for pneumonia. Notes this admission, indicate concerns for bleeding around central line and BRBPR as well. He was transfused this admission.      I am consulted today regarding further recommendations.    1) Polymicrobial bacteremia ( MSSA, Streptococcus salivarius and E faecalis)  Possible source: GI/oral in origin claudialey given hx of constipation wt straining and bloody bowel movements , skin entry as well given thin skin with multiple bruising         Recommendations:   Continue IV Vancomycin per pharmacy dosing wt close renal function. Last trough still supra therapeutic and dose being adjusted by pharmacy.  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 Please contact with questions or concerns.       Hilda Castillo,    11:57 AM

## 2018-02-12 NOTE — WOUND CARE
Wound care Consult: Chart reviewed and patient assessed for his sacrum / buttocks on the left side. This was not present on admission. Attending Dr. Pillo Vigil was notified of this. You Paz and nurse manager is also aware. Assessment: Patient able to stand for a short period of time. He is incontinent of urine. Pt. Has a Deep Tissue injury to the skin on the left side of the gluteal cleft and left buttocks. Both lesions are deep purple, linear and non-blanchable. Both lesions measure 7.5 cm x 0.7 and the skin is not broken. The patient c/o pain in the entire area. He is currently in a recliner with his feet up and heels floated. Pt. On two chux and a pillow in the chair prior to this evaluation. Treatment: ordered Venelex ointment for the skin to help improve circulation. Off load the sacrum at all times. Chair cushion placed in the chair.    Nicole Galvin RN, BSN, CWON ([id number])

## 2018-02-13 ENCOUNTER — APPOINTMENT (OUTPATIENT)
Dept: GENERAL RADIOLOGY | Age: 83
DRG: 871 | End: 2018-02-13
Attending: INTERNAL MEDICINE
Payer: MEDICARE

## 2018-02-13 LAB
ANION GAP SERPL CALC-SCNC: 7 MMOL/L (ref 5–15)
BASOPHILS # BLD: 0 K/UL (ref 0–0.1)
BASOPHILS NFR BLD: 0 % (ref 0–1)
BUN SERPL-MCNC: 24 MG/DL (ref 6–20)
BUN/CREAT SERPL: 20 (ref 12–20)
CALCIUM SERPL-MCNC: 8.2 MG/DL (ref 8.5–10.1)
CHLORIDE SERPL-SCNC: 111 MMOL/L (ref 97–108)
CO2 SERPL-SCNC: 26 MMOL/L (ref 21–32)
CREAT SERPL-MCNC: 1.2 MG/DL (ref 0.7–1.3)
DIFFERENTIAL METHOD BLD: ABNORMAL
EOSINOPHIL # BLD: 0.1 K/UL (ref 0–0.4)
EOSINOPHIL NFR BLD: 1 % (ref 0–7)
ERYTHROCYTE [DISTWIDTH] IN BLOOD BY AUTOMATED COUNT: 16.9 % (ref 11.5–14.5)
GLUCOSE BLD STRIP.AUTO-MCNC: 115 MG/DL (ref 65–100)
GLUCOSE BLD STRIP.AUTO-MCNC: 118 MG/DL (ref 65–100)
GLUCOSE BLD STRIP.AUTO-MCNC: 266 MG/DL (ref 65–100)
GLUCOSE BLD STRIP.AUTO-MCNC: 90 MG/DL (ref 65–100)
GLUCOSE SERPL-MCNC: 103 MG/DL (ref 65–100)
HCT VFR BLD AUTO: 30.1 % (ref 36.6–50.3)
HGB BLD-MCNC: 9.7 G/DL (ref 12.1–17)
IMM GRANULOCYTES # BLD: 0.2 K/UL (ref 0–0.04)
IMM GRANULOCYTES NFR BLD AUTO: 3 % (ref 0–0.5)
LYMPHOCYTES # BLD: 0.5 K/UL (ref 0.8–3.5)
LYMPHOCYTES NFR BLD: 9 % (ref 12–49)
MCH RBC QN AUTO: 34.6 PG (ref 26–34)
MCHC RBC AUTO-ENTMCNC: 32.2 G/DL (ref 30–36.5)
MCV RBC AUTO: 107.5 FL (ref 80–99)
MONOCYTES # BLD: 1 K/UL (ref 0–1)
MONOCYTES NFR BLD: 17 % (ref 5–13)
NEUTS SEG # BLD: 4.2 K/UL (ref 1.8–8)
NEUTS SEG NFR BLD: 70 % (ref 32–75)
NRBC # BLD: 0 K/UL (ref 0–0.01)
NRBC BLD-RTO: 0 PER 100 WBC
PLATELET # BLD AUTO: 100 K/UL (ref 150–400)
PMV BLD AUTO: 11.3 FL (ref 8.9–12.9)
POTASSIUM SERPL-SCNC: 3.8 MMOL/L (ref 3.5–5.1)
RBC # BLD AUTO: 2.8 M/UL (ref 4.1–5.7)
RBC MORPH BLD: ABNORMAL
RBC MORPH BLD: ABNORMAL
SERVICE CMNT-IMP: ABNORMAL
SERVICE CMNT-IMP: NORMAL
SODIUM SERPL-SCNC: 144 MMOL/L (ref 136–145)
WBC # BLD AUTO: 6 K/UL (ref 4.1–11.1)

## 2018-02-13 PROCEDURE — 74230 X-RAY XM SWLNG FUNCJ C+: CPT

## 2018-02-13 PROCEDURE — 85025 COMPLETE CBC W/AUTO DIFF WBC: CPT | Performed by: INTERNAL MEDICINE

## 2018-02-13 PROCEDURE — 74011000250 HC RX REV CODE- 250: Performed by: INTERNAL MEDICINE

## 2018-02-13 PROCEDURE — 82962 GLUCOSE BLOOD TEST: CPT

## 2018-02-13 PROCEDURE — 74011636637 HC RX REV CODE- 636/637: Performed by: INTERNAL MEDICINE

## 2018-02-13 PROCEDURE — 77010033678 HC OXYGEN DAILY

## 2018-02-13 PROCEDURE — 92611 MOTION FLUOROSCOPY/SWALLOW: CPT

## 2018-02-13 PROCEDURE — 94640 AIRWAY INHALATION TREATMENT: CPT

## 2018-02-13 PROCEDURE — 77030011256 HC DRSG MEPILEX <16IN NO BORD MOLN -A

## 2018-02-13 PROCEDURE — 74011250637 HC RX REV CODE- 250/637: Performed by: INTERNAL MEDICINE

## 2018-02-13 PROCEDURE — 65660000000 HC RM CCU STEPDOWN

## 2018-02-13 PROCEDURE — 80048 BASIC METABOLIC PNL TOTAL CA: CPT | Performed by: INTERNAL MEDICINE

## 2018-02-13 PROCEDURE — 36415 COLL VENOUS BLD VENIPUNCTURE: CPT | Performed by: INTERNAL MEDICINE

## 2018-02-13 RX ORDER — TRAMADOL HYDROCHLORIDE 50 MG/1
50 TABLET ORAL
Status: ACTIVE | OUTPATIENT
Start: 2018-02-13 | End: 2018-02-13

## 2018-02-13 RX ADMIN — CASTOR OIL AND BALSAM, PERU: 788; 87 OINTMENT TOPICAL at 09:28

## 2018-02-13 RX ADMIN — Medication 10 ML: at 14:10

## 2018-02-13 RX ADMIN — IPRATROPIUM BROMIDE AND ALBUTEROL SULFATE 3 ML: .5; 3 SOLUTION RESPIRATORY (INHALATION) at 07:11

## 2018-02-13 RX ADMIN — IPRATROPIUM BROMIDE AND ALBUTEROL SULFATE 3 ML: .5; 3 SOLUTION RESPIRATORY (INHALATION) at 19:45

## 2018-02-13 RX ADMIN — Medication 10 ML: at 21:39

## 2018-02-13 RX ADMIN — CASTOR OIL AND BALSAM, PERU: 788; 87 OINTMENT TOPICAL at 13:30

## 2018-02-13 RX ADMIN — Medication 10 ML: at 06:21

## 2018-02-13 RX ADMIN — METRONIDAZOLE 500 MG: 500 INJECTION, SOLUTION INTRAVENOUS at 06:18

## 2018-02-13 RX ADMIN — CASTOR OIL AND BALSAM, PERU: 788; 87 OINTMENT TOPICAL at 21:37

## 2018-02-13 RX ADMIN — NYSTATIN: 100000 POWDER TOPICAL at 09:27

## 2018-02-13 RX ADMIN — CASTOR OIL AND BALSAM, PERU: 788; 87 OINTMENT TOPICAL at 18:21

## 2018-02-13 RX ADMIN — IPRATROPIUM BROMIDE AND ALBUTEROL SULFATE 3 ML: .5; 3 SOLUTION RESPIRATORY (INHALATION) at 04:33

## 2018-02-13 RX ADMIN — INSULIN LISPRO 2 UNITS: 100 INJECTION, SOLUTION INTRAVENOUS; SUBCUTANEOUS at 21:38

## 2018-02-13 RX ADMIN — CASTOR OIL AND BALSAM, PERU: 788; 87 OINTMENT TOPICAL at 07:30

## 2018-02-13 RX ADMIN — AMIODARONE HYDROCHLORIDE 100 MG: 200 TABLET ORAL at 09:25

## 2018-02-13 RX ADMIN — IPRATROPIUM BROMIDE AND ALBUTEROL SULFATE 3 ML: .5; 3 SOLUTION RESPIRATORY (INHALATION) at 17:00

## 2018-02-13 RX ADMIN — MINERAL OIL, PETROLATUM: 425; 568 OINTMENT OPHTHALMIC at 14:15

## 2018-02-13 RX ADMIN — IPRATROPIUM BROMIDE AND ALBUTEROL SULFATE 3 ML: .5; 3 SOLUTION RESPIRATORY (INHALATION) at 23:01

## 2018-02-13 RX ADMIN — NYSTATIN: 100000 POWDER TOPICAL at 18:23

## 2018-02-13 RX ADMIN — NYSTATIN: 100000 POWDER TOPICAL at 21:38

## 2018-02-13 RX ADMIN — METRONIDAZOLE 500 MG: 500 INJECTION, SOLUTION INTRAVENOUS at 18:22

## 2018-02-13 RX ADMIN — Medication: at 10:11

## 2018-02-13 RX ADMIN — MINERAL OIL, PETROLATUM: 425; 568 OINTMENT OPHTHALMIC at 06:21

## 2018-02-13 NOTE — PROGRESS NOTES
Infectious Disease Progress Note       Subjective:   Mr Sherry Macdonald much more awake, alert today. Sitting in chair and says feels better.          Vitals:   Patient Vitals for the past 24 hrs:   Temp Pulse Resp BP SpO2   02/13/18 1245 97.7 °F (36.5 °C) 85 16 99/60 100 %   02/13/18 0732 97.6 °F (36.4 °C) 81 12 (!) 88/54 99 %   02/13/18 0712 - - - - 97 %   02/13/18 0432 - - - - 99 %   02/12/18 2328 - 84 - 104/56 98 %   02/12/18 2325 97.3 °F (36.3 °C) 78 18 104/56 95 %   02/12/18 2218 - - - - 97 %   02/12/18 1959 97.3 °F (36.3 °C) 91 16 92/57 98 %   02/12/18 1800 96.4 °F (35.8 °C) 86 16 (!) 88/45 100 %   02/12/18 1604 96.2 °F (35.7 °C) 85 14 (!) 89/47 100 %   02/12/18 1548 - - - - 97 %       ¨ GEN: NAD   ¨ HEENT:  facial asymetry,  no scleral icterus L eye,   very poor dentition with many missing/chipped off teeth , L neck line since removed  ¨ CV: S1, S2 heard with RADHA  ¨ Lungs: Coarse and crackles   ¨ Abdomen: soft, non distended, non tender  ¨ Extremities: + edema , slight blanchable erythema noted on feet B/L   ¨ Neuro: Awake, alert, follows commands   ¨ Skin: no rash  ¨ Psych: good affect,non tearful       Medications:    Current Facility-Administered Medications:     traMADol (ULTRAM) tablet 50 mg, 50 mg, Oral, NOW, June Cooney MD    zinc oxide-cod liver oil (DESITIN) 40 % paste, , Topical, PRN, Traci Tyler MD    metroNIDAZOLE (FLAGYL) IVPB premix 500 mg, 500 mg, IntraVENous, Q12H, Maxi Sam MD, Last Rate: 100 mL/hr at 02/13/18 0618, 500 mg at 02/13/18 0618    balsam peru-castor oil (VENELEX)  mg/gram ointment, , Topical, TID, Traci Tyler MD    vancomycin (VANCOCIN) 1500 mg in  ml infusion, 1,500 mg, IntraVENous, Q36H, Traci Tyler MD, Last Rate: 250 mL/hr at 02/12/18 1728, 1,500 mg at 02/12/18 1728    albuterol-ipratropium (DUO-NEB) 2.5 MG-0.5 MG/3 ML, 3 mL, Nebulization, Q4H PRN, Aparna OAVLLE MD, 3 mL at 02/09/18 2004    albuterol-ipratropium (DUO-NEB) 2.5 MG-0.5 MG/3 ML, 3 mL, Nebulization, Q4H RT, Jasbir Melendez MD, Stopped at 02/13/18 1200    white petrolatum-mineral oil (LACRILUBE S.O.P.) ointment, , Right Eye, Q8H, Angel Watkins MD    polyethylene glycol [facility]) packet 17 g, 17 g, Oral, DAILY, Sagar Kuhn MD, Stopped at 02/12/18 0900    insulin lispro (HUMALOG) injection, , SubCUTAneous, AC&HS, Jinx Opitz, MD, Stopped at 02/07/18 2200    amiodarone (CORDARONE) tablet 100 mg, 100 mg, Oral, DAILY, Yesica Camargo MD, 100 mg at 02/13/18 0806    glucose chewable tablet 16 g, 4 Tab, Oral, PRN, Brianna Dyer MD    dextrose (D50W) injection syrg 12.5-25 g, 12.5-25 g, IntraVENous, PRN, Brianna Dyer MD, 25 g at 02/11/18 0307    glucagon (GLUCAGEN) injection 1 mg, 1 mg, IntraMUSCular, PRN, Brianna Dyer MD    nystatin (MYCOSTATIN) 100,000 unit/gram powder, , Topical, TID, Brianna Dyer MD    sodium chloride (NS) flush 5-10 mL, 5-10 mL, IntraVENous, Q8H, Demond Guerrero MD, 10 mL at 02/13/18 0333    sodium chloride (NS) flush 5-10 mL, 5-10 mL, IntraVENous, PRN, Demond Guerrero MD, 10 mL at 02/11/18 0913      Labs:  Recent Results (from the past 24 hour(s))   GLUCOSE, POC    Collection Time: 02/12/18  4:45 PM   Result Value Ref Range    Glucose (POC) 89 65 - 100 mg/dL    Performed by NURY MANN (PCT) CON    GLUCOSE, POC    Collection Time: 02/12/18  8:11 PM   Result Value Ref Range    Glucose (POC) 114 (H) 65 - 100 mg/dL    Performed by Sisi GUADALUPE    CBC WITH AUTOMATED DIFF    Collection Time: 02/13/18  4:11 AM   Result Value Ref Range    WBC 6.0 4.1 - 11.1 K/uL    RBC 2.80 (L) 4.10 - 5.70 M/uL    HGB 9.7 (L) 12.1 - 17.0 g/dL    HCT 30.1 (L) 36.6 - 50.3 %    .5 (H) 80.0 - 99.0 FL    MCH 34.6 (H) 26.0 - 34.0 PG    MCHC 32.2 30.0 - 36.5 g/dL    RDW 16.9 (H) 11.5 - 14.5 %    PLATELET 313 (L) 515 - 400 K/uL    MPV 11.3 8.9 - 12.9 FL    NRBC 0.0 0  WBC    ABSOLUTE NRBC 0.00 0.00 - 0.01 K/uL    NEUTROPHILS 70 32 - 75 % LYMPHOCYTES 9 (L) 12 - 49 %    MONOCYTES 17 (H) 5 - 13 %    EOSINOPHILS 1 0 - 7 %    BASOPHILS 0 0 - 1 %    IMMATURE GRANULOCYTES 3 (H) 0.0 - 0.5 %    ABS. NEUTROPHILS 4.2 1.8 - 8.0 K/UL    ABS. LYMPHOCYTES 0.5 (L) 0.8 - 3.5 K/UL    ABS. MONOCYTES 1.0 0.0 - 1.0 K/UL    ABS. EOSINOPHILS 0.1 0.0 - 0.4 K/UL    ABS. BASOPHILS 0.0 0.0 - 0.1 K/UL    ABS. IMM. GRANS. 0.2 (H) 0.00 - 0.04 K/UL    DF AUTOMATED      RBC COMMENTS ANISOCYTOSIS  1+        RBC COMMENTS MACROCYTOSIS  PRESENT       METABOLIC PANEL, BASIC    Collection Time: 02/13/18  4:11 AM   Result Value Ref Range    Sodium 144 136 - 145 mmol/L    Potassium 3.8 3.5 - 5.1 mmol/L    Chloride 111 (H) 97 - 108 mmol/L    CO2 26 21 - 32 mmol/L    Anion gap 7 5 - 15 mmol/L    Glucose 103 (H) 65 - 100 mg/dL    BUN 24 (H) 6 - 20 MG/DL    Creatinine 1.20 0.70 - 1.30 MG/DL    BUN/Creatinine ratio 20 12 - 20      GFR est AA >60 >60 ml/min/1.73m2    GFR est non-AA 57 (L) >60 ml/min/1.73m2    Calcium 8.2 (L) 8.5 - 10.1 MG/DL   GLUCOSE, POC    Collection Time: 02/13/18  7:40 AM   Result Value Ref Range    Glucose (POC) 90 65 - 100 mg/dL    Performed by [address], POC    Collection Time: 02/13/18 12:52 PM   Result Value Ref Range    Glucose (POC) 118 (H) 65 - 100 mg/dL    Performed by Agus Diaz            Micro:   UA 2/2/18 0-4 WBC, negative bacteria, negative leukocyte esterase and negative nitrite         Blood 2/6/18 negative so far                            Blood: 2/2/18          STREPTOCOCCUS SALIVARIUS GROWING IN BOTH BOTTLES DRAWN (SITES = RFA) (SENSITIVITIES PERFORMED BY E-TEST) (A)        Culture result:      Final      STAPHYLOCOCCUS AUREUS ALSO GROWING IN THE 1ST OF 2 BOTTLES DRAWN (SITE = R FA) (A)      Culture result:      Final      ENTEROCOCCUS FAECALIS GROUP D ALSO GROWING IN THE 1ST OUT OF 2 BOTTLES DRAWN.  (SITE = [facility]) GENTAMICIN SYNERGY SCREEN IS SENSITIVE @ < OR = 500 mcg/ml STREPTOMYCIN SYNERGY SCREEN IS SENSITIVE @ < OR =1000 mcg/ml (A)      Culture & Susceptibility                     Antibiotic    Organism Organism Organism          Enterococcus faecalis group D Staphylococcus aureus Streptococcus salivarius      AMPICILLIN ($)    <=2   ug/mL   S Final              AMPICILLIN/SULBACTAM ($)        <=8/4   ug/mL   S Final          CEFAZOLIN ($)        <=4   ug/mL   S Final          CEFTRIAXONE ($)            0.023   ug/mL   S Final      CIPROFLOXACIN ($)        <=1   ug/mL   S Final          CLINDAMYCIN ($)        <=0.5   ug/mL   S Final          DAPTOMYCIN ($$$$$)    2   ug/mL   S Final  1   ug/mL   S Final          ERYTHROMYCIN ($$$$)        <=0.5   ug/mL   S Final  0.125   ug/mL   S Final      GENTAMICIN ($)        <=4   ug/mL   S Final          LEVOFLOXACIN ($)            1.5   ug/mL   S Final      LINEZOLID ($$$$$)    2   ug/mL   S Final  4   ug/mL   S Final          OXACILLIN        <=0.25   ug/mL   S Final          PENICILLIN G ($$)    2   ug/mL   S Final      0.023   ug/mL   S Final      RIFAMPIN ($$$$)        <=1   ug/mL   S Final          TETRACYCLINE        <=4   ug/mL   S Final          TRIMETH-SULFAMETHOXA        <=0.5/9.5   ug/mL   S Final          VANCOMYCIN ($)    2   ug/mL   S Final  2   ug/mL   S Final  0.50   ug/mL   S Final                                        Component Value Ref Range & Units Status      Special Requests: NO SPECIAL REQUESTS    Preliminary      Culture result: NO GROWTH 1 DAY    Preliminary            Pathology Results:      FINAL PATHOLOGIC DIAGNOSIS   Right parotid gland, total parotidectomy with partial neck dissection:   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  Is minimal decreased attenuation in the periventricular white  matter which is nonspecific but consistent with small vessel disease. Elnoria Coil is  no intracranial hemorrhage. Elnoria Coil is no extra-axial collection, mass, mass  effect or midline shift.  The basilar cisterns are open.  No acute infarct is  identified. The bone windows demonstrate no abnormalities.  The visualized  portions of the paranasal sinuses and mastoid air cells are clear.      IMPRESSION  IMPRESSION: No acute intracranial abnormality. Age-related volume loss and  microvascular disease unchanged.         CXR 2/7/18  FINDINGS: Portable chest shows support lines/devices appear unchanged since  February 4. There is no apparent pneumothorax.  Lungs show improved pulmonary  edema with persistent bibasilar haziness favoring effusions and atelectasis. Heart size is large, stable. There is no midline shift.      IMPRESSION  IMPRESSION: Improved pulmonary edema. Persistent pleural effusions and  Cardiomegaly.     Renal US 2/3/18  FINDINGS: The right kidney measures 9.6 cm in length. The left kidney measures  10.5 cm in length. Both kidneys demonstrate normal cortical echogenicity. No  renal calculus is seen. There is no hydronephrosis. There is a 2.2 cm left renal  cyst. The aorta and iliac arteries are not visualized due to body habitus. Visualized portions of the IVC are normal. The bladder is decompressed by Ivy  catheter.      IMPRESSION  IMPRESSION:   No acute genitourinary pathology.     Procedures:   L neck central line insertion         Assessment / Plan:      Mr Leonardo Redmond  is a 80y.o. 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   Appears from notes that he had \"syncope\" the day before presentation there and had heart rhytym that was alternating between  \"junctional bradycardia \" and rate controlled Afib. He was transferred to [facility] and on presentation found to be septic with hypothermia. Was started on pressors as well and inititated on Meropenem, Levofloxacin and Vancomycin. Blood cultures sent 2/2 returned + for polymicrobial organisms including MSSA, Strep salivarus and ampicillin sensitive E faecalis.       He is also noted to be leukopenic without neutropenia, thrombocytopenia and macrocytic anemia  and seen by Hematology/oncology this admission. Renal function is imporving. He has had imaging of CT head that showed no acute findings. US renal done with 2.2 cm L renal cyst noted. CXR has a small R pleural effusion. He is currently on Vancomycin with last trough on 2/4/18 at 19.6. TTE has shown AV and MV thickening.      From discussion with him, he says that he has no specific symptoms. Denied any known hardware in his body. Denied falls at home. Says he was supposed to take care of his teeth when asked but got admitted. Denied pain.      From review of chart and  Medical records, he saw his PCP 1/2018 with dyspnea and notes indicate volume overload. At that time, notes do not indicate concern for pneumonia. Notes this admission, indicate concerns for bleeding around central line and BRBPR as well. He was transfused this admission.      I am consulted today regarding further recommendations.    1) Polymicrobial bacteremia ( MSSA, Streptococcus salivarius and E faecalis)  Possible source: GI/oral in origin silviano given hx of constipation wt straining and bloody bowel movements , skin entry as well given thin skin with multiple bruising         Recommendations:   Continue IV Vancomycin per pharmacy dosing wt close renal function.  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 Please contact with questions or concerns.       D/W wt his wife today     Diego Hawkins, DO   11:57 AM

## 2018-02-13 NOTE — PROGRESS NOTES
Cm met with patient to review PTOT recommendations for IP rehab. List had been provided on 2/12/2018. Wife is present to discuss and wife and patient would like referral submitted to [address] [place] [address]. Todays note recommends SNF.     CM will submit referral to [address] [place] [address] at family request.    Tu Hector RN CM  Ext [phone]

## 2018-02-13 NOTE — PROGRESS NOTES
Problem: Dysphagia (Adult)  Goal: *Acute Goals and Plan of Care (Insert Text)  2/12/2018  Speech path  1. Pt will participate with MBS. MET 2/13/18 2/13/2018  1. Patient will tolerate mechanical soft/nectar thick liquid diet without overt s/s of aspiration within 7 days. 2. Patient will perform effortful swallow swallowing exercises x20 with min cues without overt s/s of aspiration within 7 days. Speech Pathology Modified barium swallow Study  Patient: Beverley Sher (31 y.o. male)  Date: 2/13/2018  Primary Diagnosis: Hypothermia  Sepsis ([facility]  Atrial fibrillation ([facility]        Precautions: swallow       ASSESSMENT :  Based on the objective data described below, the patient presents with moderate oral dysphagia and with mild-moderate pharyngeal dysphagia. Oral dysphagia characterized by anterior spillage of thin and nectar thick liquids via cup sip, likely from right facial droop following parotid tumor surgery (06/13/2017). Patient also with prolonged and discoordinated bolus manipulation and cohesion, and premature spillage across PO trials. Patient with prolonged mastication of hard solid. Patient also with delayed posterior propulsion of all consistencies, especially with the puree consistency, needing nectar thick liquid wash to clear. Patient with collection amount of residue on his tongue across all boluses, which then passively fell into pharynx. Pharyngeal dysphagia characterized by incomplete laryngeal closure, reduced anterior hyoid excursion across consistencies, and trace amounts of residue in the valleculae and pyriforms. Patient has functional tongue base retraction, pharyngeal wall stripping, epiglottic inversion, and UES distention. Patient silently aspirated trace amounts of thin liquid via cup and straw x3 during the swallow due to decreased laryngeal closure. Aspiration after the swallow was due to penetration of thin liquid boluses, which was not independently cleared.  Patient with audible aspiration x1 following thin liquid trial; however cough was ineffective in clearing aspiration. Cued coughs x3 were not effective to clear aspiration. No aspiration observed with nectar liquids. Of note, patient with wet vocal quality after MBS, which is consistent with thin liquid aspiration that he could not clear. Of note, patient impulsive and required maximal verbal and physical cueing to prevent patient from consuming PO off fluoroscopy. SLP provided education on rationale for thickening to nectar consistency and aspiration risks. However, patient needs reinforcement, as he asked for water immediately following education. Patient is at risk for aspiration secondary to cognitive status and oral dysphagia related to R facial droop. Recommend mechanical soft/nectar thick liquid consistencies. Patient will benefit from skilled intervention to address the above impairments. Patients rehabilitation potential is considered to be Fair  Factors which may influence rehabilitation potential include:   []              None noted  [x]              Mental ability/status  [x]              Medical condition  []              Home/family situation and support systems  [x]              Safety awareness  []              Pain tolerance/management  []              Other:      PLAN :  Recommendations and Planned Interventions:  --Mechanical soft/nectar thick liquid diet  --NO straws  --Meds crushed in applesauce  --Present boluses on left (strong side) of the patient  --1:1 supervision with PO intake and to check for pocketing  --SLP to follow up for swallowing exercises (effortful swallows) and diet tolerance    Frequency/Duration: Patient will be followed by speech-language pathology 3 times a week to address goals. Discharge Recommendations: To Be Determined     SUBJECTIVE:   Patient stated Yasmeen Mcnally are you looking for?  Patient is alert and cooperative, but impulsive, needing max verbal and physical cues to control bolus intake during test.    OBJECTIVE:     Past Medical History:   Diagnosis Date    Atrial fibrillation with RVR ([facility])     Dr Tiago Chaney - cardiologist    CAD (coronary artery disease)     [facility]     Diverticula of colon     Heart failure ([facility] [facility]     Hypercholesterolemia     Ill-defined condition     psorosis    Malignant neoplasm of prostate (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    Psoriasis     lower arms, legs (above knees)     Past Surgical History:   Procedure Laterality Date    HX CATARACT REMOVAL Bilateral     HX COLONOSCOPY      HX PROSTATECTOMY  1997    HX TONSILLECTOMY  as a child     Prior Level of Function/Home Situation:   Home Situation  Home Environment: Rehabilitation facility  # Steps to Enter: 3  Rails to Enter: Yes  One/Two Story Residence: One story  Living Alone: No  Support Systems: Spouse/Significant Other/Partner  Patient Expects to be Discharged to[de-identified] Private residence  Current DME Used/Available at Home: None  Tub or Shower Type: Tub/Shower combination  Diet prior to admission: regular/thin liquid diet  Current Diet:  NPO  Radiologist: Dr. Ybarra Cap Views: Lateral;Fluoro  Patient Position: upright in chair    Trial 1: Trial 2:   Consistency Presented: Thin liquid Consistency Presented: Nectar thick liquid;Puree; Solid   How Presented: Self-fed/presented;SLP-fed/presented;Cup/sip;Spoon;Straw;Successive swallows How Presented: Self-fed/presented;SLP-fed/presented;Cup/sip;Spoon;Straw;Successive swallows         Bolus Acceptance: No impairment Bolus Acceptance: No impairment   Bolus Formation/Control: Impaired: Anterior;Spillage;Delayed;Premature spillage (right facial droop from surgery) Bolus Formation/Control: Impaired: Anterior;Delayed;Spillage;Mastication;Premature spillage   Propulsion: Delayed (# of seconds) Propulsion: Delayed (# of seconds) (with puree, he needed nectar wash to wash it down)   Oral Residue: Lingual (collection) Oral Residue: Lingual (collection)   Initiation of Swallow: Triggered at vallecula Initiation of Swallow: Triggered at base of tongue   Timing: No impairment Timing: No impairment   Penetration: During swallow; To cords;From residual Penetration: None   Aspiration/Timing: Silent ;Trace;Repeated;From residual;During Aspiration/Timing: No evidence of aspiration   Pharyngeal Clearance: No residue Pharyngeal Clearance: Vallecular residue;Pyriform residue ; Less than 10%   Attempted Modifications: Cup/sip;Straw Attempted Modifications: Alternate liquids/solids   Effective Modifications: None Effective Modifications: Alternate liquids/solids   Cues for Modifications: Minimal-moderate Cues for Modifications: Minimal             Decreased Tongue Base Retraction?: No  Laryngeal Elevation: Incomplete laryngeal closure; Reduced excursion with laryngeal vestibule gap  Aspiration/Penetration Score: 8 (Aspiration-Contrast passes cords/glottis with no effort to eject, ie/silent aspiration)  Pharyngeal Symmetry: Not assessed  Pharyngeal-Esophageal Segment: No impairment  Pharyngeal Dysfunction: Decreased elevation/closure    Oral Phase Severity: Moderate  Pharyngeal Phase Severity: Mild moderate  NOMS:   The NOMS functional outcome measure was used to quantify this patient's level of swallowing impairment. Based on the NOMS, the patient was determined to be at level 4 for swallow function     G Codes: In compliance with CMSs Claims Based Outcome Reporting, the following G-code set was chosen for this patient based the use of the NOMS functional outcome to quantify this patient's level of swallowing impairment. Using the NOMS, the patient was determined to be at level 4 for swallow function which correlates with the CK= 40-59% level of severity.     Based on the objective assessment provided within this note, the current, goal, and discharge g-codes are as follows:    Swallow Swallowing:   Swallow Current Status CK= 40-59%   Swallow Goal Status CJ= 20-39%      NOMS Swallowing Levels:  Level 1 (CN): NPO  Level 2 (CM): NPO but takes consistency in therapy  Level 3 (CL): Takes less than 50% of nutrition p.o. and continues with nonoral feedings; and/or safe with mod cues; and/or max diet restriction  Level 4 (CK): Safe swallow but needs mod cues; and/or mod diet restriction; and/or still requires some nonoral feeding/supplements  Level 5 (CJ): Safe swallow with min diet restriction; and/or needs min cues  Level 6 (CI): Independent with p.o.; rare cues; usually self cues; may need to avoid some foods or needs extra time  Level 7 ([address] [place] [address]): Independent for all p.o.  ROSENDA. (2003). National Outcomes Measurement System (NOMS): Adult Speech-Language Pathology User's Guide. COMMUNICATION/EDUCATION:   The patients plan of care including findings from MBS, recommendations, planned interventions, and recommended diet changes were discussed with: Registered Nurse. [x]  Patient/family have participated as able in goal setting and plan of care. [x]  Patient/family agree to work toward stated goals and plan of care. []  Patient understands intent and goals of therapy, but is neutral about his/her participation. []  Patient is unable to participate in goal setting and plan of care.     Thank you for this referral.  Vicky Ceron  Time Calculation: 30 mins

## 2018-02-13 NOTE — PROGRESS NOTES
Palliative Medicine Consult  [place]: [phone] ([phone])    Patient Name: Holly Diana  YOB: 1928    Date of Initial Consult: 2/6/18  Reason for Consult: care decisions  Requesting Provider: Dr. Danielle Arzola  Primary Care Physician: Debbie Malone NP     SUMMARY:   Holly Diana is a 80 y.o. 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 Current medical issues leading to Palliative Medicine involvement include: sepsis/ bacteremia, encephalopathy, pancytopenia (improving w tx of sepsis), ARF on CRF (improving). 2/12: MBS tomorrow. High risk for aspiration. RRT call on 2/9 for difficulty manging secreations and lethargy, improved with deep sxn. Today much improved. 2/13: MBS shows silent aspiration of thin liquids by straw, speech rec mechanical soft/nectar liquid diet. 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 He lives with his wife of over 48 years, Cierra Toro (her brother is Akbar Coronel- retired family physician). They live on [address] in [place]. They have two adult children who both live in [place] ([facility] and [facility])    PALLIATIVE DIAGNOSES:   1. Sepsis - bacteremia (staph/strep and enterococcus)  2. Pancytopenia, improving w tx of sepsis  3. ARF on CRF  4. Delirium, metabolic encephalopathy, agitated at times  5. Chronic constipation     PLAN:   1. Met with wife while pt was in radiology, she was gone when I came back. She provided history, reports plans for surgical \"sling\", or face lift to left side of face, although not sure it would be worth the risk.  Also stated  spoke with her, told her he does not recommend a pacemaker or MARIA at this time. 2.  Met with pt, who said he does not know what the MBS showed. Counseled pt that he aspirates thin liquids, and that recommendations will be no straw and thickened liquids. He states he is hungry and hopes he can have something soon. 3. Very motivated. Would do well at [facility] and agree with recommendation. GOALS OF CARE / TREATMENT PREFERENCES:     GOALS OF CARE:  Patient/Health Care Proxy Stated Goals: Other (comment)      TREATMENT PREFERENCES:   Code Status: Partial Code    Advance Care Planning:  Advance Care Planning 2/3/2018   Patient's Healthcare Decision Maker is: Legal Next of Kin   Primary Decision Maker Name Thai Shepherd   Primary Decision Maker Phone Number [phone]   Primary Decision Maker Relationship to Patient Spouse   Confirm Advance Directive Yes, not on file       Medical Interventions: Other (comment)   Other Instructions: Other:    As far as possible, the palliative care team has discussed with patient / health care proxy about goals of care / treatment preferences for patient. HISTORY:     History obtained from: chart, wife    CHIEF COMPLAINT: admitted with weakness    HPI/SUBJECTIVE:    The patient is:   [x] Verbal and participatory  [] Non-participatory      80year old male with debility from multiple medical issues as above. He was seen in [facility] ER, found to have bradycardia, hypothermia, hypotension. At baseline, wife describes him as active --  Up most of the day, works on his computer on a good day. He no longer does zoomba (no in last 2 years) but ambulation not difficult. No major changes recently. Clinic notes reviewed -- seen for LE swelling, recent cellulitis treated as outpatient. Prior to admission, was having some ongoing constipation, had large BM with some bright red bleeding. 2/12: \"My ass hurts! \"  Has not been out of bed since arrival.  Will be seen by wound care, OT and PT today.    2/13: sitting in chair at bedside playing Visual Threat. Clinical Pain Assessment (nonverbal scale for severity on nonverbal patients):   Clinical Pain Assessment  Severity: 0          Duration: for how long has pt been experiencing pain (e.g., 2 days, 1 month, years)  Frequency: how often pain is an issue (e.g., several times per day, once every few days, constant)     FUNCTIONAL ASSESSMENT:     Palliative Performance Scale (PPS):  PPS: 30       PSYCHOSOCIAL/SPIRITUAL SCREENING:     Palliative IDT has assessed this patient for cultural preferences / practices and a referral made as appropriate to needs (Cultural Services, Patient Advocacy, Ethics, etc.)    Advance Care Planning:  Advance Care Planning 2/3/2018   Patient's Healthcare Decision Maker is: Legal Next of Corin Handy   Primary Decision Maker Name Lori Hooker   Primary Decision Maker Phone Number [phone]   Primary Decision Maker Relationship to Patient Spouse   Confirm Advance Directive Yes, not on file       Any spiritual / Cheondoism concerns:  [] Yes /  [x] No    Caregiver Burnout:  [] Yes /  [x] No /  [] No Caregiver Present      Anticipatory grief assessment:   [x] Normal  / [] Maladaptive       ESAS Anxiety: Anxiety: 0    ESAS Depression: Depression: 0        REVIEW OF SYSTEMS:     Positive and pertinent negative findings in ROS are noted above in HPI. The following systems were [x] reviewed / [] unable to be reviewed as noted in HPI  Other findings are noted below. Systems: constitutional, ears/nose/mouth/throat, respiratory, gastrointestinal, genitourinary, musculoskeletal, integumentary, neurologic, psychiatric, endocrine. Positive findings noted below.   Modified ESAS Completed by: provider   Fatigue: 0 Drowsiness: 0   Depression: 0 Pain: 0   Anxiety: 0 Nausea: 0   Anorexia: 0 Dyspnea: 0     Constipation: Yes     Stool Occurrence(s): 1        PHYSICAL EXAM:     From RN flowsheet:  Wt Readings from Last 3 Encounters:   02/12/18 210 lb 12.2 oz (95.6 kg)   02/02/18 194 lb (88 kg) 01/09/18 199 lb (90.3 kg)     Blood pressure 99/60, pulse 85, temperature 97.7 °F (36.5 °C), resp. rate 16, height 5' 9\" (1.753 m), weight 210 lb 12.2 oz (95.6 kg), SpO2 100 %. Pain Scale 1: Numeric (0 - 10)  Pain Intensity 1: 0  Pain Onset 1: chronic  Pain Location 1: Eye  Pain Orientation 1: Right  Pain Description 1: Aching  Pain Intervention(s) 1: Relaxation technique, Repositioned, Rest  Last bowel movement, if known:     Constitutional: AAOx3  He is clear, speech normal           HISTORY:     Active Problems:    Atrial fibrillation ([address] (9/21/2017)      Hypothermia (2/2/2018)      Sepsis ([address] (2/2/2018)      Oropharyngeal dysphagia (2/12/2018)      Increased oropharyngeal secretions (2/12/2018)      Aspiration pneumonia ([address] (2/12/2018)      Past Medical History:   Diagnosis Date    Atrial fibrillation with RVR (HCC)     Dr Debbi Del Toro - cardiologist    CAD (coronary artery disease)     Cardiomyopathy ([address]     Diverticula of colon     Heart failure ([address]     Hypercholesterolemia     Ill-defined condition     psorosis    Malignant neoplasm of prostate ([address]     prostrate removed    Parotid tumor 06/13/2017    Surgery, XRT; 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   Social History   Substance Use Topics    Smoking status: Former Smoker    Smokeless tobacco: Never Used    Alcohol use 4.2 oz/week     7 Standard drinks or equivalent per week      Comment: \"1 drink a night\"     Allergies   Allergen Reactions    Ancef [Cefazolin] Unknown (comments)     \"drops my blood pressure\"    Neosporin [Benzalkonium Chloride] Unknown (comments)    Polysporin [Bacitracin-Polymyxin B] Other (comments)     \"WOUNDS DO NOT HEAL\"      Current Facility-Administered Medications   Medication Dose Route Frequency    traMADol (ULTRAM) tablet 50 mg  50 mg Oral NOW    zinc oxide-cod liver oil (DESITIN) 40 % paste   Topical PRN    metroNIDAZOLE (FLAGYL) IVPB premix 500 mg  500 mg IntraVENous Q12H    balsam peru-castor oil (VENELEX)  mg/gram ointment   Topical TID    vancomycin (VANCOCIN) 1500 mg in  ml infusion  1,500 mg IntraVENous Q36H    albuterol-ipratropium (DUO-NEB) 2.5 MG-0.5 MG/3 ML  3 mL Nebulization Q4H PRN    albuterol-ipratropium (DUO-NEB) 2.5 MG-0.5 MG/3 ML  3 mL Nebulization Q4H RT    white petrolatum-mineral oil (LACRILUBE S.O.P.) ointment   Right Eye Q8H    polyethylene glycol (MIRALAX) packet 17 g  17 g Oral DAILY    insulin lispro (HUMALOG) injection   SubCUTAneous AC&HS    amiodarone (CORDARONE) tablet 100 mg  100 mg Oral DAILY    glucose chewable tablet 16 g  4 Tab Oral PRN    dextrose (D50W) injection syrg 12.5-25 g  12.5-25 g IntraVENous PRN    glucagon (GLUCAGEN) injection 1 mg  1 mg IntraMUSCular PRN    nystatin (MYCOSTATIN) 100,000 unit/gram powder   Topical TID    sodium chloride (NS) flush 5-10 mL  5-10 mL IntraVENous Q8H    sodium chloride (NS) flush 5-10 mL  5-10 mL IntraVENous PRN          LAB AND IMAGING FINDINGS:     Lab Results   Component Value Date/Time    WBC 6.0 02/13/2018 04:11 AM    HGB 9.7 (L) 02/13/2018 04:11 AM    PLATELET 322 (L) 02/74/6427 04:11 AM     Lab Results   Component Value Date/Time    Sodium 144 02/13/2018 04:11 AM    Potassium 3.8 02/13/2018 04:11 AM    Chloride 111 (H) 02/13/2018 04:11 AM    CO2 26 02/13/2018 04:11 AM    BUN 24 (H) 02/13/2018 04:11 AM    Creatinine 1.20 02/13/2018 04:11 AM    Calcium 8.2 (L) 02/13/2018 04:11 AM    Magnesium 1.7 02/06/2018 05:23 AM    Phosphorus 2.7 02/06/2018 05:23 AM      Lab Results   Component Value Date/Time    AST (SGOT) 28 02/06/2018 05:23 AM    Alk. phosphatase 91 02/06/2018 05:23 AM    Protein, total 6.1 (L) 02/06/2018 05:23 AM    Albumin 2.6 (L) 02/06/2018 05:23 AM    Globulin 3.5 02/06/2018 05:23 AM     Lab Results   Component Value Date/Time    INR 1.5 (H) 02/04/2018 04:18 AM    Prothrombin time 15.4 (H) 02/04/2018 04:18 AM    aPTT 42.5 (H) 02/04/2018 04:18 AM      No results found for: IRON, FE, TIBC, IBCT, PSAT, FERR   Lab Results   Component Value Date/Time    pH 7.37 02/09/2018 08:07 PM    PCO2 41 02/09/2018 08:07 PM    PO2 88 02/09/2018 08:07 PM     No components found for: [place]   Lab Results   Component Value Date/Time    CK 69 02/02/2018 06:45 PM    CK - MB 9.9 (H) 02/02/2018 06:45 PM                Total time: 20  Counseling / coordination time, spent as noted above: 15  > 50% counseling / coordination?: y    Prolonged service was provided for  []30 min   []75 min in face to face time in the presence of the patient, spent as noted above. Time Start:   Time End:   Note: this can only be billed with 75208 (initial) or 22071 (follow up). If multiple start / stop times, list each separately.

## 2018-02-13 NOTE — PROGRESS NOTES
Hospitalist Progress Note    NAME: Kathy Heart   :  1928   MRN:  [id number]   LOS:   6      Assessment / Plan:  Septic Shock   Hypothermia (hypothermia resolved)  Bacteremia  Staph, Strep and Enterococcus bacteremia; suspect from cutaneous source as patient picks his psoriasis relentlessness at home (per wife's description)  Now aspirating with aspiration PNA  -continue vanc and flagyl  -await MARIA when stable  -appreciate ID evaluation  -MBS done with aspiration of thin liquids, so diet changed accordingly by speech recs     Acute on Chronic Systolic CHF with EF 85%  Acute Respiratory Failure with Hypoxia from Acute Pulmonary Edema (on CXR)  Now with aspiration PNA  -off Dobutamine for now, PRN per cardiology  -now on PRN IV diuretics per cardiology  -continue Xarelto  -continue O2, wean as tolerated  -TSH wnl  -holding home Coreg and Xarelto     Metabolic Encephalopathy  Delirium with hallucinations  -possible related to sepsis  -QTc high, now off haldol, was getting IV haldol in ICU without worsening QTc     Chronic Atrial Fibrillation with Junctional Bradycardia  -Appreciate cardiology input  -On Amiodarone 100 mg daily  -BB on hold for now  -Continue to monitor     Hypernatremia  Hypoglycemia - Current resolved     Acute Kidney Injury with baseline CKD3  Cr stable  Continue to monitor lytes     Pancytopenia  -S/p 2 units plts on 2/2 for acute bleeding from central line   -Leukopenia resolved  -S/p Vitamin daily B12 shots x3, may need montly upon discharge    Blood in Stool PTA  -Likely from Xarelto and thrombocytopenia  -Xarelto on hold     Patient with history of Left Parotid Mass s/p resection approximately 1 year ago by Dr. Kim Garcia and XRT, 2017 PET negative  Heme/Onc following     History of Prostate Cancer     Psoriasis     DTIs x2 left buttocks not POA  Woundcare following     Code status: Partial code , no Compressions or Defibrillation.  She is okay with temporary pacing, ACLS meds (like atropine) and temporary intubation. Palliative is following     Prophylaxis: SCD's and H2B  Recommended Disposition: TBD    Obesity  Body mass index is 31.12 kg/(m^2). Subjective:     Chief Complaint: sepsis follow up  Patient feeling well today, asking about what he can eat      Objective:     VITALS:   Last 24hrs VS reviewed since prior progress note. Most recent are:  Patient Vitals for the past 24 hrs:   Temp Pulse Resp BP SpO2   02/13/18 1703 - - - - 97 %   02/13/18 1245 97.7 °F (36.5 °C) 85 16 99/60 100 %   02/13/18 0732 97.6 °F (36.4 °C) 81 12 (!) 88/54 99 %   02/13/18 0712 - - - - 97 %   02/13/18 0432 - - - - 99 %   02/12/18 2328 - 84 - 104/56 98 %   02/12/18 2325 97.3 °F (36.3 °C) 78 18 104/56 95 %   02/12/18 2218 - - - - 97 %   02/12/18 1959 97.3 °F (36.3 °C) 91 16 92/57 98 %   02/12/18 1800 96.4 °F (35.8 °C) 86 16 (!) 88/45 100 %       Intake/Output Summary (Last 24 hours) at 02/13/18 1744  Last data filed at 02/13/18 1400   Gross per 24 hour   Intake              700 ml   Output              750 ml   Net              -50 ml        PHYSICAL EXAM:  General: Alert, cooperative, no acute distress    EENT:  EOMI. Anicteric sclerae. Mucous membranes moist  Resp:  CTA bilaterally, no wheezing or rales. No accessory muscle use  CV:  Regular rhythm, no edema  GI:  Soft, non distended, non tender. +Bowel sounds  Neurologic:  Alert and oriented X 3, normal speech, chronic left facial droop  Psych:   Good insight, not anxious nor agitated  Skin:  No rashes, no jaundice  ________________________________________________________________________  Care Plan discussed with:    Comments   Patient x    Family  x    RN x    Care Manager     Consultant                        Multidiciplinary team rounds were held today with , nursing, pharmacist and clinical coordinator. Patient's plan of care was discussed; medications were reviewed and discharge planning was addressed. ________________________________________________________________________  Total NON critical care TIME:  20   Minutes    >50% of visit spent in counseling and coordination of care       ________________________________________________________________________    Procedures: see electronic medical records for all procedures/Xrays and details which were not copied into this note but were reviewed prior to creation of Plan. LABS:  I reviewed today's most current labs and imaging studies.   Pertinent labs include:  Recent Labs      02/13/18 0411 02/12/18   0353  02/11/18   0106   WBC  6.0  6.6  5.4   HGB  9.7*  9.9*  9.9*   HCT  30.1*  30.9*  30.3*   PLT  100*  66*  75*     Recent Labs      02/13/18   0411 02/12/18   0353  02/11/18   0106   NA  144  144  143   K  3.8  4.0  3.9   CL  111*  111*  111*   CO2  26  25  26   GLU  103*  93  88   BUN  24*  26*  29*   CREA  1.20  1.35*  1.53*   CA  8.2*  8.3*  8.4*       Signed: Seward Mohs, MD

## 2018-02-13 NOTE — PROGRESS NOTES
Speech pathology note  MBS completed. Patient with silent aspiration of thin liquid. Tolerated nectar liquid, puree, and solid with no penetration/aspiration. Recommend mechanical soft/nectar liquid diet. Full note to follow. Thank you.     John Rodriguez., CCC-SLP

## 2018-02-13 NOTE — PROGRESS NOTES
..    PCU SHIFT NURSING NOTE      Bedside shift change report given to  Carolyn Solis RN (oncoming nurse) by  Niya Simon (offgoing nurse). Report included the following information SBAR. Shift Summary:        Admission Date 2/2/2018   Admission Diagnosis Hypothermia  Sepsis ([facility]  Atrial fibrillation ([facility]   Consults IP CONSULT TO CARDIOLOGY  IP CONSULT TO HEMATOLOGY  IP CONSULT TO PALLIATIVE CARE - PROVIDER  IP CONSULT TO INFECTIOUS DISEASES        Consults   [x]PT   [x]OT   [x]Speech   [x]Case Management      [] Palliative      Cardiac Monitoring Order   [x]Yes   []No     IV drips   []Yes    Drip:                            Dose:  Drip:                            Dose:  Drip:                            Dose:   []No     GI Prophylaxis   [x]Yes   []No         DVT Prophylaxis   SCDs:  Sequential Compression Device: Bilateral     Patient Refused VTE Prophylaxis: Yes    Edwin stockings:         [] Medication   []Contraindicated   []None      Activity Level Activity Level: Up with Assistance     Activity Assistance: Complete care   Purposeful Rounding every 1-2 hour? [x]Yes   Hutchison Score  Total Score: 4   Bed Alarm (If score 3 or >)   [x]Yes   [] Refused (See signed refusal form in chart)   Luis Score  Luis Score: 12   Luis Score (if score 14 or less)   [x]PMT consult   [x]Wound Care consult      []Specialty bed   [x] Nutrition consult          Needs prior to discharge:   Home O2 required:    []Yes   [x]No    If yes, how much O2 required? Other:    Last Bowel Movement: Last Bowel Movement Date: 02/12/18      Influenza Vaccine Received Flu Vaccine for Current Season (usually Sept-March):  Unsure    Patient/Guardian Refused (Notify MD): No   Pneumonia Vaccine           Diet Active Orders   Diet    DIET NPO      LDAs               Peripheral IV 02/02/18 Anterior;Right Forearm (Active)   Site Assessment Clean, dry, & intact 2/11/2018  8:00 PM   Phlebitis Assessment 0 2/11/2018  8:00 PM   Infiltration Assessment 0 2/11/2018  8:00 PM   Dressing Status Clean, dry, & intact 2/11/2018  8:00 PM   Dressing Type Transparent 2/11/2018  8:00 PM   Hub Color/Line Status Pink 2/11/2018  8:00 PM   Action Taken Open ports on tubing capped 2/11/2018  5:29 PM   Alcohol Cap Used Yes 2/11/2018  5:29 PM                      Urinary Catheter [REMOVED] Urinary Catheter 02/02/18 Ivy - Temperature-Criteria for Appropriate Use: Strict I/Os    Intake & Output   Date 02/12/18 0700 - 02/13/18 0659 02/13/18 0700 - 02/14/18 0659   Shift 2213-3877 0855-0655 24 Hour Total 7924-3202 0054-4867 24 Hour Total   I  N  T  A  K  E   P. O.  0 0         P. O.  0 0       I.V.  (mL/kg/hr) 600  (0.5)  600  (0.3)         Volume (vancomycin (VANCOCIN) 1500 mg in  ml infusion) 500  500         Volume (metroNIDAZOLE (FLAGYL) IVPB premix 500 mg) 100  100       Shift Total  (mL/kg) 600  (6.3) 0  (0) 600  (6.3)      O  U  T  P  U  T   Urine  (mL/kg/hr)  450  (0.4) 450  (0.2)         Urine Voided  450 450         Urine Occurrence(s) 4 x  4 x       Stool            Stool Occurrence(s) 2 x  2 x       Shift Total  (mL/kg)  450  (4.7) 450  (4.7)       -450 150      Weight (kg) 95.6 95.6 95.6 95.6 95.6 95.6         Readmission Risk Assessment Tool Score Low Risk            10       Total Score        2 . Living with Significant Other. Assisted Living. LTAC. SNF. or   Rehab    3 Patient Length of Stay (>5 days = 3)    5 Pt.  Coverage (Medicare=5 , Medicaid, or Self-Pay=4)        Criteria that do not apply:    Has Seen PCP in Last 6 Months (Yes=3, No=0)    IP Visits Last 12 Months (1-3=4, 4=9, >4=11)    Charlson Comorbidity Score (Age + Comorbid Conditions)       Expected Length of Stay 5d 14h   Actual Length of Stay 11

## 2018-02-13 NOTE — PROGRESS NOTES
Occupational Therapy  Attempted to see patient for OT treatment, but he transport arrived to take patient down for his MBS. Will defer for now, but continue to follow. Thank you.

## 2018-02-13 NOTE — PROGRESS NOTES
PCU SHIFT NURSING NOTE      Bedside and Verbal shift change report given to Chinmay Weston RN (oncoming nurse) by Denia Reaves RN (offgoing nurse). Report included the following information SBAR, Kardex, ED Summary, Procedure Summary, Intake/Output, MAR, Recent Results, Med Rec Status, Cardiac Rhythm A-fib and Alarm Parameters . Shift Summary:         Admission Date 2/2/2018   Admission Diagnosis Hypothermia  Sepsis ([address]  Atrial fibrillation ([address]   Consults IP CONSULT TO CARDIOLOGY  IP CONSULT TO HEMATOLOGY  IP CONSULT TO PALLIATIVE CARE - PROVIDER  IP CONSULT TO INFECTIOUS DISEASES        Consults   [x]PT   [x]OT   [x]Speech   [x]Case Management      [x] Palliative      Cardiac Monitoring Order   [x]Yes   []No     IV drips   []Yes    Drip:                            Dose:  Drip:                            Dose:  Drip:                            Dose:   [x]No     GI Prophylaxis   [x]Yes   []No         DVT Prophylaxis   SCDs:  Sequential Compression Device: Bilateral     Patient Refused VTE Prophylaxis: Yes    Edwin stockings:         [x] Medication   []Contraindicated   []None      Activity Level Activity Level: Up with Assistance     Activity Assistance: Complete care   Purposeful Rounding every 1-2 hour? [x]Yes   Hutchison Score  Total Score: 3   Bed Alarm (If score 3 or >)   [x]Yes   [] Refused (See signed refusal form in chart)   Luis Score  Luis Score: 11   Luis Score (if score 14 or less)   [x]PMT consult   [x]Wound Care consult      []Specialty bed   [] Nutrition consult          Needs prior to discharge:   Home O2 required:    []Yes   [x]No    If yes, how much O2 required? Other:    Last Bowel Movement: Last Bowel Movement Date: 02/12/18      Influenza Vaccine Received Flu Vaccine for Current Season (usually Sept-March):  Unsure    Patient/Guardian Refused (Notify MD): No   Pneumonia Vaccine           Diet Active Orders   Diet    DIET NPO      LDAs               Peripheral IV 02/02/18 Anterior;Right Forearm (Active)   Site Assessment Clean, dry, & intact 2/12/2018  6:00 PM   Phlebitis Assessment 0 2/12/2018  6:00 PM   Infiltration Assessment 0 2/12/2018  6:00 PM   Dressing Status Clean, dry, & intact 2/12/2018  6:00 PM   Dressing Type Tape;Transparent 2/12/2018  6:00 PM   Hub Color/Line Status Pink; Infusing;Flushed 2/12/2018  6:00 PM   Action Taken Open ports on tubing capped 2/12/2018  6:00 PM   Alcohol Cap Used Yes 2/12/2018  6:00 PM                      Urinary Catheter [REMOVED] Urinary Catheter 02/02/18 Ivy - Temperature-Criteria for Appropriate Use: Strict I/Os    Intake & Output   Date 02/11/18 1900 - 02/12/18 0659 02/12/18 0700 - 02/13/18 0659   Shift 5598-2811 24 Hour Total 5034-6452 0400-0190 24 Hour Total   I  N  T  A  K  E   P. O. 0 0         P. O. 0 0       I.V.  (mL/kg/hr) 100 200 600  (0.5)  600      Volume (vancomycin (VANCOCIN) 1250 mg in  ml infusion)  0         Volume (metroNIDAZOLE (FLAGYL) IVPB premix 500 mg) 100 200         Volume (vancomycin (VANCOCIN) 1500 mg in  ml infusion) 0 0 500  500      Volume (metroNIDAZOLE (FLAGYL) IVPB premix 500 mg) 0 0 100  100    Shift Total  (mL/kg) 100  (1) 200  (2.1) 600  (6.3)  600  (6.3)   O  U  T  P  U  T   Urine  (mL/kg/hr)  1050         Urine Voided  1050         Urine Occurrence(s) 2 x 3 x 4 x  4 x    Stool           Stool Occurrence(s)   2 x  2 x    Shift Total  (mL/kg)  1050  (11)       -850 600  600   Weight (kg) 95.6 95.6 95.6 95.6 95.6         Readmission Risk Assessment Tool Score Low Risk            10       Total Score        2 . Living with Significant Other. Assisted Living. LTAC. SNF. or   Rehab    3 Patient Length of Stay (>5 days = 3)    5 Pt.  Coverage (Medicare=5 , Medicaid, or Self-Pay=4)        Criteria that do not apply:    Has Seen PCP in Last 6 Months (Yes=3, No=0)    IP Visits Last 12 Months (1-3=4, 4=9, >4=11)    Charlson Comorbidity Score (Age + Comorbid Conditions)       Expected Length of Stay 5d 14h   Actual Length of Stay 10

## 2018-02-13 NOTE — PROGRESS NOTES
Problem: Falls - Risk of  Goal: *Absence of Falls  Document Jenna Fall Risk and appropriate interventions in the flowsheet.    Outcome: Progressing Towards Goal  Fall Risk Interventions:  Mobility Interventions: Bed/chair exit alarm, Patient to call before getting OOB, PT Consult for assist device competence, Strengthening exercises (ROM-active/passive), PT Consult for mobility concerns, OT consult for ADLs, Communicate number of staff needed for ambulation/transfer    Mentation Interventions: Adequate sleep, hydration, pain control, Bed/chair exit alarm, Door open when patient unattended, Familiar objects from home, Reorient patient, Toileting rounds, More frequent rounding, Eyeglasses and hearing aids, Update white board, Room close to nurse's station, Family/sitter at bedside, Evaluate medications/consider consulting pharmacy    Medication Interventions: Bed/chair exit alarm, Patient to call before getting OOB, Teach patient to arise slowly, Evaluate medications/consider consulting pharmacy    Elimination Interventions: Bed/chair exit alarm, Call light in reach, Toileting schedule/hourly rounds, Patient to call for help with toileting needs, Urinal in reach    History of Falls Interventions: Bed/chair exit alarm, Consult care management for discharge planning, Evaluate medications/consider consulting pharmacy, Room close to nurse's station, Utilize gait belt for transfer/ambulation, Door open when patient unattended

## 2018-02-13 NOTE — PROGRESS NOTES
Cardiology Progress Note    Patient ID:  Patient: Roberto Salas  MRN: [id number]  Age: 80 y.o.  : 1928   Admit Date: 2018    Assessment: 1. Atrial fibrillation with slow ventricular response requiring dobutamine initially, but now off of this due to adequate HR's. Likely a persistent episode. 2. Chronic bifascicular block (RBBB and LAFB). 3. Gram-positive cocci bacteremia (Staph, Streptococcus, Enterococcus) and sepsis. See ID consult notes. 4. Postural hypotension, intermittent and mild. 5. Cardiomyopathy NOS, EF 25%. 6. Acute on chronic systolic congestive heart failure, better. 7. Acute kidney injury atop chronic kidney disease stage III. Improving. 8. Pancytopenia (watching platelets, still >63G, improving). 9. Encephalopathy/delirium, resolved. 10. History of R face/neck mass resected. 11. Partial code status (No shock or chest compressions). Plan:     1. Continue to hold beta blocker. May actually be able to tolerate from a HR standpoint at this point, but BP contraindicates. 2. Continue amiodarone 100 mg daily. 3. No temporary or permanent pacemaker recommended. 4. Agree with treating him for bacteremia. ID consult appreciated. Will try to find a good window for MARIA to evaluate the valves, best done when aspiration risk is at lowest.  5. Not a candidate for ACEI or ARB or spironolactone due to acute renal failure. 6. Would not do an ischemia evaluation at this time. 7. BP low-normal, asymptomatic and afebrile, will watch. I discussed the above with he and his wife Roque Pascal. Discussed with Dr. Tito Kim (ID)--a MARIA is probably best to do prior to discharge as it will determine the length of antibiotic therapy as well as expose possible cardiac involvement with his multi-organism bacteremia. He is more alert and platelet count coming up. So we'll get this done.     All questions answered. [x]       High complexity decision making was performed in this patient. Subjective:     Jenniffer Townsend denies chest pain, shortness of breath, dizziness. Wears eye patch at night. Review of Systems - No abdominal pain, nausea or vomiting, productive cough, hemoptysis, pleurisy. Objective:     Physical Exam:  Temp (24hrs), Av.9 °F (36.1 °C), Min:96.2 °F (35.7 °C), Max:97.7 °F (36.5 °C)    Patient Vitals for the past 8 hrs:   Pulse   18 1245 85   18 0732 81    Patient Vitals for the past 8 hrs:   Resp   18 1245 16   18 0732 12    Patient Vitals for the past 8 hrs:   BP   18 1245 99/60   18 0732 (!) 88/54          Intake/Output Summary (Last 24 hours) at 18 1326  Last data filed at 18 1100   Gross per 24 hour   Intake              700 ml   Output              450 ml   Net              250 ml       Nondiaphoretic, not in acute distress. MMM, no jaundice, HEENT stable. R eye patch removed at this time. Unlabored, clear to auscultation bilaterally, symmetric air movement. Regular rate and rhythm, no murmur, pericardial rub, knock, or gallop. No JVD but there is +peripheral edema. Palpable radial pulses bilaterally. Abdomen soft, nontender, nondistended. Extremities without cyanosis or clubbing. Skin warm and dry. Venostasis changes in legs. Musculoskeletal exam stable. Awake, less confused. No tremor. Cardiographics and Studies, I personally reviewed:    Telemetry:  Afib with HR 70-80's. ECHO 2/3:    LEFT VENTRICLE: The ventricle was mildly dilated. Ejection fraction was estimated in the range of 25 % to 30 %. There was moderate diffuse hypokinesis. RIGHT VENTRICLE: The ventricle was moderately dilated. Systolic function was mildly reduced. LEFT ATRIUM: The atrium was moderately dilated. RIGHT ATRIUM: The atrium was mildly dilated. MITRAL VALVE: There was mild thickening.  DOPPLER: There was moderate regurgitation. AORTIC VALVE: Leaflets exhibited mildly increased thickness. DOPPLER: There was no significant regurgitation. TRICUSPID VALVE: Normal valve structure. DOPPLER: There was moderate regurgitation. Pulmonary artery systolic pressure: 57 mmHg. There was moderate pulmonary hypertension. PULMONIC VALVE: Normal valve structure. AORTA: The root exhibited normal size. SYSTEMIC VEINS: IVC: The respirophasic change in diameter was less than 50%. PERICARDIUM: There was no pericardial effusion. The pericardium was normal in appearance. LAB Review:     No results for input(s): CPK, CKMB, CKNDX, TROIQ in the last 72 hours. No lab exists for component: CPKMB  Lab Results   Component Value Date/Time    Cholesterol, total 128 06/29/2017 10:41 AM    HDL Cholesterol 49 06/29/2017 10:41 AM    LDL, calculated 60 06/29/2017 10:41 AM    Triglyceride 94 06/29/2017 10:41 AM     No results for input(s): INR, PTP, APTT in the last 72 hours. No lab exists for component: Lillian Castañeda   Recent Labs      02/13/18   0411  02/12/18   0353  02/11/18   0106   NA  144  144  143   K  3.8  4.0  3.9   CL  111*  111*  111*   CO2  26  25  26   BUN  24*  26*  29*   CREA  1.20  1.35*  1.53*   GLU  103*  93  88   CA  8.2*  8.3*  8.4*   WBC  6.0  6.6  5.4   HGB  9.7*  9.9*  9.9*   HCT  30.1*  30.9*  30.3*   PLT  100*  66*  75*     No results for input(s): SGOT, GPT, AP, TBIL, TP, ALB, GLOB, GGT, AML, LPSE in the last 72 hours. No lab exists for component: AMYP, HLPSE  No components found for: GLPOC  No results for input(s): PH, PCO2, PO2 in the last 72 hours. Medications Reviewed:      Allergies   Allergen Reactions    Ancef [Cefazolin] Unknown (comments)     \"drops my blood pressure\"    Neosporin [Benzalkonium Chloride] Unknown (comments)    Polysporin [Bacitracin-Polymyxin B] Other (comments)     \"WOUNDS DO NOT HEAL\"        Current Facility-Administered Medications   Medication Dose Route Frequency    traMADol (ULTRAM) tablet 50 mg  50 mg Oral NOW    zinc oxide-cod liver oil (DESITIN) 40 % paste   Topical PRN    metroNIDAZOLE (FLAGYL) IVPB premix 500 mg  500 mg IntraVENous Q12H    balsam peru-castor oil (VENELEX)  mg/gram ointment   Topical TID    vancomycin (VANCOCIN) 1500 mg in  ml infusion  1,500 mg IntraVENous Q36H    albuterol-ipratropium (DUO-NEB) 2.5 MG-0.5 MG/3 ML  3 mL Nebulization Q4H PRN    albuterol-ipratropium (DUO-NEB) 2.5 MG-0.5 MG/3 ML  3 mL Nebulization Q4H RT    white petrolatum-mineral oil (LACRILUBE S.O.P.) ointment   Right Eye Q8H    polyethylene glycol (MIRALAX) packet 17 g  17 g Oral DAILY    insulin lispro (HUMALOG) injection   SubCUTAneous AC&HS    amiodarone (CORDARONE) tablet 100 mg  100 mg Oral DAILY    glucose chewable tablet 16 g  4 Tab Oral PRN    dextrose (D50W) injection syrg 12.5-25 g  12.5-25 g IntraVENous PRN    glucagon (GLUCAGEN) injection 1 mg  1 mg IntraMUSCular PRN    nystatin (MYCOSTATIN) 100,000 unit/gram powder   Topical TID    sodium chloride (NS) flush 5-10 mL  5-10 mL IntraVENous Q8H    sodium chloride (NS) flush 5-10 mL  5-10 mL IntraVENous PRN          Holly Álvarez MD  2/13/2018

## 2018-02-14 LAB
ANION GAP SERPL CALC-SCNC: 6 MMOL/L (ref 5–15)
BUN SERPL-MCNC: 26 MG/DL (ref 6–20)
BUN/CREAT SERPL: 22 (ref 12–20)
CALCIUM SERPL-MCNC: 8.5 MG/DL (ref 8.5–10.1)
CHLORIDE SERPL-SCNC: 111 MMOL/L (ref 97–108)
CO2 SERPL-SCNC: 28 MMOL/L (ref 21–32)
CREAT SERPL-MCNC: 1.16 MG/DL (ref 0.7–1.3)
ERYTHROCYTE [DISTWIDTH] IN BLOOD BY AUTOMATED COUNT: 17 % (ref 11.5–14.5)
GLUCOSE BLD STRIP.AUTO-MCNC: 141 MG/DL (ref 65–100)
GLUCOSE BLD STRIP.AUTO-MCNC: 218 MG/DL (ref 65–100)
GLUCOSE BLD STRIP.AUTO-MCNC: 95 MG/DL (ref 65–100)
GLUCOSE BLD STRIP.AUTO-MCNC: 96 MG/DL (ref 65–100)
GLUCOSE SERPL-MCNC: 86 MG/DL (ref 65–100)
HCT VFR BLD AUTO: 27.7 % (ref 36.6–50.3)
HGB BLD-MCNC: 9.3 G/DL (ref 12.1–17)
MCH RBC QN AUTO: 35.4 PG (ref 26–34)
MCHC RBC AUTO-ENTMCNC: 33.6 G/DL (ref 30–36.5)
MCV RBC AUTO: 105.3 FL (ref 80–99)
NRBC # BLD: 0 K/UL (ref 0–0.01)
NRBC BLD-RTO: 0 PER 100 WBC
PLATELET # BLD AUTO: 106 K/UL (ref 150–400)
PMV BLD AUTO: 11.7 FL (ref 8.9–12.9)
POTASSIUM SERPL-SCNC: 3.7 MMOL/L (ref 3.5–5.1)
RBC # BLD AUTO: 2.63 M/UL (ref 4.1–5.7)
SERVICE CMNT-IMP: ABNORMAL
SERVICE CMNT-IMP: ABNORMAL
SERVICE CMNT-IMP: NORMAL
SERVICE CMNT-IMP: NORMAL
SODIUM SERPL-SCNC: 145 MMOL/L (ref 136–145)
WBC # BLD AUTO: 4.5 K/UL (ref 4.1–11.1)

## 2018-02-14 PROCEDURE — 77010033678 HC OXYGEN DAILY

## 2018-02-14 PROCEDURE — 65660000000 HC RM CCU STEPDOWN

## 2018-02-14 PROCEDURE — 80048 BASIC METABOLIC PNL TOTAL CA: CPT | Performed by: INTERNAL MEDICINE

## 2018-02-14 PROCEDURE — 74011636637 HC RX REV CODE- 636/637: Performed by: INTERNAL MEDICINE

## 2018-02-14 PROCEDURE — 94640 AIRWAY INHALATION TREATMENT: CPT

## 2018-02-14 PROCEDURE — 74011000250 HC RX REV CODE- 250: Performed by: INTERNAL MEDICINE

## 2018-02-14 PROCEDURE — 85027 COMPLETE CBC AUTOMATED: CPT | Performed by: INTERNAL MEDICINE

## 2018-02-14 PROCEDURE — 82962 GLUCOSE BLOOD TEST: CPT

## 2018-02-14 PROCEDURE — 74011250636 HC RX REV CODE- 250/636: Performed by: INTERNAL MEDICINE

## 2018-02-14 PROCEDURE — 36415 COLL VENOUS BLD VENIPUNCTURE: CPT | Performed by: INTERNAL MEDICINE

## 2018-02-14 PROCEDURE — 92526 ORAL FUNCTION THERAPY: CPT

## 2018-02-14 PROCEDURE — 74011250637 HC RX REV CODE- 250/637: Performed by: INTERNAL MEDICINE

## 2018-02-14 RX ORDER — IPRATROPIUM BROMIDE AND ALBUTEROL SULFATE 2.5; .5 MG/3ML; MG/3ML
3 SOLUTION RESPIRATORY (INHALATION)
Status: DISCONTINUED | OUTPATIENT
Start: 2018-02-14 | End: 2018-02-15

## 2018-02-14 RX ADMIN — VANCOMYCIN HYDROCHLORIDE 1500 MG: 10 INJECTION, POWDER, LYOPHILIZED, FOR SOLUTION INTRAVENOUS at 02:53

## 2018-02-14 RX ADMIN — NYSTATIN: 100000 POWDER TOPICAL at 15:39

## 2018-02-14 RX ADMIN — MINERAL OIL, PETROLATUM: 425; 568 OINTMENT OPHTHALMIC at 15:39

## 2018-02-14 RX ADMIN — INSULIN LISPRO 2 UNITS: 100 INJECTION, SOLUTION INTRAVENOUS; SUBCUTANEOUS at 12:37

## 2018-02-14 RX ADMIN — CASTOR OIL AND BALSAM, PERU: 788; 87 OINTMENT TOPICAL at 21:44

## 2018-02-14 RX ADMIN — METRONIDAZOLE 500 MG: 500 INJECTION, SOLUTION INTRAVENOUS at 17:17

## 2018-02-14 RX ADMIN — IPRATROPIUM BROMIDE AND ALBUTEROL SULFATE 3 ML: .5; 3 SOLUTION RESPIRATORY (INHALATION) at 19:45

## 2018-02-14 RX ADMIN — NYSTATIN: 100000 POWDER TOPICAL at 21:44

## 2018-02-14 RX ADMIN — Medication 10 ML: at 21:44

## 2018-02-14 RX ADMIN — METRONIDAZOLE 500 MG: 500 INJECTION, SOLUTION INTRAVENOUS at 05:55

## 2018-02-14 RX ADMIN — IPRATROPIUM BROMIDE AND ALBUTEROL SULFATE 3 ML: .5; 3 SOLUTION RESPIRATORY (INHALATION) at 03:40

## 2018-02-14 RX ADMIN — Medication 10 ML: at 15:40

## 2018-02-14 RX ADMIN — Medication 10 ML: at 05:55

## 2018-02-14 RX ADMIN — IPRATROPIUM BROMIDE AND ALBUTEROL SULFATE 3 ML: .5; 3 SOLUTION RESPIRATORY (INHALATION) at 07:41

## 2018-02-14 RX ADMIN — MINERAL OIL, PETROLATUM: 425; 568 OINTMENT OPHTHALMIC at 21:43

## 2018-02-14 RX ADMIN — CASTOR OIL AND BALSAM, PERU: 788; 87 OINTMENT TOPICAL at 15:39

## 2018-02-14 RX ADMIN — IPRATROPIUM BROMIDE AND ALBUTEROL SULFATE 3 ML: .5; 3 SOLUTION RESPIRATORY (INHALATION) at 14:32

## 2018-02-14 RX ADMIN — CASTOR OIL AND BALSAM, PERU: 788; 87 OINTMENT TOPICAL at 08:50

## 2018-02-14 RX ADMIN — NYSTATIN: 100000 POWDER TOPICAL at 08:50

## 2018-02-14 RX ADMIN — POLYETHYLENE GLYCOL 3350 17 G: 17 POWDER, FOR SOLUTION ORAL at 08:51

## 2018-02-14 NOTE — PROGRESS NOTES
Problem: Dysphagia (Adult)  Goal: *Acute Goals and Plan of Care (Insert Text)  2/12/2018  Speech path  1. Pt will participate with MBS. MET 2/13/18 2/13/2018  1. Patient will tolerate mechanical soft/nectar thick liquid diet without overt s/s of aspiration within 7 days. 2. Patient will perform effortful swallow swallowing exercises x20 with min cues without overt s/s of aspiration within 7 days. Speech language pathology dysphagia treatment  Patient: Stevan Cruz (50 y.o. male)  Date: 2/14/2018  Diagnosis: Hypothermia  Sepsis ([facility] [address]  Atrial fibrillation ([facility] [address] <principal problem not specified>       Precautions:      ASSESSMENT:  Pt seen today for swallowing therapy. He continues to have loss of liquids on the R due to right facial weakness/labial droop. No coughing on thickened liquids. He deferred other textures offered. His hyolaryngeal excursion is weak. He had an MBS yesterday. rec dys 3 and nectar thick liquids due to aspiration of thins. Progression toward goals:  []         Improving appropriately and progressing toward goals  []         Improving slowly and progressing toward goals  []         Not making progress toward goals and plan of care will be adjusted     PLAN:  Recommendations and Planned Interventions:  Continue with swallowing exercises and current diet. Patient continues to benefit from skilled intervention to address the above impairments. Continue treatment per established plan of care. Discharge Recommendations: To Be Determined     SUBJECTIVE:   Patient stated he wanted the chocolate covered strawberries.      OBJECTIVE:   Cognitive and Communication Status:  Neurologic State: Alert  Orientation Level: Oriented X4  Cognition: Appropriate decision making, Appropriate for age attention/concentration, Appropriate safety awareness  Perception: Appears intact  Perseveration: No perseveration noted  Safety/Judgement: Decreased insight into deficits, Decreased awareness of need for safety, Decreased awareness of need for assistance  Dysphagia Treatment:  Oral Assessment:     P.O. Trials:  Patient Position: upright in bed  Vocal quality prior to P.O.: No impairment  Consistency Presented: Nectar thick liquid  How Presented: Self-fed/presented;Cup/sip     Bolus Acceptance: No impairment  Bolus Formation/Control: Impaired  Type of Impairment: Delayed;Lip closure  Propulsion: Delayed (# of seconds)  Oral Residue: None                         Oral Phase Severity: Mild  Pharyngeal Phase Severity : Mild                           Pain:  Pain Scale 1: Numeric (0 - 10)  Pain Intensity 1: 0     After treatment:   []              Patient left in no apparent distress sitting up in chair  [x]              Patient left in no apparent distress in bed  [x]              Call bell left within reach  [x]              Nursing notified  [x]              Caregiver present  []              Bed alarm activated    COMMUNICATION/EDUCATION:   Pt was educated that he should practice a hard swallow with each swallow if possible. He was aware he could not use straws and needs thickened liquids. .     The patients plan of care including recommendations, planned interventions, and recommended diet changes were discussed with: Registered Nurse. []              Posted safety precautions in patient's room.     FANTASMA Rodriguez  Time Calculation: 19 mins

## 2018-02-14 NOTE — PROGRESS NOTES
CM received a call from Pt's Wife, Mrs. Hannah Faulkner and she wanted to verify that referral had been sent to [facility]. CM reviewed chart and verified that referral has been sent to [facility] yesterday and they are reviewing the case. CM explained that I would let her know if I hear anything from [facility]. CM explained that sometimes they are not able to accept Pt d/t bed availabilty and that she should review the SNF list and let us know a Plan B. She said she would look over that and let us know Plan B placement plan if [facility] can not accept. Initial therapy recommendation was for IP rehab and then lately have been for SNF. So [facility] will have to review. 9:53 AM   Pt is still on oxygen 2 LPM.  RN continue to wean as Pt does not have Home Oxygen. Pt has used sitter in the past d/t night time confusion. Pt does not currently have a sitter or telesitter in the room. PT/OT evaluations are on the chart for [facility] to review. 10:35 AM   CAM spoke to [facility] with [facility] [phone]. She is having MD review now but was coming by to meet the Pt and will call wife to discuss. Her main questions were is the MARIA scheduled and if so when? What is the anticipated discharge date? Pt has [employer] and they will have to work on authorization so we need to let them know prior to discharge so they can have time to get authorization. CAM explained that I will ask these questions during rounds and get back to [facility] this afternoon. CM will continue to monitor discharge plan.       Mallory Turner, [address]   Ext [phone]

## 2018-02-14 NOTE — PROGRESS NOTES
ADULT PROTOCOL: JET AEROSOL ASSESSMENT    Patient  Mark Castle     80 y.o.   male     2/14/2018  8:58 AM    Breath Sounds Pre Procedure:  Clear      Post Procedure:  Clear                                   Breathing pattern: Pre procedure Breathing Pattern: Regular          Post procedure Breathing Pattern: Regular    Heart Rate: Pre procedure Pulse: 73           Post procedure Pulse: 72    Resp Rate: Pre procedure Respirations: 18           Post procedure Respirations: 18            Cough: Pre procedure Cough: Non-productive               Post procedure Cough: Non-productive      Oxygen: O2 Device: Nasal cannula   2L         SpO2: Pre procedure SpO2: 100 %                 Post procedure SpO2: 99 %      Nebulizer Therapy: Current medications Aerosolized Medications: DuoNeb q4hrs      Changed: {YES to q6hrs    Smoking History: Former    Problem List:   Patient Active Problem List   Diagnosis Code    Hypercholesterolemia E78.00    Malignant neoplasm of prostate ([facility] [address] C61    GERD (gastroesophageal reflux disease) K21.9    Diverticula of colon K57.30    Parotid mass K11.9    Parotid tumor D49.0    Cellulitis of right upper arm L03. 113    Atrial fibrillation (HCC) I48.91    Hypothermia T68. XXXA    Sepsis ([facility] [address] A41.9    Oropharyngeal dysphagia R13.12    Increased oropharyngeal secretions K11.7    Aspiration pneumonia [facility]) J69.0       Respiratory Therapist: RT Jules

## 2018-02-14 NOTE — PROGRESS NOTES
Hospitalist Progress Note    NAME: Krystin Valentine   :  1928   MRN:  [id number]   LOS:   12      Assessment / Plan:  Septic Shock   Hypothermia  Bacteremia  Staph, Strep and Enterococcus bacteremia; possibly from cutaneous source as patient picks his psoriasis relentlessness at home (per wife's description)  Now aspirating with aspiration PNA  -continue vanc and flagyl  -await MARIA when stable, discussed with Dr. Julio Cesar Soriano today will likely be ready soon  -appreciate ID evaluation  -MBS done with aspiration of thin liquids, so diet changed accordingly by speech recs     Acute on Chronic Systolic CHF with EF 04%  Acute Respiratory Failure with Hypoxia from Acute Pulmonary Edema (on CXR)  Now with aspiration PNA  -off Dobutamine for now, PRN per cardiology  -now on PRN IV diuretics per cardiology  -continue Xarelto  -continue O2, wean as tolerated  -TSH wnl  -holding home Coreg and Xarelto     Metabolic Encephalopathy  Delirium with hallucinations  -possible related to sepsis  -QTc high, now off haldol, was getting IV haldol in ICU without worsening QTc     Chronic Atrial Fibrillation with Junctional Bradycardia  -Appreciate cardiology input  -On Amiodarone 100 mg daily  -BB on hold for now  -Continue to monitor     Hypernatremia  Hypoglycemia - Current resolved     Acute Kidney Injury with baseline CKD3  Cr stable  Continue to monitor lytes     Pancytopenia  -S/p 2 units plts on 22 for acute bleeding from central line   -Leukopenia resolved  -S/p Vitamin daily B12 shots x3, may need montly upon discharge    Blood in Stool PTA  -Likely from Xarelto and thrombocytopenia  -Xarelto on hold     Patient with history of Left Parotid Mass s/p resection approximately 1 year ago by Dr. Marcelino Gutiérrez and XRT, 2017 PET negative  Heme/Onc following     History of Prostate Cancer     Psoriasis     DTIs x2 left buttocks not POA  Woundcare following     Code status: Partial code , no Compressions or Defibrillation.  She is okay with temporary pacing, ACLS meds (like atropine) and temporary intubation. Palliative is following     Prophylaxis: SCD's and H2B  Recommended Disposition: TBD    Obesity  Body mass index is 31.12 kg/(m^2). Subjective:     Chief Complaint: sepsis follow up    Doing well today, tolerating diet, did admit to having \"clandestine\" sips of water    Objective:     VITALS:   Last 24hrs VS reviewed since prior progress note. Most recent are:  Patient Vitals for the past 24 hrs:   Temp Pulse Resp BP SpO2   02/14/18 1614 98 °F (36.7 °C) 84 18 96/53 98 %   02/14/18 1432 - - - - 99 %   02/14/18 1159 97.8 °F (36.6 °C) 81 17 93/59 99 %   02/14/18 0741 - - - - 100 %   02/14/18 0716 98 °F (36.7 °C) 79 18 (!) 87/59 96 %   02/14/18 0400 97.6 °F (36.4 °C) 82 16 (!) 89/57 98 %   02/14/18 0000 97.5 °F (36.4 °C) 67 14 (!) 86/51 97 %   02/13/18 2000 97.6 °F (36.4 °C) 80 16 (!) 137/114 100 %       Intake/Output Summary (Last 24 hours) at 02/14/18 1857  Last data filed at 02/14/18 0600   Gross per 24 hour   Intake              600 ml   Output                0 ml   Net              600 ml        PHYSICAL EXAM:  General: Alert, cooperative, no acute distress    EENT:  EOMI. Anicteric sclerae. Mucous membranes moist  Resp:  CTA bilaterally, no wheezing or rales. No accessory muscle use  CV:  Regular rhythm, no edema  GI:  Soft, non distended, non tender. +Bowel sounds  Neurologic:  Alert and oriented X 3, normal speech, chronic R facial droop  Psych:   Good insight, not anxious nor agitated  Skin:  No rashes, no jaundice  ________________________________________________________________________  Care Plan discussed with:    Comments   Patient x    Family  x    RN x    Care Manager     Consultant  x                      Multidiciplinary team rounds were held today with , nursing, pharmacist and clinical coordinator. Patient's plan of care was discussed; medications were reviewed and discharge planning was addressed. ________________________________________________________________________  Total NON critical care TIME:  20   Minutes    >50% of visit spent in counseling and coordination of care       ________________________________________________________________________    Procedures: see electronic medical records for all procedures/Xrays and details which were not copied into this note but were reviewed prior to creation of Plan. LABS:  I reviewed today's most current labs and imaging studies.   Pertinent labs include:  Recent Labs      02/14/18 0400 02/13/18 0411 02/12/18   0353   WBC  4.5  6.0  6.6   HGB  9.3*  9.7*  9.9*   HCT  27.7*  30.1*  30.9*   PLT  106*  100*  66*     Recent Labs      02/14/18 0400  02/13/18 0411 02/12/18   0353   NA  145  144  144   K  3.7  3.8  4.0   CL  111*  111*  111*   CO2  28  26  25   GLU  86  103*  93   BUN  26*  24*  26*   CREA  1.16  1.20  1.35*   CA  8.5  8.2*  8.3*       Signed: Carolyn Irizarry MD

## 2018-02-14 NOTE — PROGRESS NOTES
PCU SHIFT NURSING NOTE      Bedside and Verbal shift change report given to Aquilino Burroughs RN (oncoming nurse) by Anibal Barber RN (offgoing nurse). Report included the following information SBAR, Kardex, ED Summary, Procedure Summary, Intake/Output, MAR, Recent Results, Med Rec Status, Cardiac Rhythm controlled A-fib and Alarm Parameters . Shift Summary:         Admission Date 2/2/2018   Admission Diagnosis Hypothermia  Sepsis ([facility] [address]  Atrial fibrillation ([facility] [address]   Consults IP CONSULT TO CARDIOLOGY  IP CONSULT TO HEMATOLOGY  IP CONSULT TO PALLIATIVE CARE - PROVIDER  IP CONSULT TO INFECTIOUS DISEASES        Consults   [x]PT   [x]OT   [x]Speech   [x]Case Management      [x] Palliative      Cardiac Monitoring Order   [x]Yes   []No     IV drips   []Yes    Drip:                            Dose:  Drip:                            Dose:  Drip:                            Dose:   [x]No     GI Prophylaxis   [x]Yes   []No         DVT Prophylaxis   SCDs:  Sequential Compression Device: Bilateral     Patient Refused VTE Prophylaxis: Yes    Dewin stockings:         [x] Medication   []Contraindicated   []None      Activity Level Activity Level: Up with Assistance     Activity Assistance: Partial (two people)   Purposeful Rounding every 1-2 hour? [x]Yes   Hutchison Score  Total Score: 3   Bed Alarm (If score 3 or >)   [x]Yes   [] Refused (See signed refusal form in chart)   Luis Score  Luis Score: 13   Luis Score (if score 14 or less)   [x]PMT consult   [x]Wound Care consult      []Specialty bed   [x] Nutrition consult          Needs prior to discharge:   Home O2 required:    []Yes   [x]No    If yes, how much O2 required? Other:    Last Bowel Movement: Last Bowel Movement Date: 02/13/18      Influenza Vaccine Received Flu Vaccine for Current Season (usually Sept-March):  Unsure    Patient/Guardian Refused (Notify MD): No   Pneumonia Vaccine           Diet Active Orders   Diet    DIET MECHANICAL SOFT 1 NECTAR      LDAs Peripheral IV 02/02/18 Anterior;Right Forearm (Active)   Site Assessment Clean, dry, & intact 2/13/2018  6:35 PM   Phlebitis Assessment 0 2/13/2018  6:35 PM   Infiltration Assessment 0 2/13/2018  6:35 PM   Dressing Status Clean, dry, & intact 2/13/2018  6:35 PM   Dressing Type Tape;Transparent 2/13/2018  6:35 PM   Hub Color/Line Status Pink; Infusing;Flushed 2/13/2018  6:35 PM   Action Taken Open ports on tubing capped 2/13/2018  6:35 PM   Alcohol Cap Used Yes 2/13/2018  6:35 PM                      Urinary Catheter [REMOVED] Urinary Catheter 02/02/18 Ivy - Temperature-Criteria for Appropriate Use: Strict I/Os    Intake & Output   Date 02/12/18 1900 - 02/13/18 0659 02/13/18 0700 - 02/14/18 0659   Shift 7928-4645 24 Hour Total 4376-4101 6088-5340 24 Hour Total   I  N  T  A  K  E   P. O. 0 0         P. O. 0 0       I.V.  (mL/kg/hr) 100 700 100  (0.1)  100      Volume (vancomycin (VANCOCIN) 1500 mg in  ml infusion)  500 0  0      Volume (metroNIDAZOLE (FLAGYL) IVPB premix 500 mg) 100 200 100  100    Shift Total  (mL/kg) 100  (1) 700  (7.3) 100  (1)  100  (1)   O  U  T  P  U  T   Urine  (mL/kg/hr) 450 450 300  (0.3)  300      Urine Voided 450 450 300  300      Urine Occurrence(s)  4 x 4 x  4 x    Stool           Stool Occurrence(s)  2 x 2 x  2 x    Shift Total  (mL/kg) 450  (4.7) 450  (4.7) 300  (3.1)  300  (3.1)   NET -350 250 -200  -200   Weight (kg) 95.6 95.6 95.6 95.6 95.6         Readmission Risk Assessment Tool Score Low Risk            10       Total Score        2 . Living with Significant Other. Assisted Living. LTAC. SNF. or   Rehab    3 Patient Length of Stay (>5 days = 3)    5 Pt.  Coverage (Medicare=5 , Medicaid, or Self-Pay=4)        Criteria that do not apply:    Has Seen PCP in Last 6 Months (Yes=3, No=0)    IP Visits Last 12 Months (1-3=4, 4=9, >4=11)    Charlson Comorbidity Score (Age + Comorbid Conditions)       Expected Length of Stay 5d 14h   Actual Length of Stay 11

## 2018-02-14 NOTE — PROGRESS NOTES
Problem: Falls - Risk of  Goal: *Absence of Falls  Document Jenna Fall Risk and appropriate interventions in the flowsheet.    Fall Risk Interventions:  Mobility Interventions: Assess mobility with egress test, Bed/chair exit alarm, Patient to call before getting OOB, PT Consult for assist device competence, Utilize walker, cane, or other assitive device, Strengthening exercises (ROM-active/passive), PT Consult for mobility concerns, OT consult for ADLs, Communicate number of staff needed for ambulation/transfer    Mentation Interventions: Adequate sleep, hydration, pain control, Bed/chair exit alarm, Door open when patient unattended, Familiar objects from home, Reorient patient, Toileting rounds, More frequent rounding, Eyeglasses and hearing aids, Update white board, Room close to nurse's station, Increase mobility, Family/sitter at bedside, Evaluate medications/consider consulting pharmacy    Medication Interventions: Bed/chair exit alarm, Patient to call before getting OOB, Teach patient to arise slowly, Evaluate medications/consider consulting pharmacy    Elimination Interventions: Bed/chair exit alarm, Call light in reach, Elevated toilet seat, Toileting schedule/hourly rounds, Toilet paper/wipes in reach, Patient to call for help with toileting needs, Urinal in reach    History of Falls Interventions: Bed/chair exit alarm, Consult care management for discharge planning, Evaluate medications/consider consulting pharmacy, Room close to nurse's station, Utilize gait belt for transfer/ambulation, Door open when patient unattended

## 2018-02-14 NOTE — PROGRESS NOTES
Palliative Medicine  [place]: [phone]  [facility]: [phone])      Resuscitation Status: Partial Code   Durable DNR addressed? [] Yes   [] No    [] Not Applicable    Advance Care Planning 2/3/2018   Patient's Healthcare Decision Maker is: Legal Next of Kin   Primary Decision Maker Name Brandon Weaver   Primary Decision Maker Phone Number [phone]   Primary Decision Maker Relationship to Patient Spouse   Confirm Advance Directive Yes, not on file     Patient reading [facility] brochure at time of visit. Introduced self and explained role of palliative medicine. Informed him that we were following him at the hospital to see if we could provide support to him and family and assist with any goals of care. Goals are clear at this time. Patient is doing much better physically, is alert cognitively, moving around more functionally and his goal is to go to [facility] for rehab, if possible. Patient shared his understanding of what brought him to the hospital. He realizes he had a \"blood infection and for a while I was acting like a jackass\"! He knows he had some confusion. He would like to return home to [facility] after rehab but has also contemplated moving to the [facility] area where his son and daughter live. Patient was open, conversant, polite and enjoyed socialization. He is a partial code in the hospital. Will need to address if he would like a DDNR when he leaves the hospital. Will follow up with patient prior to discharge. Patient eager to order chocolate covered strawberries for lunch and did so. He did not know it was Radha's Day. Will follow patient as needed.

## 2018-02-15 PROBLEM — R53.81 PHYSICAL DECONDITIONING: Status: ACTIVE | Noted: 2018-02-15

## 2018-02-15 LAB
ANION GAP SERPL CALC-SCNC: 6 MMOL/L (ref 5–15)
BUN SERPL-MCNC: 27 MG/DL (ref 6–20)
BUN/CREAT SERPL: 23 (ref 12–20)
CALCIUM SERPL-MCNC: 8.6 MG/DL (ref 8.5–10.1)
CHLORIDE SERPL-SCNC: 110 MMOL/L (ref 97–108)
CO2 SERPL-SCNC: 29 MMOL/L (ref 21–32)
CREAT SERPL-MCNC: 1.18 MG/DL (ref 0.7–1.3)
DATE LAST DOSE: ABNORMAL
ERYTHROCYTE [DISTWIDTH] IN BLOOD BY AUTOMATED COUNT: 16.9 % (ref 11.5–14.5)
GLUCOSE BLD STRIP.AUTO-MCNC: 116 MG/DL (ref 65–100)
GLUCOSE BLD STRIP.AUTO-MCNC: 146 MG/DL (ref 65–100)
GLUCOSE BLD STRIP.AUTO-MCNC: 169 MG/DL (ref 65–100)
GLUCOSE BLD STRIP.AUTO-MCNC: 98 MG/DL (ref 65–100)
GLUCOSE SERPL-MCNC: 91 MG/DL (ref 65–100)
HCT VFR BLD AUTO: 30.1 % (ref 36.6–50.3)
HGB BLD-MCNC: 9.6 G/DL (ref 12.1–17)
MCH RBC QN AUTO: 34.2 PG (ref 26–34)
MCHC RBC AUTO-ENTMCNC: 31.9 G/DL (ref 30–36.5)
MCV RBC AUTO: 107.1 FL (ref 80–99)
NRBC # BLD: 0 K/UL (ref 0–0.01)
NRBC BLD-RTO: 0 PER 100 WBC
PLATELET # BLD AUTO: 120 K/UL (ref 150–400)
PMV BLD AUTO: 11.5 FL (ref 8.9–12.9)
POTASSIUM SERPL-SCNC: 3.6 MMOL/L (ref 3.5–5.1)
RBC # BLD AUTO: 2.81 M/UL (ref 4.1–5.7)
REPORTED DOSE,DOSE: ABNORMAL UNITS
REPORTED DOSE/TIME,TMG: ABNORMAL
SERVICE CMNT-IMP: ABNORMAL
SERVICE CMNT-IMP: NORMAL
SODIUM SERPL-SCNC: 145 MMOL/L (ref 136–145)
VANCOMYCIN TROUGH SERPL-MCNC: 15.9 UG/ML (ref 5–10)
WBC # BLD AUTO: 3.9 K/UL (ref 4.1–11.1)

## 2018-02-15 PROCEDURE — 80048 BASIC METABOLIC PNL TOTAL CA: CPT | Performed by: INTERNAL MEDICINE

## 2018-02-15 PROCEDURE — 74011000250 HC RX REV CODE- 250: Performed by: INTERNAL MEDICINE

## 2018-02-15 PROCEDURE — 94640 AIRWAY INHALATION TREATMENT: CPT

## 2018-02-15 PROCEDURE — 97116 GAIT TRAINING THERAPY: CPT

## 2018-02-15 PROCEDURE — 80202 ASSAY OF VANCOMYCIN: CPT | Performed by: INTERNAL MEDICINE

## 2018-02-15 PROCEDURE — 36415 COLL VENOUS BLD VENIPUNCTURE: CPT | Performed by: INTERNAL MEDICINE

## 2018-02-15 PROCEDURE — 74011250637 HC RX REV CODE- 250/637: Performed by: INTERNAL MEDICINE

## 2018-02-15 PROCEDURE — 85027 COMPLETE CBC AUTOMATED: CPT | Performed by: INTERNAL MEDICINE

## 2018-02-15 PROCEDURE — 65660000000 HC RM CCU STEPDOWN

## 2018-02-15 PROCEDURE — 74011250636 HC RX REV CODE- 250/636: Performed by: INTERNAL MEDICINE

## 2018-02-15 PROCEDURE — 82962 GLUCOSE BLOOD TEST: CPT

## 2018-02-15 PROCEDURE — 97530 THERAPEUTIC ACTIVITIES: CPT

## 2018-02-15 PROCEDURE — 97110 THERAPEUTIC EXERCISES: CPT

## 2018-02-15 PROCEDURE — 92526 ORAL FUNCTION THERAPY: CPT

## 2018-02-15 PROCEDURE — 77010033678 HC OXYGEN DAILY

## 2018-02-15 RX ORDER — VANCOMYCIN 1.75 GRAM/500 ML IN 0.9 % SODIUM CHLORIDE INTRAVENOUS
1750
Status: COMPLETED | OUTPATIENT
Start: 2018-02-17 | End: 2018-02-20

## 2018-02-15 RX ADMIN — CASTOR OIL AND BALSAM, PERU: 788; 87 OINTMENT TOPICAL at 16:00

## 2018-02-15 RX ADMIN — Medication 10 ML: at 05:49

## 2018-02-15 RX ADMIN — NYSTATIN: 100000 POWDER TOPICAL at 16:00

## 2018-02-15 RX ADMIN — METRONIDAZOLE 500 MG: 500 INJECTION, SOLUTION INTRAVENOUS at 05:49

## 2018-02-15 RX ADMIN — POLYETHYLENE GLYCOL 3350 17 G: 17 POWDER, FOR SOLUTION ORAL at 09:06

## 2018-02-15 RX ADMIN — NYSTATIN: 100000 POWDER TOPICAL at 21:42

## 2018-02-15 RX ADMIN — AMIODARONE HYDROCHLORIDE 100 MG: 200 TABLET ORAL at 09:06

## 2018-02-15 RX ADMIN — Medication 10 ML: at 21:42

## 2018-02-15 RX ADMIN — MINERAL OIL, PETROLATUM: 425; 568 OINTMENT OPHTHALMIC at 21:44

## 2018-02-15 RX ADMIN — CASTOR OIL AND BALSAM, PERU: 788; 87 OINTMENT TOPICAL at 21:43

## 2018-02-15 RX ADMIN — MINERAL OIL, PETROLATUM: 425; 568 OINTMENT OPHTHALMIC at 13:46

## 2018-02-15 RX ADMIN — IPRATROPIUM BROMIDE AND ALBUTEROL SULFATE 3 ML: .5; 3 SOLUTION RESPIRATORY (INHALATION) at 01:58

## 2018-02-15 RX ADMIN — Medication 10 ML: at 13:46

## 2018-02-15 RX ADMIN — NYSTATIN: 100000 POWDER TOPICAL at 09:07

## 2018-02-15 RX ADMIN — IPRATROPIUM BROMIDE AND ALBUTEROL SULFATE 3 ML: .5; 3 SOLUTION RESPIRATORY (INHALATION) at 07:26

## 2018-02-15 RX ADMIN — VANCOMYCIN HYDROCHLORIDE 1500 MG: 10 INJECTION, POWDER, LYOPHILIZED, FOR SOLUTION INTRAVENOUS at 15:58

## 2018-02-15 RX ADMIN — MINERAL OIL, PETROLATUM: 425; 568 OINTMENT OPHTHALMIC at 05:54

## 2018-02-15 RX ADMIN — CASTOR OIL AND BALSAM, PERU: 788; 87 OINTMENT TOPICAL at 09:07

## 2018-02-15 NOTE — PROGRESS NOTES
Problem: Dysphagia (Adult)  Goal: *Acute Goals and Plan of Care (Insert Text)  2/12/2018  Speech path  1. Pt will participate with MBS. MET 2/13/18 2/13/2018  1. Patient will tolerate mechanical soft/nectar thick liquid diet without overt s/s of aspiration within 7 days. 2. Patient will perform effortful swallow swallowing exercises x20 with min cues without overt s/s of aspiration within 7 days. Speech language pathology dysphagia treatment  Patient: Marquis Vazquez (81 y.o. male)  Date: 2/15/2018  Diagnosis: Hypothermia  Sepsis ([facility] [address]  Atrial fibrillation ([facility] [address] <principal problem not specified>       Precautions: aspiration      ASSESSMENT:  Patient underwent MBS earlier in the week which showed silent aspiration of thins. Patient has been tolerating a mechanical soft diet/ nectar thick liquids since. Patient reports mild oral residue in R cheek that he is aware of and clears with liquid wash and finger sweep. Patient participated in effortful swallows utilizing applesauce today. He required moderate cueing in order to [facility] hard\" for improved efficacy. No overt s/s aspiration noted. PLAN:  Patient continues to benefit from skilled intervention to address the above impairments. Recommendations and Planned Interventions:  Continue on current diet of mechanical soft/ NECTAR thick liquids. No straws  Strict upright positioning with all PO  SLP to follow for effortful swallows    Discharge Recommendations: rehab     SUBJECTIVE:   Patient stated I think my swallow gets slower the more I do.     OBJECTIVE:   Cognitive and Communication Status:  Neurologic State: Alert, Eyes open spontaneously  Orientation Level: Oriented X4  Cognition: Appropriate decision making, Appropriate for age attention/concentration, Appropriate safety awareness, Follows commands  Perception: Appears intact  Perseveration: No perseveration noted  Safety/Judgement: Decreased insight into deficits, Decreased awareness of need for safety, Decreased awareness of need for assistance  Dysphagia Treatment:     Exercises:  Laryngeal Exercises:     Effortful Swallow: Yes- utilizing puree  Sets : 2  Reps : 10                 Pain:  Pain Scale 1: Numeric (0 - 10)  Pain Intensity 1: 0     After treatment:   [x]                Patient left in no apparent distress sitting up in chair  []                Patient left in no apparent distress in bed  [x]                Call bell left within reach  [x]                Nursing notified  []                Caregiver present  []                Bed alarm activated    COMMUNICATION/EDUCATION:   Educated on continued need for nectar thick liquids    The patients plan of care including recommendations, planned interventions, and recommended diet changes were discussed with: Registered NurseBrown South M.S. CCC-SLP  Time Calculation: 12 mins

## 2018-02-15 NOTE — PROGRESS NOTES
Palliative Medicine  [place]: [phone])  [facility]: [phone])      Resuscitation Status: Partial Code   Durable DNR addressed? [x] Yes   [] No    [] Not Applicable   Patient noted he is not ready to sign a DDNR today. He would like the option kept open to be resuscitated. Advance Care Planning 2/3/2018   Patient's Healthcare Decision Maker is: Legal Next of Kin   Primary Decision Maker Name Daren Amaro   Primary Decision Maker Phone Number [phone]   Primary Decision Maker Relationship to Patient Spouse   Confirm Advance Directive Yes, not on file       Patient in a positive and social mood today. Spoke at length about various subjects, including his procedure tomorrow. He was reading the consent form and had some questions about it. These were clarified for him. Patient able to share life review and stories of his time at [facility], his nickname \"lapman\" as he, until recently, still swam laps at the pool near his home, his family. Patient still feels he has time to plan some things and is not ready to \"give up\". Discussed his wishes about dying naturally vs being resuscitated and patient noted \"I am not ready to sign that form yet\". Patient is decisional and although may be somewhat unrealistic, it was stressed that these were his wishes and would be honored. No family present today. Wife Aliza Mark plans to come in tomorrow around 11 am. Patient notes his procedure is at 8:30 am. He is generally fairly alert, has some memory issues in terms of remembering what he shared with LCSW the previous day or asking his wife a question he wanted to ask her. Patient hoping that he will have some years to live, shared that [place] of my uncles lived until he was 100\". However, he also voiced that he would not want to be a burden on his family. Encouraged patient to speak to wife Aliza Mark about his wishes. Patient notes they have spoken some and he does feel \"if I go before her, she will do okay.  I will have left her with enough to get by well\". He has an AMD but does not have a DDNR. Palliative Medicine will follow patient peripherally.

## 2018-02-15 NOTE — PROGRESS NOTES
Cardiology Progress Note    Patient ID:  Patient: Heather Reich  MRN: [id number]  Age: 80 y.o.  : 1928    2/15/2018   Admit Date: 2018    Assessment: 1. Atrial fibrillation with slow ventricular response requiring dobutamine initially. Now improved. Persistent. 2. Chronic bifascicular block (RBBB and LAFB). 3. Gram-positive cocci bacteremia (Staph, Streptococcus, Enterococcus) and sepsis. See ID consult notes, all of these are organisms that can cause endocarditis. 4. Postural hypotension, mild and asymptomatic. 5. Cardiomyopathy NOS, EF 25%. 6. Acute on chronic systolic congestive heart failure, better. 7. Acute kidney injury atop chronic kidney disease stage III. Improved. 8. Pancytopenia (watching platelets, still >91C, improving). 9. Encephalopathy/delirium, resolved. 10. History of R face/neck mass resected. 11. Partial code status (No shock or chest compressions). Plan:     1. Discontinue beta blocker indefinitely. I would not restart. 2. Continue amiodarone 100 mg daily. In the future, may stop this as well if no hope for sinus rhythm, provided the ventricular arrhythmia protection is also not wanted. 3. No temporary or permanent pacemaker recommended. 4. Treated for GPC bacteremia. ID consult appreciated. 5. Not a candidate for ACEI or ARB or spironolactone due to acute renal failure. 6. Would not do an ischemia evaluation at this time. I discussed the above with the patient again. Anesthesiology consult ordered for today, will have him assessed and see if there is a sedation proposal for MARIA.  NPO after MN, tentatively on for Friday. [x]       High complexity decision making was performed in this patient. Subjective:     Heather Reich denies chest pain, shortness of breath, dizziness. Wears eye patch at night. Sitting up in the chair currently.     Review of Systems - No abdominal pain, nausea or vomiting, productive cough, hemoptysis, pleurisy. Objective:     Physical Exam:  Temp (24hrs), Av.8 °F (36.6 °C), Min:97.4 °F (36.3 °C), Max:98 °F (36.7 °C)    Patient Vitals for the past 8 hrs:   Pulse   02/15/18 0724 84   02/15/18 0353 78    Patient Vitals for the past 8 hrs:   Resp   02/15/18 0724 17   02/15/18 0353 16    Patient Vitals for the past 8 hrs:   BP   02/15/18 0724 96/61   02/15/18 0353 90/65          Intake/Output Summary (Last 24 hours) at 02/15/18 1038  Last data filed at 18   Gross per 24 hour   Intake              100 ml   Output                0 ml   Net              100 ml       Nondiaphoretic, not in acute distress. MMM, no jaundice, HEENT stable. R eye patch removed at this time. Unlabored, clear to auscultation bilaterally, symmetric air movement. Regular rate and rhythm, no murmur, pericardial rub, knock, or gallop. No JVD but there is +peripheral edema. Palpable radial pulses bilaterally. Abdomen soft, nontender, nondistended. Extremities without cyanosis or clubbing. Skin warm and dry. Venostasis changes in legs. Musculoskeletal exam stable. Awake, less confused. No tremor. Cardiographics and Studies, I personally reviewed:    Telemetry:  Afib with HR 70-80's. ECHO 2/3:    LEFT VENTRICLE: The ventricle was mildly dilated. Ejection fraction was estimated in the range of 25 % to 30 %. There was moderate diffuse hypokinesis. RIGHT VENTRICLE: The ventricle was moderately dilated. Systolic function was mildly reduced. LEFT ATRIUM: The atrium was moderately dilated. RIGHT ATRIUM: The atrium was mildly dilated. MITRAL VALVE: There was mild thickening. DOPPLER: There was moderate regurgitation. AORTIC VALVE: Leaflets exhibited mildly increased thickness. DOPPLER: There was no significant regurgitation. TRICUSPID VALVE: Normal valve structure. DOPPLER: There was moderate regurgitation.  Pulmonary artery systolic pressure: 57 mmHg. There was moderate pulmonary hypertension. PULMONIC VALVE: Normal valve structure. AORTA: The root exhibited normal size. SYSTEMIC VEINS: IVC: The respirophasic change in diameter was less than 50%. PERICARDIUM: There was no pericardial effusion. The pericardium was normal in appearance. LAB Review:     No results for input(s): CPK, CKMB, CKNDX, TROIQ in the last 72 hours. No lab exists for component: CPKMB  Lab Results   Component Value Date/Time    Cholesterol, total 128 06/29/2017 10:41 AM    HDL Cholesterol 49 06/29/2017 10:41 AM    LDL, calculated 60 06/29/2017 10:41 AM    Triglyceride 94 06/29/2017 10:41 AM     No results for input(s): INR, PTP, APTT in the last 72 hours. No lab exists for component: INREXT, INREXT   Recent Labs      02/15/18   0411  02/14/18   0400  02/13/18   0411   NA  145  145  144   K  3.6  3.7  3.8   CL  110*  111*  111*   CO2  29  28  26   BUN  27*  26*  24*   CREA  1.18  1.16  1.20   GLU  91  86  103*   CA  8.6  8.5  8.2*   WBC  3.9*  4.5  6.0   HGB  9.6*  9.3*  9.7*   HCT  30.1*  27.7*  30.1*   PLT  120*  106*  100*     No results for input(s): SGOT, GPT, AP, TBIL, TP, ALB, GLOB, GGT, AML, LPSE in the last 72 hours. No lab exists for component: AMYP, HLPSE  No components found for: GLPOC  No results for input(s): PH, PCO2, PO2 in the last 72 hours. Medications Reviewed:      Allergies   Allergen Reactions    Ancef [Cefazolin] Unknown (comments)     \"drops my blood pressure\"    Neosporin [Benzalkonium Chloride] Unknown (comments)    Polysporin [Bacitracin-Polymyxin B] Other (comments)     \"WOUNDS DO NOT HEAL\"        Current Facility-Administered Medications   Medication Dose Route Frequency    albuterol-ipratropium (DUO-NEB) 2.5 MG-0.5 MG/3 ML  3 mL Nebulization Q6H RT    VANCOMYCIN INFORMATION NOTE   Other ONCE    zinc oxide-cod liver oil (DESITIN) 40 % paste   Topical PRN    metroNIDAZOLE (FLAGYL) IVPB premix 500 mg  500 mg IntraVENous Q12H    balsam peru-castor oil (VENELEX)  mg/gram ointment   Topical TID    vancomycin (VANCOCIN) 1500 mg in  ml infusion  1,500 mg IntraVENous Q36H    albuterol-ipratropium (DUO-NEB) 2.5 MG-0.5 MG/3 ML  3 mL Nebulization Q4H PRN    white petrolatum-mineral oil (LACRILUBE S.O.P.) ointment   Right Eye Q8H    polyethylene glycol (MIRALAX) packet 17 g  17 g Oral DAILY    insulin lispro (HUMALOG) injection   SubCUTAneous AC&HS    amiodarone (CORDARONE) tablet 100 mg  100 mg Oral DAILY    glucose chewable tablet 16 g  4 Tab Oral PRN    dextrose (D50W) injection syrg 12.5-25 g  12.5-25 g IntraVENous PRN    glucagon (GLUCAGEN) injection 1 mg  1 mg IntraMUSCular PRN    nystatin (MYCOSTATIN) 100,000 unit/gram powder   Topical TID    sodium chloride (NS) flush 5-10 mL  5-10 mL IntraVENous Q8H    sodium chloride (NS) flush 5-10 mL  5-10 mL IntraVENous PRN          Annita Pinedo MD  2/15/2018

## 2018-02-15 NOTE — PROGRESS NOTES
Nutrition Assessment:    INTERVENTIONS/RECOMMENDATIONS:   Meals/Snacks: General/healthful diet: Mechanical soft w/ necat thick liquids  ASSESSMENT:   Chart reviewed. Pt medically noted for afib, hypothermia, sepsis, oropharyngeal dysphagia, aspiration pna and increased oropharyngeal secretions. PMHx of CAD/CHF/cardiomyopathy. Talked with pt while he was sitting in recliner eating lunch. He states his appetite is not very good. But declined any nutritional supplements at this time. He states he will just try to order foods he knows he likes. He has a hard time chewing and his taste is very bad. He has a menu and orders his meals. He has no concerns or questions at this time. Diet Order: Mechanical soft  % Eaten:  No data found. Pertinent Medications: [x] Reviewed []Other: humalog, nystatin   Pertinent Labs: [x]Reviewed  []Other: gluc 782--18 Cl 110  Food Allergies: [x]None []Other:     Last BM: 2/15  []Active     []Hyperactive  []Hypoactive       [] Absent  BS  Skin:    [] Intact   [] Incision  [] Breakdown   [x]Edema   [x]Other:Pressure injury, skin tear    Anthropometrics: Height: 5' 9\" (175.3 cm) Weight: 94.7 kg (208 lb 12.4 oz)    IBW (%IBW):   ( ) UBW (%UBW):   (  %)    BMI: Body mass index is 30.83 kg/(m^2). This BMI is indicative of:  []Underweight   []Normal   []Overweight   [x] Obesity   [] Extreme Obesity (BMI>40)  Last Weight Metrics:  Weight Loss Metrics 2/15/2018 2/2/2018 2/2/2018 2/2/2018 1/9/2018 9/28/2017 9/21/2017   Today's Wt 208 lb 12.4 oz - 194 lb - 199 lb 182 lb 183 lb   BMI - 30.83 kg/m2 - 28.65 kg/m2 26.99 kg/m2 24.68 kg/m2 24.82 kg/m2       Estimated Nutrition Needs (Based on): 1994 Kcals/day (BMR x AF 1.3) , 88 g (1.0g/kg) Protein  Carbohydrate:  At Least 130 g/day  Fluids: 2000 mL/day    NUTRITION DIAGNOSES:   Problem:  No nutritional diagnosis at this time      Etiology: related to       Signs/Symptoms: as evidenced by      Previous Nutrition Dx:  [] Resolved  [] Unresolved [] Progressing    NUTRITION INTERVENTIONS:  Meals/Snacks: General/healthful diet                  GOAL:   to consume greater than 50% of meals in 3-7 days    NUTRITION MONITORING AND EVALUATION      Food/Nutrient Intake Outcomes:  Total energy intake  Physical Signs/Symptoms Outcomes: Weight/weight change, Electrolyte and renal profile, GI profile, Glucose profile    Previous Goal Met:   [] Met              [x] Progressing Towards Goal              [] Not Progressing Towards Goal   Previous Recommendations:   [x] Implemented          [] Not Implemented          [] Not Applicable    LEARNING NEEDS (Diet, Food/Nutrient-Drug Interaction):    [x] None Identified   [] Identified and Education Provided/Documented   [] Identified and Pt declined/was not appropriate     Cultural, Yazidism, OR Ethnic Dietary Needs:    [x] None Identified   [] Identified and Addressed     [x] Interdisciplinary Care Plan Reviewed/Documented    [x] Discharge Planning: Continue a general/healthful diet    [] Participated in Interdisciplinary Rounds    NUTRITION RISK:    [x] Patient At Nutritional Risk    [] High              [x] Moderate/Mild           []  Low     [] Patient Not At [address]  Pager [phone]  Weekend Pager [phone]

## 2018-02-15 NOTE — PROGRESS NOTES
ADULT PROTOCOL: JET AEROSOL ASSESSMENT    Patient  Christine Mckay     80 y.o.   male     2/15/2018  1:18 PM    Breath Sounds Pre Procedure:  Clear       Post Procedure:  Clear                                     Breathing pattern: Pre procedure Breathing Pattern: Regular          Post procedure Breathing Pattern: Regular    Heart Rate: Pre procedure Pulse: 80           Post procedure Pulse: 82    Resp Rate: Pre procedure Respirations: 16           Post procedure Respirations: 16      Cough: Pre procedure Cough: Non-productive               Post procedure Cough: Non-productive      Oxygen: O2 Device: Nasal cannula   2L       SpO2: Pre procedure SpO2: 100 %                 Post procedure SpO2: 99 %      Nebulizer Therapy: Current medications Aerosolized Medications: DuoNeb  q6hrs      Changed: {YESto PRN    Smoking History: Former    Problem List:   Patient Active Problem List   Diagnosis Code    Hypercholesterolemia E78.00    Malignant neoplasm of prostate ([facility] [address] C61    GERD (gastroesophageal reflux disease) K21.9    Diverticula of colon K57.30    Parotid mass K11.9    Parotid tumor D49.0    Cellulitis of right upper arm L03. 113    Atrial fibrillation (HCC) I48.91    Hypothermia T68. XXXA    Sepsis ([facility] [address] A41.9    Oropharyngeal dysphagia R13.12    Increased oropharyngeal secretions K11.7    Aspiration pneumonia [facility]) J69.0       Respiratory Therapist: RT Jules

## 2018-02-15 NOTE — PROGRESS NOTES
Palliative Medicine Consult  Oscar: [phone] ([phone])    Patient Name: Kaur Mazariegos  YOB: 1928    Date of Initial Consult: 2/6/18  Reason for Consult: care decisions  Requesting Provider: Dr. Herman Woodruff  Primary Care Physician: Michael Childers NP     SUMMARY:   Kaur Mazariegos is a 80 y.o. 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 Current medical issues leading to Palliative Medicine involvement include: sepsis/ bacteremia, encephalopathy, pancytopenia (improving w tx of sepsis), ARF on CRF (improving). 2/12: MBS tomorrow. High risk for aspiration. RRT call on 2/9 for difficulty manging secreations and lethargy, improved with deep sxn. Today much improved. 2/13: MBS shows silent aspiration of thin liquids by straw, speech rec mechanical soft/nectar liquid diet. 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 He lives with his wife of over 48 years, Rosie Mendez (her brother is Cody Efren- retired family physician). They live on [address] in [place] [place] [facility]. They have two adult children who both live in [place] ([facility] and [facility])    PALLIATIVE DIAGNOSES:   1. Sepsis - bacteremia (staph/strep and enterococcus)  2. Pancytopenia, improving w tx of sepsis  3. ARF on CRF  4. Delirium, metabolic encephalopathy, agitated at times  5. Chronic constipation  6. Physical debility      PLAN:   1. Met with pt, good mood, talked about events in the newspaper. Explained plan for [facility] tomorrow, then hopefully transfer to [facility]. Kim Ledesmajaswinder to be physically moving, wishes PT would come more than once daily. Provided supportive listening.    2. Pt has AMD, is goals are clear, he is now medically stable. Will follow on periphery until discharge. 3. Very motivated. Would do well at [facility] and agree with recommendation. GOALS OF CARE / TREATMENT PREFERENCES:     GOALS OF CARE:  Patient/Health Care Proxy Stated Goals: Other (comment)      TREATMENT PREFERENCES:   Code Status: Partial Code    Advance Care Planning:  Advance Care Planning 2/3/2018   Patient's Healthcare Decision Maker is: Legal Next of Kin   Primary Decision Maker Name Luz Ibanez   Primary Decision Maker Phone Number [phone]   Primary Decision Maker Relationship to Patient Spouse   Confirm Advance Directive Yes, not on file       Medical Interventions: Other (comment)   Other Instructions: Other:    As far as possible, the palliative care team has discussed with patient / health care proxy about goals of care / treatment preferences for patient. HISTORY:     History obtained from: chart, wife    CHIEF COMPLAINT: admitted with weakness    HPI/SUBJECTIVE:    The patient is:   [x] Verbal and participatory  [] Non-participatory      80year old male with debility from multiple medical issues as above. He was seen in [facility], found to have bradycardia, hypothermia, hypotension. At baseline, wife describes him as active --  Up most of the day, works on his computer on a good day. He no longer does zoomba (no in last 2 years) but ambulation not difficult. No major changes recently. Clinic notes reviewed -- seen for LE swelling, recent cellulitis treated as outpatient. Prior to admission, was having some ongoing constipation, had large BM with some bright red bleeding. 2/12: \"My ass hurts! \"  Has not been out of bed since arrival.  Will be seen by wound care, OT and PT today. 2/13: sitting in chair at bedside playing [employer].    2/15: as per above     Clinical Pain Assessment (nonverbal scale for severity on nonverbal patients):   Clinical Pain Assessment  Severity: 0          Duration: for how long has pt been experiencing pain (e.g., 2 days, 1 month, years)  Frequency: how often pain is an issue (e.g., several times per day, once every few days, constant)     FUNCTIONAL ASSESSMENT:     Palliative Performance Scale (PPS):  PPS: 30       PSYCHOSOCIAL/SPIRITUAL SCREENING:     Palliative IDT has assessed this patient for cultural preferences / practices and a referral made as appropriate to needs (Cultural Services, Patient Advocacy, Ethics, etc.)    Advance Care Planning:  Advance Care Planning 2/3/2018   Patient's Healthcare Decision Maker is: Legal Next of Corin Handy   Primary Decision Maker Name Constance Garcia   Primary Decision Maker Phone Number [phone]   Primary Decision Maker Relationship to Patient Spouse   Confirm Advance Directive Yes, not on file       Any spiritual / Denominational concerns:  [] Yes /  [x] No    Caregiver Burnout:  [] Yes /  [x] No /  [] No Caregiver Present      Anticipatory grief assessment:   [x] Normal  / [] Maladaptive       ESAS Anxiety: Anxiety: 0    ESAS Depression: Depression: 0        REVIEW OF SYSTEMS:     Positive and pertinent negative findings in ROS are noted above in HPI. The following systems were [x] reviewed / [] unable to be reviewed as noted in HPI  Other findings are noted below. Systems: constitutional, ears/nose/mouth/throat, respiratory, gastrointestinal, genitourinary, musculoskeletal, integumentary, neurologic, psychiatric, endocrine. Positive findings noted below. Modified ESAS Completed by: provider   Fatigue: 0 Drowsiness: 0   Depression: 0 Pain: 0   Anxiety: 0 Nausea: 0   Anorexia: 0 Dyspnea: 0     Constipation: Yes     Stool Occurrence(s): 1        PHYSICAL EXAM:     From RN flowsheet:  Wt Readings from Last 3 Encounters:   02/15/18 208 lb 12.4 oz (94.7 kg)   02/02/18 194 lb (88 kg)   01/09/18 199 lb (90.3 kg)     Blood pressure 96/61, pulse 84, temperature 97.7 °F (36.5 °C), resp.  rate 17, height 5' 9\" (1.753 m), weight 208 lb 12.4 oz (94.7 kg), SpO2 100 %. Pain Scale 1: Numeric (0 - 10)  Pain Intensity 1: 0  Pain Onset 1: chronic  Pain Location 1: Eye  Pain Orientation 1: Right  Pain Description 1: Aching  Pain Intervention(s) 1: Relaxation technique, Repositioned, Rest  Last bowel movement, if known:     Constitutional: AAOx3  He is clear, speech normal           HISTORY:     Active Problems:    Atrial fibrillation ([address] (9/21/2017)      Hypothermia (2/2/2018)      Sepsis ([address] (2/2/2018)      Oropharyngeal dysphagia (2/12/2018)      Increased oropharyngeal secretions (2/12/2018)      Aspiration pneumonia ([address] (2/12/2018)      Past Medical History:   Diagnosis Date    Atrial fibrillation with RVR (HCC)     Dr Steven Ivan - cardiologist    CAD (coronary artery disease)     Cardiomyopathy ([address]     Diverticula of colon     Heart failure ([address]     Hypercholesterolemia     Ill-defined condition     psorosis    Malignant neoplasm of prostate ([address]     prostrate removed    Parotid tumor 06/13/2017    Surgery, XRT; resulting right Fithian' palsy    Psoriasis     lower arms, legs (above knees)      Past Surgical History:   Procedure Laterality Date    HX CATARACT REMOVAL Bilateral     HX COLONOSCOPY      HX PROSTATECTOMY  1997    HX TONSILLECTOMY  as a child      Family History   Problem Relation Age of Onset    Cancer Mother      BRAIN TUMOR    Cancer Brother      PROSTATE      History reviewed, no pertinent family history.   Social History   Substance Use Topics    Smoking status: Former Smoker    Smokeless tobacco: Never Used    Alcohol use 4.2 oz/week     7 Standard drinks or equivalent per week      Comment: \"1 drink a night\"     Allergies   Allergen Reactions    Ancef [Cefazolin] Unknown (comments)     \"drops my blood pressure\"    Neosporin [Benzalkonium Chloride] Unknown (comments)    Polysporin [Bacitracin-Polymyxin B] Other (comments)     \"WOUNDS DO NOT HEAL\"      Current Facility-Administered Medications   Medication Dose Route Frequency    albuterol-ipratropium (DUO-NEB) 2.5 MG-0.5 MG/3 ML  3 mL Nebulization Q6H RT    VANCOMYCIN INFORMATION NOTE   Other ONCE    zinc oxide-cod liver oil (DESITIN) 40 % paste   Topical PRN    metroNIDAZOLE (FLAGYL) IVPB premix 500 mg  500 mg IntraVENous Q12H    balsam peru-castor oil (VENELEX)  mg/gram ointment   Topical TID    vancomycin (VANCOCIN) 1500 mg in  ml infusion  1,500 mg IntraVENous Q36H    albuterol-ipratropium (DUO-NEB) 2.5 MG-0.5 MG/3 ML  3 mL Nebulization Q4H PRN    white petrolatum-mineral oil (LACRILUBE S.O.P.) ointment   Right Eye Q8H    polyethylene glycol (MIRALAX) packet 17 g  17 g Oral DAILY    insulin lispro (HUMALOG) injection   SubCUTAneous AC&HS    amiodarone (CORDARONE) tablet 100 mg  100 mg Oral DAILY    glucose chewable tablet 16 g  4 Tab Oral PRN    dextrose (D50W) injection syrg 12.5-25 g  12.5-25 g IntraVENous PRN    glucagon (GLUCAGEN) injection 1 mg  1 mg IntraMUSCular PRN    nystatin (MYCOSTATIN) 100,000 unit/gram powder   Topical TID    sodium chloride (NS) flush 5-10 mL  5-10 mL IntraVENous Q8H    sodium chloride (NS) flush 5-10 mL  5-10 mL IntraVENous PRN          LAB AND IMAGING FINDINGS:     Lab Results   Component Value Date/Time    WBC 3.9 (L) 02/15/2018 04:11 AM    HGB 9.6 (L) 02/15/2018 04:11 AM    PLATELET 285 (L) 70/60/0896 04:11 AM     Lab Results   Component Value Date/Time    Sodium 145 02/15/2018 04:11 AM    Potassium 3.6 02/15/2018 04:11 AM    Chloride 110 (H) 02/15/2018 04:11 AM    CO2 29 02/15/2018 04:11 AM    BUN 27 (H) 02/15/2018 04:11 AM    Creatinine 1.18 02/15/2018 04:11 AM    Calcium 8.6 02/15/2018 04:11 AM    Magnesium 1.7 02/06/2018 05:23 AM    Phosphorus 2.7 02/06/2018 05:23 AM      Lab Results   Component Value Date/Time    AST (SGOT) 28 02/06/2018 05:23 AM    Alk.  phosphatase 91 02/06/2018 05:23 AM    Protein, total 6.1 (L) 02/06/2018 05:23 AM    Albumin 2.6 (L) 02/06/2018 05:23 AM    Globulin 3.5 02/06/2018 05:23 AM     Lab Results   Component Value Date/Time    INR 1.5 (H) 02/04/2018 04:18 AM    Prothrombin time 15.4 (H) 02/04/2018 04:18 AM    aPTT 42.5 (H) 02/04/2018 04:18 AM      No results found for: IRON, FE, TIBC, IBCT, PSAT, FERR   Lab Results   Component Value Date/Time    pH 7.37 02/09/2018 08:07 PM    PCO2 41 02/09/2018 08:07 PM    PO2 88 02/09/2018 08:07 PM     No components found for: [facility]   Lab Results   Component Value Date/Time    CK 69 02/02/2018 06:45 PM    CK - MB 9.9 (H) 02/02/2018 06:45 PM                Total time: 20  Counseling / coordination time, spent as noted above: 15  > 50% counseling / coordination?: n    Prolonged service was provided for  []30 min   []75 min in face to face time in the presence of the patient, spent as noted above. Time Start:   Time End:   Note: this can only be billed with 99791 (initial) or 18086 (follow up). If multiple start / stop times, list each separately.

## 2018-02-15 NOTE — PROGRESS NOTES
Problem: Mobility Impaired (Adult and Pediatric)  Goal: *Acute Goals and Plan of Care (Insert Text)  Physical Therapy Goals  Initiated 2/9/2018  1. Patient will move from supine to sit and sit to supine , scoot up and down and roll side to side in bed with moderate assistance  within 7 day(s). 2.  Patient will transfer from bed to chair and chair to bed with minimal assistance/contact guard assist using the least restrictive device within 7 day(s). 3.  Patient will perform sit to stand with minimal assistance/contact guard assist within 7 day(s). 4.  Patient will ambulate with minimal assistance/contact guard assist for 25 feet with the least restrictive device within 7 day(s). physical Therapy TREATMENT  Patient: Leilani Johnston (35 y.o. male)  Date: 2/15/2018  Diagnosis: Hypothermia  Sepsis ([facility]  Atrial fibrillation ([facility] <principal problem not specified>       Precautions:  Falls  Chart, physical therapy assessment, plan of care and goals were reviewed. ASSESSMENT:  Patient received supine in bed and agreeable to therapy. Patient tolerated session well and making good progress towards goals. Patient completed supine to sit with CGA, verbal cues to reach for bed railing to assist with upper body mobility. Patient performed sit<>stand from slightly elevated bed height with min assist. Patient ambulated with min assist x 2 with bilateral HHA x 50 feet. Patient demonstrated decreased step length bilaterally, wide JAMEEL, shuffle steps and generalized instability. Patient with reports of RLE \"buckling\", however no evidence noted during gait. Patient performed 10 x sit<>stand progressing to CGA and verbal cues for trunk extension and upright head position. Patient returned to supine position with CGA and elevated bilateral LEs on 3 pillows for edema control. Patient eager to mobilize and participate with therapies.  Patient will continue to benefit from PT to progress mobility as tolerated and reach highest level of independence. Pt will benefit from inpatient rehab upon discharge given improved functional mobility and tolerance to activity this date. Progression toward goals:  [x]    Improving appropriately and progressing toward goals  []    Improving slowly and progressing toward goals  []    Not making progress toward goals and plan of care will be adjusted     PLAN:  Patient continues to benefit from skilled intervention to address the above impairments. Continue treatment per established plan of care. Discharge Recommendations:  Inpatient Rehab  Further Equipment Recommendations for Discharge:  TBD     SUBJECTIVE:   Patient stated What's next? Hans Ray    OBJECTIVE DATA SUMMARY:   Critical Behavior:  Neurologic State: Alert, Eyes open spontaneously  Orientation Level: Oriented X4  Cognition: Appropriate decision making, Appropriate for age attention/concentration, Appropriate safety awareness, Follows commands  Safety/Judgement: Decreased insight into deficits, Decreased awareness of need for safety, Decreased awareness of need for assistance  Functional Mobility Training:  Bed Mobility:     Supine to Sit: Contact guard assistance; Adaptive equipment  Sit to Supine: Contact guard assistance; Additional time           Transfers:  Sit to Stand: Minimum assistance;Contact guard assistance  Stand to Sit: Minimum assistance;Contact guard assistance                             Balance:  Sitting: Intact  Standing: Impaired; With support  Standing - Static: Fair;Constant support  Standing - Dynamic : Fair  Ambulation/Gait Training:  Distance (ft): 50 Feet (ft)  Assistive Device: Gait belt (HHA x 2)  Ambulation - Level of Assistance: Minimal assistance;Assist x2 (HHA x 2)        Gait Abnormalities: Decreased step clearance;Shuffling gait        Base of Support: Widened     Speed/Christina: Pace decreased (<100 feet/min)  Step Length: Right shortened;Left shortened       Therapeutic Exercises:    Ankle pumps, LAQ, seated marching, 10 x sit<>stand with CGA  Pain:  Pain Scale 1: Numeric (0 - 10)  Pain Intensity 1: 0              Activity Tolerance:   Good. VSS. BP hypotensive, but remained stable  Please refer to the flowsheet for vital signs taken during this treatment.   After treatment:   []    Patient left in no apparent distress sitting up in chair  [x]    Patient left in no apparent distress in bed  [x]    Call bell left within reach  [x]    Nursing notified  []    Caregiver present  [x]    Bed alarm activated    COMMUNICATION/COLLABORATION:   The patients plan of care was discussed with: Occupational Therapist and Registered Nurse    Bettina Trujillo PT, DPT   Time Calculation: 30 mins

## 2018-02-15 NOTE — PROGRESS NOTES
Problem: Self Care Deficits Care Plan (Adult)  Goal: *Acute Goals and Plan of Care (Insert Text)  Occupational Therapy Goals  Initiated 2/9/2018  1. Patient will perform self-feeding with independence within 7 day(s). 2.  Patient will perform grooming with minimal assistance/contact guard assist within 7 day(s). 3.  Patient will perform bathing with moderate assistance  within 7 day(s). 4.  Patient will perform toilet transfers with moderate assistance  within 7 day(s). 5.  Patient will perform all aspects of toileting with moderate assistance  within 7 day(s). 6.  Patient will participate in upper extremity therapeutic exercise/activities with supervision/set-up for 5 minutes within 7 day(s). Occupational Therapy TREATMENT  Patient: Ana Tran (11 y.o. male)  Date: 2/15/2018  Diagnosis: Hypothermia  Sepsis ([facility]  Atrial fibrillation ([facility] <principal problem not specified>       Precautions:    Chart, occupational therapy assessment, plan of care, and goals were reviewed. ASSESSMENT:  Patient is progressing and motivated to regain his prior level of independence. He was CGA for supine to sit with verbal cues for use of bed rails, min A x 2 for sit to stand via HHA x 2. Pt increased his activity tolerance today, able to transfer into bathroom where he required min A for safe descent onto commode but improved sit to stand from commode with use of L grab bar. Pt completed 5 reps, 2 sets of sit ot stand from EOB, progressing to SBA with occasional tactile cues at glute for upright posture. With fatigue, noted weakness in R knee. Pt dofffed socks EOB with min A this date. Pt with extremely edematous ankles and feet, educated on importance of elevation and placed 3 pillows under feet at end of session. He has demonstrated improvement since previous OT session and would be an appropriate IP rehab candidate to increase his activity tolerance, strength and independence with ADLs.    Progression toward goals:  [x]       Improving appropriately and progressing toward goals  []       Improving slowly and progressing toward goals  []       Not making progress toward goals and plan of care will be adjusted     PLAN:  Patient continues to benefit from skilled intervention to address the above impairments. Continue treatment per established plan of care. Discharge Recommendations:  Inpatient Rehab  Further Equipment Recommendations for Discharge:  TBD     SUBJECTIVE:   Patient stated what else can we do? Carla Tan    OBJECTIVE DATA SUMMARY:   Cognitive/Behavioral Status:  Neurologic State: Alert;Eyes open spontaneously  Orientation Level: Oriented X4  Cognition: Appropriate decision making; Appropriate for age attention/concentration; Appropriate safety awareness; Follows commands             Functional Mobility and Transfers for ADLs:  Bed Mobility:  Supine to Sit: Contact guard assistance; Adaptive equipment  Sit to Supine: Contact guard assistance; Additional time    Transfers:  Sit to Stand: Minimum assistance;Contact guard assistance  Functional Transfers  Toilet Transfer : Minimum assistance (for descent and safety, CGA to stand with L grab bar)    Balance:  Sitting: Intact  Standing: Impaired; With support  Standing - Static: Fair;Constant support  Standing - Dynamic : Fair    ADL Intervention:                           Lower Body Dressing Assistance  Socks: Minimum assistance (to doff)  Position Performed: Seated edge of bed            Therapeutic Exercises:   EOB performed 10 reps of shoulder flexion, 10 reps of shoulder abduction. 5 reps, 2 sets of sit to stands from EOB. Pain:  Pain Scale 1: Numeric (0 - 10)  Pain Intensity 1: 0              Activity Tolerance:   VSS  Please refer to the flowsheet for vital signs taken during this treatment.   After treatment:   [] Patient left in no apparent distress sitting up in chair  [x] Patient left in no apparent distress in bed  with B feet elevated  [x] Call bell left within reach  [x] Nursing notified  [] Caregiver present  [x] Bed alarm activated    COMMUNICATION/COLLABORATION:   The patients plan of care was discussed with: Physical Therapist and Registered Nurse    Kevin Whitfield OT  Time Calculation: 29 mins

## 2018-02-15 NOTE — ROUTINE PROCESS
Bedside shift change report given to Gaye Cleveland RN (oncoming nurse) by Pipe White RN (offgoing nurse). Report included the following information SBAR, Kardex, Procedure Summary, Intake/Output, MAR, Accordion, Recent Results, Med Rec Status, Cardiac Rhythm SR/PVC/AFIB and Alarm Parameters .

## 2018-02-15 NOTE — PROGRESS NOTES
Cardiology Progress Note    Patient ID:  Patient: Duarte Dodson  MRN: [id number]  Age: 80 y.o.  : 1928   Admit Date: 2018    Assessment: 1. Atrial fibrillation with slow ventricular response requiring dobutamine initially. Now improved. Persistent. 2. Chronic bifascicular block (RBBB and LAFB). 3. Gram-positive cocci bacteremia (Staph, Streptococcus, Enterococcus) and sepsis. See ID consult notes, all of these are organisms that can cause endocarditis. 4. Postural hypotension, mild and asymptomatic. 5. Cardiomyopathy NOS, EF 25%. 6. Acute on chronic systolic congestive heart failure, better. 7. Acute kidney injury atop chronic kidney disease stage III. Improved. 8. Pancytopenia (watching platelets, still >51B, improving). 9. Encephalopathy/delirium, resolved. 10. History of R face/neck mass resected. 11. Partial code status (No shock or chest compressions). Plan:     1. Discontinue beta blocker indefinitely. I would not restart. 2. Continue amiodarone 100 mg daily. In the future, may stop this as well if no hope for sinus rhythm, provided the ventricular arrhythmia protection is also not wanted. 3. No temporary or permanent pacemaker recommended. 4. Treated for GPC bacteremia. ID consult appreciated. 5. Not a candidate for ACEI or ARB or spironolactone due to acute renal failure. 6. Would not do an ischemia evaluation at this time. I discussed the above with the patient. Also with family in the room as well. Anesthesiology consult ordered for tomorrow, will have him assessed and see if there is a sedation proposal for MARIA. Tentatively, let's do this on Friday. [x]       High complexity decision making was performed in this patient. Subjective:     Duarte Dodson denies chest pain, shortness of breath, dizziness. Wears eye patch at night.     Review of Systems - No abdominal pain, nausea or vomiting, productive cough, hemoptysis, pleurisy. Objective:     Physical Exam:  Temp (24hrs), Av.8 °F (36.6 °C), Min:97.5 °F (36.4 °C), Max:98 °F (36.7 °C)    Patient Vitals for the past 8 hrs:   Pulse   18 1614 84    Patient Vitals for the past 8 hrs:   Resp   18 1614 18    Patient Vitals for the past 8 hrs:   BP   18 1614 96/53          Intake/Output Summary (Last 24 hours) at 18 2147  Last data filed at 18 0600   Gross per 24 hour   Intake              600 ml   Output                0 ml   Net              600 ml       Nondiaphoretic, not in acute distress. MMM, no jaundice, HEENT stable. R eye patch removed at this time. Unlabored, clear to auscultation bilaterally, symmetric air movement. Regular rate and rhythm, no murmur, pericardial rub, knock, or gallop. No JVD but there is +peripheral edema. Palpable radial pulses bilaterally. Abdomen soft, nontender, nondistended. Extremities without cyanosis or clubbing. Skin warm and dry. Venostasis changes in legs. Musculoskeletal exam stable. Awake, less confused. No tremor. Cardiographics and Studies, I personally reviewed:    Telemetry:  Afib with HR 70-80's. ECHO 2/3:    LEFT VENTRICLE: The ventricle was mildly dilated. Ejection fraction was estimated in the range of 25 % to 30 %. There was moderate diffuse hypokinesis. RIGHT VENTRICLE: The ventricle was moderately dilated. Systolic function was mildly reduced. LEFT ATRIUM: The atrium was moderately dilated. RIGHT ATRIUM: The atrium was mildly dilated. MITRAL VALVE: There was mild thickening. DOPPLER: There was moderate regurgitation. AORTIC VALVE: Leaflets exhibited mildly increased thickness. DOPPLER: There was no significant regurgitation. TRICUSPID VALVE: Normal valve structure. DOPPLER: There was moderate regurgitation. Pulmonary artery systolic pressure: 57 mmHg.  There was moderate pulmonary hypertension. PULMONIC VALVE: Normal valve structure. AORTA: The root exhibited normal size. SYSTEMIC VEINS: IVC: The respirophasic change in diameter was less than 50%. PERICARDIUM: There was no pericardial effusion. The pericardium was normal in appearance. LAB Review:     No results for input(s): CPK, CKMB, CKNDX, TROIQ in the last 72 hours. No lab exists for component: CPKMB  Lab Results   Component Value Date/Time    Cholesterol, total 128 06/29/2017 10:41 AM    HDL Cholesterol 49 06/29/2017 10:41 AM    LDL, calculated 60 06/29/2017 10:41 AM    Triglyceride 94 06/29/2017 10:41 AM     No results for input(s): INR, PTP, APTT in the last 72 hours. No lab exists for component: INREXT, INREXT   Recent Labs      02/14/18   0400  02/13/18   0411  02/12/18   0353   NA  145  144  144   K  3.7  3.8  4.0   CL  111*  111*  111*   CO2  28  26  25   BUN  26*  24*  26*   CREA  1.16  1.20  1.35*   GLU  86  103*  93   CA  8.5  8.2*  8.3*   WBC  4.5  6.0  6.6   HGB  9.3*  9.7*  9.9*   HCT  27.7*  30.1*  30.9*   PLT  106*  100*  66*     No results for input(s): SGOT, GPT, AP, TBIL, TP, ALB, GLOB, GGT, AML, LPSE in the last 72 hours. No lab exists for component: AMYP, HLPSE  No components found for: GLPOC  No results for input(s): PH, PCO2, PO2 in the last 72 hours. Medications Reviewed:      Allergies   Allergen Reactions    Ancef [Cefazolin] Unknown (comments)     \"drops my blood pressure\"    Neosporin [Benzalkonium Chloride] Unknown (comments)    Polysporin [Bacitracin-Polymyxin B] Other (comments)     \"WOUNDS DO NOT HEAL\"        Current Facility-Administered Medications   Medication Dose Route Frequency    albuterol-ipratropium (DUO-NEB) 2.5 MG-0.5 MG/3 ML  3 mL Nebulization Q6H RT    [START ON 2/15/2018] VANCOMYCIN INFORMATION NOTE   Other ONCE    zinc oxide-cod liver oil (DESITIN) 40 % paste   Topical PRN    metroNIDAZOLE (FLAGYL) IVPB premix 500 mg  500 mg IntraVENous Q12H    balsam peru-castor oil (VENELEX)  mg/gram ointment   Topical TID    vancomycin (VANCOCIN) 1500 mg in  ml infusion  1,500 mg IntraVENous Q36H    albuterol-ipratropium (DUO-NEB) 2.5 MG-0.5 MG/3 ML  3 mL Nebulization Q4H PRN    white petrolatum-mineral oil (LACRILUBE S.O.P.) ointment   Right Eye Q8H    polyethylene glycol (MIRALAX) packet 17 g  17 g Oral DAILY    insulin lispro (HUMALOG) injection   SubCUTAneous AC&HS    amiodarone (CORDARONE) tablet 100 mg  100 mg Oral DAILY    glucose chewable tablet 16 g  4 Tab Oral PRN    dextrose (D50W) injection syrg 12.5-25 g  12.5-25 g IntraVENous PRN    glucagon (GLUCAGEN) injection 1 mg  1 mg IntraMUSCular PRN    nystatin (MYCOSTATIN) 100,000 unit/gram powder   Topical TID    sodium chloride (NS) flush 5-10 mL  5-10 mL IntraVENous Q8H    sodium chloride (NS) flush 5-10 mL  5-10 mL IntraVENous PRN          Elsie Rosenberg MD  2/14/2018

## 2018-02-16 ENCOUNTER — ANESTHESIA EVENT (OUTPATIENT)
Dept: NON INVASIVE DIAGNOSTICS | Age: 83
DRG: 871 | End: 2018-02-16
Payer: MEDICARE

## 2018-02-16 ENCOUNTER — ANESTHESIA (OUTPATIENT)
Dept: NON INVASIVE DIAGNOSTICS | Age: 83
DRG: 871 | End: 2018-02-16
Payer: MEDICARE

## 2018-02-16 LAB
ANION GAP SERPL CALC-SCNC: 4 MMOL/L (ref 5–15)
BUN SERPL-MCNC: 27 MG/DL (ref 6–20)
BUN/CREAT SERPL: 22 (ref 12–20)
CALCIUM SERPL-MCNC: 8.3 MG/DL (ref 8.5–10.1)
CHLORIDE SERPL-SCNC: 111 MMOL/L (ref 97–108)
CO2 SERPL-SCNC: 30 MMOL/L (ref 21–32)
CREAT SERPL-MCNC: 1.24 MG/DL (ref 0.7–1.3)
ERYTHROCYTE [DISTWIDTH] IN BLOOD BY AUTOMATED COUNT: 16.9 % (ref 11.5–14.5)
GLUCOSE BLD STRIP.AUTO-MCNC: 77 MG/DL (ref 65–100)
GLUCOSE BLD STRIP.AUTO-MCNC: 81 MG/DL (ref 65–100)
GLUCOSE BLD STRIP.AUTO-MCNC: 88 MG/DL (ref 65–100)
GLUCOSE BLD STRIP.AUTO-MCNC: 93 MG/DL (ref 65–100)
GLUCOSE BLD STRIP.AUTO-MCNC: 99 MG/DL (ref 65–100)
GLUCOSE SERPL-MCNC: 85 MG/DL (ref 65–100)
HCT VFR BLD AUTO: 30.7 % (ref 36.6–50.3)
HGB BLD-MCNC: 9.8 G/DL (ref 12.1–17)
MCH RBC QN AUTO: 34 PG (ref 26–34)
MCHC RBC AUTO-ENTMCNC: 31.9 G/DL (ref 30–36.5)
MCV RBC AUTO: 106.6 FL (ref 80–99)
NRBC # BLD: 0 K/UL (ref 0–0.01)
NRBC BLD-RTO: 0 PER 100 WBC
PLATELET # BLD AUTO: 121 K/UL (ref 150–400)
PMV BLD AUTO: 11.3 FL (ref 8.9–12.9)
POTASSIUM SERPL-SCNC: 3.9 MMOL/L (ref 3.5–5.1)
RBC # BLD AUTO: 2.88 M/UL (ref 4.1–5.7)
SERVICE CMNT-IMP: NORMAL
SODIUM SERPL-SCNC: 145 MMOL/L (ref 136–145)
WBC # BLD AUTO: 3.3 K/UL (ref 4.1–11.1)

## 2018-02-16 PROCEDURE — 77030018729 HC ELECTRD DEFIB PAD CARD -B

## 2018-02-16 PROCEDURE — 77030021678 HC GLIDESCP STAT DISP VERT -B: Performed by: NURSE ANESTHETIST, CERTIFIED REGISTERED

## 2018-02-16 PROCEDURE — 76210000017 HC OR PH I REC 1.5 TO 2 HR

## 2018-02-16 PROCEDURE — 80048 BASIC METABOLIC PNL TOTAL CA: CPT | Performed by: INTERNAL MEDICINE

## 2018-02-16 PROCEDURE — 77010033678 HC OXYGEN DAILY

## 2018-02-16 PROCEDURE — 97535 SELF CARE MNGMENT TRAINING: CPT

## 2018-02-16 PROCEDURE — 36415 COLL VENOUS BLD VENIPUNCTURE: CPT | Performed by: INTERNAL MEDICINE

## 2018-02-16 PROCEDURE — 74011250636 HC RX REV CODE- 250/636

## 2018-02-16 PROCEDURE — B246ZZ4 ULTRASONOGRAPHY OF RIGHT AND LEFT HEART, TRANSESOPHAGEAL: ICD-10-PCS | Performed by: INTERNAL MEDICINE

## 2018-02-16 PROCEDURE — 74011250637 HC RX REV CODE- 250/637: Performed by: INTERNAL MEDICINE

## 2018-02-16 PROCEDURE — 77030021668 HC NEB PREFIL KT VYRM -A

## 2018-02-16 PROCEDURE — 97116 GAIT TRAINING THERAPY: CPT

## 2018-02-16 PROCEDURE — 74011250636 HC RX REV CODE- 250/636: Performed by: ANESTHESIOLOGY

## 2018-02-16 PROCEDURE — 74011000250 HC RX REV CODE- 250

## 2018-02-16 PROCEDURE — 93312 ECHO TRANSESOPHAGEAL: CPT

## 2018-02-16 PROCEDURE — 85027 COMPLETE CBC AUTOMATED: CPT | Performed by: INTERNAL MEDICINE

## 2018-02-16 PROCEDURE — 82962 GLUCOSE BLOOD TEST: CPT

## 2018-02-16 PROCEDURE — 77030026438 HC STYL ET INTUB CARD -A: Performed by: NURSE ANESTHETIST, CERTIFIED REGISTERED

## 2018-02-16 PROCEDURE — 97530 THERAPEUTIC ACTIVITIES: CPT

## 2018-02-16 PROCEDURE — 65660000000 HC RM CCU STEPDOWN

## 2018-02-16 PROCEDURE — 77030008684 HC TU ET CUF COVD -B: Performed by: NURSE ANESTHETIST, CERTIFIED REGISTERED

## 2018-02-16 RX ORDER — HYDROMORPHONE HYDROCHLORIDE 1 MG/ML
.2-.5 INJECTION, SOLUTION INTRAMUSCULAR; INTRAVENOUS; SUBCUTANEOUS
Status: DISCONTINUED | OUTPATIENT
Start: 2018-02-16 | End: 2018-02-16 | Stop reason: HOSPADM

## 2018-02-16 RX ORDER — DIPHENHYDRAMINE HYDROCHLORIDE 50 MG/ML
12.5 INJECTION, SOLUTION INTRAMUSCULAR; INTRAVENOUS
Status: DISCONTINUED | OUTPATIENT
Start: 2018-02-16 | End: 2018-02-16 | Stop reason: HOSPADM

## 2018-02-16 RX ORDER — PROPOFOL 10 MG/ML
INJECTION, EMULSION INTRAVENOUS AS NEEDED
Status: DISCONTINUED | OUTPATIENT
Start: 2018-02-16 | End: 2018-02-16 | Stop reason: HOSPADM

## 2018-02-16 RX ORDER — MORPHINE SULFATE 10 MG/ML
2 INJECTION, SOLUTION INTRAMUSCULAR; INTRAVENOUS
Status: DISCONTINUED | OUTPATIENT
Start: 2018-02-16 | End: 2018-02-16 | Stop reason: HOSPADM

## 2018-02-16 RX ORDER — FUROSEMIDE 10 MG/ML
40 INJECTION INTRAMUSCULAR; INTRAVENOUS DAILY
Status: COMPLETED | OUTPATIENT
Start: 2018-02-17 | End: 2018-02-19

## 2018-02-16 RX ORDER — FENTANYL CITRATE 50 UG/ML
25 INJECTION, SOLUTION INTRAMUSCULAR; INTRAVENOUS
Status: DISCONTINUED | OUTPATIENT
Start: 2018-02-16 | End: 2018-02-16 | Stop reason: HOSPADM

## 2018-02-16 RX ORDER — PHENYLEPHRINE HCL IN 0.9% NACL 0.4MG/10ML
SYRINGE (ML) INTRAVENOUS AS NEEDED
Status: DISCONTINUED | OUTPATIENT
Start: 2018-02-16 | End: 2018-02-16 | Stop reason: HOSPADM

## 2018-02-16 RX ORDER — SODIUM CHLORIDE 0.9 % (FLUSH) 0.9 %
5-10 SYRINGE (ML) INJECTION EVERY 8 HOURS
Status: DISCONTINUED | OUTPATIENT
Start: 2018-02-16 | End: 2018-02-16 | Stop reason: HOSPADM

## 2018-02-16 RX ORDER — MIDAZOLAM HYDROCHLORIDE 1 MG/ML
.5-1 INJECTION, SOLUTION INTRAMUSCULAR; INTRAVENOUS
Status: DISCONTINUED | OUTPATIENT
Start: 2018-02-16 | End: 2018-02-16 | Stop reason: HOSPADM

## 2018-02-16 RX ORDER — SUCCINYLCHOLINE CHLORIDE 20 MG/ML
INJECTION INTRAMUSCULAR; INTRAVENOUS AS NEEDED
Status: DISCONTINUED | OUTPATIENT
Start: 2018-02-16 | End: 2018-02-16 | Stop reason: HOSPADM

## 2018-02-16 RX ORDER — FENTANYL CITRATE 50 UG/ML
25-50 INJECTION, SOLUTION INTRAMUSCULAR; INTRAVENOUS
Status: DISCONTINUED | OUTPATIENT
Start: 2018-02-16 | End: 2018-02-16 | Stop reason: HOSPADM

## 2018-02-16 RX ORDER — SODIUM CHLORIDE 9 MG/ML
INJECTION, SOLUTION INTRAVENOUS
Status: DISCONTINUED | OUTPATIENT
Start: 2018-02-16 | End: 2018-02-16 | Stop reason: HOSPADM

## 2018-02-16 RX ORDER — PHENYLEPHRINE HYDROCHLORIDE 10 MG/ML
INJECTION INTRAVENOUS
Status: DISPENSED
Start: 2018-02-16 | End: 2018-02-17

## 2018-02-16 RX ORDER — SODIUM CHLORIDE 0.9 % (FLUSH) 0.9 %
5-10 SYRINGE (ML) INJECTION AS NEEDED
Status: DISCONTINUED | OUTPATIENT
Start: 2018-02-16 | End: 2018-02-16 | Stop reason: HOSPADM

## 2018-02-16 RX ORDER — LIDOCAINE HYDROCHLORIDE 20 MG/ML
1-50 SOLUTION OROPHARYNGEAL ONCE
Status: DISCONTINUED | OUTPATIENT
Start: 2018-02-16 | End: 2018-02-16 | Stop reason: HOSPADM

## 2018-02-16 RX ORDER — EPHEDRINE SULFATE 50 MG/ML
INJECTION, SOLUTION INTRAVENOUS AS NEEDED
Status: DISCONTINUED | OUTPATIENT
Start: 2018-02-16 | End: 2018-02-16 | Stop reason: HOSPADM

## 2018-02-16 RX ORDER — ONDANSETRON 2 MG/ML
4 INJECTION INTRAMUSCULAR; INTRAVENOUS AS NEEDED
Status: DISCONTINUED | OUTPATIENT
Start: 2018-02-16 | End: 2018-02-16 | Stop reason: HOSPADM

## 2018-02-16 RX ORDER — DOBUTAMINE HYDROCHLORIDE 200 MG/100ML
0-10 INJECTION INTRAVENOUS
Status: DISCONTINUED | OUTPATIENT
Start: 2018-02-16 | End: 2018-02-23

## 2018-02-16 RX ORDER — SODIUM CHLORIDE, SODIUM LACTATE, POTASSIUM CHLORIDE, CALCIUM CHLORIDE 600; 310; 30; 20 MG/100ML; MG/100ML; MG/100ML; MG/100ML
25 INJECTION, SOLUTION INTRAVENOUS CONTINUOUS
Status: DISCONTINUED | OUTPATIENT
Start: 2018-02-16 | End: 2018-02-16 | Stop reason: HOSPADM

## 2018-02-16 RX ORDER — LIDOCAINE HYDROCHLORIDE 10 MG/ML
0.1 INJECTION, SOLUTION EPIDURAL; INFILTRATION; INTRACAUDAL; PERINEURAL AS NEEDED
Status: DISCONTINUED | OUTPATIENT
Start: 2018-02-16 | End: 2018-02-16 | Stop reason: HOSPADM

## 2018-02-16 RX ORDER — FUROSEMIDE 10 MG/ML
40 INJECTION INTRAMUSCULAR; INTRAVENOUS DAILY
Status: DISCONTINUED | OUTPATIENT
Start: 2018-02-16 | End: 2018-02-16

## 2018-02-16 RX ADMIN — PROPOFOL 50 MG: 10 INJECTION, EMULSION INTRAVENOUS at 14:47

## 2018-02-16 RX ADMIN — NYSTATIN: 100000 POWDER TOPICAL at 23:38

## 2018-02-16 RX ADMIN — DOBUTAMINE HYDROCHLORIDE 5 MCG/KG/MIN: 200 INJECTION INTRAVENOUS at 16:16

## 2018-02-16 RX ADMIN — POLYETHYLENE GLYCOL 3350 17 G: 17 POWDER, FOR SOLUTION ORAL at 10:10

## 2018-02-16 RX ADMIN — Medication 5 ML: at 19:02

## 2018-02-16 RX ADMIN — MINERAL OIL, PETROLATUM: 425; 568 OINTMENT OPHTHALMIC at 04:38

## 2018-02-16 RX ADMIN — SUCCINYLCHOLINE CHLORIDE 120 MG: 20 INJECTION INTRAMUSCULAR; INTRAVENOUS at 14:43

## 2018-02-16 RX ADMIN — PROPOFOL 50 MG: 10 INJECTION, EMULSION INTRAVENOUS at 14:45

## 2018-02-16 RX ADMIN — NYSTATIN: 100000 POWDER TOPICAL at 10:11

## 2018-02-16 RX ADMIN — CASTOR OIL AND BALSAM, PERU: 788; 87 OINTMENT TOPICAL at 23:37

## 2018-02-16 RX ADMIN — NYSTATIN: 100000 POWDER TOPICAL at 16:00

## 2018-02-16 RX ADMIN — AMIODARONE HYDROCHLORIDE 100 MG: 200 TABLET ORAL at 10:10

## 2018-02-16 RX ADMIN — PROPOFOL 100 MG: 10 INJECTION, EMULSION INTRAVENOUS at 15:05

## 2018-02-16 RX ADMIN — EPHEDRINE SULFATE 5 MG: 50 INJECTION, SOLUTION INTRAVENOUS at 15:21

## 2018-02-16 RX ADMIN — SODIUM CHLORIDE: 9 INJECTION, SOLUTION INTRAVENOUS at 14:32

## 2018-02-16 RX ADMIN — MINERAL OIL, PETROLATUM: 425; 568 OINTMENT OPHTHALMIC at 23:38

## 2018-02-16 RX ADMIN — CASTOR OIL AND BALSAM, PERU: 788; 87 OINTMENT TOPICAL at 19:03

## 2018-02-16 RX ADMIN — Medication 120 MCG: at 15:21

## 2018-02-16 RX ADMIN — Medication 120 MCG: at 14:45

## 2018-02-16 RX ADMIN — CASTOR OIL AND BALSAM, PERU: 788; 87 OINTMENT TOPICAL at 10:11

## 2018-02-16 RX ADMIN — Medication 10 ML: at 04:39

## 2018-02-16 RX ADMIN — PROPOFOL 100 MG: 10 INJECTION, EMULSION INTRAVENOUS at 14:43

## 2018-02-16 RX ADMIN — Medication 10 ML: at 23:39

## 2018-02-16 NOTE — PROCEDURES
[address]  [phone]    Patient ID:  Patient: Ana Tran  MRN: [id number]  Age: 80 y.o.  : 1928  Gender: male  Study Date: 2018    History: This is a male with Gram positive bacteremia with multiple organisms, here for transesophageal echo to evaluate for evidence of endocarditis.     Procedure: Transesophageal Echo, complete  The patient was brought to the noninvasive lab in a postabsorptive state after informed consent had been previously obtained.  Continuous electrocardiographic and hemodynamic monitoring was performed.  Sedation was provided by the anesthesiologist using general anesthesia.  The probe was passed into the esophagus only after using a Glidoscope with a camera to help guide passage, and then down the level of the heart.  After 2D imaging, including using both pulse and continuous doppler, color flow mapping, the probe was removed without issue.  The patient was recovered, with satisfactory hemodynamics, no immediate complication noted. Atrial fibrillation was present during the procedure.      Preoperative Diagnosis: As above. Postoperative Diagnosis: As above. Procedure: As above. Surgeon(s) and Role: Dixie Mccloud MD - Primary   Anesthesia:  General by the anesthesiologist.  Estimated Blood Loss: <5 cc. Specimens: * No specimens in log *   Findings: As below. Complications: None.      Findings:  LEFT VENTRICLE:  The cavity was dilated in size.  Ejection fraction was estimated to be 35%. No obvious wall motion abnormalities identified in the views obtained, diffuse hypokinesis was seen. Wall thickness was normal.    RIGHT VENTRICLE:  The size was dilated.  Systolic function was mildly reduced. LEFT ATRIUM: [place] was moderately-dilated.  No mass.  DOPPLER:  No evidence of left atrial appendage thrombus.     RIGHT ATRIUM: [place] was moderately-dilated.  No mass.      INTERATRIAL SEPTUM:  There was a patent foramen ovale imaged with left to right flow by color flow doppler. No septal aneurysm. MITRAL VALVE:  Normal valve structure without prolapse. No vegetations were noted. DOPPLER: Sarah [place]os was no evidence for stenosis.  There was moderate to severe non-rheumatic regurgitation. AORTIC VALVE: Normal valve structure with some cuspal thickening but no stenosis. No vegetations were noted.  DOPPLER:  There was mild non-rheumatic regurgitation. TRICUSPID VALVE:  Normal valve structure.  No vegetations were noted. DOPPLER: Sarah Katos was mild regurgitation. PULMONIC VALVE:  Not well visualized.  No obvious defect. AORTA:  The root exhibited normal size.  No dissection or aneurysm.  Nonmobile atherosclerotic plaque was seen. PERICARDIUM:  A small pericardial effusion was noted. In the pericardial space or even the pleural space, there appeared to be a mobile mass or thrombus. Careful scanning in that region showed this was an extracardiac finding.          Impression:   1.  No vegetations, valve destruction, or abscesses noted. 2.  Moderate to severe non-rheumatic mitral valve regurgitation. 3.  Mild non-rheumatic aortic valve regurgitation. 4.  Moderate non-rheumatic tricuspid valve regurgitation. 5.  Patent foramen ovale with small left to right shunt. 6.  No left atrial appendage thrombus. 7.  Reduced left ventricular systolic function EF 34%. 8.  Small pericardial effusion. 9.  Mobile ~2 cm linear and heterogeneous mass, may be thrombus but unclear. It is extracardiac, apparently in the pericardial space but cannot exclude pleural attachment.        Signed:  Sophy Nguyễn MD

## 2018-02-16 NOTE — PROGRESS NOTES
Hospitalist Progress Note    NAME: Terrell Rahman   :  1928   MRN:  [id number]   LOS:   15      Assessment / Plan:  Septic Shock   Hypothermia  Bacteremia  Staph, Strep and Enterococcus bacteremia; possibly from cutaneous source as patient picks his psoriasis relentlessness at home (per wife's description)  Aspiration PNA  -completed course of flagyl for additional aspiration organisms  -continue vanc  -await MARIA when stable, discussed with Dr. Mere Yee, anesthesia to eval patient, possible procedure tomorrow  -appreciate ID evaluation  -MBS done with aspiration of thin liquids, so diet changed accordingly by speech recs  -appreciate palliative meeting with patient     Acute on Chronic Systolic CHF with EF 40%  Acute Respiratory Failure with Hypoxia from Acute Pulmonary Edema (on CXR)  Now with aspiration PNA  -off Dobutamine for now, PRN per cardiology  -now on PRN IV diuretics per cardiology  -continue Xarelto  -continue O2, wean as tolerated  -TSH wnl  -holding home Coreg and Xarelto     Metabolic Encephalopathy  Delirium with hallucinations  -possible related to sepsis  -QTc high, now off haldol, was getting IV haldol in ICU without worsening QTc     Chronic Atrial Fibrillation with Junctional Bradycardia  -Appreciate cardiology input  -On Amiodarone 100 mg daily  -BB on hold for now  -Continue to monitor     Hypernatremia  Hypoglycemia - Current resolved     Acute Kidney Injury with baseline CKD3  Cr stable  Continue to monitor lytes     Pancytopenia  -S/p 2 units plts on  for acute bleeding from central line   -Leukopenia resolved  -S/p Vitamin daily B12 shots x3, may need montly upon discharge    Blood in Stool PTA  -likely from Xarelto and thrombocytopenia  -Xarelto on hold     Patient with history of Left Parotid Mass s/p resection approximately 1 year ago by Dr. Madison Gu and XRT, 2017 PET negative  Heme/Onc following     History of Prostate Cancer     Psoriasis     DTIs x2 left buttocks not POA  Woundcare following     Code status: Partial code , no Compressions or Defibrillation. She is okay with temporary pacing, ACLS meds (like atropine) and temporary intubation. Palliative is following appreciate their visiting with patient     Prophylaxis: SCD's and H2B  Recommended Disposition: TBD    Obesity  Body mass index is 30.83 kg/(m^2). Subjective:     Chief Complaint: sepsis follow up    Eating during my visit, micheal feeling well, expressed appreciate for palliative team visiting with him and explaining the MARIA procedure    Objective:     VITALS:   Last 24hrs VS reviewed since prior progress note. Most recent are:  Patient Vitals for the past 24 hrs:   Temp Pulse Resp BP SpO2   02/15/18 1533 98.1 °F (36.7 °C) 89 17 98/85 96 %   02/15/18 1146 - 91 - - 98 %   02/15/18 1145 98 °F (36.7 °C) 83 18 95/57 98 %   02/15/18 0726 - - - - 100 %   02/15/18 0724 97.7 °F (36.5 °C) 84 17 96/61 95 %   02/15/18 0353 97.4 °F (36.3 °C) 78 16 90/65 97 %   02/14/18 2301 - 82 16 93/58 96 %     No intake or output data in the 24 hours ending 02/15/18 2113     PHYSICAL EXAM:  General: Alert, cooperative, no acute distress    EENT:  EOMI. Anicteric sclerae. Mucous membranes moist  Resp:  CTA bilaterally, no wheezing or rales. No accessory muscle use  CV:  Regular rhythm, no edema  GI:  Soft, non distended, non tender. +Bowel sounds  Neurologic:  Alert and oriented X 3, normal speech, chronic R facial droop  Psych:   Good insight, not anxious nor agitated  Skin:  No rashes, no jaundice  ________________________________________________________________________  Care Plan discussed with:    Comments   Patient x    Family      RN     Care Manager     Consultant  x                      Multidiciplinary team rounds were held today with , nursing, pharmacist and clinical coordinator. Patient's plan of care was discussed; medications were reviewed and discharge planning was addressed. ________________________________________________________________________  Total NON critical care TIME:  20   Minutes    >50% of visit spent in counseling and coordination of care       ________________________________________________________________________    Procedures: see electronic medical records for all procedures/Xrays and details which were not copied into this note but were reviewed prior to creation of Plan. LABS:  I reviewed today's most current labs and imaging studies.   Pertinent labs include:  Recent Labs      02/15/18   0411  02/14/18   0400  02/13/18   0411   WBC  3.9*  4.5  6.0   HGB  9.6*  9.3*  9.7*   HCT  30.1*  27.7*  30.1*   PLT  120*  106*  100*     Recent Labs      02/15/18   0411  02/14/18   0400  02/13/18   0411   NA  145  145  144   K  3.6  3.7  3.8   CL  110*  111*  111*   CO2  29  28  26   GLU  91  86  103*   BUN  27*  26*  24*   CREA  1.18  1.16  1.20   CA  8.6  8.5  8.2*       Signed: Yu Sherman MD

## 2018-02-16 NOTE — PROGRESS NOTES
Speech path  Pt is currently off the floor. We will follow up Monday with swallowing exercises.    Abida Farley, SLP

## 2018-02-16 NOTE — PROGRESS NOTES
Cardiology Progress Note    Patient ID:  Patient: Get Manriquez  MRN: [id number]  Age: 80 y.o.  : 1928   Admit Date: 2018    Assessment: 1. Atrial fibrillation with slow ventricular response requiring dobutamine initially. Now improved. Persistent. 2. Chronic bifascicular block (RBBB and LAFB). 3. Gram-positive cocci bacteremia (Staph, Streptococcus, Enterococcus) and sepsis. No evidence of endocarditis on MARIA here (vegetation, valve destruction, abscess). 4. Postural hypotension, mild and asymptomatic. 5. Cardiomyopathy NOS, EF 25-35% depending on study. 6. Acute on chronic systolic congestive heart failure, better. 7. Acute kidney injury atop chronic kidney disease stage III. Improved. 8. Pancytopenia (watching platelets, still >27F, improving). 9. Encephalopathy/delirium, resolved. 10. History of R face/neck mass resected. 11. Partial code status (No shock or chest compressions). Plan:     1. Discontinue beta blocker indefinitely. I would not restart. 2. Continue amiodarone 100 mg daily. In the future, may stop this as well if no hope for sinus rhythm, provided the ventricular arrhythmia protection is also not wanted. 3. No temporary or permanent pacemaker recommended. 4. Start furosemide 40 mg IV daily x 3 days. Watch renal fx, BP.  5. Treated for GPC bacteremia. ID consult appreciated. 6. Not a candidate for ACEI or ARB or spironolactone due to acute renal failure. 7. Would not do an ischemia evaluation at this time. I called the daughter with the result of the MARIA, discussed with Dr. Joe Cardenas (hospitalist). Will be available AS NEEDED this weekend for any cardiac issues. []       High complexity decision making was performed in this patient. Subjective:     Get Manriquez denies chest pain, shortness of breath, dizziness. Wears eye patch at night.      Review of Systems - No abdominal pain, nausea or vomiting, productive cough, hemoptysis, pleurisy. Objective:     Physical Exam:  Temp (24hrs), Av.3 °F (35.7 °C), Min:94 °F (34.4 °C), Max:98.2 °F (36.8 °C)    Patient Vitals for the past 8 hrs:   Pulse   18 1630 72   18 1615 67   18 1600 70   18 1555 71   18 1550 70   18 1545 65   18 1540 71   18 1538 70   18 0945 79    Patient Vitals for the past 8 hrs:   Resp   18 1630 14   18 1615 14   18 1600 18   18 1555 15   18 1550 16   18 1545 14   18 1540 17   18 1538 14    Patient Vitals for the past 8 hrs:   BP   18 1630 94/63   18 1615 95/58   18 1600 97/66   18 1555 94/66   18 1550 93/63   18 1545 (!) 81/56   18 1540 (!) 82/55   18 1538 (!) 83/53   18 0945 93/61          Intake/Output Summary (Last 24 hours) at 18 1700  Last data filed at 18 1530   Gross per 24 hour   Intake              740 ml   Output                0 ml   Net              740 ml       Nondiaphoretic, not in acute distress. MMM, no jaundice, HEENT stable. R eye patch removed at this time. Unlabored, clear to auscultation bilaterally, symmetric air movement. Regular rate and rhythm, no murmur, pericardial rub, knock, or gallop. No JVD but there is +++peripheral edema. Palpable radial pulses bilaterally. Abdomen soft, nontender, nondistended. Extremities without cyanosis or clubbing. Skin warm and dry. Venostasis changes in legs. Musculoskeletal exam stable. Awake, less confused. No tremor. Cardiographics and Studies, I personally reviewed:    Telemetry:  Afib with HR 70-80's. ECHO 2/3:    LEFT VENTRICLE: The ventricle was mildly dilated. Ejection fraction was estimated in the range of 25 % to 30 %. There was moderate diffuse hypokinesis. RIGHT VENTRICLE: The ventricle was moderately dilated.  Systolic function was mildly reduced. LEFT ATRIUM: The atrium was moderately dilated. RIGHT ATRIUM: The atrium was mildly dilated. MITRAL VALVE: There was mild thickening. DOPPLER: There was moderate regurgitation. AORTIC VALVE: Leaflets exhibited mildly increased thickness. DOPPLER: There was no significant regurgitation. TRICUSPID VALVE: Normal valve structure. DOPPLER: There was moderate regurgitation. Pulmonary artery systolic pressure: 57 mmHg. There was moderate pulmonary hypertension. PULMONIC VALVE: Normal valve structure. AORTA: The root exhibited normal size. SYSTEMIC VEINS: IVC: The respirophasic change in diameter was less than 50%. PERICARDIUM: There was no pericardial effusion. The pericardium was normal in appearance. LAB Review:     No results for input(s): CPK, CKMB, CKNDX, TROIQ in the last 72 hours. No lab exists for component: CPKMB  Lab Results   Component Value Date/Time    Cholesterol, total 128 06/29/2017 10:41 AM    HDL Cholesterol 49 06/29/2017 10:41 AM    LDL, calculated 60 06/29/2017 10:41 AM    Triglyceride 94 06/29/2017 10:41 AM     No results for input(s): INR, PTP, APTT in the last 72 hours. No lab exists for component: INREXT, INREXT   Recent Labs      02/16/18   0430  02/15/18   0411  02/14/18   0400   NA  145  145  145   K  3.9  3.6  3.7   CL  111*  110*  111*   CO2  30  29  28   BUN  27*  27*  26*   CREA  1.24  1.18  1.16   GLU  85  91  86   CA  8.3*  8.6  8.5   WBC  3.3*  3.9*  4.5   HGB  9.8*  9.6*  9.3*   HCT  30.7*  30.1*  27.7*   PLT  121*  120*  106*     No results for input(s): SGOT, GPT, AP, TBIL, TP, ALB, GLOB, GGT, AML, LPSE in the last 72 hours. No lab exists for component: AMYP, HLPSE  No components found for: GLPOC  No results for input(s): PH, PCO2, PO2 in the last 72 hours. Medications Reviewed:      Allergies   Allergen Reactions    Ancef [Cefazolin] Unknown (comments)     \"drops my blood pressure\"    Neosporin [Benzalkonium Chloride] Unknown (comments)    Polysporin [Bacitracin-Polymyxin B] Other (comments)     \"WOUNDS DO NOT HEAL\"        Current Facility-Administered Medications   Medication Dose Route Frequency    PHENYLephrine (AUTUMN-SYNEPHRINE) 10 mg/mL injection        DOBUTamine (DOBUTREX) 500 mg/250 mL (2,000 mcg/mL) infusion  0-10 mcg/kg/min IntraVENous TITRATE    [START ON 2/17/2018] vancomycin (VANCOCIN) 1750 mg in  ml infusion  1,750 mg IntraVENous Q36H    zinc oxide-cod liver oil (DESITIN) 40 % paste   Topical PRN    balsam peru-castor oil (VENELEX)  mg/gram ointment   Topical TID    albuterol-ipratropium (DUO-NEB) 2.5 MG-0.5 MG/3 ML  3 mL Nebulization Q4H PRN    white petrolatum-mineral oil (LACRILUBE S.O.P.) ointment   Right Eye Q8H    polyethylene glycol (MIRALAX) packet 17 g  17 g Oral DAILY    insulin lispro (HUMALOG) injection   SubCUTAneous AC&HS    amiodarone (CORDARONE) tablet 100 mg  100 mg Oral DAILY    glucose chewable tablet 16 g  4 Tab Oral PRN    dextrose (D50W) injection syrg 12.5-25 g  12.5-25 g IntraVENous PRN    glucagon (GLUCAGEN) injection 1 mg  1 mg IntraMUSCular PRN    nystatin (MYCOSTATIN) 100,000 unit/gram powder   Topical TID    sodium chloride (NS) flush 5-10 mL  5-10 mL IntraVENous Q8H    sodium chloride (NS) flush 5-10 mL  5-10 mL IntraVENous PRN          Josep Montalvo MD  2/16/2018

## 2018-02-16 NOTE — PROGRESS NOTES
2:32 PM  Pt CHAD for MARIA    TRANSFER - IN REPORT:    Verbal report received from Freeman(name) on Ana Tran  being received from [facility]) for ordered procedure      Report consisted of patients Situation, Background, Assessment and   Recommendations(SBAR). Information from the following report(s) SBAR, Kardex, MAR and Accordion was reviewed with the receiving nurse. Opportunity for questions and clarification was provided. Assessment completed upon patients arrival to unit and care assumed. 6:00 PM  BG 77, will give juice if pt can remain alert to safely take PO    6:18 PM  BG up to 88 after apple juice.

## 2018-02-16 NOTE — PROGRESS NOTES
Pharmacy Automatic Renal Dosing Protocol - Antimicrobials    Indication for Antimicrobials: Bacteremia (ID is still ruling out endocarditis)    Current Regimen of Each Antimicrobial:  Vancomycin 1750 mg IV every 24 hours (Started 18; Day #14  Metronidazole 500 mg Q 12 H (Started 18, Day # 7)      Previous Antimicrobial Therapy:  Levofloxacin 750 mg IV every 48 hours (Started 18; Stopped 18)  Meropenem 1 gram IV every 12 hours (Started 2/3/18; Stopped 18)  Levofloxacin 750 mg Q48H (Started 18, Day 1)      Goal Vancomycin Trough: 15-20 mcg/mL    Vancomycin Trough (18 at 2240): 19.6 mcg/mL (True trough = 18.8 mcg/mL)  Vancomycin Trough (18 at 2149): 25 mcg/mL (True trough = 23.6 mcg/mL)  Vancomycin Trough (18 at [facility]): 25.3 mcg/ml (True trough = 23.89 mcg/ml)  Vancomycin trough, 1500 mg Q36H, 2/15 at 13:45, 15.9 mcg/ml (True Tr 14.9 mcg/ml)     Significant Cultures:   18 Blood culture = No growth x 5 days (final)  18 Blood culture = strep salivarius (2/2) - [place] S, enterococcus (1/2) - pan S, MSSA (1/2) - final    Labs:  Recent Labs      02/15/18   0411  18   0400  18   0411   CREA  1.18  1.16  1.20   BUN  27*  26*  24*   WBC  3.9*  4.5  6.0   Temp (24hrs), Av.8 °F (36.6 °C), Min:97.4 °F (36.3 °C), Max:98 °F (36.7 °C)    Creatinine Clearance (mL/min) or Dialysis: 33 mL/min     No results found for: PCT    Impression/Plan:  -Vancomycin trough on 1500 mg Q36H 15.9 mcg/ml, extrapolated to 14.9 mcg/ml. Dose adjusted to 1750 mg Q36H with anticipated trough of 17.4 mcg/ml. - Serum  has improved. - ID is following and MARIA is still pending. MARIA planned for tomorrow possibly. Pharmacy will follow daily and adjust medications as appropriate for renal function and/or serum levels.     Thank you,  Sacha Avery, [facility] Automatic Renal Dosing Protocol - Antimicrobials    Indication for Antimicrobials: Bacteremia (ID is still ruling out endocarditis)    Current Regimen of Each Antimicrobial:  Vancomycin 1750 mg IV every 24 hours (Started 18; Day #14      Previous Antimicrobial Therapy:  Levofloxacin 750 mg IV every 48 hours (Started 18; Stopped 18)  Meropenem 1 gram IV every 12 hours (Started 2/3/18; Stopped 18)  Levofloxacin 750 mg Q48H (Started 18, Day 1)  Metronidazole 500 mg Q 12 H (Started 18, Day     Goal Vancomycin Trough: 15-20 mcg/mL    Vancomycin Trough (18 at 2240): 19.6 mcg/mL (True trough = 18.8 mcg/mL)  Vancomycin Trough (18 at 2149): 25 mcg/mL (True trough = 23.6 mcg/mL)  Vancomycin Trough (18 at [facility]): 25.3 mcg/ml (True trough = 23.89 mcg/ml)    Significant Cultures:   18 Blood culture = No growth x 5 days (Results pending)  18 Blood culture = strep salivarius (22) - [place] S, enterococcus (2) - pan S, MSSA (2) - final    Labs:  Recent Labs      02/15/18   0411  18   0400  18   0411   CREA  1.18  1.16  1.20   BUN  27*  26*  24*   WBC  3.9*  4.5  6.0   Temp (24hrs), Av.8 °F (36.6 °C), Min:97.4 °F (36.3 °C), Max:98 °F (36.7 °C)    Creatinine Clearance (mL/min) or Dialysis: 33 mL/min     No results found for: PCT    Impression/Plan:   - Vancomycin dose adjusted based on the trough level on ; scr has improved. - Vancomycin trough on 2/15 at 2 PM pending  - ID is following and MARIA is still pending. MARIA planned for tomorrow possibly. Pharmacy will follow daily and adjust medications as appropriate for renal function and/or serum levels.     Thank you,  Mike Richards, PHARMD

## 2018-02-16 NOTE — PROGRESS NOTES
Occupational Therapy Goals  OT weekly reassessment 2/16/18  Initiated 2/9/2018  1. Patient will perform self-feeding with independence within 7 day(s). 2.  Patient will perform grooming with supervision/setup within 7 day(s). 3.  Patient will perform bathing with minimum assistance  within 7 day(s). 4.  Patient will perform toilet transfers with supervision within 7 day(s). 5.  Patient will perform all aspects of toileting with supervision within 7 day(s). 6.  Patient will participate in upper extremity therapeutic exercise/activities with supervision/set-up for 5 minutes within 7 day(s). Initiated 2/9/2018  1. Patient will perform self-feeding with independence within 7 day(s). 2.  Patient will perform grooming with minimal assistance/contact guard assist within 7 day(s). 3.  Patient will perform bathing with moderate assistance  within 7 day(s). 4.  Patient will perform toilet transfers with moderate assistance  within 7 day(s). 5.  Patient will perform all aspects of toileting with moderate assistance  within 7 day(s). 6.  Patient will participate in upper extremity therapeutic exercise/activities with supervision/set-up for 5 minutes within 7 day(s). Occupational Therapy TREATMENT: WEEKLY REASSESSMENT  Patient: Kathy Henry (36 y.o. male)  Date: 2/16/2018  Diagnosis: Hypothermia  Sepsis ([facility] [address]  Atrial fibrillation ([facility] [address] <principal problem not specified>       Precautions:    Chart, occupational therapy assessment, plan of care, and goals were reviewed. ASSESSMENT:  Patient is progressing and has met OT goals since initial OT evaluation. Pt demonstrated improvement in Barthel Index from score of 5 on initial evaluation to score of 60 on reassessment. He required mod A to don socks EOB, able to cross R LE over L but difficult to cross L over R. Pt was min A x 2 to stand from slightly elevated bed height, with verbal cues for hand placement as pt attempted to pull up on RW.  Pt completed standing ADL tasks at sink x 5 minutes with SBA, good dynamic balance with B hands removed from RW support for task. With fatigue, pt demonstrated increased B knee flexion but no buckling. Pt is very motivated to regain his prior level of function and will be able to tolerate 3 hours of therapy. Continue to recommend IP rehab. Progression toward goals: pt has met 4/6 goals with assist levels progressed for ADL tasks. [x]            Improving appropriately and progressing toward goals  []            Improving slowly and progressing toward goals  []            Not making progress toward goals and plan of care will be adjusted     PLAN:  Goals have been updated based on progression since last assessment. Patient continues to benefit from skilled intervention to address the above impairments. Continue to follow patient 4 times a week to address goals. Planned Interventions:  [x]                    Self Care Training                  [x]             Therapeutic Activities  [x]                    Functional Mobility Training    []             Cognitive Retraining  []                    Therapeutic Exercises           [x]             Endurance Activities  [x]                    Balance Training                   []             Neuromuscular Re-Education  []                    Visual/Perceptual Training     [x]        Home Safety Training  [x]                    Patient Education                 [x]             Family Training/Education  []                    Other (comment):  Discharge Recommendations: Inpatient Rehab  Further Equipment Recommendations for Discharge: TBD     SUBJECTIVE:   Patient stated let's do this.     OBJECTIVE DATA SUMMARY:   Cognitive/Behavioral Status:  Neurologic State: Appropriate for age; Alert;Eyes open spontaneously  Orientation Level: Oriented X4  Cognition: Appropriate decision making; Appropriate for age attention/concentration; Appropriate safety awareness; Follows commands Functional Mobility and Transfers for ADLs:  Bed Mobility:  Supine to Sit: Stand-by asssistance; Additional time; Adaptive equipment    Transfers:  Sit to Stand: Minimum assistance x 2 from EOB from slightly elevated bed height, cues to not pull up on RW       Balance:  Sitting: Intact  Standing: Impaired  Standing - Static: Good  Standing - Dynamic : Fair    ADL Intervention:        pt completed functional mobility bathroom with RW and CGA. Occasional cues to stay within frame of RW and square off to sink surface. Pt completed standing ADLs x 5 minutes with good dynamic balance for grooming task. Pain:  Pain Scale 1: Numeric (0 - 10)  Pain Intensity 1: 0              Activity Tolerance:   VSS  Please refer to the flowsheet for vital signs taken during this treatment.   After treatment:   [x] Patient left in no apparent distress sitting up in chair  [] Patient left in no apparent distress in bed  [x] Call bell left within reach  [x] Nursing notified  [] Caregiver present  [x] Bed alarm activated    COMMUNICATION/COLLABORATION:   The patients plan of care was discussed with: Physical Therapist and Registered Nurse    Nicky Robin OT  Time Calculation: 19 mins

## 2018-02-16 NOTE — ANESTHESIA PREPROCEDURE EVALUATION
Anesthetic History   No history of anesthetic complications            Review of Systems / Medical History  Patient summary reviewed, nursing notes reviewed and pertinent labs reviewed    Pulmonary          Pneumonia and smoker      Comments: Former smoker  Aspiration pneumonia   Neuro/Psych   Within defined limits           Cardiovascular          CHF  Dysrhythmias : atrial fibrillation  CAD and hyperlipidemia    Exercise tolerance: <4 METS     GI/Hepatic/Renal     GERD: poorly controlled           Endo/Other        Cancer     Other Findings            Physical Exam    Airway  Mallampati: II  TM Distance: 4 - 6 cm  Neck ROM: normal range of motion   Mouth opening: Normal     Cardiovascular  Regular rate and rhythm,  S1 and S2 normal,  no murmur, click, rub, or gallop  Rhythm: regular  Rate: normal         Dental    Dentition: Poor dentition  Comments:  Many missing , many in disrepair   Pulmonary  Breath sounds clear to auscultation               Abdominal  GI exam deferred       Other Findings            Anesthetic Plan    ASA: 3  Anesthesia type: general    Monitoring Plan: BIS      Induction: Intravenous  Anesthetic plan and risks discussed with: Patient and Spouse      Glidescope/asleep

## 2018-02-16 NOTE — PERIOP NOTES
Handoff Report from Operating Room to PACU    Report received from Marci Montano and Kahlil Issa CRNA regarding Louisa Kimbrough):  Case Anesthesia  And Procedure(s) (LRB):  PACU/RECOVERY                      EP/LAB (N/A)  confirmed   with allergies and dressings discussed. Anesthesia type, drugs, patient history, complications, estimated blood loss, vital signs, intake and output, and last pain medication, lines, reversal medications and temperature were reviewed. SBP 80's. CRNA MARIO. Sheridanismael Polanco remains at bedside. Axillary temp 94.0, cool to touch. Jo-Ann hugger placed on patient along with 3 warm blankets. VORB from Dr. Zaire Meraz to start a Phenylephrine infusion and then resume prn order for Dobutamine. \"Wean Jameel first.\"    9035 Jameel infusion started at 5 mcg's. I will titrate to meet a MAP of 70 or greater.

## 2018-02-16 NOTE — PERIOP NOTES
Melum 50 for Dobutamine infusion. They will tube to station 11.    1615 Dobutamine infusion started. 1625 Jameel infusion stopped and placed on stanbye. 1639 Decreased Dobutamine to 2.5 mcg/kg/min. SBP 90 or greater and MAP 65 or greater. Increase in ectopy noted. Dr. Herman Lainez aware of continued hypothermia. Jo-Ann hugger remains in use. Patient's temperature decreases quickly when covers removed. 1707 Increased Dobutamine back to 5 mcg/kg/min to maintain SBP 90 or greater. 1720 TRANSFER - OUT REPORT:    Verbal report given to Latonia (name) on Yovani Jasmine  being transferred to [facility](unit) for routine post - op       Report consisted of patients Situation, Background, Assessment and   Recommendations(SBAR). Information from the following report(s) SBAR, Kardex, OR Summary, Procedure Summary, Intake/Output, MAR, Accordion, Recent Results, Med Rec Status, Cardiac Rhythm NSR PVC's and Alarm Parameters  was reviewed with the receiving nurse. Opportunity for questions and clarification was provided.       Patient transported with:   Monitor  O2 @ 4 liters  Registered Nurse  [facility]

## 2018-02-16 NOTE — PROGRESS NOTES
Problem: Mobility Impaired (Adult and Pediatric)  Goal: *Acute Goals and Plan of Care (Insert Text)  Physical Therapy Goals    Revised 2/16/2018  1. Patient will move from supine to sit and sit to supine  in bed with modified independence within 7 day(s). 2.  Patient will transfer from bed to chair and chair to bed with modified independence using the least restrictive device within 7 day(s). 3.  Patient will perform sit to stand with modified independence within 7 day(s). 4.  Patient will ambulate with contact guard assist for 100 feet with the least restrictive device within 7 day(s). Initiated 2/9/2018  1. Patient will move from supine to sit and sit to supine , scoot up and down and roll side to side in bed with moderate assistance  within 7 day(s). --Met 2/16/18  2. Patient will transfer from bed to chair and chair to bed with minimal assistance/contact guard assist using the least restrictive device within 7 day(s). -- Met 2/16/18  3. Patient will perform sit to stand with minimal assistance/contact guard assist within 7 day(s). --Met 2/16/18  4. Patient will ambulate with minimal assistance/contact guard assist for 25 feet with the least restrictive device within 7 day(s). --Met 2/16/18   physical Therapy TREATMENT: WEEKLY REASSESSMENT  Patient: Lili Crensahw (43 y.o. male)  Date: 2/16/2018  Diagnosis: Hypothermia  Sepsis ([facility] [address]  Atrial fibrillation ([facility] [address] <principal problem not specified>       Precautions:    Chart, physical therapy assessment, plan of care and goals were reviewed. ASSESSMENT:  Patient received supine in bed and agreeable to therapy. Patient tolerated session and making good progress towards goals. Patient completed supine to sit with supervision, use of bed rails to assist and increased time. Patient performed sit<>Stand with RW with min assist and verbal cueing for proper hand placement.  Patient ambulated with RW with CGA-min assist towards the end of gait due to generalized weakness and instability. Patient continues to have edematous LEs impacting balance and tolerance to activity as well. Patient remained seated in chair with chair alarm set. Patient will continue to benefit from PT to improve tolerance to activity, improve strength, balance and gait in order to return to prior level of function. Patient highly motivated and would be an excellent inpatient rehab upon discharge. Patient's progression toward goals since last assessment: 4/4 goals were met since evaluation. New goals set this date     PLAN:  Goals have been updated based on progression since last assessment. Patient continues to benefit from skilled intervention to address the above impairments. Continue to follow the patient 5 times a week to address goals.   Planned Interventions:  [x]              Bed Mobility Training             [x]       Neuromuscular Re-Education  [x]              Transfer Training                   []       Orthotic/Prosthetic Training  [x]              Gait Training                         []       Modalities  [x]              Therapeutic Exercises           []       Edema Management/Control  [x]              Therapeutic Activities            [x]       Patient and Family Training/Education  []              Other (comment):  Discharge Recommendations: Inpatient Rehab  Further Equipment Recommendations for Discharge: TBD at rehab     SUBJECTIVE:   Patient stated I am going for a test.    OBJECTIVE DATA SUMMARY:   Critical Behavior:  Neurologic State: Appropriate for age, Alert, Eyes open spontaneously  Orientation Level: Oriented X4  Cognition: Appropriate decision making, Appropriate for age attention/concentration, Appropriate safety awareness, Follows commands  Safety/Judgement: Decreased insight into deficits, Decreased awareness of need for safety, Decreased awareness of need for assistance    Strength:   Strength: Generally decreased, functional                      Functional Mobility Training:  Bed Mobility:     Supine to Sit: Stand-by asssistance; Additional time; Adaptive equipment              Transfers:  Sit to Stand: Minimum assistance  Stand to Sit: Contact guard assistance                             Balance:  Sitting: Intact  Standing: Impaired  Standing - Static: Good  Standing - Dynamic : Fair  Ambulation/Gait Training:  Distance (ft):  (50)  Assistive Device: Gait belt;Walker, rolling  Ambulation - Level of Assistance: Minimal assistance        Gait Abnormalities: Decreased step clearance        Base of Support: Widened     Speed/Christina: Pace decreased (<100 feet/min)  Step Length: Right shortened;Left shortened       As patient ambulated further distance, noted patient's bilateral knees went into more flexed position; no buckling      Neuro Re-Education:    Therapeutic Exercises:   LAQ x 10  Pain:  Pain Scale 1: Numeric (0 - 10)  Pain Intensity 1: 0              Activity Tolerance:   Good-fair. Patient on 2L of oxygen  Please refer to the flowsheet for vital signs taken during this treatment.   After treatment:   [x]  Patient left in no apparent distress sitting up in chair  []  Patient left in no apparent distress in bed  [x]  Call bell left within reach  [x]  Nursing notified  []  Caregiver present  [x]  Bed alarm activated    COMMUNICATION/COLLABORATION:   The patients plan of care was discussed with: Occupational Therapist and Registered Nurse    Trecia Apley, PT, DPT   Time Calculation: 23 mins

## 2018-02-16 NOTE — PROGRESS NOTES
Infectious Disease Progress Note       Subjective:   Mr Sandy Ricci says he is doing ok. Awaiting MARIA.  No new issues today         Vitals:   Patient Vitals for the past 24 hrs:   Temp Pulse Resp BP SpO2   02/16/18 0945 - 79 - 93/61 95 %   02/16/18 0815 97.7 °F (36.5 °C) 70 18 92/60 97 %   02/16/18 0305 97.6 °F (36.4 °C) 74 18 94/60 98 %   02/15/18 2240 98.2 °F (36.8 °C) 81 20 99/70 96 %   02/15/18 1909 98 °F (36.7 °C) 86 18 103/59 94 %   02/15/18 1533 98.1 °F (36.7 °C) 89 17 98/85 96 %       ¨ GEN: NAD   ¨ HEENT:  facial asymetry,  no scleral icterus L eye,   very poor dentition with many missing/chipped off teeth ,  ¨ CV: S1, S2 heard with RADHA  ¨ Lungs: Clear to auscultation   ¨ Abdomen: soft, non distended, non tender  ¨ Extremities: + edema , slight blanchable erythema noted on feet B/L   ¨ Neuro: Awake, alert, follows commands   ¨ Skin: no rash        Medications:    Current Facility-Administered Medications:     [START ON 2/17/2018] vancomycin (VANCOCIN) 1750 mg in  ml infusion, 1,750 mg, IntraVENous, Q36H, Eleanor Landin DO    zinc oxide-cod liver oil (DESITIN) 40 % paste, , Topical, PRN, Pamela Jones MD    balsam peru-castor oil (VENELEX)  mg/gram ointment, , Topical, TID, Maxi Sam MD    albuterol-ipratropium (DUO-NEB) 2.5 MG-0.5 MG/3 ML, 3 mL, Nebulization, Q4H PRN, Jackie OVALLE MD, 3 mL at 02/09/18 2004    white petrolatum-mineral oil (LACRILUBE S.O.P.) ointment, , Right Eye, Q8H, Briana Morales MD    polyethylene glycol (MIRALAX) packet 17 g, 17 g, Oral, DAILY, Kaitlyn De La Cruz MD, 17 g at 02/16/18 1010    insulin lispro (HUMALOG) injection, , SubCUTAneous, AC&HS, Pamela Jones MD, Stopped at 02/14/18 1630    amiodarone (CORDARONE) tablet 100 mg, 100 mg, Oral, DAILY, Ej Eric MD, 100 mg at 02/16/18 1010    glucose chewable tablet 16 g, 4 Tab, Oral, PRN, Lashon Rivera MD    dextrose (D50W) injection syrg 12.5-25 g, 12.5-25 g, IntraVENous, PRN, Lashon Rivera MD, 25 g at 02/11/18 0307    glucagon (GLUCAGEN) injection 1 mg, 1 mg, IntraMUSCular, PRN, Melia Samuel MD    nystatin (MYCOSTATIN) 100,000 unit/gram powder, , Topical, TID, Melia Samuel MD    sodium chloride (NS) flush 5-10 mL, 5-10 mL, IntraVENous, Q8H, Livan Baugh MD, 10 mL at 02/16/18 0439    sodium chloride (NS) flush 5-10 mL, 5-10 mL, IntraVENous, PRN, Livan Baugh MD, 10 mL at 02/11/18 0913      Labs:  Recent Results (from the past 24 hour(s))   VANCOMYCIN, TROUGH    Collection Time: 02/15/18  1:45 PM   Result Value Ref Range    Vancomycin,trough 15.9 (H) 5.0 - 10.0 ug/mL    Reported dose date: NOT PROVIDED      Reported dose time: NOT PROVIDED      Reported dose: NOT PROVIDED UNITS   GLUCOSE, POC    Collection Time: 02/15/18  4:17 PM   Result Value Ref Range    Glucose (POC) 146 (H) 65 - 100 mg/dL    Performed by Tobias Le    GLUCOSE, POC    Collection Time: 02/15/18  9:04 PM   Result Value Ref Range    Glucose (POC) 116 (H) 65 - 100 mg/dL    Performed by Animated Speech    CBC W/O DIFF    Collection Time: 02/16/18  4:30 AM   Result Value Ref Range    WBC 3.3 (L) 4.1 - 11.1 K/uL    RBC 2.88 (L) 4.10 - 5.70 M/uL    HGB 9.8 (L) 12.1 - 17.0 g/dL    HCT 30.7 (L) 36.6 - 50.3 %    .6 (H) 80.0 - 99.0 FL    MCH 34.0 26.0 - 34.0 PG    MCHC 31.9 30.0 - 36.5 g/dL    RDW 16.9 (H) 11.5 - 14.5 %    PLATELET 392 (L) 260 - 400 K/uL    MPV 11.3 8.9 - 12.9 FL    NRBC 0.0 0  WBC    ABSOLUTE NRBC 0.00 0.00 - 2.44 K/uL   METABOLIC PANEL, BASIC    Collection Time: 02/16/18  4:30 AM   Result Value Ref Range    Sodium 145 136 - 145 mmol/L    Potassium 3.9 3.5 - 5.1 mmol/L    Chloride 111 (H) 97 - 108 mmol/L    CO2 30 21 - 32 mmol/L    Anion gap 4 (L) 5 - 15 mmol/L    Glucose 85 65 - 100 mg/dL    BUN 27 (H) 6 - 20 MG/DL    Creatinine 1.24 0.70 - 1.30 MG/DL    BUN/Creatinine ratio 22 (H) 12 - 20      GFR est AA >60 >60 ml/min/1.73m2    GFR est non-AA 55 (L) >60 ml/min/1.73m2    Calcium 8.3 (L) 8.5 - 10.1 MG/DL   GLUCOSE, POC    Collection Time: 02/16/18  8:16 AM   Result Value Ref Range    Glucose (POC) 81 65 - 100 mg/dL    Performed by Tammy Beach (PRETTY)    GLUCOSE, POC    Collection Time: 02/16/18 11:29 AM   Result Value Ref Range    Glucose (POC) 93 65 - 100 mg/dL    Performed by Sean Moffett            Micro:   UA 2/2/18 0-4 WBC, negative bacteria, negative leukocyte esterase and negative nitrite         Blood 2/6/18 negative so far                            Blood: 2/2/18          STREPTOCOCCUS SALIVARIUS GROWING IN BOTH BOTTLES DRAWN (SITES = RFA) (SENSITIVITIES PERFORMED BY E-TEST) (A)        Culture result:      Final      STAPHYLOCOCCUS AUREUS ALSO GROWING IN THE 1ST OF 2 BOTTLES DRAWN (SITE = R FA) (A)      Culture result:      Final      ENTEROCOCCUS FAECALIS GROUP D ALSO GROWING IN THE 1ST OUT OF 2 BOTTLES DRAWN.  (SITE = RFA) GENTAMICIN SYNERGY SCREEN IS SENSITIVE @ < OR = 500 mcg/ml STREPTOMYCIN SYNERGY SCREEN IS SENSITIVE @ < OR =1000 mcg/ml (A)        Culture & Susceptibility                     Antibiotic    Organism Organism Organism          Enterococcus faecalis group D Staphylococcus aureus Streptococcus salivarius      AMPICILLIN ($)    <=2   ug/mL   S Final              AMPICILLIN/SULBACTAM ($)        <=8/4   ug/mL   S Final          CEFAZOLIN ($)        <=4   ug/mL   S Final          CEFTRIAXONE ($)            0.023   ug/mL   S Final      CIPROFLOXACIN ($)        <=1   ug/mL   S Final          CLINDAMYCIN ($)        <=0.5   ug/mL   S Final          DAPTOMYCIN ($$$$$)    2   ug/mL   S Final  1   ug/mL   S Final          ERYTHROMYCIN ($$$$)        <=0.5   ug/mL   S Final  0.125   ug/mL   S Final      GENTAMICIN ($)        <=4   ug/mL   S Final          LEVOFLOXACIN ($)            1.5   ug/mL   S Final      LINEZOLID ($$$$$)    2   ug/mL   S Final  4   ug/mL   S Final          OXACILLIN        <=0.25   ug/mL   S Final          PENICILLIN G ($$)    2   ug/mL   S Final      0.023   ug/mL   S Final      RIFAMPIN ($$$$)        <=1   ug/mL   S Final          TETRACYCLINE        <=4   ug/mL   S Final          TRIMETH-SULFAMETHOXA        <=0.5/9.5   ug/mL   S Final          VANCOMYCIN ($)    2   ug/mL   S Final  2   ug/mL   S Final  0.50   ug/mL   S Final                                        Component Value Ref Range & Units Status      Special Requests: NO SPECIAL REQUESTS    Preliminary      Culture result: NO GROWTH 1 DAY    Preliminary            Pathology Results:      FINAL PATHOLOGIC DIAGNOSIS   Right parotid gland, total parotidectomy with partial neck dissection:   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  Is minimal decreased attenuation in the periventricular white  matter which is nonspecific but consistent with small vessel disease.  There is  no intracranial hemorrhage.  There is no extra-axial collection, mass, mass  effect or midline shift.  The basilar cisterns are open.  No acute infarct is  identified. The bone windows demonstrate no abnormalities.  The visualized  portions of the paranasal sinuses and mastoid air cells are clear.      IMPRESSION  IMPRESSION: No acute intracranial abnormality. Age-related volume loss and  microvascular disease unchanged.         CXR 2/7/18  FINDINGS: Portable chest shows support lines/devices appear unchanged since  February 4. There is no apparent pneumothorax.  Lungs show improved pulmonary  edema with persistent bibasilar haziness favoring effusions and atelectasis. Heart size is large, stable. There is no midline shift.      IMPRESSION  IMPRESSION: Improved pulmonary edema.  Persistent pleural effusions and  Cardiomegaly.     Renal US 2/3/18  FINDINGS: The right kidney measures 9.6 cm in length. The left kidney measures  10.5 cm in length. Both kidneys demonstrate normal cortical echogenicity. No  renal calculus is seen. There is no hydronephrosis. There is a 2.2 cm left renal  cyst. The aorta and iliac arteries are not visualized due to body habitus. Visualized portions of the IVC are normal. The bladder is decompressed by Ivy  catheter.      IMPRESSION  IMPRESSION:   No acute genitourinary pathology.     Procedures:   L neck central line insertion         Assessment / Plan:      Mr Reyna Donnelly  is a 80y.o. 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 Appears from notes that he had \"syncope\" the day before presentation there and had heart rhytym that was alternating between  \"junctional bradycardia \" and rate controlled Afib. He was transferred to [facility] and on presentation found to be septic with hypothermia. Was started on pressors as well and inititated on Meropenem, Levofloxacin and Vancomycin. Blood cultures sent 2/2 returned + for polymicrobial organisms including MSSA, Strep salivarus and ampicillin sensitive E faecalis.       He is also noted to be leukopenic without neutropenia, thrombocytopenia and macrocytic anemia  and seen by Hematology/oncology this admission. Renal function is imporving. He has had imaging of CT head that showed no acute findings. US renal done with 2.2 cm L renal cyst noted. CXR has a small R pleural effusion. He is currently on Vancomycin with last trough on 2/4/18 at 19.6. TTE has shown AV and MV thickening.      From discussion with him, he says that he has no specific symptoms. Denied any known hardware in his body. Denied falls at home. Says he was supposed to take care of his teeth when asked but got admitted. Denied pain.      From review of chart and  Medical records, he saw his PCP 1/2018 with dyspnea and notes indicate volume overload. At that time, notes do not indicate concern for pneumonia. Notes this admission, indicate concerns for bleeding around central line and BRBPR as well. He was transfused this admission.         1) Polymicrobial bacteremia ( MSSA, Streptococcus salivarius and E faecalis)  Possible source: GI/oral in origin likley given hx of constipation wt straining and bloody bowel movements , skin entry as well given thin skin with multiple bruising         Recommendations:   Continue IV Vancomycin per pharmacy dosing wt close renal function. 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 Please contact with questions or concerns. D/W wt his wife today     ID will follow peripherally over weekend. Dr Lincoln Knutson is on call over weekend and can be reached through [facility]  if needed.      Abhijit Every, DO   12:35 PM

## 2018-02-16 NOTE — ANESTHESIA POSTPROCEDURE EVALUATION
Post-Anesthesia Evaluation and Assessment    Patient: Emerita Batista MRN: [id number]  SSN: [id number]    YOB: 1928  Age: 80 y.o. Sex: male       Cardiovascular Function/Vital Signs  Visit Vitals    BP 90/45    Pulse 89    Temp 36.3 °C (97.4 °F)    Resp 16    Ht 5' 9\" (1.753 m)    Wt 95.6 kg (210 lb 12.2 oz)    SpO2 93%    BMI 31.12 kg/m2       Patient is status post general anesthesia for * No procedures listed *. Nausea/Vomiting: None    Postoperative hydration reviewed and adequate. Pain:  Pain Scale 1: Numeric (0 - 10) (02/16/18 1630)  Pain Intensity 1: 0 (02/16/18 1630)   Managed    Neurological Status:   Neuro (WDL): Exceptions to WDL (02/16/18 1538)  Neuro  Neurologic State: Drowsy; Eyes open spontaneously (02/16/18 1538)  Orientation Level: Oriented to person (02/16/18 1538)  Cognition: Follows commands (02/16/18 1538)  Speech: Delayed responses (02/16/18 1538)  Assessment L Pupil: Brisk (02/16/18 0256)  Size L Pupil (mm): 3 (02/16/18 0256)  Assessment R Pupil: Round (02/16/18 0256)  Size R Pupil (mm): 2 (02/16/18 0256)  LUE Motor Response: Purposeful;Spontaneous ;Weak (02/16/18 1538)  LLE Motor Response: Purposeful;Spontaneous ;Weak (02/16/18 1538)  RUE Motor Response: Purposeful;Spontaneous ;Weak (02/16/18 1538)  RLE Motor Response: Purposeful;Spontaneous ;Weak (02/16/18 1538)   At baseline    Mental Status and Level of Consciousness: Arousable    Pulmonary Status:   O2 Device: 02 face tent (02/16/18 1645)   Adequate oxygenation and airway patent    Complications related to anesthesia: None    Post-anesthesia assessment completed. No CP/SOB. Post-op hypotension managed with Dobutamine/Neosynephrine. To IVCU for further management.     Signed By: Zac Thompson MD     February 16, 2018

## 2018-02-16 NOTE — PROGRESS NOTES
Bedside shift change report given to Meredith Roy RN (oncoming nurse) by Lissette Coleman RN (offgoing nurse). Report included the following information SBAR, Kardex, Procedure Summary, Intake/Output, MAR, Accordion, Recent Results, Med Rec Status, Cardiac Rhythm AFIB/NSR and Alarm Parameters .

## 2018-02-16 NOTE — PROGRESS NOTES
PCU SHIFT NURSING NOTE      Bedside and Verbal shift change report given to HORACE Yun (oncoming nurse) by Angélica Donovan (offgoing nurse). Report included the following information SBAR, Kardex, Procedure Summary, Intake/Output, MAR, Recent Results, Med Rec Status, Cardiac Rhythm NSR/BBB and Alarm Parameters . Shift Summary: 1909 Bedside report received at this time. Patient observed in bed talking to Visitors. Patient voiced no c/o pain and/or discomfort. Patient IV sites clean, dry, and intact. Patient assisted to bedside commode X 2 assist. Patient NPO at midnight for MARIA procedure on 2/16/18. Admission Date 2/2/2018   Admission Diagnosis Hypothermia  Sepsis ([facility]  Atrial fibrillation ([facility]   Consults IP CONSULT TO CARDIOLOGY  IP CONSULT TO HEMATOLOGY  IP CONSULT TO PALLIATIVE CARE - PROVIDER  IP CONSULT TO INFECTIOUS DISEASES  IP CONSULT TO ANESTHESIOLOGY        Consults   []PT   []OT   []Speech   []Case Management      [] Palliative      Cardiac Monitoring Order   []Yes   []No     IV drips   []Yes    Drip:                            Dose:  Drip:                            Dose:  Drip:                            Dose:   []No     GI Prophylaxis   []Yes   []No         DVT Prophylaxis   SCDs:  Sequential Compression Device: Bilateral     Patient Refused VTE Prophylaxis: Yes    Edwin stockings:         [] Medication   []Contraindicated   []None      Activity Level Activity Level: Bath Room Privileges, Up with Assistance     Activity Assistance: Partial (two people)   Purposeful Rounding every 1-2 hour? []Yes   Hutchison Score  Total Score: 4   Bed Alarm (If score 3 or >)   []Yes   [] Refused (See signed refusal form in chart)   Luis Score  Luis Score: 15   Luis Score (if score 14 or less)   []PMT consult   []Wound Care consult      []Specialty bed   [] Nutrition consult          Needs prior to discharge:   Home O2 required:    []Yes   []No    If yes, how much O2 required?     Other:    Last Bowel Movement: Last Bowel Movement Date: 02/15/18      Influenza Vaccine Received Flu Vaccine for Current Season (usually Sept-March): Unsure    Patient/Guardian Refused (Notify MD): No   Pneumonia Vaccine           Diet Active Orders   Diet    DIET NPO      LDAs               Peripheral IV 02/02/18 Anterior;Right Forearm (Active)   Site Assessment Clean, dry, & intact 2/15/2018  7:09 PM   Phlebitis Assessment 0 2/15/2018  7:09 PM   Infiltration Assessment 0 2/15/2018  7:09 PM   Dressing Status Clean, dry, & intact 2/15/2018  7:09 PM   Dressing Type Tape;Transparent 2/15/2018  7:09 PM   Hub Color/Line Status Pink;Capped 2/15/2018  7:09 PM   Action Taken Open ports on tubing capped 2/15/2018  7:09 PM   Alcohol Cap Used Yes 2/15/2018  7:09 PM                      Urinary Catheter [REMOVED] Urinary Catheter 02/02/18 Ivy - Temperature-Criteria for Appropriate Use: Strict I/Os    Intake & Output   Date 02/15/18 0700 - 02/16/18 0659 02/16/18 0700 - 02/17/18 0659   Shift 0691-9031 0812-6684 24 Hour Total 0313-6918 3099-2213 24 Hour Total   I  N  T  A  K  E   Shift Total  (mL/kg)         O  U  T  P  U  T   Urine  (mL/kg/hr)            Urine Occurrence(s) 1 x  1 x       Stool            Stool Occurrence(s) 1 x  1 x       Shift Total  (mL/kg)         NET         Weight (kg) 94.7 94.7 94.7 94.7 94.7 94.7         Readmission Risk Assessment Tool Score Low Risk            10       Total Score        2 . Living with Significant Other. Assisted Living. LTAC. SNF. or   Rehab    3 Patient Length of Stay (>5 days = 3)    5 Pt.  Coverage (Medicare=5 , Medicaid, or Self-Pay=4)        Criteria that do not apply:    Has Seen PCP in Last 6 Months (Yes=3, No=0)    IP Visits Last 12 Months (1-3=4, 4=9, >4=11)    Charlson Comorbidity Score (Age + Comorbid Conditions)       Expected Length of Stay 4d 21h   Actual Length of Stay 14

## 2018-02-16 NOTE — PROGRESS NOTES
Hospitalist Progress Note    NAME: Heather Reich   :  1928   MRN:  [id number]   LOS:   15      Assessment / Plan:  Septic Shock   Hypothermia  Bacteremia  Staph, strep and enterococcus bacteremia; possibly from cutaneous source as patient picks his psoriasis relentlessness at home (per wife's description)  Aspiration PNA  -completed course of flagyl for additional aspiration organisms  -continue vanc  -MARIA shows no valvular vegetations  -appreciate ID evaluation  -will plan for IV vanc to complete      Acute on chronic systolic CHF with EF 59%  Acute respiratory failure with hypoxia from acute pulmonary edema (on CXR)  -dobutamine PRN per cardiology  -now on PRN IV diuretics per cardiology  -continue Xarelto  -continue O2, wean as tolerated  -TSH wnl  -stopping BB indefinitely     Metabolic Encephalopathy  Delirium with hallucinations  -possible related to sepsis  -QTc high, now off haldol, was getting IV haldol in ICU without worsening QTc     Chronic atrial fibrillation with junctional bradycardia  -Appreciate cardiology input  -On Amiodarone 100 mg daily  -BB stopped indefinitely  -Continue to monitor     Hypernatremia  Hypoglycemia - Current resolved     Acute Kidney Injury with baseline CKD3  Cr stable  Continue to monitor lytes     Pancytopenia  -S/p 2 units plts on  for acute bleeding from central line   -Leukopenia resolved  -S/p Vitamin daily B12 shots x3, may need montly upon discharge    Blood in Stool PTA  -likely from Xarelto and thrombocytopenia  -Xarelto on hold     Patient with history of Left Parotid Mass s/p resection approximately 1 year ago by Dr. Mykel Mohr and XRT, 2017 PET negative     History of Prostate Cancer     Psoriasis     DTIs x2 left buttocks not POA  Woundcare following     Code status: Partial code , no Compressions or Defibrillation. She is okay with temporary pacing, ACLS meds (like atropine) and temporary intubation.  Palliative is following appreciate their visiting with patient     Prophylaxis: SCD's and H2B  Recommended Disposition: TBD    Obesity  Body mass index is 31.12 kg/(m^2). Subjective:     Chief Complaint: sepsis follow up  Awaiting MARIA, feeling well    Objective:     VITALS:   Last 24hrs VS reviewed since prior progress note. Most recent are:  Patient Vitals for the past 24 hrs:   Temp Pulse Resp BP SpO2   02/16/18 1748 - - - - 97 %   02/16/18 1743 97.5 °F (36.4 °C) 93 18 95/48 92 %   02/16/18 1730 - 89 16 90/45 93 %   02/16/18 1725 - 89 15 92/48 94 %   02/16/18 1715 97.4 °F (36.3 °C) 84 14 93/47 95 %   02/16/18 1710 96 °F (35.6 °C) 79 15 (!) 89/56 94 %   02/16/18 1705 - 78 17 (!) 88/49 94 %   02/16/18 1700 - 76 14 97/46 95 %   02/16/18 1645 (!) 94.5 °F (34.7 °C) 72 12 95/56 96 %   02/16/18 1630 (!) 94.1 °F (34.5 °C) 72 14 94/63 96 %   02/16/18 1615 - 67 14 95/58 99 %   02/16/18 1600 - 70 18 97/66 100 %   02/16/18 1555 95.6 °F (35.3 °C) 71 15 94/66 100 %   02/16/18 1550 - 70 16 93/63 100 %   02/16/18 1545 95 °F (35 °C) 65 14 (!) 81/56 95 %   02/16/18 1540 - 71 17 (!) 82/55 93 %   02/16/18 1538 (!) 94 °F (34.4 °C) 70 14 (!) 83/53 (!) 88 %   02/16/18 0945 - 79 - 93/61 95 %   02/16/18 0815 97.7 °F (36.5 °C) 70 18 92/60 97 %   02/16/18 0305 97.6 °F (36.4 °C) 74 18 94/60 98 %   02/15/18 2240 98.2 °F (36.8 °C) 81 20 99/70 96 %   02/15/18 1909 98 °F (36.7 °C) 86 18 103/59 94 %       Intake/Output Summary (Last 24 hours) at 02/16/18 1758  Last data filed at 02/16/18 1730   Gross per 24 hour   Intake          1315.22 ml   Output                0 ml   Net          1315.22 ml        PHYSICAL EXAM:  General: Alert, cooperative, no acute distress    EENT:  EOMI. Anicteric sclerae. Mucous membranes moist  Resp:  CTA bilaterally, no wheezing or rales. No accessory muscle use  CV:  Regular rhythm, no edema  GI:  Soft, non distended, non tender.  +Bowel sounds  Neurologic:  Alert and oriented X 3, normal speech, chronic R facial droop  Psych:   Good insight, not anxious nor agitated  Skin:  No rashes, no jaundice  ________________________________________________________________________  Care Plan discussed with:    Comments   Patient x    Family      RN x    Care Manager     Consultant  x                      Multidiciplinary team rounds were held today with , nursing, pharmacist and clinical coordinator. Patient's plan of care was discussed; medications were reviewed and discharge planning was addressed. ________________________________________________________________________  Total NON critical care TIME:  20   Minutes    >50% of visit spent in counseling and coordination of care       ________________________________________________________________________    Procedures: see electronic medical records for all procedures/Xrays and details which were not copied into this note but were reviewed prior to creation of Plan. LABS:  I reviewed today's most current labs and imaging studies.   Pertinent labs include:  Recent Labs      02/16/18   0430  02/15/18   0411  02/14/18   0400   WBC  3.3*  3.9*  4.5   HGB  9.8*  9.6*  9.3*   HCT  30.7*  30.1*  27.7*   PLT  121*  120*  106*     Recent Labs      02/16/18   0430  02/15/18   0411  02/14/18   0400   NA  145  145  145   K  3.9  3.6  3.7   CL  111*  110*  111*   CO2  30  29  28   GLU  85  91  86   BUN  27*  27*  26*   CREA  1.24  1.18  1.16   CA  8.3*  8.6  8.5       Signed: Judi Alejandro MD

## 2018-02-17 ENCOUNTER — APPOINTMENT (OUTPATIENT)
Dept: CT IMAGING | Age: 83
DRG: 871 | End: 2018-02-17
Attending: INTERNAL MEDICINE
Payer: MEDICARE

## 2018-02-17 LAB
ANION GAP SERPL CALC-SCNC: 9 MMOL/L (ref 5–15)
BUN SERPL-MCNC: 25 MG/DL (ref 6–20)
BUN/CREAT SERPL: 21 (ref 12–20)
CALCIUM SERPL-MCNC: 8.2 MG/DL (ref 8.5–10.1)
CHLORIDE SERPL-SCNC: 110 MMOL/L (ref 97–108)
CO2 SERPL-SCNC: 28 MMOL/L (ref 21–32)
CREAT SERPL-MCNC: 1.21 MG/DL (ref 0.7–1.3)
GLUCOSE BLD STRIP.AUTO-MCNC: 103 MG/DL (ref 65–100)
GLUCOSE BLD STRIP.AUTO-MCNC: 118 MG/DL (ref 65–100)
GLUCOSE BLD STRIP.AUTO-MCNC: 83 MG/DL (ref 65–100)
GLUCOSE BLD STRIP.AUTO-MCNC: 93 MG/DL (ref 65–100)
GLUCOSE SERPL-MCNC: 80 MG/DL (ref 65–100)
POTASSIUM SERPL-SCNC: 3.7 MMOL/L (ref 3.5–5.1)
SERVICE CMNT-IMP: ABNORMAL
SERVICE CMNT-IMP: ABNORMAL
SERVICE CMNT-IMP: NORMAL
SERVICE CMNT-IMP: NORMAL
SODIUM SERPL-SCNC: 147 MMOL/L (ref 136–145)

## 2018-02-17 PROCEDURE — 74011250637 HC RX REV CODE- 250/637: Performed by: INTERNAL MEDICINE

## 2018-02-17 PROCEDURE — 74011250636 HC RX REV CODE- 250/636: Performed by: INTERNAL MEDICINE

## 2018-02-17 PROCEDURE — 71260 CT THORAX DX C+: CPT

## 2018-02-17 PROCEDURE — 74011636320 HC RX REV CODE- 636/320: Performed by: INTERNAL MEDICINE

## 2018-02-17 PROCEDURE — 74011250636 HC RX REV CODE- 250/636: Performed by: ANESTHESIOLOGY

## 2018-02-17 PROCEDURE — 80048 BASIC METABOLIC PNL TOTAL CA: CPT | Performed by: INTERNAL MEDICINE

## 2018-02-17 PROCEDURE — 36415 COLL VENOUS BLD VENIPUNCTURE: CPT | Performed by: INTERNAL MEDICINE

## 2018-02-17 PROCEDURE — 82962 GLUCOSE BLOOD TEST: CPT

## 2018-02-17 PROCEDURE — 65660000000 HC RM CCU STEPDOWN

## 2018-02-17 RX ORDER — SODIUM CHLORIDE 0.9 % (FLUSH) 0.9 %
10 SYRINGE (ML) INJECTION
Status: COMPLETED | OUTPATIENT
Start: 2018-02-17 | End: 2018-02-17

## 2018-02-17 RX ORDER — SODIUM CHLORIDE 9 MG/ML
50 INJECTION, SOLUTION INTRAVENOUS
Status: COMPLETED | OUTPATIENT
Start: 2018-02-17 | End: 2018-02-18

## 2018-02-17 RX ADMIN — CASTOR OIL AND BALSAM, PERU: 788; 87 OINTMENT TOPICAL at 16:48

## 2018-02-17 RX ADMIN — MINERAL OIL, PETROLATUM: 425; 568 OINTMENT OPHTHALMIC at 14:13

## 2018-02-17 RX ADMIN — CASTOR OIL AND BALSAM, PERU: 788; 87 OINTMENT TOPICAL at 21:00

## 2018-02-17 RX ADMIN — Medication 5 ML: at 14:12

## 2018-02-17 RX ADMIN — FUROSEMIDE 40 MG: 10 INJECTION, SOLUTION INTRAMUSCULAR; INTRAVENOUS at 08:36

## 2018-02-17 RX ADMIN — Medication 10 ML: at 21:02

## 2018-02-17 RX ADMIN — SODIUM CHLORIDE 50 ML/HR: 900 INJECTION, SOLUTION INTRAVENOUS at 15:44

## 2018-02-17 RX ADMIN — NYSTATIN: 100000 POWDER TOPICAL at 21:01

## 2018-02-17 RX ADMIN — DOBUTAMINE HYDROCHLORIDE 7.5 MCG/KG/MIN: 200 INJECTION INTRAVENOUS at 08:37

## 2018-02-17 RX ADMIN — IOPAMIDOL 100 ML: 755 INJECTION, SOLUTION INTRAVENOUS at 15:45

## 2018-02-17 RX ADMIN — Medication 10 ML: at 04:38

## 2018-02-17 RX ADMIN — MINERAL OIL, PETROLATUM: 425; 568 OINTMENT OPHTHALMIC at 04:37

## 2018-02-17 RX ADMIN — DOBUTAMINE HYDROCHLORIDE 7.5 MCG/KG/MIN: 200 INJECTION INTRAVENOUS at 21:01

## 2018-02-17 RX ADMIN — NYSTATIN: 100000 POWDER TOPICAL at 08:49

## 2018-02-17 RX ADMIN — MINERAL OIL, PETROLATUM: 425; 568 OINTMENT OPHTHALMIC at 21:01

## 2018-02-17 RX ADMIN — NYSTATIN: 100000 POWDER TOPICAL at 16:48

## 2018-02-17 RX ADMIN — AMIODARONE HYDROCHLORIDE 100 MG: 200 TABLET ORAL at 08:36

## 2018-02-17 RX ADMIN — CASTOR OIL AND BALSAM, PERU: 788; 87 OINTMENT TOPICAL at 08:49

## 2018-02-17 RX ADMIN — VANCOMYCIN HYDROCHLORIDE 1750 MG: 10 INJECTION, POWDER, LYOPHILIZED, FOR SOLUTION INTRAVENOUS at 04:36

## 2018-02-17 RX ADMIN — DOBUTAMINE HYDROCHLORIDE 7.5 MCG/KG/MIN: 200 INJECTION INTRAVENOUS at 00:51

## 2018-02-17 RX ADMIN — Medication 10 ML: at 15:44

## 2018-02-17 NOTE — PROGRESS NOTES
Hospitalist Progress Note    NAME: Roberto Salas   :  1928   MRN:  [id number]   LOS:   13      Assessment / Plan:  Septic Shock   Hypothermia  Bacteremia  Staph, strep and enterococcus bacteremia  Aspiration PNA  -completed course of flagyl for additional aspiration organisms  -continue vanc  -MARIA shows no valvular vegetations, however is showing extracardiac lesion  -will order further imaging with CT chest with contrast to better characterize this lesion  -appreciate ID evaluation  -will plan for IV vanc to complete , however may need to extend for 2 more weeks per ID if lesion is considered infected     Acute on chronic systolic CHF with EF 03%  Acute respiratory failure with hypoxia from acute pulmonary edema (on CXR)  -dobutamine PRN per cardiology  -now on PRN IV diuretics per cardiology  -continue Xarelto  -continue O2, wean as tolerated  -TSH wnl  -stopping BB indefinitely     Metabolic Encephalopathy  Delirium with hallucinations  -possible related to sepsis  -QTc high, now off haldol, was getting IV haldol in ICU without worsening QTc     Chronic atrial fibrillation with junctional bradycardia  -Appreciate cardiology input  -On Amiodarone 100 mg daily  -BB stopped indefinitely  -Continue to monitor     Hypernatremia  Hypoglycemia - Current resolved     Acute Kidney Injury with baseline CKD3  Cr stable  Continue to monitor lytes     Pancytopenia  -S/p 2 units plts on  for acute bleeding from central line   -Leukopenia resolved  -S/p Vitamin daily B12 shots x3, may need montly upon discharge    Blood in Stool PTA  -likely from Xarelto and thrombocytopenia  -Xarelto on hold     Patient with history of Left Parotid Mass s/p resection approximately 1 year ago by Dr. Giuliano Pastor and XRT, 2017 PET negative     History of Prostate Cancer     Psoriasis     DTIs x2 left buttocks not POA  Woundcare following     Code status: Partial code , no Compressions or Defibrillation.  She is okay with temporary pacing, ACLS meds (like atropine) and temporary intubation. Palliative is following appreciate their visiting with patient     Prophylaxis: SCD's and H2B  Recommended Disposition: TBD    Obesity  Body mass index is 31.12 kg/(m^2). Subjective:     Chief Complaint: sepsis follow up  Completed MARIA, currently on dobutamine    Objective:     VITALS:   Last 24hrs VS reviewed since prior progress note. Most recent are:  Patient Vitals for the past 24 hrs:   Temp Pulse Resp BP SpO2   02/17/18 1000 - 84 - 93/48 -   02/17/18 0800 98 °F (36.7 °C) 86 18 91/58 91 %   02/17/18 0300 98.1 °F (36.7 °C) 96 20 (!) 89/55 97 %   02/17/18 0051 - 84 - (!) 86/51 -   02/16/18 2300 98 °F (36.7 °C) 89 18 93/49 99 %   02/16/18 1931 97.8 °F (36.6 °C) 83 20 97/53 98 %   02/16/18 1800 - 89 - 99/50 97 %   02/16/18 1748 - - - - 97 %   02/16/18 1743 97.5 °F (36.4 °C) 93 18 95/48 92 %   02/16/18 1730 - 89 16 90/45 93 %   02/16/18 1725 - 89 15 92/48 94 %   02/16/18 1715 97.4 °F (36.3 °C) 84 14 93/47 95 %   02/16/18 1710 96 °F (35.6 °C) 79 15 (!) 89/56 94 %   02/16/18 1705 - 78 17 (!) 88/49 94 %   02/16/18 1700 - 76 14 97/46 95 %   02/16/18 1645 (!) 94.5 °F (34.7 °C) 72 12 95/56 96 %   02/16/18 1630 (!) 94.1 °F (34.5 °C) 72 14 94/63 96 %   02/16/18 1615 - 67 14 95/58 99 %   02/16/18 1600 - 70 18 97/66 100 %   02/16/18 1555 95.6 °F (35.3 °C) 71 15 94/66 100 %   02/16/18 1550 - 70 16 93/63 100 %   02/16/18 1545 95 °F (35 °C) 65 14 (!) 81/56 95 %   02/16/18 1540 - 71 17 (!) 82/55 93 %   02/16/18 1538 (!) 94 °F (34.4 °C) 70 14 (!) 83/53 (!) 88 %       Intake/Output Summary (Last 24 hours) at 02/17/18 1139  Last data filed at 02/16/18 1816   Gross per 24 hour   Intake          1375.22 ml   Output                0 ml   Net          1375.22 ml        PHYSICAL EXAM:  General: Alert, cooperative, no acute distress    EENT:  EOMI. Anicteric sclerae. Mucous membranes moist  Resp:  CTA bilaterally, no wheezing or rales.  No accessory muscle use  CV:  Regular rhythm, no edema  GI:  Soft, non distended, non tender. +Bowel sounds  Neurologic:  Alert and oriented X 3, normal speech, chronic R facial droop  Psych:   Good insight, not anxious nor agitated  Skin:  No rashes, no jaundice  ________________________________________________________________________  Care Plan discussed with:    Comments   Patient x    Family      RN     Care Manager     Consultant  x                      Multidiciplinary team rounds were held today with , nursing, pharmacist and clinical coordinator. Patient's plan of care was discussed; medications were reviewed and discharge planning was addressed. ________________________________________________________________________  Total NON critical care TIME:  20   Minutes    >50% of visit spent in counseling and coordination of care       ________________________________________________________________________    Procedures: see electronic medical records for all procedures/Xrays and details which were not copied into this note but were reviewed prior to creation of Plan. LABS:  I reviewed today's most current labs and imaging studies.   Pertinent labs include:  Recent Labs      02/16/18   0430  02/15/18   0411   WBC  3.3*  3.9*   HGB  9.8*  9.6*   HCT  30.7*  30.1*   PLT  121*  120*     Recent Labs      02/17/18   0428  02/16/18   0430  02/15/18   0411   NA  147*  145  145   K  3.7  3.9  3.6   CL  110*  111*  110*   CO2  28  30  29   GLU  80  85  91   BUN  25*  27*  27*   CREA  1.21  1.24  1.18   CA  8.2*  8.3*  8.6       Signed: Yu Sherman MD

## 2018-02-17 NOTE — PROGRESS NOTES
PCU SHIFT NURSING NOTE      Bedside shift change report given to Vamsi (oncoming nurse) by Aranza Ponce (offgoing nurse). Report included the following information SBAR, Kardex and MAR. Shift Summary:   8 AM  Dobutamine infusing at 7.5 to maintain SBP > 90. Pt on RA while awake and 2L during sleep.    8:24 AM  Contacted pharmacy for new bag of dobutamine    2 PM  Pt ambulated in hallway x5 minutes with walker and standby assistance. 3:00 PM  SBP holding at 90, Dobutamine remains at same rate. Plan for chest CT soon. 6:38 PM  Attempted to wean down dobutamine -- BP quickly dropped at 5mcg and 6mcg/min - will try at 6.5mcg and slowly titrate by 0.5mcg. See MAR. Admission Date 2/2/2018   Admission Diagnosis Hypothermia  Sepsis ([facility] [address]  Atrial fibrillation ([facility] [address]  MARIA   Consults IP CONSULT TO CARDIOLOGY  IP CONSULT TO HEMATOLOGY  IP CONSULT TO PALLIATIVE CARE - PROVIDER  IP CONSULT TO INFECTIOUS DISEASES  IP CONSULT TO ANESTHESIOLOGY        Consults   []PT   []OT   []Speech   []Case Management      [] Palliative      Cardiac Monitoring Order   []Yes   []No     IV drips   []Yes    Drip:                            Dose:  Drip:                            Dose:  Drip:                            Dose:   []No     GI Prophylaxis   []Yes   []No         DVT Prophylaxis   SCDs:  Sequential Compression Device: Bilateral     Patient Refused VTE Prophylaxis: Yes    Edwin stockings:         [] Medication   []Contraindicated   []None      Activity Level Activity Level: Up with Assistance     Activity Assistance: Partial (two people)   Purposeful Rounding every 1-2 hour?    [x]Yes   Hutchison Score  Total Score: 4   Bed Alarm (If score 3 or >)   []Yes   [] Refused (See signed refusal form in chart)   Luis Score  Luis Score: 16   Luis Score (if score 14 or less)   []PMT consult   []Wound Care consult      []Specialty bed   [] Nutrition consult          Needs prior to discharge:   Home O2 required:    []Yes   []No    If yes, how much O2 required? Other:    Last Bowel Movement: Last Bowel Movement Date: 02/17/18      Influenza Vaccine Received Flu Vaccine for Current Season (usually Sept-March): Unsure    Patient/Guardian Refused (Notify MD): No   Pneumonia Vaccine           Diet Active Orders   Diet    DIET MECHANICAL SOFT 2 GM NA (House Low NA)      LDAs               Peripheral IV 02/16/18 Right Antecubital (Active)   Site Assessment Clean, dry, & intact 2/17/2018  2:50 PM   Phlebitis Assessment 0 2/17/2018  2:50 PM   Infiltration Assessment 0 2/17/2018  2:50 PM   Dressing Status Clean, dry, & intact 2/17/2018  2:50 PM   Dressing Type Transparent;Tape 2/17/2018  2:50 PM   Hub Color/Line Status Pink; Infusing 2/17/2018  2:50 PM                      Urinary Catheter [REMOVED] Urinary Catheter 02/02/18 Ivy - Temperature-Criteria for Appropriate Use: Strict I/Os    Intake & Output   Date 02/16/18 1900 - 02/17/18 0659 02/17/18 0700 - 02/18/18 0659   Shift 3642-4304 24 Hour Total 1798-7269 5125-5142 24 Hour Total   I  N  T  A  K  E   P.O.  140         P. O.  140       I.V.  (mL/kg/hr)  1275.2         DOBUTamine Volume  575.2         Volume (0.9% sodium chloride infusion)  700       Shift Total  (mL/kg)  1415.2  (14.8)      O  U  T  P  U  T   Urine  (mL/kg/hr)           Urine Occurrence(s) 1 x 2 x       Stool           Stool Occurrence(s) 1 x 2 x       Shift Total  (mL/kg)        NET  1415.2      Weight (kg) 95.6 95.6 95.6 95.6 95.6         Readmission Risk Assessment Tool Score Low Risk            10       Total Score        2 . Living with Significant Other. Assisted Living. LTAC. SNF. or   Rehab    3 Patient Length of Stay (>5 days = 3)    5 Pt.  Coverage (Medicare=5 , Medicaid, or Self-Pay=4)        Criteria that do not apply:    Has Seen PCP in Last 6 Months (Yes=3, No=0)    IP Visits Last 12 Months (1-3=4, 4=9, >4=11)    Charlson Comorbidity Score (Age + Comorbid Conditions)       Expected Length of Stay 4d 21h   Actual Length of Stay 15

## 2018-02-17 NOTE — PROGRESS NOTES
ID follow up note:       MARIA results noted: no valvular vegetations but has a \"Mobile ~2 cm linear and heterogeneous mass, may be thrombus but unclear. It is extracardiac, apparently in the pericardial space but cannot exclude pleural attachment\". Concern for this to be a nidus and or also get infected. ? If any imaging or modality to establish this lesion better? Given that he had 3 virulent organisms in blood and now a possible thrombus and or a mass that could get infected which is a new finding on MARIA today, may need up to 4 weeks antibiotics if he tolerates. Will discuss with consultants and team to decide final duration of treatment     ID will follow peripherally over the weekend. Dr Tristan Moore is on call this weekend and can be reached by calling [facility]  over weekend if needed.     Marco Tilley,   7:50 PM

## 2018-02-17 NOTE — PROGRESS NOTES
PCU SHIFT NURSING NOTE      Bedside and Verbal shift change report given to [facility] (oncoming nurse) by Kevin Mesa (offgoing nurse). Report included the following information SBAR, Kardex, Procedure Summary, Intake/Output, MAR, Recent Results, Med Rec Status, Cardiac Rhythm NSR/BBB and Alarm Parameters . Shift Summary: Bedside report received. Patient observed in bed watching tv. Patient denies pain at this time. Patient continues on Dobutamine drip throughout shift. Patient tolerated meds without difficulty. Patient up  To bedside commode with assistance. Patient slept throughout shift. Admission Date 2/2/2018   Admission Diagnosis Hypothermia  Sepsis ([facility] [address]  Atrial fibrillation ([facility] [address]  MARIA   Consults IP CONSULT TO CARDIOLOGY  IP CONSULT TO HEMATOLOGY  IP CONSULT TO PALLIATIVE CARE - PROVIDER  IP CONSULT TO INFECTIOUS DISEASES  IP CONSULT TO ANESTHESIOLOGY        Consults   []PT   []OT   []Speech   []Case Management      [] Palliative      Cardiac Monitoring Order   []Yes   []No     IV drips   []Yes    Drip:                            Dose:  Drip:                            Dose:  Drip:                            Dose:   []No     GI Prophylaxis   []Yes   []No         DVT Prophylaxis   SCDs:  Sequential Compression Device: Bilateral     Patient Refused VTE Prophylaxis: Yes    Edwin stockings:         [] Medication   []Contraindicated   []None      Activity Level Activity Level: Up with Assistance     Activity Assistance: Partial (two people)   Purposeful Rounding every 1-2 hour? []Yes   Hutchison Score  Total Score: 4   Bed Alarm (If score 3 or >)   []Yes   [] Refused (See signed refusal form in chart)   Luis Score  Luis Score: 15   Luis Score (if score 14 or less)   []PMT consult   []Wound Care consult      []Specialty bed   [] Nutrition consult          Needs prior to discharge:   Home O2 required:    []Yes   []No    If yes, how much O2 required?     Other:    Last Bowel Movement: Last Bowel Movement Date: 02/16/18      Influenza Vaccine Received Flu Vaccine for Current Season (usually Sept-March): Unsure    Patient/Guardian Refused (Notify MD): No   Pneumonia Vaccine           Diet Active Orders   Diet    DIET MECHANICAL SOFT 2 GM NA (House Low NA)      LDAs               Peripheral IV 02/16/18 Right Antecubital (Active)   Site Assessment Clean, dry, & intact 2/16/2018  6:00 PM   Phlebitis Assessment 0 2/16/2018  6:00 PM   Infiltration Assessment 0 2/16/2018  6:00 PM   Dressing Status Clean, dry, & intact 2/16/2018  6:00 PM   Dressing Type Transparent;Tape 2/16/2018  6:00 PM   Hub Color/Line Status Pink; Infusing 2/16/2018  6:00 PM                      Urinary Catheter [REMOVED] Urinary Catheter 02/02/18 Ivy - Temperature-Criteria for Appropriate Use: Strict I/Os    Intake & Output   Date 02/16/18 0700 - 02/17/18 0659 02/17/18 0700 - 02/18/18 0659   Shift 5767-0049 1842-4325 24 Hour Total 5595-8874 3788-1004 24 Hour Total   I  N  T  A  K  E   P.O. 140  140         P. O. 140  140       I.V.  (mL/kg/hr) 1275.2  (1.1)  1275.2  (0.6)         DOBUTamine Volume 575.2  575.2         Volume (0.9% sodium chloride infusion) 700  700       Shift Total  (mL/kg) 1415.2  (14.8)  1415.2  (14.8)      O  U  T  P  U  T   Urine  (mL/kg/hr)            Urine Occurrence(s) 1 x 1 x 2 x       Stool            Stool Occurrence(s) 1 x 1 x 2 x       Shift Total  (mL/kg)         NET 1415.2  1415.2      Weight (kg) 95.6 95.6 95.6 95.6 95.6 95.6         Readmission Risk Assessment Tool Score Low Risk            10       Total Score        2 . Living with Significant Other. Assisted Living. LTAC. SNF. or   Rehab    3 Patient Length of Stay (>5 days = 3)    5 Pt.  Coverage (Medicare=5 , Medicaid, or Self-Pay=4)        Criteria that do not apply:    Has Seen PCP in Last 6 Months (Yes=3, No=0)    IP Visits Last 12 Months (1-3=4, 4=9, >4=11)    Charlson Comorbidity Score (Age + Comorbid Conditions)       Expected Length of Stay 4d 21h   Actual Length of Stay 15

## 2018-02-18 LAB
ANION GAP SERPL CALC-SCNC: 5 MMOL/L (ref 5–15)
BUN SERPL-MCNC: 22 MG/DL (ref 6–20)
BUN/CREAT SERPL: 16 (ref 12–20)
CALCIUM SERPL-MCNC: 7.9 MG/DL (ref 8.5–10.1)
CHLORIDE SERPL-SCNC: 108 MMOL/L (ref 97–108)
CO2 SERPL-SCNC: 29 MMOL/L (ref 21–32)
CREAT SERPL-MCNC: 1.36 MG/DL (ref 0.7–1.3)
GLUCOSE BLD STRIP.AUTO-MCNC: 102 MG/DL (ref 65–100)
GLUCOSE BLD STRIP.AUTO-MCNC: 86 MG/DL (ref 65–100)
GLUCOSE BLD STRIP.AUTO-MCNC: 98 MG/DL (ref 65–100)
GLUCOSE BLD STRIP.AUTO-MCNC: 99 MG/DL (ref 65–100)
GLUCOSE SERPL-MCNC: 81 MG/DL (ref 65–100)
POTASSIUM SERPL-SCNC: 3.4 MMOL/L (ref 3.5–5.1)
SERVICE CMNT-IMP: ABNORMAL
SERVICE CMNT-IMP: NORMAL
SODIUM SERPL-SCNC: 142 MMOL/L (ref 136–145)

## 2018-02-18 PROCEDURE — 74011250637 HC RX REV CODE- 250/637: Performed by: INTERNAL MEDICINE

## 2018-02-18 PROCEDURE — 65660000000 HC RM CCU STEPDOWN

## 2018-02-18 PROCEDURE — 74011250636 HC RX REV CODE- 250/636: Performed by: INTERNAL MEDICINE

## 2018-02-18 PROCEDURE — 82962 GLUCOSE BLOOD TEST: CPT

## 2018-02-18 PROCEDURE — 80048 BASIC METABOLIC PNL TOTAL CA: CPT | Performed by: INTERNAL MEDICINE

## 2018-02-18 PROCEDURE — 36415 COLL VENOUS BLD VENIPUNCTURE: CPT | Performed by: INTERNAL MEDICINE

## 2018-02-18 PROCEDURE — 74011250636 HC RX REV CODE- 250/636: Performed by: ANESTHESIOLOGY

## 2018-02-18 RX ADMIN — CASTOR OIL AND BALSAM, PERU: 788; 87 OINTMENT TOPICAL at 23:03

## 2018-02-18 RX ADMIN — FUROSEMIDE 40 MG: 10 INJECTION, SOLUTION INTRAMUSCULAR; INTRAVENOUS at 08:59

## 2018-02-18 RX ADMIN — RIVAROXABAN 20 MG: 20 TABLET, FILM COATED ORAL at 09:00

## 2018-02-18 RX ADMIN — DOBUTAMINE HYDROCHLORIDE 6.49 MCG/KG/MIN: 200 INJECTION INTRAVENOUS at 23:23

## 2018-02-18 RX ADMIN — CASTOR OIL AND BALSAM, PERU: 788; 87 OINTMENT TOPICAL at 19:24

## 2018-02-18 RX ADMIN — AMIODARONE HYDROCHLORIDE 100 MG: 200 TABLET ORAL at 08:59

## 2018-02-18 RX ADMIN — VANCOMYCIN HYDROCHLORIDE 1750 MG: 10 INJECTION, POWDER, LYOPHILIZED, FOR SOLUTION INTRAVENOUS at 16:47

## 2018-02-18 RX ADMIN — Medication 5 ML: at 14:00

## 2018-02-18 RX ADMIN — MINERAL OIL, PETROLATUM: 425; 568 OINTMENT OPHTHALMIC at 23:03

## 2018-02-18 RX ADMIN — NYSTATIN: 100000 POWDER TOPICAL at 19:24

## 2018-02-18 RX ADMIN — Medication 5 ML: at 22:46

## 2018-02-18 RX ADMIN — NYSTATIN: 100000 POWDER TOPICAL at 09:02

## 2018-02-18 RX ADMIN — DOBUTAMINE HYDROCHLORIDE 8.5 MCG/KG/MIN: 200 INJECTION INTRAVENOUS at 08:56

## 2018-02-18 RX ADMIN — NYSTATIN: 100000 POWDER TOPICAL at 23:04

## 2018-02-18 RX ADMIN — Medication 5 ML: at 06:00

## 2018-02-18 RX ADMIN — MINERAL OIL, PETROLATUM: 425; 568 OINTMENT OPHTHALMIC at 14:00

## 2018-02-18 RX ADMIN — CASTOR OIL AND BALSAM, PERU: 788; 87 OINTMENT TOPICAL at 09:00

## 2018-02-18 NOTE — PROGRESS NOTES
Hospitalist Progress Note    NAME: Christine Mckay   :  1928   MRN:  [id number]   LOS:   12      Assessment / Plan:  Septic Shock   Hypothermia  Bacteremia  Staph, strep and enterococcus bacteremia  Aspiration PNA  -completed course of flagyl for additional aspiration organisms  -continue vanc  -MARIA shows no valvular vegetations, however is showing extracardiac lesion  -will order further imaging with CT chest with contrast did not show any lesion  -tentative plan for IV vanc to complete , however may need to extend for 2 more weeks per ID if lesion is considered infected  -hopeful for [facility] on discharge, CM to follow up    Acute on chronic systolic CHF with EF 23%  Acute respiratory failure with hypoxia from acute pulmonary edema (on CXR)  -dobutamine PRN per cardiology  -now on PRN IV diuretics per cardiology  -continue Xarelto  -continue O2, wean as tolerated  -TSH wnl  -stopping BB indefinitely     Metabolic Encephalopathy  Delirium with hallucinations  -possible related to sepsis  -QTc high, now off haldol, was getting IV haldol in ICU without worsening QTc     Chronic atrial fibrillation with junctional bradycardia  -Appreciate cardiology input  -On Amiodarone 100 mg daily  -BB stopped indefinitely  -Continue to monitor     Hypernatremia  Hypoglycemia - Current resolved     Acute Kidney Injury with baseline CKD3  Cr stable  Continue to monitor lytes     Pancytopenia  -S/p 2 units plts on  for acute bleeding from central line   -Leukopenia resolved  -S/p Vitamin daily B12 shots x3, may need montly upon discharge    Blood in Stool PTA  -likely from Xarelto and thrombocytopenia  -Xarelto on hold     Patient with history of Left Parotid Mass s/p resection approximately 1 year ago by Dr. Rey Devi and XRT, 2017 PET negative     History of Prostate Cancer     Psoriasis     DTIs x2 left buttocks not POA  Woundcare following     Code status: Partial code , no Compressions or Defibrillation.  She is okay with temporary pacing, ACLS meds (like atropine) and temporary intubation. Palliative is following appreciate their visiting with patient     Prophylaxis: SCD's and H2B  Recommended Disposition: TBD    Obesity  Body mass index is 31.12 kg/(m^2). Subjective:     Chief Complaint: sepsis follow up  CT chest done, patient feeling well, asking about discharge plans    Objective:     VITALS:   Last 24hrs VS reviewed since prior progress note.  Most recent are:  Patient Vitals for the past 24 hrs:   Temp Pulse Resp BP SpO2   02/18/18 1500 - 91 - (!) 87/45 100 %   02/18/18 1430 - 94 - 100/64 99 %   02/18/18 1400 - 84 - 95/55 96 %   02/18/18 1340 - 82 - 100/61 98 %   02/18/18 1335 - 85 - 95/83 99 %   02/18/18 1330 - 86 - 99/70 99 %   02/18/18 1325 - 81 - 96/63 100 %   02/18/18 1320 - 82 - 94/55 98 %   02/18/18 1317 - 79 - (!) 88/62 98 %   02/18/18 1316 - 86 - (!) 84/55 98 %   02/18/18 1315 - 84 - 100/56 98 %   02/18/18 1310 - 73 - 97/55 99 %   02/18/18 1305 - 85 - 95/65 98 %   02/18/18 1300 - 87 - 98/65 96 %   02/18/18 1255 - 86 - 105/69 99 %   02/18/18 1250 - 99 - 97/56 97 %   02/18/18 1245 - 97 - (!) 59/38 93 %   02/18/18 1240 - 78 - 100/54 100 %   02/18/18 1235 - 88 - 100/49 99 %   02/18/18 1230 - 88 - 103/62 100 %   02/18/18 1225 - 91 - 104/52 100 %   02/18/18 1220 - 83 - 107/55 100 %   02/18/18 1215 - 95 - 108/69 -   02/18/18 1210 - 83 - 108/65 100 %   02/18/18 1205 - 92 - 107/62 100 %   02/18/18 1200 - 91 - 106/68 99 %   02/18/18 1147 - (!) 104 - 113/72 -   02/18/18 1133 97.3 °F (36.3 °C) - 18 113/72 100 %   02/18/18 1100 - 86 - 99/55 -   02/18/18 1000 - 82 - 95/44 -   02/18/18 0938 - 88 - 93/47 -   02/18/18 0900 - 93 - 91/52 -   02/18/18 0854 - 96 - (!) 78/48 -   02/18/18 0500 - 83 - 102/52 100 %   02/18/18 0430 - 92 - 96/54 98 %   02/18/18 0400 - 87 - 96/50 99 %   02/18/18 0330 - 83 - 92/51 96 %   02/18/18 0300 - 83 - 98/47 97 %   02/18/18 0230 - 97 - 110/64 100 %   02/18/18 0200 - 92 - 96/51 (!) 77 %   02/18/18 0030 - 88 - 97/50 93 %   02/17/18 2256 98.9 °F (37.2 °C) 80 18 (!) 88/45 96 %   02/17/18 2230 - 69 - (!) 86/51 92 %   02/17/18 2103 - 88 - 93/57 -   02/17/18 2030 - 76 - (!) 87/52 97 %   02/17/18 2015 - 88 - (!) 85/50 97 %   02/17/18 2004 - 88 - 98/59 -   02/17/18 1915 - 83 - 91/50 -   02/17/18 1900 - 91 - (!) 89/53 -   02/17/18 1844 - - - 95/40 -   02/17/18 1835 - 92 - (!) 83/38 -   02/17/18 1819 - 78 - (!) 72/53 -   02/17/18 1800 - 87 - 98/60 -   02/17/18 1700 - 87 - 102/48 -       Intake/Output Summary (Last 24 hours) at 02/18/18 1529  Last data filed at 02/18/18 1324   Gross per 24 hour   Intake          1743.28 ml   Output              250 ml   Net          1493.28 ml        PHYSICAL EXAM:  General: Alert, cooperative, no acute distress    EENT:  EOMI. Anicteric sclerae. Mucous membranes moist  Resp:  CTA bilaterally, no wheezing or rales. No accessory muscle use  CV:  Regular rhythm, no edema  GI:  Soft, non distended, non tender. +Bowel sounds  Neurologic:  Alert and oriented X 3, normal speech, chronic R facial droop  Psych:   Good insight, not anxious nor agitated  Skin:  No rashes, no jaundice  ________________________________________________________________________  Care Plan discussed with:    Comments   Patient x    Family  x    RN     Care Manager x    Consultant                        Multidiciplinary team rounds were held today with , nursing, pharmacist and clinical coordinator. Patient's plan of care was discussed; medications were reviewed and discharge planning was addressed.      ________________________________________________________________________  Total NON critical care TIME:  20   Minutes    >50% of visit spent in counseling and coordination of care       ________________________________________________________________________    Procedures: see electronic medical records for all procedures/Xrays and details which were not copied into this note but were reviewed prior to creation of Plan. LABS:  I reviewed today's most current labs and imaging studies.   Pertinent labs include:  Recent Labs      02/16/18   0430   WBC  3.3*   HGB  9.8*   HCT  30.7*   PLT  121*     Recent Labs      02/18/18   0357  02/17/18   0428  02/16/18   0430   NA  142  147*  145   K  3.4*  3.7  3.9   CL  108  110*  111*   CO2  29  28  30   GLU  81  80  85   BUN  22*  25*  27*   CREA  1.36*  1.21  1.24   CA  7.9*  8.2*  8.3*       Signed: Yu Sherman MD

## 2018-02-18 NOTE — PROGRESS NOTES
0700: Report received from Swapnil, [facility], [facility], [facility], RECENT RESULTS were discussed. Cristo Jensen RN    [phone]: Spoke with Gurmeet Hair in [facility] in regards to [email or web address]. She will check and call me back.

## 2018-02-18 NOTE — PROGRESS NOTES
..    PCU SHIFT NURSING NOTE      Bedside shift change report given to  [employer] (oncoming nurse) by  Efrain Eaton RN (offgoing nurse). Report included the following information SBAR. Shift Summary:        Admission Date 2/2/2018   Admission Diagnosis Hypothermia  Sepsis ([facility] [place] [address]  Atrial fibrillation ([facility] [place] [address]  MARIA   Consults IP CONSULT TO CARDIOLOGY  IP CONSULT TO HEMATOLOGY  IP CONSULT TO PALLIATIVE CARE - PROVIDER  IP CONSULT TO INFECTIOUS DISEASES  IP CONSULT TO ANESTHESIOLOGY        Consults   [x]PT   []OT   []Speech   [x]Case Management      [] Palliative      Cardiac Monitoring Order   [x]Yes   []No     IV drips   []Yes    Drip:                            Dose:  Drip:                            Dose:  Drip:                            Dose:   []No     GI Prophylaxis   [x]Yes   []No         DVT Prophylaxis   SCDs:  Sequential Compression Device: Bilateral     Patient Refused VTE Prophylaxis: Yes    Edwin stockings:         [x] Medication   []Contraindicated   []None      Activity Level Activity Level: Up with Assistance     Activity Assistance: Partial (one person)   Purposeful Rounding every 1-2 hour? [x]Yes   Hutchison Score  Total Score: 4   Bed Alarm (If score 3 or >)   [x]Yes   [] Refused (See signed refusal form in chart)   Luis Score  Luis Score: 16   Luis Score (if score 14 or less)   []PMT consult   []Wound Care consult      []Specialty bed   [] Nutrition consult          Needs prior to discharge:   Home O2 required:    []Yes   [x]No    If yes, how much O2 required? Other:    Last Bowel Movement: Last Bowel Movement Date: 02/17/18      Influenza Vaccine Received Flu Vaccine for Current Season (usually Sept-March):  Unsure    Patient/Guardian Refused (Notify MD): No   Pneumonia Vaccine           Diet Active Orders   Diet    DIET MECHANICAL SOFT 2 GM NA (House Low NA)      LDAs               Peripheral IV 02/16/18 Right Antecubital (Active)   Site Assessment Clean, dry, & intact 2/18/2018  3:23 AM Phlebitis Assessment 0 2/18/2018  3:23 AM   Infiltration Assessment 0 2/18/2018  3:23 AM   Dressing Status Clean, dry, & intact 2/18/2018  3:23 AM   Dressing Type Transparent 2/18/2018  3:23 AM   Hub Color/Line Status Pink 2/18/2018  3:23 AM                      Urinary Catheter [REMOVED] Urinary Catheter 02/02/18 [facility] - Temperature-Criteria for Appropriate Use: Strict I/Os    Intake & Output   Date 02/17/18 0700 - 02/18/18 0659 02/18/18 0700 - 02/19/18 0659   Shift 4300-34401859 1900-0659 24 Hour Total 0412-6683 8774-9532 24 Hour Total   I  N  T  A  K  E   I.V.  (mL/kg/hr)  512.5 512.5         DOBUTamine Volume  512.5 512.5       Shift Total  (mL/kg)  512.5  (5.4) 512.5  (5.4)      O  U  T  P  U  T   Urine  (mL/kg/hr)  250 250         Urine Voided  250 250         Urine Occurrence(s)  1 x 1 x       Stool            Stool Occurrence(s)  1 x 1 x       Shift Total  (mL/kg)  250  (2.6) 250  (2.6)      NET  262.5 262.5      Weight (kg) 95.6 95.6 95.6 95.6 95.6 95.6         Readmission Risk Assessment Tool Score Low Risk            10       Total Score        2 . Living with Significant Other. Assisted Living. LTAC. SNF. or   Rehab    3 Patient Length of Stay (>5 days = 3)    5 Pt.  Coverage (Medicare=5 , Medicaid, or Self-Pay=4)        Criteria that do not apply:    Has Seen PCP in Last 6 Months (Yes=3, No=0)    IP Visits Last 12 Months (1-3=4, 4=9, >4=11)    Charlson Comorbidity Score (Age + Comorbid Conditions)       Expected Length of Stay 4d 21h   Actual Length of Stay 16

## 2018-02-18 NOTE — PROGRESS NOTES
9 AM  Unable to titrate down dobutamine overnight - pt requiring higher dose at 8.5mcg to maintain SBP >90    12 PM  Dobutamine weaned down slowly, pt tolerating. See MAR.

## 2018-02-19 LAB
ANION GAP SERPL CALC-SCNC: 4 MMOL/L (ref 5–15)
BUN SERPL-MCNC: 19 MG/DL (ref 6–20)
BUN/CREAT SERPL: 13 (ref 12–20)
CALCIUM SERPL-MCNC: 8 MG/DL (ref 8.5–10.1)
CHLORIDE SERPL-SCNC: 106 MMOL/L (ref 97–108)
CO2 SERPL-SCNC: 32 MMOL/L (ref 21–32)
CREAT SERPL-MCNC: 1.42 MG/DL (ref 0.7–1.3)
GLUCOSE BLD STRIP.AUTO-MCNC: 100 MG/DL (ref 65–100)
GLUCOSE BLD STRIP.AUTO-MCNC: 103 MG/DL (ref 65–100)
GLUCOSE BLD STRIP.AUTO-MCNC: 182 MG/DL (ref 65–100)
GLUCOSE BLD STRIP.AUTO-MCNC: 93 MG/DL (ref 65–100)
GLUCOSE SERPL-MCNC: 84 MG/DL (ref 65–100)
POTASSIUM SERPL-SCNC: 3.6 MMOL/L (ref 3.5–5.1)
SERVICE CMNT-IMP: ABNORMAL
SERVICE CMNT-IMP: ABNORMAL
SERVICE CMNT-IMP: NORMAL
SERVICE CMNT-IMP: NORMAL
SODIUM SERPL-SCNC: 142 MMOL/L (ref 136–145)

## 2018-02-19 PROCEDURE — 80048 BASIC METABOLIC PNL TOTAL CA: CPT | Performed by: INTERNAL MEDICINE

## 2018-02-19 PROCEDURE — 65660000000 HC RM CCU STEPDOWN

## 2018-02-19 PROCEDURE — 82962 GLUCOSE BLOOD TEST: CPT

## 2018-02-19 PROCEDURE — 74011250637 HC RX REV CODE- 250/637: Performed by: INTERNAL MEDICINE

## 2018-02-19 PROCEDURE — 74011250636 HC RX REV CODE- 250/636: Performed by: INTERNAL MEDICINE

## 2018-02-19 PROCEDURE — 36415 COLL VENOUS BLD VENIPUNCTURE: CPT | Performed by: INTERNAL MEDICINE

## 2018-02-19 PROCEDURE — 97110 THERAPEUTIC EXERCISES: CPT | Performed by: PHYSICAL THERAPIST

## 2018-02-19 PROCEDURE — 74011250636 HC RX REV CODE- 250/636: Performed by: ANESTHESIOLOGY

## 2018-02-19 PROCEDURE — 97116 GAIT TRAINING THERAPY: CPT | Performed by: PHYSICAL THERAPIST

## 2018-02-19 PROCEDURE — 92526 ORAL FUNCTION THERAPY: CPT

## 2018-02-19 RX ORDER — FUROSEMIDE 10 MG/ML
20 INJECTION INTRAMUSCULAR; INTRAVENOUS ONCE
Status: COMPLETED | OUTPATIENT
Start: 2018-02-19 | End: 2018-02-19

## 2018-02-19 RX ORDER — MIDODRINE HYDROCHLORIDE 5 MG/1
5 TABLET ORAL 2 TIMES DAILY WITH MEALS
Status: DISCONTINUED | OUTPATIENT
Start: 2018-02-20 | End: 2018-02-21

## 2018-02-19 RX ADMIN — NYSTATIN: 100000 POWDER TOPICAL at 09:12

## 2018-02-19 RX ADMIN — CASTOR OIL AND BALSAM, PERU: 788; 87 OINTMENT TOPICAL at 16:02

## 2018-02-19 RX ADMIN — DOBUTAMINE HYDROCHLORIDE 6.49 MCG/KG/MIN: 200 INJECTION INTRAVENOUS at 15:40

## 2018-02-19 RX ADMIN — Medication 10 ML: at 21:48

## 2018-02-19 RX ADMIN — FUROSEMIDE 20 MG: 10 INJECTION, SOLUTION INTRAMUSCULAR; INTRAVENOUS at 16:02

## 2018-02-19 RX ADMIN — AMIODARONE HYDROCHLORIDE 100 MG: 200 TABLET ORAL at 09:09

## 2018-02-19 RX ADMIN — CASTOR OIL AND BALSAM, PERU: 788; 87 OINTMENT TOPICAL at 21:47

## 2018-02-19 RX ADMIN — Medication 10 ML: at 05:58

## 2018-02-19 RX ADMIN — RIVAROXABAN 15 MG: 15 TABLET, FILM COATED ORAL at 10:51

## 2018-02-19 RX ADMIN — Medication 10 ML: at 16:03

## 2018-02-19 RX ADMIN — NYSTATIN: 100000 POWDER TOPICAL at 21:48

## 2018-02-19 RX ADMIN — MINERAL OIL, PETROLATUM: 425; 568 OINTMENT OPHTHALMIC at 05:59

## 2018-02-19 RX ADMIN — MINERAL OIL, PETROLATUM: 425; 568 OINTMENT OPHTHALMIC at 16:03

## 2018-02-19 RX ADMIN — NYSTATIN: 100000 POWDER TOPICAL at 17:28

## 2018-02-19 RX ADMIN — CASTOR OIL AND BALSAM, PERU: 788; 87 OINTMENT TOPICAL at 12:46

## 2018-02-19 RX ADMIN — MINERAL OIL, PETROLATUM: 425; 568 OINTMENT OPHTHALMIC at 22:00

## 2018-02-19 RX ADMIN — FUROSEMIDE 40 MG: 10 INJECTION, SOLUTION INTRAMUSCULAR; INTRAVENOUS at 09:10

## 2018-02-19 RX ADMIN — CASTOR OIL AND BALSAM, PERU: 788; 87 OINTMENT TOPICAL at 09:12

## 2018-02-19 NOTE — PROGRESS NOTES
PCU SHIFT NURSING NOTE      Bedside and Verbal shift change report given to Vinicio Luna RN (oncoming nurse) by Dennie Rigg, RN (offgoing nurse). Report included the following information SBAR, Kardex, ED Summary, Procedure Summary, Intake/Output, MAR, Recent Results, Med Rec Status, Cardiac Rhythm A-fib and Alarm Parameters . Shift Summary:         Admission Date 2/2/2018   Admission Diagnosis Hypothermia  Sepsis ([facility] [address]  Atrial fibrillation ([facility] [address]  MARIA   Consults IP CONSULT TO CARDIOLOGY  IP CONSULT TO HEMATOLOGY  IP CONSULT TO PALLIATIVE CARE - PROVIDER  IP CONSULT TO INFECTIOUS DISEASES  IP CONSULT TO ANESTHESIOLOGY        Consults   [x]PT   [x]OT   [x]Speech   [x]Case Management      [x] Palliative      Cardiac Monitoring Order   [x]Yes   []No     IV drips   [x]Yes    Drip:          Dobutamine gtt                      Dose:  Drip:                            Dose:  Drip:                            Dose:   []No     GI Prophylaxis   [x]Yes   []No         DVT Prophylaxis   SCDs:  Sequential Compression Device: Bilateral     Patient Refused VTE Prophylaxis: Yes    Edwin stockings:         [x] Medication   []Contraindicated   []None      Activity Level Activity Level: Up with Assistance     Activity Assistance: Partial (one person)   Purposeful Rounding every 1-2 hour? [x]Yes   Hutchison Score  Total Score: 2   Bed Alarm (If score 3 or >)   [x]Yes   [] Refused (See signed refusal form in chart)   Luis Score  Luis Score: 17   Luis Score (if score 14 or less)   [x]PMT consult   [x]Wound Care consult      []Specialty bed   [x] Nutrition consult          Needs prior to discharge:   Home O2 required:    []Yes   [x]No    If yes, how much O2 required? Other:    Last Bowel Movement: Last Bowel Movement Date: 02/19/18      Influenza Vaccine Received Flu Vaccine for Current Season (usually Sept-March):  Unsure    Patient/Guardian Refused (Notify MD): No   Pneumonia Vaccine           Diet Active Orders   Diet DIET MECHANICAL SOFT 2 GM NA (House Low NA)      LDAs               Peripheral IV 02/16/18 Right Antecubital (Active)   Site Assessment Clean, dry, & intact 2/19/2018  3:00 PM   Phlebitis Assessment 0 2/19/2018  3:00 PM   Infiltration Assessment 0 2/19/2018  3:00 PM   Dressing Status Dry; Intact 2/19/2018  3:00 PM   Dressing Type Tape;Transparent 2/19/2018  3:00 PM   Hub Color/Line Status Pink;Capped;Flushed 2/19/2018  3:00 PM   Action Taken Open ports on tubing capped 2/19/2018  3:00 PM   Alcohol Cap Used Yes 2/19/2018  2:22 PM       Peripheral IV 02/18/18 Left Arm (Active)   Site Assessment Intact; Bleeding 2/19/2018  3:00 PM   Phlebitis Assessment 0 2/19/2018  3:00 PM   Infiltration Assessment 0 2/19/2018  3:00 PM   Dressing Status Intact 2/19/2018  3:00 PM   Dressing Type Tape;Transparent 2/19/2018  3:00 PM   Hub Color/Line Status Pink; Infusing;Flushed 2/19/2018  3:00 PM   Action Taken Open ports on tubing capped 2/19/2018  3:00 PM   Alcohol Cap Used Yes 2/19/2018  3:00 PM                      Urinary Catheter [REMOVED] Urinary Catheter 02/02/18 [facility] - Temperature-Criteria for Appropriate Use: Strict I/Os    Intake & Output   Date 02/18/18 0700 - 02/19/18 0659 02/19/18 0700 - 02/20/18 0659   Shift 7297-3004 1304-2325 24 Hour Total 0700-1859 1900-0659 24 Hour Total   I  N  T  A  K  E   P.O. 360 100 460         P. O. 360 100 460       I.V.  (mL/kg/hr) 113.1  (0.1) 892.3  (0.8) 1005.4  (0.5) 161.5  161.5      DOBUTamine Volume 113.1 392.3 505.4 161.5  161.5      Volume (vancomycin (VANCOCIN) 1750 mg in  ml infusion)  500 500 0  0    Shift Total  (mL/kg) 473.1  (4.9) 992.3  (10.7) 1465.4  (15.8) 161.5  (1.7)  161.5  (1.7)   O  U  T  P  U  T   Urine  (mL/kg/hr)  200  (0.2) 200  (0.1) 1250  1250      Urine Voided    1250      Urine Occurrence(s) 2 x  2 x 2 x  2 x    Stool            Stool Occurrence(s) 2 x  2 x 1 x  1 x    Shift Total  (mL/kg)  200  (2.2) 200  (2.2) 1250  (13.5)  1250  (13.5)   NET 473.1 792.3 1265.4 -1088.5  -1088. 5   Weight (kg) 95.6 92.7 92.7 92.7 92.7 92.7         Readmission Risk Assessment Tool Score Low Risk            10       Total Score        2 . Living with Significant Other. Assisted Living. LTAC. SNF. or   Rehab    3 Patient Length of Stay (>5 days = 3)    5 Pt.  Coverage (Medicare=5 , Medicaid, or Self-Pay=4)        Criteria that do not apply:    Has Seen PCP in Last 6 Months (Yes=3, No=0)    IP Visits Last 12 Months (1-3=4, 4=9, >4=11)    Charlson Comorbidity Score (Age + Comorbid Conditions)       Expected Length of Stay 4d 21h   Actual Length of Stay 17

## 2018-02-19 NOTE — PROGRESS NOTES
Problem: Mobility Impaired (Adult and Pediatric)  Goal: *Acute Goals and Plan of Care (Insert Text)  Physical Therapy Goals    Revised 2/16/2018  1. Patient will move from supine to sit and sit to supine  in bed with modified independence within 7 day(s). 2.  Patient will transfer from bed to chair and chair to bed with modified independence using the least restrictive device within 7 day(s). 3.  Patient will perform sit to stand with modified independence within 7 day(s). 4.  Patient will ambulate with contact guard assist for 100 feet with the least restrictive device within 7 day(s). Initiated 2/9/2018  1. Patient will move from supine to sit and sit to supine , scoot up and down and roll side to side in bed with moderate assistance  within 7 day(s). --Met 2/16/18  2. Patient will transfer from bed to chair and chair to bed with minimal assistance/contact guard assist using the least restrictive device within 7 day(s). -- Met 2/16/18  3. Patient will perform sit to stand with minimal assistance/contact guard assist within 7 day(s). --Met 2/16/18  4. Patient will ambulate with minimal assistance/contact guard assist for 25 feet with the least restrictive device within 7 day(s). --Met 2/16/18    physical Therapy TREATMENT  Seen 1403 to 1428      Patient: Marquis Vazquez (13 y.o. male)  Date: 2/19/2018  Diagnosis: Hypothermia  Sepsis ([facility] [address]  Atrial fibrillation ([facility] [address]  MARIA <principal problem not specified>  Procedure(s) (LRB):  PACU/RECOVERY                      EP/LAB (N/A) 3 Days Post-Op  Precautions: on a dobutamine drip for low BP; Falls  Chart, physical therapy assessment, plan of care and goals were reviewed. ASSESSMENT:  Patient cleared by nursing for treatment session, but stating to be careful of BP-on drip. Up in chair upon arrival.  Agreeable to working with PT. Looking better than the last time this therapist saw him. Much more alert and interactive.   Had him do LE exercises prior to getting up to walk. Wanted to see also if it would raise his BP any. Raised it a little. 87/50 per exercises and 93/53 after exercises. Stood with CGA and verbal safety cues. Ambulated 30' with RW and CGA/verbal cues. Safety cues to stay within the RW on turns. Sat a few minutes and then ambulated another 30'. Denied any dizziness during the session. Was a little SOB after the second walk. Recovered quickly with seated rest.  Encouraged LE exercises every so often. BLE remain swollen. SNF vs IPR for rehab. Progression toward goals:  []    Improving appropriately and progressing toward goals  [x]    Improving slowly and progressing toward goals  []    Not making progress toward goals and plan of care will be adjusted     PLAN:  Patient continues to benefit from skilled intervention to address the above impairments. Continue treatment per established plan of care. Discharge Recommendations:  [facility] vs [facility]  Further Equipment Recommendations for Discharge: defer to rehab     SUBJECTIVE:   Patient stated I think I'm suppose to go to [facility] in a couple of days.     OBJECTIVE DATA SUMMARY:   Critical Behavior:  Neurologic State: Alert, Appropriate for age  Orientation Level: Oriented X4  Cognition: Appropriate decision making, Appropriate for age attention/concentration, Appropriate safety awareness, Follows commands  Safety/Judgement: Decreased insight into deficits, Decreased awareness of need for safety, Decreased awareness of need for assistance     Functional Mobility Training:  Transfers:  Sit to Stand: Contact guard assistance (verbal cues to push from chair instead of RW)  Stand to Sit: Contact guard assistance (safety cues to reach for chair arms)     Balance:  Sitting: Intact  Standing: Impaired  Standing - Static: Constant support;Good  Standing - Dynamic : Fair    Ambulation/Gait Training:  Assistive Device: Gait belt;Walker, rolling  Ambulation - Level of Assistance: Contact guard assistance;Minimal assistance (cues to stay within the RW on turns)  Gait Abnormalities: Decreased step clearance  Base of Support: Widened  Speed/Christina: Pace decreased (<100 feet/min)  Step Length: Left shortened;Right shortened    Therapeutic Exercises: Toe wiggles, ankle pumps, long arc quads, horizontal abduction/adduction    Pain:  Pain Scale 1: Numeric (0 - 10)  Pain Intensity 1: 0     Activity Tolerance: Tolerated PT session fairly well. No dizziness. Slightly SOB after second walk. BP continues to run low on drip. Please refer to the flowsheet for vital signs taken during this treatment.   After treatment:   [x]    Patient left in no apparent distress sitting up in chair  []    Patient left in no apparent distress in bed  [x]    Call bell left within reach  [x]    Nursing notified  []    Caregiver present  []    Bed alarm activated (not being used)    COMMUNICATION/COLLABORATION:   The patients plan of care was discussed with: Registered Nurse    Hector Paul, PT  Time Calculation: 25 mins

## 2018-02-19 NOTE — PROGRESS NOTES
[facility] has accepted when patient is medically stable. Patient will require AUTH. [employer]. Patient is presently on dobutamine drip at this time. Patient and family informed. Patient asked about clothing. Pratima Bocanegra RN Liaison [facility] informed CM that patient should have comfortable clothing, pj's, sock and shoes. Patient informed.     Zoe Bruner RN CM  Ext [phone]

## 2018-02-19 NOTE — PROGRESS NOTES
PCU SHIFT NURSING NOTE      Bedside and Verbal shift change report given to Tj Zaidi (oncoming nurse) by Lawrence Gonzalez (offgoing nurse). Report included the following information SBAR, Kardex, Procedure Summary, Intake/Output, MAR and Recent Results. Shift Summary:         Admission Date 2/2/2018   Admission Diagnosis Hypothermia  Sepsis ([facility] [address]  Atrial fibrillation ([facility] [address]  MARIA   Consults IP CONSULT TO CARDIOLOGY  IP CONSULT TO HEMATOLOGY  IP CONSULT TO PALLIATIVE CARE - PROVIDER  IP CONSULT TO INFECTIOUS DISEASES  IP CONSULT TO ANESTHESIOLOGY        Consults   []PT   []OT   []Speech   []Case Management      [] Palliative      Cardiac Monitoring Order   []Yes   []No     IV drips   []Yes    Drip:                            Dose:  Drip:                            Dose:  Drip:                            Dose:   []No     GI Prophylaxis   []Yes   []No         DVT Prophylaxis   SCDs:  Sequential Compression Device: Bilateral     Patient Refused VTE Prophylaxis: Yes    Edwin stockings:         [] Medication   []Contraindicated   []None      Activity Level Activity Level: Up with Assistance     Activity Assistance: Partial (one person)   Purposeful Rounding every 1-2 hour? []Yes   Hutchison Score  Total Score: 4   Bed Alarm (If score 3 or >)   []Yes   [] Refused (See signed refusal form in chart)   Luis Score  Luis Score: 16   Luis Score (if score 14 or less)   []PMT consult   []Wound Care consult      []Specialty bed   [] Nutrition consult          Needs prior to discharge:   Home O2 required:    []Yes   []No    If yes, how much O2 required? Other:    Last Bowel Movement: Last Bowel Movement Date: 02/18/18      Influenza Vaccine Received Flu Vaccine for Current Season (usually Sept-March):  Unsure    Patient/Guardian Refused (Notify MD): No   Pneumonia Vaccine           Diet Active Orders   Diet    DIET MECHANICAL SOFT 2 GM NA (House Low NA)      LDAs               Peripheral IV 02/16/18 Right Antecubital (Active)   Site Assessment Clean, dry, & intact 2/18/2018  3:23 AM   Phlebitis Assessment 0 2/18/2018  3:23 AM   Infiltration Assessment 0 2/18/2018  3:23 AM   Dressing Status Clean, dry, & intact 2/18/2018  3:23 AM   Dressing Type Transparent 2/18/2018  3:23 AM   Hub Color/Line Status Pink 2/18/2018  3:23 AM                      Urinary Catheter [REMOVED] Urinary Catheter 02/02/18 [facility] - Temperature-Criteria for Appropriate Use: Strict I/Os    Intake & Output   Date 02/18/18 0700 - 02/19/18 0659 02/19/18 0700 - 02/20/18 0659   Shift 0123-5360 7684-9784 24 Hour Total 2161-1635 5439-0092 24 Hour Total   I  N  T  A  K  E   P.O. 360  360         P. O. 360  360       I.V.  (mL/kg/hr) 113.1  (0.1) 706.2 819.2         DOBUTamine Volume 113.1 206.2 319.2         Volume (vancomycin (VANCOCIN) 1750 mg in  ml infusion)  500 500       Shift Total  (mL/kg) 473.1  (4.9) 706.2  (7.4) 1179.2  (12.3)      O  U  T  P  U  T   Urine  (mL/kg/hr)            Urine Occurrence(s) 2 x  2 x       Stool            Stool Occurrence(s) 2 x  2 x       Shift Total  (mL/kg)         .1 706.2 1179.2      Weight (kg) 95.6 95.6 95.6 95.6 95.6 95.6         Readmission Risk Assessment Tool Score Low Risk            10       Total Score        2 . Living with Significant Other. Assisted Living. LTAC. SNF. or   Rehab    3 Patient Length of Stay (>5 days = 3)    5 Pt.  Coverage (Medicare=5 , Medicaid, or Self-Pay=4)        Criteria that do not apply:    Has Seen PCP in Last 6 Months (Yes=3, No=0)    IP Visits Last 12 Months (1-3=4, 4=9, >4=11)    Charlson Comorbidity Score (Age + Comorbid Conditions)       Expected Length of Stay 4d 21h   Actual Length of Stay 17

## 2018-02-19 NOTE — PROGRESS NOTES
Infectious Disease Progress Note       Subjective:   Mr Jose Tyson says he is doing ok except has floaters in R eye. Also, noted on Dobutamine for low BP.          Vitals:   Patient Vitals for the past 24 hrs:   Temp Pulse Resp BP SpO2   02/19/18 1212 - 83 20 97/50 98 %   02/19/18 0715 96.1 °F (35.6 °C) 86 16 94/60 96 %   02/19/18 0530 - 80 - 96/52 93 %   02/19/18 0430 - 78 - 92/56 96 %   02/19/18 0300 98.2 °F (36.8 °C) 88 18 96/76 97 %   02/19/18 0100 - 84 - 97/53 98 %   02/18/18 2330 98 °F (36.7 °C) 88 18 103/64 98 %   02/18/18 2300 - 77 - 105/60 97 %   02/18/18 2252 - 86 - 94/50 100 %   02/18/18 2236 - 84 - 102/57 98 %   02/18/18 2200 - 77 - 93/52 95 %   02/18/18 2130 - 92 - 91/52 (!) 84 %   02/18/18 2100 - 88 - 97/49 91 %   02/18/18 1700 - 90 - 100/59 99 %   02/18/18 1646 - 88 - (!) 87/60 100 %   02/18/18 1531 - 95 - 92/43 99 %   02/18/18 1500 - 91 - (!) 87/45 100 %   02/18/18 1430 - 94 - 100/64 99 %   02/18/18 1400 - 84 - 95/55 96 %   02/18/18 1340 - 82 - 100/61 98 %   02/18/18 1335 - 85 - 95/83 99 %   02/18/18 1330 - 86 - 99/70 99 %   02/18/18 1325 - 81 - 96/63 100 %   02/18/18 1320 - 82 - 94/55 98 %   02/18/18 1317 - 79 - (!) 88/62 98 %   02/18/18 1316 - 86 - (!) 84/55 98 %   02/18/18 1315 - 84 - 100/56 98 %   02/18/18 1310 - 73 - 97/55 99 %   02/18/18 1305 - 85 - 95/65 98 %   02/18/18 1300 - 87 - 98/65 96 %   02/18/18 1255 - 86 - 105/69 99 %   02/18/18 1250 - 99 - 97/56 97 %   02/18/18 1245 - 97 - (!) 59/38 93 %   02/18/18 1240 - 78 - 100/54 100 %   02/18/18 1235 - 88 - 100/49 99 %   02/18/18 1230 - 88 - 103/62 100 %   02/18/18 1225 - 91 - 104/52 100 %   02/18/18 1220 - 83 - 107/55 100 %       ¨ GEN: NAD , sitting in chair   ¨ HEENT:  facial asymetry,  no scleral icterus L eye,   poor dentition with many missing/chipped off teeth , no thrush   ¨ CV: S1, S2 heard with RADHA  ¨ Lungs: Clear to auscultation , no crackles or wheezing heard   ¨ Abdomen: soft, non distended, non tender  ¨ Extremities: + edema B/l LE That is pitting    ¨ Neuro: Awake, alert, follows commands   ¨ Skin: no rash        Medications:    Current Facility-Administered Medications:     rivaroxaban (XARELTO) tablet 15 mg, 15 mg, Oral, DAILY WITH BREAKFAST, Giacomo Donovan MD, 15 mg at 02/19/18 1051    DOBUTamine (DOBUTREX) 500 mg/250 mL (2,000 mcg/mL) infusion, 0-10 mcg/kg/min, IntraVENous, TITRATE, Kushal Rizzo MD, Last Rate: 18.6 mL/hr at 02/18/18 2323, 6.485 mcg/kg/min at 02/18/18 2323    vancomycin (VANCOCIN) 1750 mg in  ml infusion, 1,750 mg, IntraVENous, Q36H, Cinthia Landin DO, Last Rate: 250 mL/hr at 02/18/18 1647, 1,750 mg at 02/18/18 1647    zinc oxide-cod liver oil (DESITIN) 40 % paste, , Topical, PRN, Behzad Vivar MD    balsam peru-castor oil (VENELEX)  mg/gram ointment, , Topical, TID, Maxi Sam MD    albuterol-ipratropium (DUO-NEB) 2.5 MG-0.5 MG/3 ML, 3 mL, Nebulization, Q4H PRN, Felipe OVALLE MD, 3 mL at 02/09/18 2004    white petrolatum-mineral oil (LACRILUBE S.O.P.) ointment, , Right Eye, Q8H, Ambrosio Neal MD    polyethylene glycol (MIRALAX) packet 17 g, 17 g, Oral, DAILY, Emilia Vaughn MD, Stopped at 02/19/18 0900    insulin lispro (HUMALOG) injection, , SubCUTAneous, AC&HS, Behzad Vivar MD, Stopped at 02/14/18 1630    amiodarone (CORDARONE) tablet 100 mg, 100 mg, Oral, DAILY, Holly Álvarez MD, 100 mg at 02/19/18 0909    glucose chewable tablet 16 g, 4 Tab, Oral, PRN, Tanna Garcia MD    dextrose (D50W) injection syrg 12.5-25 g, 12.5-25 g, IntraVENous, PRN, Tanna Garcia MD, 25 g at 02/11/18 0307    glucagon (GLUCAGEN) injection 1 mg, 1 mg, IntraMUSCular, PRN, Tanna Garcia MD    nystatin (MYCOSTATIN) 100,000 unit/gram powder, , Topical, TID, Tanna Garcia MD    sodium chloride (NS) flush 5-10 mL, 5-10 mL, IntraVENous, Q8H, Victoria Vaughan MD, 10 mL at 02/19/18 0558    sodium chloride (NS) flush 5-10 mL, 5-10 mL, IntraVENous, PRN, Victoria Vaughan MD, 10 mL at 02/11/18 0913      Labs:  Recent Results (from the past 24 hour(s))   GLUCOSE, POC    Collection Time: 02/18/18  4:21 PM   Result Value Ref Range    Glucose (POC) 99 65 - 100 mg/dL    Performed by GRETTA SMITH(CON)    GLUCOSE, POC    Collection Time: 02/18/18  9:38 PM   Result Value Ref Range    Glucose (POC) 98 65 - 100 mg/dL    Performed by [address], BASIC    Collection Time: 02/19/18  3:39 AM   Result Value Ref Range    Sodium 142 136 - 145 mmol/L    Potassium 3.6 3.5 - 5.1 mmol/L    Chloride 106 97 - 108 mmol/L    CO2 32 21 - 32 mmol/L    Anion gap 4 (L) 5 - 15 mmol/L    Glucose 84 65 - 100 mg/dL    BUN 19 6 - 20 MG/DL    Creatinine 1.42 (H) 0.70 - 1.30 MG/DL    BUN/Creatinine ratio 13 12 - 20      GFR est AA 57 (L) >60 ml/min/1.73m2    GFR est non-AA 47 (L) >60 ml/min/1.73m2    Calcium 8.0 (L) 8.5 - 10.1 MG/DL   GLUCOSE, POC    Collection Time: 02/19/18  7:36 AM   Result Value Ref Range    Glucose (POC) 100 65 - 100 mg/dL    Performed by Lynda Benoit    GLUCOSE, POC    Collection Time: 02/19/18 11:34 AM   Result Value Ref Range    Glucose (POC) 182 (H) 65 - 100 mg/dL    Performed by Lynda Benoit            Micro:   UA 2/2/18 0-4 WBC, negative bacteria, negative leukocyte esterase and negative nitrite         Blood 2/6/18 negative so far                            Blood: 2/2/18          STREPTOCOCCUS SALIVARIUS GROWING IN BOTH BOTTLES DRAWN (SITES = RFA) (SENSITIVITIES PERFORMED BY E-TEST) (A)        Culture result:      Final      STAPHYLOCOCCUS AUREUS ALSO GROWING IN THE 1ST OF 2 BOTTLES DRAWN (SITE = R FA) (A)      Culture result:      Final      ENTEROCOCCUS FAECALIS GROUP D ALSO GROWING IN THE 1ST OUT OF 2 BOTTLES DRAWN.  (SITE = RFA) GENTAMICIN SYNERGY SCREEN IS SENSITIVE @ < OR = 500 mcg/ml STREPTOMYCIN SYNERGY SCREEN IS SENSITIVE @ < OR =1000 mcg/ml (A)        Culture & Susceptibility                     Antibiotic    Organism Organism Organism          Enterococcus faecalis group D Staphylococcus aureus Streptococcus salivarius      AMPICILLIN ($)    <=2   ug/mL   S Final              AMPICILLIN/SULBACTAM ($)        <=8/4   ug/mL   S Final          CEFAZOLIN ($)        <=4   ug/mL   S Final          CEFTRIAXONE ($)            0.023   ug/mL   S Final      CIPROFLOXACIN ($)        <=1   ug/mL   S Final          CLINDAMYCIN ($)        <=0.5   ug/mL   S Final          DAPTOMYCIN ($$$$$)    2   ug/mL   S Final  1   ug/mL   S Final          ERYTHROMYCIN ($$$$)        <=0.5   ug/mL   S Final  0.125   ug/mL   S Final      GENTAMICIN ($)        <=4   ug/mL   S Final          LEVOFLOXACIN ($)            1.5   ug/mL   S Final      LINEZOLID ($$$$$)    2   ug/mL   S Final  4   ug/mL   S Final          OXACILLIN        <=0.25   ug/mL   S Final          PENICILLIN G ($$)    2   ug/mL   S Final      0.023   ug/mL   S Final      RIFAMPIN ($$$$)        <=1   ug/mL   S Final          TETRACYCLINE        <=4   ug/mL   S Final          TRIMETH-SULFAMETHOXA        <=0.5/9.5   ug/mL   S Final          VANCOMYCIN ($)    2   ug/mL   S Final  2   ug/mL   S Final  0.50   ug/mL   S Final                                        Component Value Ref Range & Units Status      Special Requests: NO SPECIAL REQUESTS    Preliminary      Culture result: NO GROWTH 1 DAY    Preliminary            Pathology Results:      FINAL PATHOLOGIC DIAGNOSIS   Right parotid gland, total parotidectomy with partial neck dissection:   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  Is minimal decreased attenuation in the periventricular white  matter which is nonspecific but consistent with small vessel disease. Doralee Lucks is  no intracranial hemorrhage. Doralee Lucks is no extra-axial collection, mass, mass  effect or midline shift.  The basilar cisterns are open.  No acute infarct is  identified. The bone windows demonstrate no abnormalities.  The visualized  portions of the paranasal sinuses and mastoid air cells are clear.      IMPRESSION  IMPRESSION: No acute intracranial abnormality. Age-related volume loss and  microvascular disease unchanged.         CXR 2/7/18  FINDINGS: Portable chest shows support lines/devices appear unchanged since  February 4. There is no apparent pneumothorax.  Lungs show improved pulmonary  edema with persistent bibasilar haziness favoring effusions and atelectasis. Heart size is large, stable. There is no midline shift.      IMPRESSION  IMPRESSION: Improved pulmonary edema. Persistent pleural effusions and  Cardiomegaly.     Renal US 2/3/18  FINDINGS: The right kidney measures 9.6 cm in length. The left kidney measures  10.5 cm in length. Both kidneys demonstrate normal cortical echogenicity. No  renal calculus is seen. There is no hydronephrosis. There is a 2.2 cm left renal  cyst. The aorta and iliac arteries are not visualized due to body habitus. Visualized portions of the IVC are normal. The bladder is decompressed by Ivy  catheter.      IMPRESSION  IMPRESSION:   No acute genitourinary pathology.     MARIA:  Impression:   1.  No vegetations, valve destruction, or abscesses noted. 2.  Moderate to severe non-rheumatic mitral valve regurgitation. 3.  Mild non-rheumatic aortic valve regurgitation. 4.  Moderate non-rheumatic tricuspid valve regurgitation. 5.  Patent foramen ovale with small left to right shunt. 6.  No left atrial appendage thrombus. 7.  Reduced left ventricular systolic function EF 68%. 8.  Small pericardial effusion. 9.  Mobile ~2 cm linear and heterogeneous mass, may be thrombus but unclear.   It is extracardiac, apparently in the pericardial space but cannot exclude pleural attachment. CT Chest  W Contrast   FINDINGS:  LOWER NECK:  The visualized portions of the thyroid and structures of the lower  neck are within normal limits. LUNGS: There is atelectasis in the lower lobes, right greater than left. PLEURA: There are pleural effusions, right larger than left. AORTA: The aorta is atherosclerotic and ectatic without dissection. The  ascending thoracic aorta measures 4.5 cm in diameter. MEDIASTINUM: There is no mediastinal or hilar adenopathy or mass. BONES AND SOFT TISSUES: The bones and soft tissues of the chest wall are normal.  UPPER ABDOMEN: The liver is minimally nodular in contour. The visualized  portions of the upper abdominal organs are normal.     IMPRESSION  IMPRESSION:   1. No mediastinal mass or adenopathy. 2. Atherosclerotic ectatic thoracic aorta measuring 4.5 cm in diameter. 3. Bilateral pleural effusions and lower lobe atelectasis, right greater than  left. 4. Minimal nodularity of the liver contour.     Assessment / Plan:      Mr Issa Kumar  is a 80y.o. 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 Appears from notes that he had \"syncope\" the day before presentation there and had heart rhytym that was alternating between  \"junctional bradycardia \" and rate controlled Afib. He was transferred to [facility] and on presentation found to be septic with hypothermia. Was started on pressors as well and inititated on Meropenem, Levofloxacin and Vancomycin.   Blood cultures sent 2/2 returned + for polymicrobial organisms including MSSA, Strep salivarus and ampicillin sensitive E faecalis.       He is also noted to be leukopenic without neutropenia, thrombocytopenia and macrocytic anemia  and seen by Hematology/oncology this admission. Renal function is imporving. He has had imaging of CT head that showed no acute findings. US renal done with 2.2 cm L renal cyst noted. CXR has a small R pleural effusion. He is currently on Vancomycin with last trough on 2/4/18 at 19.6. TTE has shown AV and MV thickening.      From discussion with him, he says that he has no specific symptoms. Denied any known hardware in his body. Denied falls at home. Says he was supposed to take care of his teeth when asked but got admitted. Denied pain.      From review of chart and  Medical records, he saw his PCP 1/2018 with dyspnea and notes indicate volume overload. At that time, notes do not indicate concern for pneumonia. Notes this admission, indicate concerns for bleeding around central line and BRBPR as well. He was transfused this admission.         1) Polymicrobial bacteremia ( MSSA, Streptococcus salivarius and E faecalis)  Possible source: GI/oral in origin likley given hx of constipation wt straining and bloody bowel movements , skin entry as well given thin skin with multiple bruising         Recommendations:   Continue IV Vancomycin per pharmacy dosing wt close renal function. Goal trough of 15-20 Dosing per pharmacy  MARIA showed no valvular vegetations but \"Mobile ~2 cm linear and heterogeneous mass, may be thrombus but unclear. It is extracardiac, apparently in the pericardial space but cannot exclude pleural attachment\". CT W contrast done to evalute this further and not commented on CT chest. Unclear finding. Discussed with patient, his wife and daughter about above. As details unclear and not seen on CT chest, would for now plan on treating till 2/20/18 with IV antibiotics ( 2 weeks after clearance of bacteremia)  If bacteremia recurs, the need to assess this lesion as this could also get infected if present   ?  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 Please contact with questions or concerns.       D/W wt his wife and daughter today         Zaki Kumar,    12:27 PM

## 2018-02-19 NOTE — PROGRESS NOTES
Hospitalist Progress Note    NAME: Duarte Dodson   :  1928   MRN:  [id number]   LOS:   16      Assessment / Plan:  Septic Shock   Hypothermia  Bacteremia  Staph, strep and enterococcus bacteremia  Aspiration PNA treated  -continue vanc, last day   -MARIA shows no valvular vegetations, however is showing extracardiac lesion, CT chest with contrast did not show any lesion  -accepted to [facility], needs authorization and needs to be off of dobutamine    Acute on chronic systolic CHF with EF 28%  Acute respiratory failure with hypoxia from acute pulmonary edema (on CXR)  -dobutamine PRN per cardiology, trying to wean off today  -patient with mild hypoxia today will order one time dose of lasix  -continue Xarelto  -continue O2, wean as tolerated  -TSH wnl  -stopping BB indefinitely     Metabolic Encephalopathy  Delirium with hallucinations  -possible related to sepsis  -QTc high, now off haldol, was getting IV haldol in ICU without worsening QTc     Chronic atrial fibrillation with junctional bradycardia  -Appreciate cardiology input  -On Amiodarone 100 mg daily  -BB stopped indefinitely    Hypernatremia  Hypoglycemia - Current resolved     Acute Kidney Injury with baseline CKD3  Cr stable  Continue to monitor lytes     Pancytopenia  -S/p 2 units plts on  for acute bleeding from central line   -Leukopenia resolved  -S/p Vitamin daily B12 shots x3, may need montly upon discharge    Blood in Stool PTA  -likely from Xarelto and thrombocytopenia  -Xarelto restarted     Patient with history of Left Parotid Mass s/p resection approximately 1 year ago by Dr. Tad Calvert and XRT, 2017 PET negative    Floaters in right eye   -since he was admitted here, just mentioned it today   -recommend outpatient opthalmology follow up after d/c     History of Prostate Cancer     Psoriasis     DTIs x2 left buttocks not POA  Woundcare following     Code status: Partial code , no Compressions or Defibrillation.  She is okay with temporary pacing, ACLS meds (like atropine) and temporary intubation. Palliative is following appreciate their visiting with patient     Prophylaxis: SCD's and H2B  Recommended Disposition: TBD    Obesity  Body mass index is 30.18 kg/(m^2). Subjective:     Chief Complaint: sepsis follow up  Says he is having \"floaters\" in his right eye since he has been hospitalized did not mention it before  Otherwise doing well    Objective:     VITALS:   Last 24hrs VS reviewed since prior progress note. Most recent are:  Patient Vitals for the past 24 hrs:   Temp Pulse Resp BP SpO2   02/19/18 1540 97.8 °F (36.6 °C) 84 20 95/59 96 %   02/19/18 1424 - - - - 100 %   02/19/18 1422 97.8 °F (36.6 °C) 88 20 92/58 (!) 82 %   02/19/18 1415 97.8 °F (36.6 °C) 93 20 (!) 86/50 98 %   02/19/18 1410 97.8 °F (36.6 °C) 88 20 93/53 98 %   02/19/18 1403 97.8 °F (36.6 °C) 91 20 (!) 87/50 99 %   02/19/18 1246 97.8 °F (36.6 °C) 88 20 107/68 100 %   02/19/18 1212 97.5 °F (36.4 °C) 83 20 97/50 98 %   02/19/18 1022 97.6 °F (36.4 °C) 87 20 103/64 100 %   02/19/18 0715 96.1 °F (35.6 °C) 86 16 94/60 96 %   02/19/18 0530 - 80 - 96/52 93 %   02/19/18 0430 - 78 - 92/56 96 %   02/19/18 0300 98.2 °F (36.8 °C) 88 18 96/76 97 %   02/19/18 0100 - 84 - 97/53 98 %   02/18/18 2330 98 °F (36.7 °C) 88 18 103/64 98 %   02/18/18 2300 - 77 - 105/60 97 %   02/18/18 2252 - 86 - 94/50 100 %   02/18/18 2236 - 84 - 102/57 98 %   02/18/18 2200 - 77 - 93/52 95 %   02/18/18 2130 - 92 - 91/52 (!) 84 %   02/18/18 2100 - 88 - 97/49 91 %   02/18/18 1700 - 90 - 100/59 99 %       Intake/Output Summary (Last 24 hours) at 02/19/18 1658  Last data filed at 02/19/18 1540   Gross per 24 hour   Intake          1153.85 ml   Output             1450 ml   Net          -296.15 ml        PHYSICAL EXAM:  General: Alert, cooperative, no acute distress    EENT:  EOMI. Anicteric sclerae. Mucous membranes moist  Resp:  CTA bilaterally, no wheezing or rales.  No accessory muscle use  CV:  Regular rhythm, no edema  GI:  Soft, non distended, non tender. +Bowel sounds  Neurologic:  Alert and oriented X 3, normal speech, chronic R facial droop  Psych:   Good insight, not anxious nor agitated  Skin:  No rashes, no jaundice  ________________________________________________________________________  Care Plan discussed with:    Comments   Patient x    Family      RN     Care Manager x    Consultant                        Multidiciplinary team rounds were held today with , nursing, pharmacist and clinical coordinator. Patient's plan of care was discussed; medications were reviewed and discharge planning was addressed. ________________________________________________________________________  Total NON critical care TIME:  20   Minutes    >50% of visit spent in counseling and coordination of care       ________________________________________________________________________    Procedures: see electronic medical records for all procedures/Xrays and details which were not copied into this note but were reviewed prior to creation of Plan. LABS:  I reviewed today's most current labs and imaging studies. Pertinent labs include:  No results for input(s): WBC, HGB, HCT, PLT, HGBEXT, HCTEXT, PLTEXT, HGBEXT, HCTEXT, PLTEXT in the last 72 hours.   Recent Labs      02/19/18   0339  02/18/18   0357  02/17/18   0428   NA  142  142  147*   K  3.6  3.4*  3.7   CL  106  108  110*   CO2  32  29  28   GLU  84  81  80   BUN  19  22*  25*   CREA  1.42*  1.36*  1.21   CA  8.0*  7.9*  8.2*       Signed: Sony Gates MD

## 2018-02-19 NOTE — PROGRESS NOTES
Music Therapy Assessment    Krystin Valentine [id number]  [id number]    7/1/1928  80 y.o.  male    Patient Telephone Number: [phone] (home)   Tenriism Affiliation: Nicolas Evie   Language: English   Extended Emergency Contact Information  Primary Emergency Contact: Marquise Nagel  Address: [address] [place], [address] Phone: [phone]  Mobile Phone: [phone]  Relation: Spouse  Secondary Emergency Contact: Chelsi Ag Phone: [phone]  Relation: Spouse   Patient Active Problem List    Diagnosis Date Noted    Physical deconditioning 02/15/2018    Oropharyngeal dysphagia 02/12/2018    Increased oropharyngeal secretions 02/12/2018    Aspiration pneumonia ([facility] 02/12/2018    Hypothermia 02/02/2018    Sepsis ([address] 02/02/2018    Atrial fibrillation ([facility] [address] [facility] 09/21/2017    Cellulitis of right upper arm 09/06/2017    Parotid mass 05/04/2017    Parotid tumor 01/01/2017    Hypercholesterolemia     Malignant neoplasm of prostate ([facility]     GERD (gastroesophageal reflux disease)     Diverticula of colon         Date: 2/19/2018       Mental Status:   [x] Alert [x] Forgetful [x]  Confused  [  ] Minimally responsive    Communication Status: [  ] Impaired Speech [  ] Nonverbal     Physical Status:   [  ] Oxygen in use  [  ] Hard of Hearing [x] Vision Impaired-eye patch over one eye and wears glasses. [  ] Ambulatory  [  ] Ambulatory with assistance [  ] Non-ambulatory     Music Preferences, Background: Pt displayed enthusiasm for show tunes. He also said he enjoys popular music from the 1940's and 50's. He said he likes the music his spouse listens to - [place] [employer] and [employer]. Clinical Problem addressed: Improve mood, assist with reality orientation, positive social interaction. Goal(s) met in session:  Physical/Pain management (Scale of 1-10):    Pre-session rating: Pt denied pain. Post-session rating: Pt denied pain.    [  ] Increased relaxation   [  ] Regulated breathing patterns  [  ] Decreased muscle tension   [  ] Minimized physical distress     Emotional/Psychological:  [x] Increased self-expression   [  ] Decreased aggressive behavior   [  ] Decreased sadness   [  ] Discussed healthy coping skills     [x] Improved mood    [  ] Decreased withdrawn behavior     Social:  [  ] Decreased feelings of isolation/loneliness [x] Positive social interaction   [  ] Provided support and/or comfort for family/friends    Spiritual:  [  ] Spiritual support    [  ] Expressed peace  [  ] Expressed osmany    [  ] Discussed beliefs    Techniques Utilized (Check all that apply):   [  ] Procedural support MT [  ] Music for relaxation [x] Patient preferred music  [  ] Natalie analysis  [x] Song choice  [  ] Music for validation  [  ] Entrainment  [  ] Progressive muscle relax. [  ] Guided visualization  [  ] Rosalva Altlito  [  ] Patient instrument playing [  ] Patricia Pain writing  [x] Sing along   [  ] Ltanya Meals  [  ] Sensory stimulation  [x] Active Listening  [  ] Music for spiritual support [  ] Making of CDs as gifts    Session Observations:  Referral from Wong Hinojosa, Palliative NP. Patient (pt) goes by Stiven Childers. He was observed to be sitting comfortably in a chair. He displayed forgetfulness and some confusion at times during the session. This music therapist (MT) asked pt how he was feeling, and pt spoke about what he remembers leading up to him being in the hospital. The narrative he told was difficult to follow and sounded as if pt might have been experiencing hallucinations based on some of the things he said occurred (being taken to a basement with people he didn't feel he could trust, seeing three snakes in a restaurant). MT provided active listening, and then asked pt about his music preferences. He shared his spouse's music preferences. MT re-phrased the question, and pt shared what he liked, then chose to hear the [employer].  As MT took guitar out of its case, though, pt said he'd always liked songs from the [address] [facility] [address] [facility],[address] and [address] [facility]. MT suggested to songs from [address] [place] [address],[address] and pt chose to hear [address] [employer]. MT sang and played this with guitar. Pt's affect brightened and he increased self-expression in response to the music, as evidenced by (AEB) his eyes brightening and singing along. Pt shared about his spouse and MT provided active listening and follow up questions to promote self-expression. Pt appeared to remember the age he was when he  and thus how many years he'd been , and also how he met his spouse. MT sang and played a love song from Between to provide validation through music, Think of Me. Pt expressed enjoyment with brightened affect, and chose to hear Oh What a Beautiful Morning from [employer]. MT sang and played this with guitar and pt sang along. He expressed gratitude for the session. Will follow as able.     London Collins MT-BC (Music Therapist-Board Certified)  Spiritual Care Department  Referral-based service

## 2018-02-19 NOTE — PROGRESS NOTES
Problem: Dysphagia (Adult)  Goal: *Acute Goals and Plan of Care (Insert Text)  2/12/2018  Speech path  1. Pt will participate with MBS. MET 2/13/18 2/13/2018  1. Patient will tolerate mechanical soft/nectar thick liquid diet without overt s/s of aspiration within 7 days. 2. Patient will perform effortful swallow swallowing exercises x20 with min cues without overt s/s of aspiration within 7 days. Speech language pathology dysphagia treatment  Patient: David Pittman (47 y.o. male)  Date: 2/19/2018  Diagnosis: Hypothermia  Sepsis ([facility]  Atrial fibrillation ([facility] [facility]  MARIA <principal problem not specified>  Procedure(s) (LRB):  PACU/RECOVERY                      EP/LAB (N/A) 3 Days Post-Op  Precautions: swallow      ASSESSMENT:  Patient received in chair. Patient alert, cooperative, and agreeable. Patient reported to SLP and SLP student use of prescribed compensatory strategies of lingual/finger sweeps to remove residue from right buccal pocket and positioning of the tongue to prepare for swallowing. Patient reports this latter strategy helps him with taking pills. Patient continues to present with moderate oral dysphagia and with mild-moderate pharyngeal dysphagia. SLP re-introduced effortful swallow exercise and rationale for completing exercise. Patient performed 1 set with 17 effortful swallows. Patient with good completion with noted effort. No overt s/s of aspiration were noted. Patient continues to be at risk for aspiration secondary to his cognitive status and right facial weakness. Patient will benefit from continued intensive SLP treatment of swallow rehabilitation.     Progression toward goals:  [x]         Improving appropriately and progressing toward goals  [x]         Improving slowly and progressing toward goals  []         Not making progress toward goals and plan of care will be adjusted     PLAN:  Recommendations and Planned Interventions:  --mechanical soft/nectar-thick liquids  --Straws mandi  --Meds crushed in applesauce  --SLP to f/u for laryngeal strengthening exercises    Patient continues to benefit from skilled intervention to address the above impairments. Continue treatment per established plan of care. Discharge Recommendations:  Inpatient Rehab     SUBJECTIVE:   Patient stated Mary Lou Stephens. OBJECTIVE:   Cognitive and Communication Status:  Neurologic State: Alert, Appropriate for age  Orientation Level: Oriented X4  Cognition: Appropriate decision making, Appropriate for age attention/concentration, Appropriate safety awareness, Follows commands  Perception: Appears intact  Perseveration: No perseveration noted  Safety/Judgement: Decreased insight into deficits, Decreased awareness of need for safety, Decreased awareness of need for assistance  Dysphagia Treatment:  Oral Assessment:        Exercises:  Laryngeal Exercises:                    Effortful Swallow: Yes  Sets : 1  Reps : Other (comment) (17)                                                                                                        Pain:  Pain Scale 1: Numeric (0 - 10)  Pain Intensity 1: 0     After treatment:   [x]              Patient left in no apparent distress sitting up in chair  []              Patient left in no apparent distress in bed  [x]              Call bell left within reach  [x]              Nursing notified  []              Caregiver present  []              Bed alarm activated    COMMUNICATION/EDUCATION:     The patients plan of care including recommendations, planned interventions, and recommended diet changes were discussed with: Registered Nurse.     Dayne Ramírez  Time Calculation: 15 mins

## 2018-02-20 LAB
ANION GAP SERPL CALC-SCNC: 4 MMOL/L (ref 5–15)
BUN SERPL-MCNC: 18 MG/DL (ref 6–20)
BUN/CREAT SERPL: 12 (ref 12–20)
CALCIUM SERPL-MCNC: 7.9 MG/DL (ref 8.5–10.1)
CHLORIDE SERPL-SCNC: 104 MMOL/L (ref 97–108)
CO2 SERPL-SCNC: 31 MMOL/L (ref 21–32)
CREAT SERPL-MCNC: 1.49 MG/DL (ref 0.7–1.3)
GLUCOSE BLD STRIP.AUTO-MCNC: 111 MG/DL (ref 65–100)
GLUCOSE BLD STRIP.AUTO-MCNC: 113 MG/DL (ref 65–100)
GLUCOSE BLD STRIP.AUTO-MCNC: 186 MG/DL (ref 65–100)
GLUCOSE BLD STRIP.AUTO-MCNC: 89 MG/DL (ref 65–100)
GLUCOSE SERPL-MCNC: 81 MG/DL (ref 65–100)
POTASSIUM SERPL-SCNC: 3.8 MMOL/L (ref 3.5–5.1)
SERVICE CMNT-IMP: ABNORMAL
SERVICE CMNT-IMP: NORMAL
SODIUM SERPL-SCNC: 139 MMOL/L (ref 136–145)

## 2018-02-20 PROCEDURE — 77010033678 HC OXYGEN DAILY

## 2018-02-20 PROCEDURE — 80048 BASIC METABOLIC PNL TOTAL CA: CPT | Performed by: INTERNAL MEDICINE

## 2018-02-20 PROCEDURE — 36415 COLL VENOUS BLD VENIPUNCTURE: CPT | Performed by: INTERNAL MEDICINE

## 2018-02-20 PROCEDURE — 97530 THERAPEUTIC ACTIVITIES: CPT

## 2018-02-20 PROCEDURE — 74011250637 HC RX REV CODE- 250/637: Performed by: INTERNAL MEDICINE

## 2018-02-20 PROCEDURE — 74011250636 HC RX REV CODE- 250/636: Performed by: ANESTHESIOLOGY

## 2018-02-20 PROCEDURE — 92526 ORAL FUNCTION THERAPY: CPT

## 2018-02-20 PROCEDURE — 74011250636 HC RX REV CODE- 250/636: Performed by: INTERNAL MEDICINE

## 2018-02-20 PROCEDURE — 97116 GAIT TRAINING THERAPY: CPT | Performed by: PHYSICAL THERAPIST

## 2018-02-20 PROCEDURE — 97535 SELF CARE MNGMENT TRAINING: CPT

## 2018-02-20 PROCEDURE — 65660000000 HC RM CCU STEPDOWN

## 2018-02-20 PROCEDURE — 92610 EVALUATE SWALLOWING FUNCTION: CPT

## 2018-02-20 PROCEDURE — 82962 GLUCOSE BLOOD TEST: CPT

## 2018-02-20 RX ORDER — FUROSEMIDE 10 MG/ML
40 INJECTION INTRAMUSCULAR; INTRAVENOUS ONCE
Status: COMPLETED | OUTPATIENT
Start: 2018-02-20 | End: 2018-02-20

## 2018-02-20 RX ADMIN — CASTOR OIL AND BALSAM, PERU: 788; 87 OINTMENT TOPICAL at 08:40

## 2018-02-20 RX ADMIN — NYSTATIN: 100000 POWDER TOPICAL at 08:41

## 2018-02-20 RX ADMIN — AMIODARONE HYDROCHLORIDE 100 MG: 200 TABLET ORAL at 08:39

## 2018-02-20 RX ADMIN — RIVAROXABAN 15 MG: 15 TABLET, FILM COATED ORAL at 08:40

## 2018-02-20 RX ADMIN — Medication 10 ML: at 21:19

## 2018-02-20 RX ADMIN — DOBUTAMINE HYDROCHLORIDE 6.5 MCG/KG/MIN: 200 INJECTION INTRAVENOUS at 09:58

## 2018-02-20 RX ADMIN — MINERAL OIL, PETROLATUM: 425; 568 OINTMENT OPHTHALMIC at 03:26

## 2018-02-20 RX ADMIN — POLYETHYLENE GLYCOL 3350 17 G: 17 POWDER, FOR SOLUTION ORAL at 08:39

## 2018-02-20 RX ADMIN — NYSTATIN: 100000 POWDER TOPICAL at 17:51

## 2018-02-20 RX ADMIN — NYSTATIN: 100000 POWDER TOPICAL at 21:18

## 2018-02-20 RX ADMIN — CASTOR OIL AND BALSAM, PERU: 788; 87 OINTMENT TOPICAL at 17:51

## 2018-02-20 RX ADMIN — FUROSEMIDE 40 MG: 10 INJECTION, SOLUTION INTRAMUSCULAR; INTRAVENOUS at 12:45

## 2018-02-20 RX ADMIN — MINERAL OIL, PETROLATUM: 425; 568 OINTMENT OPHTHALMIC at 21:18

## 2018-02-20 RX ADMIN — DOBUTAMINE HYDROCHLORIDE 10 MCG/KG/MIN: 200 INJECTION INTRAVENOUS at 10:11

## 2018-02-20 RX ADMIN — Medication 10 ML: at 13:10

## 2018-02-20 RX ADMIN — MIDODRINE HYDROCHLORIDE 5 MG: 5 TABLET ORAL at 08:39

## 2018-02-20 RX ADMIN — VANCOMYCIN HYDROCHLORIDE 1750 MG: 10 INJECTION, POWDER, LYOPHILIZED, FOR SOLUTION INTRAVENOUS at 02:09

## 2018-02-20 RX ADMIN — CASTOR OIL AND BALSAM, PERU: 788; 87 OINTMENT TOPICAL at 21:18

## 2018-02-20 RX ADMIN — Medication 10 ML: at 02:10

## 2018-02-20 RX ADMIN — MIDODRINE HYDROCHLORIDE 5 MG: 5 TABLET ORAL at 17:51

## 2018-02-20 RX ADMIN — MINERAL OIL, PETROLATUM: 425; 568 OINTMENT OPHTHALMIC at 13:11

## 2018-02-20 RX ADMIN — DOBUTAMINE HYDROCHLORIDE 10 MCG/KG/MIN: 200 INJECTION INTRAVENOUS at 20:05

## 2018-02-20 NOTE — PROGRESS NOTES
Cardiology Progress Note    Patient ID:  Patient: Terrell Rahman  MRN: [id number]  Age: 80 y.o.  : 1928   Admit Date: 2018    Assessment: 1. Atrial fibrillation with slow ventricular response requiring dobutamine initially. Now improved. Persistent. 2. Chronic bifascicular block (RBBB and LAFB). 3. Gram-positive cocci bacteremia (Staph, Streptococcus, Enterococcus) and sepsis. No evidence of endocarditis on MARIA here (vegetation, valve destruction, abscess). Extracardiac finding not seen on CT. 4. Postural hypotension, mild and asymptomatic. SBP in chair 80-90's.  5. Cardiomyopathy NOS, EF 25-35% depending on study. 6. Acute on chronic systolic congestive heart failure, better, but lots of peripheral edema. 7. Acute kidney injury atop chronic kidney disease stage III. Improved. 8. Pancytopenia (watching platelets, still >94H, improving). 9. Encephalopathy/delirium, resolved. 10. History of R face/neck mass resected. 11. Partial code status (No shock or chest compressions). Plan:     1. Discontinue beta blocker indefinitely. 2. Increase dobutamine to 10 mcg and give furosemide 40 mg IV x 1.  3. Continue midodrine at 5 tid. 4. Stop amiodarone, risk>benefit. I think he'll persist in atrial fibrillation. 5. No temporary or permanent pacemaker recommended. 6. Treated for GPC bacteremia. ID consult appreciated. 7. Not a candidate for ACEI or ARB or spironolactone due to acute renal failure. 8. Would not do an ischemia evaluation at this time. He's got a ton of peripheral edema which needs to be addressed. At extremely high risk of cellulitis/breakdown in that area. []       High complexity decision making was performed in this patient. Subjective:     Terrell Rahman denies chest pain, shortness of breath, dizziness. Wears eye patch at night.     Review of Systems - No abdominal pain, nausea or vomiting, productive cough, hemoptysis, pleurisy. Objective:     Physical Exam:  Temp (24hrs), Av.8 °F (36.6 °C), Min:97.5 °F (36.4 °C), Max:98 °F (36.7 °C)    Patient Vitals for the past 8 hrs:   Pulse   18 0936 79   18 0833 72   18 0722 87   18 0600 78   18 0530 74   18 0500 77   18 0430 80   18 0300 79    Patient Vitals for the past 8 hrs:   Resp   18 0833 18   18 0722 18   18 0300 18    Patient Vitals for the past 8 hrs:   BP   18 0936 (!) 88/52   18 0833 (!) 86/52   18 0722 (!) 88/56   18 0600 (!) 86/48   18 0530 (!) 89/51   18 0500 93/46   18 0430 96/53   18 0300 94/52          Intake/Output Summary (Last 24 hours) at 18 1012  Last data filed at 18 1730   Gross per 24 hour   Intake           156.55 ml   Output             1350 ml   Net         -1193.45 ml       Nondiaphoretic, not in acute distress. MMM, no jaundice, HEENT stable. R eye patch at this time. Unlabored, clear to auscultation bilaterally, symmetric air movement. Regular rate and rhythm, no murmur, pericardial rub, knock, or gallop. No JVD but there is +++peripheral edema. Palpable radial pulses bilaterally. Abdomen soft, nontender, nondistended. Extremities without cyanosis or clubbing. Skin warm and dry. Venostasis changes in legs. Musculoskeletal exam stable. Awake, appropriate. No tremor. Cardiographics and Studies, I personally reviewed:    Telemetry:  Afib with HR 70-80's. ECHO 2/3:    LEFT VENTRICLE: The ventricle was mildly dilated. Ejection fraction was estimated in the range of 25 % to 30 %. There was moderate diffuse hypokinesis. RIGHT VENTRICLE: The ventricle was moderately dilated. Systolic function was mildly reduced. LEFT ATRIUM: The atrium was moderately dilated. RIGHT ATRIUM: The atrium was mildly dilated.     MITRAL VALVE: There was mild thickening. DOPPLER: There was moderate regurgitation. AORTIC VALVE: Leaflets exhibited mildly increased thickness. DOPPLER: There was no significant regurgitation. TRICUSPID VALVE: Normal valve structure. DOPPLER: There was moderate regurgitation. Pulmonary artery systolic pressure: 57 mmHg. There was moderate pulmonary hypertension. PULMONIC VALVE: Normal valve structure. AORTA: The root exhibited normal size. SYSTEMIC VEINS: IVC: The respirophasic change in diameter was less than 50%. PERICARDIUM: There was no pericardial effusion. The pericardium was normal in appearance. LAB Review:     No results for input(s): CPK, CKMB, CKNDX, TROIQ in the last 72 hours. No lab exists for component: CPKMB  Lab Results   Component Value Date/Time    Cholesterol, total 128 06/29/2017 10:41 AM    HDL Cholesterol 49 06/29/2017 10:41 AM    LDL, calculated 60 06/29/2017 10:41 AM    Triglyceride 94 06/29/2017 10:41 AM     No results for input(s): INR, PTP, APTT in the last 72 hours. No lab exists for component: Brian Jacques   Recent Labs      02/20/18   0314  02/19/18   0339  02/18/18   0357   NA  139  142  142   K  3.8  3.6  3.4*   CL  104  106  108   CO2  31  32  29   BUN  18  19  22*   CREA  1.49*  1.42*  1.36*   GLU  81  84  81   CA  7.9*  8.0*  7.9*     No results for input(s): SGOT, GPT, AP, TBIL, TP, ALB, GLOB, GGT, AML, LPSE in the last 72 hours. No lab exists for component: AMYP, HLPSE  No components found for: GLPOC  No results for input(s): PH, PCO2, PO2 in the last 72 hours. Medications Reviewed:      Allergies   Allergen Reactions    Ancef [Cefazolin] Unknown (comments)     \"drops my blood pressure\"    Neosporin [Benzalkonium Chloride] Unknown (comments)    Polysporin [Bacitracin-Polymyxin B] Other (comments)     \"WOUNDS DO NOT HEAL\"        Current Facility-Administered Medications   Medication Dose Route Frequency    midodrine (PROAMITINE) tablet 5 mg  5 mg Oral BID WITH MEALS    rivaroxaban (XARELTO) tablet 15 mg  15 mg Oral DAILY WITH BREAKFAST    DOBUTamine (DOBUTREX) 500 mg/250 mL (2,000 mcg/mL) infusion  0-10 mcg/kg/min IntraVENous TITRATE    zinc oxide-cod liver oil (DESITIN) 40 % paste   Topical PRN    balsam peru-castor oil (VENELEX)  mg/gram ointment   Topical TID    albuterol-ipratropium (DUO-NEB) 2.5 MG-0.5 MG/3 ML  3 mL Nebulization Q4H PRN    white petrolatum-mineral oil (LACRILUBE S.O.P.) ointment   Right Eye Q8H    polyethylene glycol (MIRALAX) packet 17 g  17 g Oral DAILY    insulin lispro (HUMALOG) injection   SubCUTAneous AC&HS    glucose chewable tablet 16 g  4 Tab Oral PRN    dextrose (D50W) injection syrg 12.5-25 g  12.5-25 g IntraVENous PRN    glucagon (GLUCAGEN) injection 1 mg  1 mg IntraMUSCular PRN    nystatin (MYCOSTATIN) 100,000 unit/gram powder   Topical TID    sodium chloride (NS) flush 5-10 mL  5-10 mL IntraVENous Q8H    sodium chloride (NS) flush 5-10 mL  5-10 mL IntraVENous PRN          Torin Moncada MD  2/20/2018

## 2018-02-20 NOTE — PROGRESS NOTES
Problem: Dysphagia (Adult)  Goal: *Acute Goals and Plan of Care (Insert Text)  2/12/2018  Speech path  1. Pt will participate with MBS. MET 2/13/18 2/13/2018  REEVAL 2/20/17  1. Patient will tolerate mechanical soft/nectar thick liquid diet without overt s/s of aspiration within 7 days. 2. Patient will perform effortful swallow swallowing exercises x20 with min cues without overt s/s of aspiration within 7 days. Speech language pathology dysphagia treatment  Patient: Stacie Jain (61 y.o. male)  Date: 2/20/2018  Diagnosis: Hypothermia  Sepsis ([facility] [address] [address]  Atrial fibrillation ([facility]  MARIA <principal problem not specified>  Procedure(s) (LRB):  PACU/RECOVERY                      EP/LAB (N/A) 4 Days Post-Op  Precautions:       ASSESSMENT:  Pt seen today for swallowing therapy. He practiced 20 effortful swallows successfully. No coughing after any texture. Pt stated he is going to [facility] soon. Progression toward goals:  []         Improving appropriately and progressing toward goals  [x]         Improving slowly and progressing toward goals  []         Not making progress toward goals and plan of care will be adjusted     PLAN:  Recommendations and Planned Interventions:  Transfer to [facility] soon. Patient continues to benefit from skilled intervention to address the above impairments. Continue treatment per established plan of care. Discharge Recommendations:  Rehab     SUBJECTIVE:   Patient stated he didn't want to do swallowing exercises but he would. .    OBJECTIVE:   Cognitive and Communication Status:  Neurologic State: Alert  Orientation Level: Oriented X4  Cognition: Appropriate decision making, Follows commands  Perception: Appears intact  Perseveration: No perseveration noted  Safety/Judgement: Decreased insight into deficits, Decreased awareness of need for safety, Decreased awareness of need for assistance  Dysphagia Treatment:  Oral Assessment:     P.O.  Trials:  Patient Position: upright in bed  Vocal quality prior to P.O.: No impairment  Consistency Presented: Nectar thick liquid;Puree  How Presented: Self-fed/presented;Spoon     Bolus Acceptance: No impairment  Bolus Formation/Control: No impairment     Propulsion: No impairment  Oral Residue: None  Initiation of Swallow: No impairment  Laryngeal Elevation: Decreased  Aspiration Signs/Symptoms: None                Oral Phase Severity: Mild  Pharyngeal Phase Severity : Mild  Exercises:  Laryngeal Exercises:                                                                                                                                   Pain:  Pain Scale 1: Numeric (0 - 10)  Pain Intensity 1: 0     After treatment:   []              Patient left in no apparent distress sitting up in chair  []              Patient left in no apparent distress in bed  []              Call bell left within reach  []              Nursing notified  []              Caregiver present  []              Bed alarm activated    COMMUNICATION/EDUCATION:       The patients plan of care including recommendations, planned interventions, and recommended diet changes were discussed with: Registered Nurse. []              Posted safety precautions in patient's room.     Demond Alexandre SLP  Time Calculation: 10 mins

## 2018-02-20 NOTE — PROGRESS NOTES
Problem: Mobility Impaired (Adult and Pediatric)  Goal: *Acute Goals and Plan of Care (Insert Text)  Physical Therapy Goals    Revised 2/16/2018  1. Patient will move from supine to sit and sit to supine  in bed with modified independence within 7 day(s). 2.  Patient will transfer from bed to chair and chair to bed with modified independence using the least restrictive device within 7 day(s). 3.  Patient will perform sit to stand with modified independence within 7 day(s). 4.  Patient will ambulate with contact guard assist for 100 feet with the least restrictive device within 7 day(s). Initiated 2/9/2018  1. Patient will move from supine to sit and sit to supine , scoot up and down and roll side to side in bed with moderate assistance  within 7 day(s). --Met 2/16/18  2. Patient will transfer from bed to chair and chair to bed with minimal assistance/contact guard assist using the least restrictive device within 7 day(s). -- Met 2/16/18  3. Patient will perform sit to stand with minimal assistance/contact guard assist within 7 day(s). --Met 2/16/18  4. Patient will ambulate with minimal assistance/contact guard assist for 25 feet with the least restrictive device within 7 day(s). --Met 2/16/18    physical Therapy TREATMENT  Seen 1210 to 1226      Patient: Emerita Batista (84 y.o. male)  Date: 2/20/2018  Diagnosis: Hypothermia  Sepsis ([facility] [address]  Atrial fibrillation ([address]  MARIA <principal problem not specified>  Procedure(s) (LRB):  PACU/RECOVERY                      EP/LAB (N/A) 4 Days Post-Op  Precautions:    Chart, physical therapy assessment, plan of care and goals were reviewed. ASSESSMENT:  Patient cleared by nurse to be seen for PT session. Patient up in recliner with LEs elevated. Stating that he feels good. Looks really good today. LE remain quite edematous to the knees. Skin tight, shiny and purple in appearance. Encouraged LE exercises while up in the chair.   Instructed him in what to do and he was able to do them. Stood with CGA/min assist to RW. Ambulated 150' with RW and CGA without any rest breaks. Up in recliner chair again at end of session with LEs elevated. Encouraged him to do the exercises. BPs remain very low with activity. Denies SOB and dizziness. Stated that he has never been dizzy with the low BPs. Progression toward goals:  [x]    Improving appropriately and progressing toward goals  []    Improving slowly and progressing toward goals  []    Not making progress toward goals and plan of care will be adjusted     PLAN:  Patient continues to benefit from skilled intervention to address the above impairments. Continue treatment per established plan of care. Discharge Recommendations:  Inpatient Rehab vs SNF   Further Equipment Recommendations for Discharge:  Defer to rehab     SUBJECTIVE:   Patient stated Nope, I'm not dizzy, never have been.     OBJECTIVE DATA SUMMARY:   Critical Behavior:  Neurologic State: Alert  Orientation Level: Oriented X4  Cognition: Appropriate decision making, Follows commands  Safety/Judgement: Decreased insight into deficits, Decreased awareness of need for safety, Decreased awareness of need for assistance  Functional Mobility Training:  Transfers:  Sit to Stand: Contact guard assistance  Stand to Sit: Contact guard assistance        Balance:  Sitting: Intact  Sitting - Static: Good (unsupported)  Sitting - Dynamic: Fair (occasional)  Standing: Impaired; Without support  Standing - Static: Constant support;Good  Standing - Dynamic : Good (with RW)    Ambulation/Gait Training:  Distance (ft): 150 Feet (ft)  Assistive Device: Gait belt;Walker, rolling  Ambulation - Level of Assistance: Contact guard assistance;Minimal assistance  Gait Abnormalities: Decreased step clearance;Trunk sway increased  Base of Support: Widened  Speed/Christina: Pace decreased (<100 feet/min)  Step Length: Left shortened;Right shortened    Therapeutic Exercises:    Toe wiggles, ankle pumps and circles, hip internal/external rotation    Pain:  Pain Scale 1: Numeric (0 - 10)  Pain Intensity 1: 0    Activity Tolerance: Tolerated PT session well. BP remains quite low, but he is asymptomatic. Denied any SOB or dizziness. Nurse made aware. Please refer to the flowsheet for vital signs taken during this treatment.   After treatment:   [x]    Patient left in no apparent distress sitting up in chair (with LEs elevated)  []    Patient left in no apparent distress in bed  [x]    Call bell left within reach  [x]    Nursing notified  [x]    Caregiver present (wife at beginning of session)  []    Bed alarm activated    COMMUNICATION/COLLABORATION:   The patients plan of care was discussed with: Registered Nurse    Shawn Evans, PT  Time Calculation: 16 mins

## 2018-02-20 NOTE — PROGRESS NOTES
PCU SHIFT NURSING NOTE      Bedside shift change report given to Ariana Terrell  (oncoming nurse) by Zita Inman (offgoing nurse). Report included the following information SBAR. Shift Summary:       Admission Date 2/2/2018   Admission Diagnosis Hypothermia  Sepsis ([facility] [address]  Atrial fibrillation ([facility] [address]  MARIA   Consults IP CONSULT TO CARDIOLOGY  IP CONSULT TO HEMATOLOGY  IP CONSULT TO PALLIATIVE CARE - PROVIDER  IP CONSULT TO INFECTIOUS DISEASES  IP CONSULT TO ANESTHESIOLOGY        Consults   []PT   []OT   []Speech   []Case Management      [] Palliative      Cardiac Monitoring Order   []Yes   []No     IV drips   []Yes    Drip:                            Dose:  Drip:                            Dose:  Drip:                            Dose:   []No     GI Prophylaxis   []Yes   []No         DVT Prophylaxis   SCDs:  Sequential Compression Device: Bilateral     Patient Refused VTE Prophylaxis: Yes    Edwin stockings:         [] Medication   []Contraindicated   []None      Activity Level Activity Level: Up with Assistance     Activity Assistance: Partial (one person)   Purposeful Rounding every 1-2 hour? []Yes   Hutchison Score  Total Score: 2   Bed Alarm (If score 3 or >)   []Yes   [] Refused (See signed refusal form in chart)   Luis Score  Luis Score: 21   Luis Score (if score 14 or less)   []PMT consult   []Wound Care consult      []Specialty bed   [] Nutrition consult          Needs prior to discharge:   Home O2 required:    []Yes   []No    If yes, how much O2 required? Other:    Last Bowel Movement: Last Bowel Movement Date: 02/19/18      Influenza Vaccine Received Flu Vaccine for Current Season (usually Sept-March):  Unsure    Patient/Guardian Refused (Notify MD): No   Pneumonia Vaccine           Diet Active Orders   Diet    DIET MECHANICAL SOFT 2 GM NA (House Low NA)      LDAs               Peripheral IV 02/16/18 Right Antecubital (Active)   Site Assessment Clean, dry, & intact 2/19/2018  6:00 PM   Phlebitis Assessment 0 2/19/2018  6:00 PM   Infiltration Assessment 0 2/19/2018  6:00 PM   Dressing Status Clean, dry, & intact 2/19/2018  6:00 PM   Dressing Type Transparent 2/19/2018  6:00 PM   Hub Color/Line Status Pink;Capped;Flushed 2/19/2018  3:00 PM   Action Taken Open ports on tubing capped 2/19/2018  3:00 PM   Alcohol Cap Used Yes 2/19/2018  6:00 PM       Peripheral IV 02/18/18 Left Arm (Active)   Site Assessment Clean, dry, & intact 2/19/2018  6:00 PM   Phlebitis Assessment 0 2/19/2018  6:00 PM   Infiltration Assessment 0 2/19/2018  6:00 PM   Dressing Status Clean, dry, & intact 2/19/2018  6:00 PM   Dressing Type Transparent 2/19/2018  6:00 PM   Hub Color/Line Status Pink; Infusing;Flushed 2/19/2018  3:00 PM   Action Taken Open ports on tubing capped 2/19/2018  3:00 PM   Alcohol Cap Used Yes 2/19/2018  6:00 PM                      Urinary Catheter [REMOVED] Urinary Catheter 02/02/18 Ivy - Temperature-Criteria for Appropriate Use: Strict I/Os    Intake & Output   Date 02/18/18 1900 - 02/19/18 0659 02/19/18 0700 - 02/20/18 0659   Shift 4411-0811 24 Hour Total 4462-5666 9178-5736 24 Hour Total   I  N  T  A  K  E   P.O. 100 460         P. O. 100 460       I.V.  (mL/kg/hr) 892.3 1005.4 161.5  (0.1)  161.5      DOBUTamine Volume 392.3 505.4 161.5  161.5      Volume (vancomycin (VANCOCIN) 1750 mg in  ml infusion) 500 500 0  0    Shift Total  (mL/kg) 992.3  (10.7) 1465.4  (15.8) 161.5  (1.7)  161.5  (1.7)   O  U  T  P  U  T   Urine  (mL/kg/hr)   (1.5)  1625      Urine Voided   1625      Urine Occurrence(s)  2 x 2 x  2 x    Stool           Stool Occurrence(s)  2 x 1 x  1 x    Shift Total  (mL/kg) 200  (2.2) 200  (2.2) 1625  (17.5)  1625  (17.5)   .3 1265.4 -1463.5  -1463.5   Weight (kg) 92.7 92.7 92.7 92.7 92.7         Readmission Risk Assessment Tool Score Low Risk            10       Total Score        2 . Living with Significant Other. Assisted Living. LTAC. SNF.  or   Rehab    3 Patient Length of Stay (>5 days = 3)    5 Pt.  Coverage (Medicare=5 , Medicaid, or Self-Pay=4)        Criteria that do not apply:    Has Seen PCP in Last 6 Months (Yes=3, No=0)    IP Visits Last 12 Months (1-3=4, 4=9, >4=11)    Charlson Comorbidity Score (Age + Comorbid Conditions)       Expected Length of Stay 4d 21h   Actual Length of Stay 17

## 2018-02-20 NOTE — PROGRESS NOTES
PCU SHIFT NURSING NOTE      Bedside and Verbal shift change report given to [address] (oncoming nurse) by Iraida Patterson RN (offgoing nurse). Report included the following information SBAR, Kardex, Intake/Output, MAR, Recent Results and Cardiac Rhythm A Fib. Shift Summary: Received pt lying in bed with HOB elevated watching tv. No noted acute distress. Ambulates to bathroom with walker will monitor. 0610 BP 86/48 with HR 77 so dropped dobutamine from 6mcg/kg/min at 16.7 ml/hr to 4 mcg/kg/min at 11.1 ml/hr. Admission Date 2/2/2018   Admission Diagnosis Hypothermia  Sepsis ([facility] [address]  Atrial fibrillation ([facility] [address]  MARIA   Consults IP CONSULT TO CARDIOLOGY  IP CONSULT TO HEMATOLOGY  IP CONSULT TO PALLIATIVE CARE - PROVIDER  IP CONSULT TO INFECTIOUS DISEASES  IP CONSULT TO ANESTHESIOLOGY        Consults   [x]PT   [x]OT   [x]Speech   []Case Management      [] Palliative      Cardiac Monitoring Order   [x]Yes   []No     IV drips   [x]Yes    Drip:                            Dose:  Drip:                            Dose:  Drip:                            Dose:   []No     GI Prophylaxis   []Yes   []No         DVT Prophylaxis   SCDs:  Sequential Compression Device: Bilateral     Patient Refused VTE Prophylaxis: Yes    Edwin stockings:         [] Medication   []Contraindicated   []None      Activity Level Activity Level: Up with Assistance     Activity Assistance: Partial (one person)   Purposeful Rounding every 1-2 hour? [x]Yes   Hutchison Score  Total Score: 2   Bed Alarm (If score 3 or >)   [x]Yes   [] Refused (See signed refusal form in chart)   Luis Score  Luis Score: 21   Luis Score (if score 14 or less)   []PMT consult   []Wound Care consult      []Specialty bed   [] Nutrition consult          Needs prior to discharge:   Home O2 required:    []Yes   []No    If yes, how much O2 required?     Other:    Last Bowel Movement: Last Bowel Movement Date: 02/19/18      Influenza Vaccine Received Flu Vaccine for Current Season (usually Sept-March): Unsure    Patient/Guardian Refused (Notify MD): No   Pneumonia Vaccine           Diet Active Orders   Diet    DIET MECHANICAL SOFT 2 GM NA (House Low NA)      LDAs               Peripheral IV 02/16/18 Right Antecubital (Active)   Site Assessment Clean, dry, & intact 2/19/2018 11:00 PM   Phlebitis Assessment 0 2/19/2018 11:00 PM   Infiltration Assessment 0 2/19/2018 11:00 PM   Dressing Status Clean, dry, & intact 2/19/2018 11:00 PM   Dressing Type Transparent 2/19/2018 11:00 PM   Hub Color/Line Status Pink;Capped;Flushed 2/19/2018  3:00 PM   Action Taken Open ports on tubing capped 2/19/2018  3:00 PM   Alcohol Cap Used Yes 2/19/2018 11:00 PM       Peripheral IV 02/18/18 Left Arm (Active)   Site Assessment Clean, dry, & intact 2/19/2018 11:00 PM   Phlebitis Assessment 0 2/19/2018 11:00 PM   Infiltration Assessment 0 2/19/2018 11:00 PM   Dressing Status Clean, dry, & intact 2/19/2018 11:00 PM   Dressing Type Transparent 2/19/2018 11:00 PM   Hub Color/Line Status Pink; Infusing;Flushed 2/19/2018  3:00 PM   Action Taken Open ports on tubing capped 2/19/2018  3:00 PM   Alcohol Cap Used Yes 2/19/2018 11:00 PM                      Urinary Catheter [REMOVED] Urinary Catheter 02/02/18 Ivy - Temperature-Criteria for Appropriate Use: Strict I/Os    Intake & Output   Date 02/19/18 0700 - 02/20/18 0659 02/20/18 0700 - 02/21/18 0659   Shift 7514-6768 8743-7776 24 Hour Total 6593-4032 8236-3421 24 Hour Total   I  N  T  A  K  E   I.V.  (mL/kg/hr) 161.5  (0.1)  161.5         DOBUTamine Volume 161.5  161.5         Volume (vancomycin (VANCOCIN) 1750 mg in  ml infusion) 0  0       Shift Total  (mL/kg) 161.5  (1.7)  161.5  (1.7)      O  U  T  P  U  T   Urine  (mL/kg/hr) 1625  (1.5)  1625         Urine Voided 1625  1625         Urine Occurrence(s) 2 x  2 x       Stool            Stool Occurrence(s) 1 x  1 x       Shift Total  (mL/kg) 1625  (17.5)  1625  (17.5)      NET -1463.5  -1463.5      Weight (kg) 92.7 92.7 92.7 92.7 92.7 92.7         Readmission Risk Assessment Tool Score Low Risk            10       Total Score        2 . Living with Significant Other. Assisted Living. LTAC. SNF. or   Rehab    3 Patient Length of Stay (>5 days = 3)    5 Pt.  Coverage (Medicare=5 , Medicaid, or Self-Pay=4)        Criteria that do not apply:    Has Seen PCP in Last 6 Months (Yes=3, No=0)    IP Visits Last 12 Months (1-3=4, 4=9, >4=11)    Charlson Comorbidity Score (Age + Comorbid Conditions)       Expected Length of Stay 4d 21h   Actual Length of Stay 18

## 2018-02-20 NOTE — PROGRESS NOTES
Hospitalist Progress Note    NAME: Heather Reich   :  1928   MRN:  [id number]   LOS:   25      Assessment / Plan:  Septic Shock   Hypothermia  Bacteremia  Staph, strep and enterococcus bacteremia  Aspiration PNA treated  -completed Vancomycin IV today 18  -MARIA shows no valvular vegetations, however is showing extracardiac lesion, CT chest with contrast did not show any lesion  -accepted to [facility], needs authorization and needs to be off of dobutamine, Dobutamine dose had been increased today  Acute on chronic systolic CHF with EF 66-99% still edematous, will c/w diuresis as much as BP allows  Acute respiratory failure with hypoxia from acute pulmonary edema (on CXR)  -dobutamine PRN per cardiology, trying to wean off today  -patient with mild hypoxia today will order one time dose of lasix  -continue Xarelto  -continue O2, wean as tolerated  -TSH wnl  -stopping BB indefinitely  -so far tolerating room air, monitor. Metabolic Encephalopathy  Delirium with hallucinations  -possible related to sepsis  -QTc high, now off haldol, was getting IV haldol in ICU without worsening QTc  -so far resolved, monitor. Chronic atrial fibrillation with junctional bradycardia  -Appreciate cardiology input  -On Amiodarone 100 mg daily  -BB stopped indefinitely  Hypernatremia  Hypoglycemia - Current resolved, check HbA1C.   Acute Kidney Injury with baseline CKD3  Cr stable  Continue to monitor lytes  Pancytopenia  -S/p 2 units plts on  for acute bleeding from central line   -Leukopenia resolved  -S/p Vitamin daily B12 shots x3, may need montly upon discharge  Blood in Stool PTA  -likely from Xarelto and thrombocytopenia  -Xarelto restarted  Patient with history of Left Parotid Mass s/p resection approximately 1 year ago by Dr. Mykel Mohr and XRT, 2017 PET negative  Floaters in right eye   -since he was admitted here, just mentioned it today   -recommend outpatient opthalmology follow up after d/c  History of Prostate Cancer  Psoriasis  DTIs x2 left buttocks not POA Woundcare following  Code status: Partial code , no Compressions or Defibrillation. She is okay with temporary pacing, ACLS meds (like atropine) and temporary intubation. Palliative is following appreciate their visiting with patient  Prophylaxis: SCD's and H2B  Recommended Disposition: TBD  Obesity  Body mass index is 30.11 kg/(m^2). Subjective:     Chief Complaint: \"I feel better\"    Objective:     VITALS:   Last 24hrs VS reviewed since prior progress note.  Most recent are:  Patient Vitals for the past 24 hrs:   Temp Pulse Resp BP SpO2   02/20/18 1300 98.2 °F (36.8 °C) 86 18 95/53 93 %   02/20/18 1230 98.2 °F (36.8 °C) 84 18 100/65 94 %   02/20/18 1146 98 °F (36.7 °C) 80 18 94/59 96 %   02/20/18 1106 98 °F (36.7 °C) 81 18 93/49 96 %   02/20/18 1058 98 °F (36.7 °C) 79 18 (!) 85/53 96 %   02/20/18 0936 98 °F (36.7 °C) 79 18 (!) 88/52 96 %   02/20/18 0833 98 °F (36.7 °C) 72 18 (!) 86/52 97 %   02/20/18 0722 98 °F (36.7 °C) 87 18 (!) 88/56 100 %   02/20/18 0600 - 78 - (!) 86/48 96 %   02/20/18 0530 - 74 - (!) 89/51 95 %   02/20/18 0500 - 77 - 93/46 98 %   02/20/18 0430 - 80 - 96/53 98 %   02/20/18 0300 98 °F (36.7 °C) 79 18 94/52 98 %   02/20/18 0200 - 82 - 94/61 96 %   02/20/18 0130 - 78 - 94/61 97 %   02/20/18 0030 - 87 - 112/52 98 %   02/20/18 0000 - 79 - 95/66 98 %   02/19/18 2300 97.8 °F (36.6 °C) 79 18 (!) 89/52 97 %   02/19/18 2200 - 77 - 91/52 97 %   02/19/18 2030 - 83 - 103/70 99 %   02/19/18 2000 - 86 - 92/55 96 %   02/19/18 1930 97.5 °F (36.4 °C) 84 20 97/54 98 %   02/19/18 1800 - 84 20 117/81 92 %   02/19/18 1730 - 91 - 108/84 95 %   02/19/18 1700 - 85 20 100/71 95 %   02/19/18 1630 - 87 - 94/75 96 %   02/19/18 1600 - 86 20 95/69 98 %   02/19/18 1540 97.8 °F (36.6 °C) 84 20 95/59 96 %   02/19/18 1500 - 73 - 91/62 100 %   02/19/18 1430 - 92 - (!) 75/55 (!) 76 %   02/19/18 1424 - - - - 100 %   02/19/18 1422 97.8 °F (36.6 °C) 88 20 92/58 (!) 82 % 02/19/18 1415 97.8 °F (36.6 °C) 93 20 (!) 86/50 98 %   02/19/18 1410 97.8 °F (36.6 °C) 88 20 93/53 98 %   02/19/18 1403 97.8 °F (36.6 °C) 91 20 (!) 87/50 99 %   02/19/18 1400 - 85 - (!) 87/50 91 %       Intake/Output Summary (Last 24 hours) at 02/20/18 1309  Last data filed at 02/20/18 1300   Gross per 24 hour   Intake           473.87 ml   Output             1600 ml   Net         -1126.13 ml        PHYSICAL EXAM:  General: Alert, cooperative, no acute distress    EENT:  EOMI. Anicteric sclerae. Mucous membranes moist  Resp:  CTA bilaterally, no wheezing or rales. No accessory muscle use  CV:  Regular rhythm, +  edema  GI:  Soft, non distended, non tender. +Bowel sounds  Neurologic:  Alert and oriented X 3, normal speech, chronic R facial droop  Psych:   Good insight, not anxious nor agitated  Skin:  No rashes, no jaundice  ________________________________________________________________________  Care Plan discussed with:    Comments   Patient x    Family      RN y    Care Manager x    Consultant                        Multidiciplinary team rounds were held today with , nursing, pharmacist and clinical coordinator. Patient's plan of care was discussed; medications were reviewed and discharge planning was addressed. ________________________________________________________________________  Total NON critical care TIME:  20   Minutes    >50% of visit spent in counseling and coordination of care y      ________________________________________________________________________    Procedures: see electronic medical records for all procedures/Xrays and details which were not copied into this note but were reviewed prior to creation of Plan. LABS:  I reviewed today's most current labs and imaging studies. Pertinent labs include:  No results for input(s): WBC, HGB, HCT, PLT, HGBEXT, HCTEXT, PLTEXT, HGBEXT, HCTEXT, PLTEXT in the last 72 hours.   Recent Labs      02/20/18   0314  02/19/18   4718 02/18/18   0357   NA  139  142  142   K  3.8  3.6  3.4*   CL  104  106  108   CO2  31  32  29   GLU  81  84  81   BUN  18  19  22*   CREA  1.49*  1.42*  1.36*   CA  7.9*  8.0*  7.9*       Signed: Monroe Ray MD

## 2018-02-20 NOTE — PROGRESS NOTES
Nutrition Assessment:    INTERVENTIONS/RECOMMENDATIONS:   Meals/Snacks: General/healthful diet:  Continue diet consistency per SLP - currently [facility] Soft. ASSESSMENT:   2/20:  Chart reviewed. PO intake remains fair. SLP visited with pt today, per note patient did not want to participate in swallowing exercises today. Continues on a [facility] Soft Diet/Na+ restriction. 2/15:  Chart reviewed. Pt medically noted for afib, hypothermia, sepsis, oropharyngeal dysphagia, aspiration pna and increased oropharyngeal secretions. PMHx of CAD/CHF/cardiomyopathy. Talked with pt while he was sitting in recliner eating lunch. He states his appetite is not very good. But declined any nutritional supplements at this time. He states he will just try to order foods he knows he likes. He has a hard time chewing and his taste is very bad. He has a menu and orders his meals. He has no concerns or questions at this time. Diet Order: Mechanical soft  % Eaten:  Patient Vitals for the past 72 hrs:   % Diet Eaten   02/18/18 1324 50 %   02/18/18 0920 75 %     Pertinent Medications: [x] Reviewed []Other:  Pertinent Labs: [x]Reviewed  []Other:  Food Allergies: [x]None []Other:     Last BM: 2/19   [x]Active     []Hyperactive  []Hypoactive       [] Absent  BS  Skin:    [] Intact   [] Incision  [x] Breakdown   []Edema   []Other:    Anthropometrics: Height: 5' 9\" (175.3 cm) Weight: 92.5 kg (203 lb 14.4 oz)    IBW (%IBW):   ( ) UBW (%UBW):   (  %)    BMI: Body mass index is 30.11 kg/(m^2).     This BMI is indicative of:  []Underweight   []Normal   []Overweight   [] Obesity   [] Extreme Obesity (BMI>40)  Last Weight Metrics:  Weight Loss Metrics 2/20/2018 2/2/2018 2/2/2018 2/2/2018 1/9/2018 9/28/2017 9/21/2017   Today's Wt 203 lb 14.4 oz - 194 lb - 199 lb 182 lb 183 lb   BMI - 30.11 kg/m2 - 28.65 kg/m2 26.99 kg/m2 24.68 kg/m2 24.82 kg/m2       Estimated Nutrition Needs (Based on): 1994 Kcals/day (BMR x AF 1.3) , 88 g (1.0g/kg) Protein  Carbohydrate: At Least 130 g/day  Fluids: 2000 mL/day    NUTRITION DIAGNOSES:   Problem:  Inadequate energy intake      Etiology: related to decreased appetite     Signs/Symptoms: as evidenced by PO intake avg 50% of meals    Previous Nutrition Dx:  [] Resolved  [] Unresolved           [x] Progressing    NUTRITION INTERVENTIONS:  Meals/Snacks: General/healthful diet                  GOAL:   PO intake >50% of meals next 3-5 days    NUTRITION MONITORING AND EVALUATION      Food/Nutrient Intake Outcomes:  Total energy intake  Physical Signs/Symptoms Outcomes: Weight/weight change, Electrolyte and renal profile, GI profile, Glucose profile    Previous Goal Met:   [] Met              [x] Progressing Towards Goal              [] Not Progressing Towards Goal   Previous Recommendations:   [] Implemented          [] Not Implemented          [x] Not Applicable    LEARNING NEEDS (Diet, Food/Nutrient-Drug Interaction):    [x] None Identified   [] Identified and Education Provided/Documented   [] Identified and Pt declined/was not appropriate     Cultural, Latter-day, OR Ethnic Dietary Needs:    [x] None Identified   [] Identified and Addressed     [x] Interdisciplinary Care Plan Reviewed/Documented    [x] Discharge Planning: Continue Mech Soft as recommended by SLP   [] Participated in Interdisciplinary Rounds    NUTRITION RISK:    [x] Patient At Nutritional Risk    [] High              [x] Moderate/Mild           []  Low     [] Patient Not At [address] [place] [address] Street  Pager [phone]  Weekend Pager [phone]

## 2018-02-20 NOTE — PROGRESS NOTES
PCU SHIFT NURSING NOTE      Bedside and Verbal shift change report given to Margarito Adame RN (oncoming nurse) by Princess Crespo RN (offgoing nurse). Report included the following information SBAR, Kardex, ED Summary, Procedure Summary, Intake/Output, MAR, Recent Results, Med Rec Status, Cardiac Rhythm A-fib and Alarm Parameters . Shift Summary:         Admission Date 2/2/2018   Admission Diagnosis Hypothermia  Sepsis ([facility] [address]  Atrial fibrillation ([facility] [address]  MARIA   Consults IP CONSULT TO CARDIOLOGY  IP CONSULT TO HEMATOLOGY  IP CONSULT TO PALLIATIVE CARE - PROVIDER  IP CONSULT TO INFECTIOUS DISEASES  IP CONSULT TO ANESTHESIOLOGY        Consults   [x]PT   [x]OT   [x]Speech   [x]Case Management      [x] Palliative      Cardiac Monitoring Order   [x]Yes   []No     IV drips   [x]Yes    Drip:        Dobutamine gtt                    Dose:titration  Drip:                            Dose:  Drip:                            Dose:   []No     GI Prophylaxis   [x]Yes   []No         DVT Prophylaxis   SCDs:  Sequential Compression Device: Bilateral     Patient Refused VTE Prophylaxis: Yes    Edwin stockings:         [x] Medication   []Contraindicated   []None      Activity Level Activity Level: Up with Assistance     Activity Assistance: Partial (one person)   Purposeful Rounding every 1-2 hour? [x]Yes   Hutchison Score  Total Score: 3   Bed Alarm (If score 3 or >)   [x]Yes   [] Refused (See signed refusal form in chart)   Luis Score  Luis Score: 19   Luis Score (if score 14 or less)   [x]PMT consult   [x]Wound Care consult      []Specialty bed   [x] Nutrition consult          Needs prior to discharge:   Home O2 required:    []Yes   [x]No    If yes, how much O2 required? Other:    Last Bowel Movement: Last Bowel Movement Date: 02/19/18      Influenza Vaccine Received Flu Vaccine for Current Season (usually Sept-March):  Unsure    Patient/Guardian Refused (Notify MD): No   Pneumonia Vaccine           Diet Active Orders   Diet DIET MECHANICAL SOFT 2 GM NA (House Low NA)      LDAs               Peripheral IV 02/19/18 Right Arm (Active)   Site Assessment Clean, dry, & intact 2/20/2018 11:06 AM   Phlebitis Assessment 0 2/20/2018 11:06 AM   Infiltration Assessment 0 2/20/2018 11:06 AM   Dressing Status Clean, dry, & intact 2/20/2018 11:06 AM   Dressing Type Tape;Transparent 2/20/2018 11:06 AM   Hub Color/Line Status Blue; Infusing;Flushed 2/20/2018 11:06 AM   Action Taken Open ports on tubing capped 2/20/2018 11:06 AM   Alcohol Cap Used Yes 2/20/2018 11:06 AM       Peripheral IV 02/19/18 Left Hand (Active)   Site Assessment Clean, dry, & intact 2/20/2018 11:06 AM   Phlebitis Assessment 0 2/20/2018 11:06 AM   Infiltration Assessment 0 2/20/2018 11:06 AM   Dressing Status Clean, dry, & intact 2/20/2018 11:06 AM   Dressing Type Tape;Transparent 2/20/2018 11:06 AM   Hub Color/Line Status Blue;Capped;Flushed 2/20/2018 11:06 AM   Action Taken Open ports on tubing capped 2/20/2018 11:06 AM   Alcohol Cap Used Yes 2/20/2018 11:06 AM                      Urinary Catheter [REMOVED] Urinary Catheter 02/02/18 Ivy - Temperature-Criteria for Appropriate Use: Strict I/Os    Intake & Output   Date 02/19/18 0700 - 02/20/18 0659 02/20/18 0700 - 02/21/18 0659   Shift 0700-1859 1900-0659 24 Hour Total 1532-4549 7887-6369 24 Hour Total   I  N  T  A  K  E   I.V.  (mL/kg/hr) 161.5  (0.1) 284.9  (0.3) 446.4  (0.2) 98.4  98.4      DOBUTamine Volume 161.5 284.9 446.4 98.4  98.4      Volume (vancomycin (VANCOCIN) 1750 mg in  ml infusion) 0  0       Shift Total  (mL/kg) 161.5  (1.7) 284.9  (3.1) 446.4  (4.8) 98.4  (1.1)  98.4  (1.1)   O  U  T  P  U  T   Urine  (mL/kg/hr) 1625  (1.5)  1625  (0.7) 775  775      Urine Voided 1625  1625 775  775      Urine Occurrence(s) 2 x 2 x 4 x       Stool            Stool Occurrence(s) 1 x  1 x       Shift Total  (mL/kg) 1625  (17.5)  1625  (17.5) 775  (8.4)  775  (8.4)   NET -1463.5 284.9 -1178.6 -676.6  -676.6   Weight (kg) 92.7 92.7 92.7 92.5 92.5 92.5         Readmission Risk Assessment Tool Score Low Risk            10       Total Score        2 . Living with Significant Other. Assisted Living. LTAC. SNF. or   Rehab    3 Patient Length of Stay (>5 days = 3)    5 Pt.  Coverage (Medicare=5 , Medicaid, or Self-Pay=4)        Criteria that do not apply:    Has Seen PCP in Last 6 Months (Yes=3, No=0)    IP Visits Last 12 Months (1-3=4, 4=9, >4=11)    Charlson Comorbidity Score (Age + Comorbid Conditions)       Expected Length of Stay 4d 21h   Actual Length of Stay 18

## 2018-02-20 NOTE — PROGRESS NOTES
Problem: Falls - Risk of  Goal: *Absence of Falls  Document Jenna Fall Risk and appropriate interventions in the flowsheet.    Outcome: Progressing Towards Goal  Fall Risk Interventions:  Mobility Interventions: Assess mobility with egress test, Bed/chair exit alarm, Patient to call before getting OOB, PT Consult for assist device competence, Utilize walker, cane, or other assitive device, Strengthening exercises (ROM-active/passive), PT Consult for mobility concerns, OT consult for ADLs, Communicate number of staff needed for ambulation/transfer    Mentation Interventions: Adequate sleep, hydration, pain control, Bed/chair exit alarm, Door open when patient unattended, Evaluate medications/consider consulting pharmacy, Eyeglasses and hearing aids, Familiar objects from home, Reorient patient, Toileting rounds, More frequent rounding, Update white board, Room close to nurse's station, Increase mobility, Family/sitter at bedside    Medication Interventions: Bed/chair exit alarm, Patient to call before getting OOB, Teach patient to arise slowly, Evaluate medications/consider consulting pharmacy    Elimination Interventions: Bed/chair exit alarm, Call light in reach, Elevated toilet seat, Toileting schedule/hourly rounds, Toilet paper/wipes in reach, Patient to call for help with toileting needs, Urinal in reach    History of Falls Interventions: Bed/chair exit alarm, Consult care management for discharge planning, Evaluate medications/consider consulting pharmacy, Room close to nurse's station, Utilize gait belt for transfer/ambulation, Door open when patient unattended

## 2018-02-20 NOTE — PROGRESS NOTES
Problem: Self Care Deficits Care Plan (Adult)  Goal: *Acute Goals and Plan of Care (Insert Text)  Occupational Therapy Goals  OT weekly reassessment 2/16/18  1. Patient will perform self-feeding with independence within 7 day(s). 2.  Patient will perform grooming with supervision/setup within 7 day(s). 3.  Patient will perform bathing with minimum assistance  within 7 day(s). 4.  Patient will perform toilet transfers with supervision within 7 day(s). 5.  Patient will perform all aspects of toileting with supervision within 7 day(s). 6.  Patient will participate in upper extremity therapeutic exercise/activities with supervision/set-up for 5 minutes within 7 day(s). Initiated 2/9/2018  1. Patient will perform self-feeding with independence within 7 day(s). 2.  Patient will perform grooming with minimal assistance/contact guard assist within 7 day(s). 3.  Patient will perform bathing with moderate assistance  within 7 day(s). 4.  Patient will perform toilet transfers with moderate assistance  within 7 day(s). 5.  Patient will perform all aspects of toileting with moderate assistance  within 7 day(s). 6.  Patient will participate in upper extremity therapeutic exercise/activities with supervision/set-up for 5 minutes within 7 day(s). Occupational Therapy TREATMENT  Patient: Holly Diana (53 y.o. male)  Date: 2/20/2018  Diagnosis: Hypothermia  Sepsis ([facility] [address]  Atrial fibrillation ([facility] [address]  MARIA <principal problem not specified>  Procedure(s) (LRB):  PACU/RECOVERY                      EP/LAB (N/A) 4 Days Post-Op  Precautions:    Chart, occupational therapy assessment, plan of care, and goals were reviewed. ASSESSMENT:  Patient received in chair and requested to transfer to bathroom. Pt on 6 mcg Dobutamine with SBP in low 80s. RN present and titrated to 6.5 mcg with standing BP 88/52 and pt asymptomatic.  Pt completed functional mobility to bathroom with RW and CGA, cues for proper hand placement with to stand. Pt completed toilet transfer with L grab bar and CGA, hygiene independently performed while seated. Pt tolerated standing at sink x 4 minutes to brush teeth, good dynamic balance with hands removed from RW support. Post ADLs BP 86/62 seated in chair. Pt is progressing and remains an appropriate IP rehab candidate as he can tolerate 3 hours of therapy and is motivated to regain his PLOF. Progression toward goals:  [x]       Improving appropriately and progressing toward goals  []       Improving slowly and progressing toward goals  []       Not making progress toward goals and plan of care will be adjusted     PLAN:  Patient continues to benefit from skilled intervention to address the above impairments. Continue treatment per established plan of care. Discharge Recommendations:  Inpatient Rehab  Further Equipment Recommendations for Discharge:  TBD     SUBJECTIVE:   Patient stated I need to get to the bathroom.     OBJECTIVE DATA SUMMARY:   Cognitive/Behavioral Status:                      Functional Mobility and Transfers for ADLs:  Bed Mobility:   OOB in recliner    Transfers:  Sit to Stand: Contact guard assistance;Assist x1;Adaptive equipment; Additional time (verbal cues for hand placement)       Balance:  Sitting: Intact  Standing: Impaired; With support  Standing - Static: Good  Standing - Dynamic : Fair    ADL Intervention:       Grooming  Brushing Teeth: Stand-by assistance (x 4 minutes at sink)                        Toileting  Bladder Hygiene: Modified independent  Bowel Hygiene: Modified indpendent (seated on commode)  Clothing Management: Minimum assistance  Adaptive Equipment: Grab bars; Walker           Pain:  Pain Scale 1: Numeric (0 - 10)  Pain Intensity 1: 0              Activity Tolerance:   Hypotensive, asymptomatic   Please refer to the flowsheet for vital signs taken during this treatment.   After treatment:   [x] Patient left in no apparent distress sitting up in chair  [] Patient left in no apparent distress in bed  [x] Call bell left within reach  [x] Nursing notified  [] Caregiver present  [x] Bed alarm activated    COMMUNICATION/COLLABORATION:   The patients plan of care was discussed with: Registered Nurse    Elaine Mckeon OT  Time Calculation: 24 mins

## 2018-02-21 LAB
ALBUMIN SERPL-MCNC: 2.5 G/DL (ref 3.5–5)
ALBUMIN/GLOB SERPL: 0.7 {RATIO} (ref 1.1–2.2)
ALP SERPL-CCNC: 81 U/L (ref 45–117)
ALT SERPL-CCNC: 17 U/L (ref 12–78)
ANION GAP SERPL CALC-SCNC: 5 MMOL/L (ref 5–15)
AST SERPL-CCNC: 32 U/L (ref 15–37)
BASOPHILS # BLD: 0 K/UL (ref 0–0.1)
BASOPHILS NFR BLD: 0 % (ref 0–1)
BILIRUB SERPL-MCNC: 0.8 MG/DL (ref 0.2–1)
BUN SERPL-MCNC: 18 MG/DL (ref 6–20)
BUN/CREAT SERPL: 11 (ref 12–20)
CALCIUM SERPL-MCNC: 7.8 MG/DL (ref 8.5–10.1)
CHLORIDE SERPL-SCNC: 102 MMOL/L (ref 97–108)
CO2 SERPL-SCNC: 30 MMOL/L (ref 21–32)
CREAT SERPL-MCNC: 1.59 MG/DL (ref 0.7–1.3)
DIFFERENTIAL METHOD BLD: ABNORMAL
EOSINOPHIL # BLD: 0 K/UL (ref 0–0.4)
EOSINOPHIL NFR BLD: 0 % (ref 0–7)
ERYTHROCYTE [DISTWIDTH] IN BLOOD BY AUTOMATED COUNT: 16.5 % (ref 11.5–14.5)
EST. AVERAGE GLUCOSE BLD GHB EST-MCNC: NORMAL MG/DL
GLOBULIN SER CALC-MCNC: 3.5 G/DL (ref 2–4)
GLUCOSE BLD STRIP.AUTO-MCNC: 122 MG/DL (ref 65–100)
GLUCOSE BLD STRIP.AUTO-MCNC: 162 MG/DL (ref 65–100)
GLUCOSE BLD STRIP.AUTO-MCNC: 91 MG/DL (ref 65–100)
GLUCOSE BLD STRIP.AUTO-MCNC: 99 MG/DL (ref 65–100)
GLUCOSE SERPL-MCNC: 82 MG/DL (ref 65–100)
HBA1C MFR BLD: 4.8 % (ref 4.2–6.3)
HCT VFR BLD AUTO: 25.8 % (ref 36.6–50.3)
HGB BLD-MCNC: 8.6 G/DL (ref 12.1–17)
IMM GRANULOCYTES # BLD: 0 K/UL (ref 0–0.04)
IMM GRANULOCYTES NFR BLD AUTO: 1 % (ref 0–0.5)
LYMPHOCYTES # BLD: 0.5 K/UL (ref 0.8–3.5)
LYMPHOCYTES NFR BLD: 20 % (ref 12–49)
MAGNESIUM SERPL-MCNC: 1.7 MG/DL (ref 1.6–2.4)
MCH RBC QN AUTO: 34.4 PG (ref 26–34)
MCHC RBC AUTO-ENTMCNC: 33.3 G/DL (ref 30–36.5)
MCV RBC AUTO: 103.2 FL (ref 80–99)
MONOCYTES # BLD: 0.5 K/UL (ref 0–1)
MONOCYTES NFR BLD: 20 % (ref 5–13)
NEUTS SEG # BLD: 1.5 K/UL (ref 1.8–8)
NEUTS SEG NFR BLD: 59 % (ref 32–75)
NRBC # BLD: 0 K/UL (ref 0–0.01)
NRBC BLD-RTO: 0 PER 100 WBC
PLATELET # BLD AUTO: 102 K/UL (ref 150–400)
PMV BLD AUTO: 11.4 FL (ref 8.9–12.9)
POTASSIUM SERPL-SCNC: 3.7 MMOL/L (ref 3.5–5.1)
PROT SERPL-MCNC: 6 G/DL (ref 6.4–8.2)
RBC # BLD AUTO: 2.5 M/UL (ref 4.1–5.7)
SERVICE CMNT-IMP: ABNORMAL
SERVICE CMNT-IMP: ABNORMAL
SERVICE CMNT-IMP: NORMAL
SERVICE CMNT-IMP: NORMAL
SODIUM SERPL-SCNC: 137 MMOL/L (ref 136–145)
WBC # BLD AUTO: 2.6 K/UL (ref 4.1–11.1)

## 2018-02-21 PROCEDURE — 65660000000 HC RM CCU STEPDOWN

## 2018-02-21 PROCEDURE — 97530 THERAPEUTIC ACTIVITIES: CPT

## 2018-02-21 PROCEDURE — 36415 COLL VENOUS BLD VENIPUNCTURE: CPT | Performed by: INTERNAL MEDICINE

## 2018-02-21 PROCEDURE — 82962 GLUCOSE BLOOD TEST: CPT

## 2018-02-21 PROCEDURE — 83036 HEMOGLOBIN GLYCOSYLATED A1C: CPT | Performed by: INTERNAL MEDICINE

## 2018-02-21 PROCEDURE — 74011250637 HC RX REV CODE- 250/637: Performed by: INTERNAL MEDICINE

## 2018-02-21 PROCEDURE — 83735 ASSAY OF MAGNESIUM: CPT | Performed by: INTERNAL MEDICINE

## 2018-02-21 PROCEDURE — 80053 COMPREHEN METABOLIC PANEL: CPT | Performed by: INTERNAL MEDICINE

## 2018-02-21 PROCEDURE — 85025 COMPLETE CBC W/AUTO DIFF WBC: CPT | Performed by: INTERNAL MEDICINE

## 2018-02-21 PROCEDURE — 74011250636 HC RX REV CODE- 250/636: Performed by: ANESTHESIOLOGY

## 2018-02-21 PROCEDURE — 77010033678 HC OXYGEN DAILY

## 2018-02-21 PROCEDURE — 74011250636 HC RX REV CODE- 250/636: Performed by: INTERNAL MEDICINE

## 2018-02-21 PROCEDURE — 97116 GAIT TRAINING THERAPY: CPT

## 2018-02-21 PROCEDURE — 77030010545

## 2018-02-21 RX ORDER — MIDODRINE HYDROCHLORIDE 5 MG/1
5 TABLET ORAL
Status: DISCONTINUED | OUTPATIENT
Start: 2018-02-21 | End: 2018-02-21

## 2018-02-21 RX ORDER — MIDODRINE HYDROCHLORIDE 5 MG/1
7.5 TABLET ORAL
Status: DISCONTINUED | OUTPATIENT
Start: 2018-02-22 | End: 2018-02-23 | Stop reason: HOSPADM

## 2018-02-21 RX ORDER — FUROSEMIDE 10 MG/ML
40 INJECTION INTRAMUSCULAR; INTRAVENOUS ONCE
Status: COMPLETED | OUTPATIENT
Start: 2018-02-22 | End: 2018-02-21

## 2018-02-21 RX ADMIN — MIDODRINE HYDROCHLORIDE 5 MG: 5 TABLET ORAL at 13:43

## 2018-02-21 RX ADMIN — NYSTATIN: 100000 POWDER TOPICAL at 21:41

## 2018-02-21 RX ADMIN — NYSTATIN: 100000 POWDER TOPICAL at 11:05

## 2018-02-21 RX ADMIN — CASTOR OIL AND BALSAM, PERU: 788; 87 OINTMENT TOPICAL at 21:41

## 2018-02-21 RX ADMIN — MINERAL OIL, PETROLATUM: 425; 568 OINTMENT OPHTHALMIC at 09:05

## 2018-02-21 RX ADMIN — MIDODRINE HYDROCHLORIDE 5 MG: 5 TABLET ORAL at 09:05

## 2018-02-21 RX ADMIN — CASTOR OIL AND BALSAM, PERU: 788; 87 OINTMENT TOPICAL at 09:06

## 2018-02-21 RX ADMIN — FUROSEMIDE 40 MG: 10 INJECTION, SOLUTION INTRAMUSCULAR; INTRAVENOUS at 23:21

## 2018-02-21 RX ADMIN — RIVAROXABAN 15 MG: 15 TABLET, FILM COATED ORAL at 09:05

## 2018-02-21 RX ADMIN — NYSTATIN: 100000 POWDER TOPICAL at 17:12

## 2018-02-21 RX ADMIN — MINERAL OIL, PETROLATUM: 425; 568 OINTMENT OPHTHALMIC at 13:44

## 2018-02-21 RX ADMIN — DOBUTAMINE HYDROCHLORIDE 10 MCG/KG/MIN: 200 INJECTION INTRAVENOUS at 15:53

## 2018-02-21 RX ADMIN — DOBUTAMINE HYDROCHLORIDE 10 MCG/KG/MIN: 200 INJECTION INTRAVENOUS at 05:39

## 2018-02-21 RX ADMIN — Medication 10 ML: at 21:41

## 2018-02-21 RX ADMIN — MIDODRINE HYDROCHLORIDE 5 MG: 5 TABLET ORAL at 17:10

## 2018-02-21 RX ADMIN — MINERAL OIL, PETROLATUM: 425; 568 OINTMENT OPHTHALMIC at 21:41

## 2018-02-21 RX ADMIN — CASTOR OIL AND BALSAM, PERU: 788; 87 OINTMENT TOPICAL at 17:11

## 2018-02-21 NOTE — PROGRESS NOTES
Bedside shift change report given to Dewayne Fuller (oncoming nurse) by Valarie Barroso RN (offgoing nurse). Report included the following information SBAR, Kardex, Intake/Output and Recent Results. 9087: Paged cardiology regarding pt low BP on dobutamine gtt at 10mcg/kg/min.

## 2018-02-21 NOTE — PROGRESS NOTES
Hospitalist Progress Note    NAME: Yovani Jasmine   :  1928   MRN:  [id number]   LOS:   23      Assessment / Plan:  Septic Shock : BP still in the low side, c/w Dobutamine, increase dose of Midodrine  Hypothermia  Bacteremia  Staph, strep and enterococcus bacteremia  Aspiration PNA treated  -completed Vancomycin IV  (18)  -MARIA shows no valvular vegetations, however is showing extracardiac lesion, CT chest with contrast did not show any lesion  -accepted to [facility], needs authorization and needs to be off of dobutamine, c/w Dobutamine, increase dose of Midodrine  Acute on chronic systolic CHF with EF 99-29% still edematous, will c/w diuresis as much as BP allows, so far weight is down, balances are negative  Acute respiratory failure with hypoxia from acute pulmonary edema (on CXR)  -c/w dobutamine, increase dose of midodrine  -BP in the low side hold lasix today  -continue Xarelto  -continue O2, wean as tolerated  -TSH wnl  -stopping BB indefinitely  -so far tolerating room air, monitor. Metabolic Encephalopathy  Delirium with hallucinations  -possible related to sepsis  -QTc high, now off haldol, was getting IV haldol in ICU without worsening QTc  -so far resolved, monitor. Chronic atrial fibrillation with junctional bradycardia  -Appreciate cardiology input  -On Amiodarone 100 mg daily  -BB stopped indefinitely  Hypernatremia  Hypoglycemia - Current resolved, check HbA1C.   Acute Kidney Injury with baseline CKD3  Cr stable  Continue to monitor lytes  Pancytopenia  -S/p 2 units plts on 2 for acute bleeding from central line   -Leukopenia resolved  -S/p Vitamin daily B12 shots x3, may need montly upon discharge  Blood in Stool PTA  -likely from Xarelto and thrombocytopenia  -Xarelto restarted  Patient with history of Left Parotid Mass s/p resection approximately 1 year ago by Dr. Mark Garcia and XRT, 2017 PET negative  Floaters in right eye   -since he was admitted here, just mentioned it today 2/19  -recommend outpatient opthalmology follow up after d/c  History of Prostate Cancer  Psoriasis  DTIs x2 left buttocks not POA Woundcare following  Code status: Partial code , no Compressions or Defibrillation. She is okay with temporary pacing, ACLS meds (like atropine) and temporary intubation. Palliative is following appreciate their visiting with patient  Prophylaxis: SCD's and H2B  Recommended Disposition: TBD  Obesity  Body mass index is 29.43 kg/(m^2). Subjective:     Chief Complaint: \"I feel fine\"    Objective:     VITALS:   Last 24hrs VS reviewed since prior progress note.  Most recent are:  Patient Vitals for the past 24 hrs:   Temp Pulse Resp BP SpO2   02/21/18 1300 - 90 - 93/55 93 %   02/21/18 1230 - 84 - (!) 89/50 (!) 87 %   02/21/18 1200 - 80 - 95/50 95 %   02/21/18 1130 - 84 - (!) 89/47 97 %   02/21/18 1104 - - - - 96 %   02/21/18 1100 - 86 - (!) 81/54 95 %   02/21/18 1055 - - - - (!) 89 %   02/21/18 1030 - 88 - (!) 82/44 -   02/21/18 1000 - 92 - 98/57 -   02/21/18 0930 - 75 - 98/53 -   02/21/18 0900 - 88 - 95/58 -   02/21/18 0831 - 87 - 100/54 -   02/21/18 0800 - 87 - 95/54 -   02/21/18 0730 97.3 °F (36.3 °C) 80 16 (!) 78/46 97 %   02/21/18 0530 - 81 - (!) 89/51 -   02/21/18 0500 - 82 - (!) 87/46 -   02/21/18 0430 - 77 - (!) 88/41 -   02/21/18 0416 97.6 °F (36.4 °C) 83 18 (!) 84/49 -   02/21/18 0230 - 92 - 90/47 -   02/21/18 0201 - 78 - 100/49 -   02/21/18 0130 - 86 - 95/48 -   02/21/18 0032 98.1 °F (36.7 °C) 90 17 93/44 98 %   02/20/18 2100 - 80 - 92/53 99 %   02/20/18 2000 98.3 °F (36.8 °C) 89 18 (!) 87/53 91 %   02/20/18 1800 98.2 °F (36.8 °C) 77 18 96/54 96 %   02/20/18 1700 98.2 °F (36.8 °C) 79 18 99/64 93 %   02/20/18 1600 98.2 °F (36.8 °C) 78 18 99/60 91 %   02/20/18 1500 98.1 °F (36.7 °C) 84 18 96/69 96 %   02/20/18 1400 98.1 °F (36.7 °C) 83 18 95/57 97 %       Intake/Output Summary (Last 24 hours) at 02/21/18 1311  Last data filed at 02/21/18 0032   Gross per 24 hour   Intake 331.01 ml   Output              850 ml   Net          -518.99 ml        PHYSICAL EXAM:  General: Alert, cooperative, no acute distress    EENT:  EOMI. Anicteric sclerae. Mucous membranes moist, right eye patched  Resp:  CTA bilaterally, no wheezing or rales. No accessory muscle use  CV:  Regular rhythm, +  edema  GI:  Soft, non distended, non tender. +Bowel sounds  Neurologic:  Alert and oriented X 3, normal speech, chronic R facial droop  Psych:   Good insight, not anxious nor agitated  Skin:  No rashes, no jaundice  ________________________________________________________________________  Care Plan discussed with:    Comments   Patient x    Family      RN y    Care Manager x    Consultant                        Multidiciplinary team rounds were held today with , nursing, pharmacist and clinical coordinator. Patient's plan of care was discussed; medications were reviewed and discharge planning was addressed. ________________________________________________________________________  Total NON critical care TIME:  20   Minutes    >50% of visit spent in counseling and coordination of care y      ________________________________________________________________________    Procedures: see electronic medical records for all procedures/Xrays and details which were not copied into this note but were reviewed prior to creation of Plan. LABS:  I reviewed today's most current labs and imaging studies.   Pertinent labs include:  Recent Labs      02/21/18 0459   WBC  2.6*   HGB  8.6*   HCT  25.8*   PLT  102*     Recent Labs      02/21/18   0459  02/20/18   0314  02/19/18   0339   NA  137  139  142   K  3.7  3.8  3.6   CL  102  104  106   CO2  30  31  32   GLU  82  81  84   BUN  18  18  19   CREA  1.59*  1.49*  1.42*   CA  7.8*  7.9*  8.0*   MG  1.7   --    --    ALB  2.5*   --    --    TBILI  0.8   --    --    SGOT  32   --    --    ALT  17   --    --        Signed: Jessica Guadarrama MD

## 2018-02-21 NOTE — PROGRESS NOTES
Cardiology Progress Note    Patient ID:  Patient: Emerita Batista  MRN: [id number]  Age: 80 y.o.  : 1928   Admit Date: 2018    Assessment: 1. Atrial fibrillation with slow ventricular response requiring dobutamine initially. Now improved. Persistent. 2. Chronic bifascicular block (RBBB and LAFB). 3. Gram-positive cocci bacteremia (Staph, Streptococcus, Enterococcus) and sepsis. No evidence of endocarditis on MARIA here (vegetation, valve destruction, abscess). Extracardiac finding not seen on CT. 4. Postural hypotension, mild and asymptomatic. SBP in chair 80-90's.  5. Cardiomyopathy NOS, EF 25-35% depending on study. 6. Acute on chronic systolic congestive heart failure, better, but lots of peripheral edema. 7. Acute kidney injury atop chronic kidney disease stage III. Improved. 8. Pancytopenia (watching platelets, still >48N, improving). 9. Encephalopathy/delirium, resolved. 10. History of R face/neck mass resected. 11. Partial code status (No shock or chest compressions). Plan:     1. Discontinue beta blocker indefinitely. 2. Continue dobutamine to 10 mcg. 3. Increase midodrine to 7.5 tid. 4. Stopped amiodarone, risk>benefit. I think he'll persist in atrial fibrillation. 5. No temporary or permanent pacemaker recommended. 6. Treating for GPC bacteremia. 7. Not a candidate for ACEI or ARB or spironolactone due to acute renal failure. 8. Would not do an ischemia evaluation at this time. I'd rechallenge with diuretic tomorrow AM.      []       High complexity decision making was performed in this patient. Subjective:     Emerita Batista denies chest pain, shortness of breath, dizziness. Review of Systems - No abdominal pain, nausea or vomiting, productive cough, hemoptysis, pleurisy.     Objective:     Physical Exam:  Temp (24hrs), Av.9 °F (36.6 °C), Min:97.3 °F (36.3 °C), Max:98.3 °F (36.8 °C)    Patient Vitals for the past 8 hrs:   Pulse   02/21/18 1700 81   02/21/18 1630 87   02/21/18 1600 84   02/21/18 1530 87   02/21/18 1500 81   02/21/18 1430 80   02/21/18 1404 86   02/21/18 1330 78   02/21/18 1300 90   02/21/18 1230 84   02/21/18 1200 80   02/21/18 1130 84   02/21/18 1100 86   02/21/18 1030 88   02/21/18 1000 92    No data found. Patient Vitals for the past 8 hrs:   BP   02/21/18 1700 101/54   02/21/18 1630 101/57   02/21/18 1600 97/59   02/21/18 1530 102/68   02/21/18 1500 112/64   02/21/18 1430 104/63   02/21/18 1404 91/58   02/21/18 1330 97/76   02/21/18 1300 93/55   02/21/18 1230 (!) 89/50   02/21/18 1200 95/50   02/21/18 1130 (!) 89/47   02/21/18 1100 (!) 81/54   02/21/18 1030 (!) 82/44   02/21/18 1000 98/57          Intake/Output Summary (Last 24 hours) at 02/21/18 1755  Last data filed at 02/21/18 0032   Gross per 24 hour   Intake           216.21 ml   Output              850 ml   Net          -633.79 ml       Nondiaphoretic, not in acute distress. MMM, no jaundice, HEENT stable. R eye patch at this time. Unlabored, clear to auscultation bilaterally, symmetric air movement. Regular rate and rhythm, no murmur, pericardial rub, knock, or gallop. No JVD but there is +++peripheral edema. Palpable radial pulses bilaterally. Abdomen soft, nontender, nondistended. Extremities without cyanosis or clubbing. Skin warm and dry. Venostasis changes in legs. Musculoskeletal exam stable. Awake, appropriate. No tremor. Cardiographics and Studies, I personally reviewed:    Telemetry:  Afib with HR 70-80's. ECHO 2/3:    LEFT VENTRICLE: The ventricle was mildly dilated. Ejection fraction was estimated in the range of 25 % to 30 %. There was moderate diffuse hypokinesis. RIGHT VENTRICLE: The ventricle was moderately dilated. Systolic function was mildly reduced. LEFT ATRIUM: The atrium was moderately dilated.     RIGHT ATRIUM: The atrium was mildly dilated. MITRAL VALVE: There was mild thickening. DOPPLER: There was moderate regurgitation. AORTIC VALVE: Leaflets exhibited mildly increased thickness. DOPPLER: There was no significant regurgitation. TRICUSPID VALVE: Normal valve structure. DOPPLER: There was moderate regurgitation. Pulmonary artery systolic pressure: 57 mmHg. There was moderate pulmonary hypertension. PULMONIC VALVE: Normal valve structure. AORTA: The root exhibited normal size. SYSTEMIC VEINS: IVC: The respirophasic change in diameter was less than 50%. PERICARDIUM: There was no pericardial effusion. The pericardium was normal in appearance. LAB Review:     No results for input(s): CPK, CKMB, CKNDX, TROIQ in the last 72 hours. No lab exists for component: CPKMB  Lab Results   Component Value Date/Time    Cholesterol, total 128 06/29/2017 10:41 AM    HDL Cholesterol 49 06/29/2017 10:41 AM    LDL, calculated 60 06/29/2017 10:41 AM    Triglyceride 94 06/29/2017 10:41 AM     No results for input(s): INR, PTP, APTT in the last 72 hours. No lab exists for component: Idris Old   Recent Labs      02/21/18   0459  02/20/18   0314  02/19/18   0339   NA  137  139  142   K  3.7  3.8  3.6   CL  102  104  106   CO2  30  31  32   BUN  18  18  19   CREA  1.59*  1.49*  1.42*   GLU  82  81  84   CA  7.8*  7.9*  8.0*   ALB  2.5*   --    --    WBC  2.6*   --    --    HGB  8.6*   --    --    HCT  25.8*   --    --    PLT  102*   --    --      Recent Labs      02/21/18   0459   SGOT  32   AP  81   TP  6.0*   ALB  2.5*   GLOB  3.5     No components found for: GLPOC  No results for input(s): PH, PCO2, PO2 in the last 72 hours. Medications Reviewed:      Allergies   Allergen Reactions    Ancef [Cefazolin] Unknown (comments)     \"drops my blood pressure\"    Neosporin [Benzalkonium Chloride] Unknown (comments)    Polysporin [Bacitracin-Polymyxin B] Other (comments)     \"WOUNDS DO NOT HEAL\"        Current Facility-Administered Medications   Medication Dose Route Frequency    [START ON 2/22/2018] midodrine (PROAMITINE) tablet 7.5 mg  7.5 mg Oral TID WITH MEALS    rivaroxaban (XARELTO) tablet 15 mg  15 mg Oral DAILY WITH BREAKFAST    DOBUTamine (DOBUTREX) 500 mg/250 mL (2,000 mcg/mL) infusion  0-10 mcg/kg/min IntraVENous TITRATE    zinc oxide-cod liver oil (DESITIN) 40 % paste   Topical PRN    balsam peru-castor oil (VENELEX)  mg/gram ointment   Topical TID    albuterol-ipratropium (DUO-NEB) 2.5 MG-0.5 MG/3 ML  3 mL Nebulization Q4H PRN    white petrolatum-mineral oil (LACRILUBE S.O.P.) ointment   Right Eye Q8H    polyethylene glycol (MIRALAX) packet 17 g  17 g Oral DAILY    insulin lispro (HUMALOG) injection   SubCUTAneous AC&HS    glucose chewable tablet 16 g  4 Tab Oral PRN    dextrose (D50W) injection syrg 12.5-25 g  12.5-25 g IntraVENous PRN    glucagon (GLUCAGEN) injection 1 mg  1 mg IntraMUSCular PRN    nystatin (MYCOSTATIN) 100,000 unit/gram powder   Topical TID    sodium chloride (NS) flush 5-10 mL  5-10 mL IntraVENous Q8H    sodium chloride (NS) flush 5-10 mL  5-10 mL IntraVENous PRN          Ethan Otero MD  2/21/2018

## 2018-02-21 NOTE — PROGRESS NOTES
PCU SHIFT NURSING NOTE      Bedside shift change report given to Bryon Amador (oncoming nurse) by Charley (offgoing nurse). Report included the following information SBAR, Kardex, Intake/Output, MAR and Recent Results. Shift Summary: 0730  Pt in bed resting  On 1LO2 NC  AFIB on monitor  Dobutamine gtt at 10mcg/kg/min (27.8mls)  (Do not titrate per Dr Wilmer Ruffin)  [facility]  Poor dentition  R eye patch  Call bell in reach    MEWS 3  Continue to monitor     1100  Pt placed on RA  SPO2 dropped to 89%  Placed back on 1L  Recovered to 96%      Admission Date 2/2/2018   Admission Diagnosis Hypothermia  Sepsis ([address]  Atrial fibrillation ([address]  MARIA   Consults IP CONSULT TO CARDIOLOGY  IP CONSULT TO HEMATOLOGY  IP CONSULT TO PALLIATIVE CARE - PROVIDER  IP CONSULT TO INFECTIOUS DISEASES  IP CONSULT TO ANESTHESIOLOGY        Consults   []PT   []OT   []Speech   []Case Management      [] Palliative      Cardiac Monitoring Order   []Yes   []No     IV drips   []Yes    Drip:                            Dose:  Drip:                            Dose:  Drip:                            Dose:   []No     GI Prophylaxis   []Yes   []No         DVT Prophylaxis   SCDs:  Sequential Compression Device: Bilateral     Patient Refused VTE Prophylaxis: Yes    Edwin stockings:         [] Medication   []Contraindicated   []None      Activity Level Activity Level: Up with Assistance     Activity Assistance: Partial (one person)   Purposeful Rounding every 1-2 hour? []Yes   Hutchison Score  Total Score: 3   Bed Alarm (If score 3 or >)   []Yes   [] Refused (See signed refusal form in chart)   Luis Score  Luis Score: 19   Luis Score (if score 14 or less)   []PMT consult   []Wound Care consult      []Specialty bed   [] Nutrition consult          Needs prior to discharge:   Home O2 required:    []Yes   []No    If yes, how much O2 required?     Other:    Last Bowel Movement: Last Bowel Movement Date: 02/20/18      Influenza Vaccine Received Flu Vaccine for Current Season (usually Sept-March): Unsure    Patient/Guardian Refused (Notify MD): No   Pneumonia Vaccine           Diet Active Orders   Diet    DIET MECHANICAL SOFT 2 GM NA (House Low NA)      LDAs               Peripheral IV 02/19/18 Right Arm (Active)   Site Assessment Clean, dry, & intact 2/21/2018 12:32 AM   Phlebitis Assessment 0 2/21/2018 12:32 AM   Infiltration Assessment 0 2/21/2018 12:32 AM   Dressing Status Clean, dry, & intact 2/21/2018 12:32 AM   Dressing Type Tape;Transparent 2/21/2018 12:32 AM   Hub Color/Line Status Blue 2/21/2018 12:32 AM   Action Taken Open ports on tubing capped 2/20/2018  6:00 PM   Alcohol Cap Used Yes 2/20/2018  6:00 PM       Peripheral IV 02/19/18 Left Hand (Active)   Site Assessment Clean, dry, & intact 2/21/2018 12:32 AM   Phlebitis Assessment 0 2/21/2018 12:32 AM   Infiltration Assessment 0 2/21/2018 12:32 AM   Dressing Status Clean, dry, & intact 2/21/2018 12:32 AM   Dressing Type Tape;Transparent 2/21/2018 12:32 AM   Hub Color/Line Status Blue;Flushed 2/21/2018 12:32 AM   Action Taken Open ports on tubing capped 2/21/2018 12:32 AM   Alcohol Cap Used Yes 2/20/2018  6:00 PM                      Urinary Catheter [REMOVED] Urinary Catheter 02/02/18 Ivy - Temperature-Criteria for Appropriate Use: Strict I/Os    Intake & Output   Date 02/20/18 0700 - 02/21/18 0659 02/21/18 0700 - 02/22/18 0659   Shift 0891-0749 1039-5956 24 Hour Total 1781-6193 2513-9213 24 Hour Total   I  N  T  A  K  E   I.V.  (mL/kg/hr) 277.3  (0.2) 187.5  (0.2) 464.8  (0.2)         DOBUTamine Volume 277.3 187. 5 464.8       Shift Total  (mL/kg) 277.3  (3) 187.5  (2) 464.8  (5)      O  U  T  P  U  T   Urine  (mL/kg/hr) 1675  (1.5) 400  (0.4) 2075  (0.9)         Urine Voided 0962 633 2561         Urine Occurrence(s) 1 x 1 x 2 x       Stool            Stool Occurrence(s) 1 x  1 x       Shift Total  (mL/kg) 1675  (18.1) 400  (4.3) 2075  (22.4)      NET -1397.7 -212.5 -1610.2      Weight (kg) 92.5 92.5 92.5 90.4 90.4 90.4         Readmission Risk Assessment Tool Score Low Risk            10       Total Score        2 . Living with Significant Other. Assisted Living. LTAC. SNF. or   Rehab    3 Patient Length of Stay (>5 days = 3)    5 Pt.  Coverage (Medicare=5 , Medicaid, or Self-Pay=4)        Criteria that do not apply:    Has Seen PCP in Last 6 Months (Yes=3, No=0)    IP Visits Last 12 Months (1-3=4, 4=9, >4=11)    Charlson Comorbidity Score (Age + Comorbid Conditions)       Expected Length of Stay 4d 21h   Actual Length of Stay 19

## 2018-02-21 NOTE — PROGRESS NOTES
PCU SHIFT NURSING NOTE      Bedside and Verbal shift change report given to Barb Teresa RN (oncoming nurse) by Fred Jovel RN (offgoing nurse). Report included the following information SBAR, Kardex, ED Summary, Intake/Output, MAR, Accordion, Recent Results and Cardiac Rhythm afib/PVC. Shift Summary:   1930: Pt sitting up in recliner with family at bedside. VSS. Pts HR controlled afib with occasional PVCs. Dobutamine gtt running at 10 mcg. Will continue to monitor BP closely. Call light within reach. 2028: Pt back in bed resting quietly. 2325: Dose of IV lasix given. Condom cath offered to pt after pt stated he \"did not want to be peeing all night. \" Pt agreed to condom cath. Will monitor urinary output and BP. Bedside and Verbal shift change report given to [id number] [place] [address] (oncoming nurse) by Dina Michaels (offgoing nurse). Report included the following information SBAR, Kardex, ED Summary, Intake/Output, MAR, Accordion, Recent Results and Cardiac Rhythm afib/PVCs. Admission Date 2/2/2018   Admission Diagnosis Hypothermia  Sepsis ([facility]  Atrial fibrillation ([facility]  MARIA   Consults IP CONSULT TO CARDIOLOGY  IP CONSULT TO HEMATOLOGY  IP CONSULT TO PALLIATIVE CARE - PROVIDER  IP CONSULT TO INFECTIOUS DISEASES  IP CONSULT TO ANESTHESIOLOGY        Consults   [x]PT   [x]OT   []Speech   []Case Management      [x] Palliative      Cardiac Monitoring Order   [x]Yes   []No     IV drips   [x]Yes    Drip: dobutamine                            Dose: 10mcg  Drip:                            Dose:  Drip:                            Dose:   []No     GI Prophylaxis   []Yes   []No         DVT Prophylaxis   SCDs:  Sequential Compression Device: Bilateral     Patient Refused VTE Prophylaxis: Yes    Edwin stockings:         [] Medication   []Contraindicated   []None      Activity Level Activity Level: Up with Assistance     Activity Assistance: Partial (one person)   Purposeful Rounding every 1-2 hour?    [x]Yes   Hutchison Score  Total Score: 3   Bed Alarm (If score 3 or >)   [x]Yes   [] Refused (See signed refusal form in chart)   Luis Score  Ulis Score: 18   Luis Score (if score 14 or less)   []PMT consult   []Wound Care consult      []Specialty bed   [] Nutrition consult          Needs prior to discharge:   Home O2 required:    []Yes   [x]No    If yes, how much O2 required? Other:    Last Bowel Movement: Last Bowel Movement Date: 02/20/18      Influenza Vaccine Received Flu Vaccine for Current Season (usually Sept-March):  Unsure    Patient/Guardian Refused (Notify MD): No   Pneumonia Vaccine           Diet Active Orders   Diet    DIET MECHANICAL SOFT 2 GM NA (House Low NA)      LDAs               Peripheral IV 02/19/18 Right Arm (Active)   Site Assessment Clean, dry, & intact 2/21/2018  4:00 PM   Phlebitis Assessment 0 2/21/2018  4:00 PM   Infiltration Assessment 0 2/21/2018  4:00 PM   Dressing Status Clean, dry, & intact 2/21/2018  4:00 PM   Dressing Type Transparent 2/21/2018  4:00 PM   Hub Color/Line Status Blue 2/21/2018  4:00 PM   Action Taken Open ports on tubing capped 2/20/2018  6:00 PM   Alcohol Cap Used Yes 2/20/2018  6:00 PM       Peripheral IV 02/19/18 Left Hand (Active)   Site Assessment Clean, dry, & intact 2/21/2018  4:00 PM   Phlebitis Assessment 0 2/21/2018  4:00 PM   Infiltration Assessment 0 2/21/2018  4:00 PM   Dressing Status Clean, dry, & intact 2/21/2018  4:00 PM   Dressing Type Transparent 2/21/2018  4:00 PM   Hub Color/Line Status Blue 2/21/2018  4:00 PM   Action Taken Open ports on tubing capped 2/21/2018 12:32 AM   Alcohol Cap Used Yes 2/20/2018  6:00 PM                      Urinary Catheter [REMOVED] Urinary Catheter 02/02/18 [facility] - Temperature-Criteria for Appropriate Use: Strict I/Os    Intake & Output   Date 02/20/18 0700 - 02/21/18 0659 02/21/18 0700 - 02/22/18 0659   Shift 5651-2545 2960-1132 24 Hour Total 6023-8098 7874-8029 24 Hour Total   I  N  T  A  K  E   I.V.  (mL/kg/hr) 277.3  (0.2) 187.5  (0.2) 464.8  (0.2) DOBUTamine Volume 277.3 187. 5 464.8       Shift Total  (mL/kg) 277.3  (3) 187.5  (2) 464.8  (5)      O  U  T  P  U  T   Urine  (mL/kg/hr) 1675  (1.5) 400  (0.4) 2075  (0.9)         Urine Voided 9786 568 4328         Urine Occurrence(s) 1 x 1 x 2 x       Stool            Stool Occurrence(s) 1 x  1 x       Shift Total  (mL/kg) 1675  (18.1) 400  (4.3) 2075  (22.4)      NET -1397.7 -212.5 -1610.2      Weight (kg) 92.5 92.5 92.5 90.4 90.4 90.4         Readmission Risk Assessment Tool Score Low Risk            10       Total Score        2 . Living with Significant Other. Assisted Living. LTAC. SNF. or   Rehab    3 Patient Length of Stay (>5 days = 3)    5 Pt.  Coverage (Medicare=5 , Medicaid, or Self-Pay=4)        Criteria that do not apply:    Has Seen PCP in Last 6 Months (Yes=3, No=0)    IP Visits Last 12 Months (1-3=4, 4=9, >4=11)    Charlson Comorbidity Score (Age + Comorbid Conditions)       Expected Length of Stay 4d 21h   Actual Length of Stay 19

## 2018-02-21 NOTE — PROGRESS NOTES
Problem: Mobility Impaired (Adult and Pediatric)  Goal: *Acute Goals and Plan of Care (Insert Text)  Physical Therapy Goals    Revised 2/16/2018  1. Patient will move from supine to sit and sit to supine  in bed with modified independence within 7 day(s). 2.  Patient will transfer from bed to chair and chair to bed with modified independence using the least restrictive device within 7 day(s). 3.  Patient will perform sit to stand with modified independence within 7 day(s). 4.  Patient will ambulate with contact guard assist for 100 feet with the least restrictive device within 7 day(s). Initiated 2/9/2018  1. Patient will move from supine to sit and sit to supine , scoot up and down and roll side to side in bed with moderate assistance  within 7 day(s). --Met 2/16/18  2. Patient will transfer from bed to chair and chair to bed with minimal assistance/contact guard assist using the least restrictive device within 7 day(s). -- Met 2/16/18  3. Patient will perform sit to stand with minimal assistance/contact guard assist within 7 day(s). --Met 2/16/18  4. Patient will ambulate with minimal assistance/contact guard assist for 25 feet with the least restrictive device within 7 day(s). --Met 2/16/18    physical Therapy TREATMENT  Patient: Leilani Johnston (89 y.o. male)  Date: 2/21/2018  Diagnosis: Hypothermia  Sepsis ([facility]  Atrial fibrillation ([facility]  MARIA <principal problem not specified>  Procedure(s) (LRB):  PACU/RECOVERY                      EP/LAB (N/A) 5 Days Post-Op  Precautions:    Chart, physical therapy assessment, plan of care and goals were reviewed. ASSESSMENT:  Patient continues with slow progress towards goals, limited by decreased endurance and weakness. Cleared by RN to mobilize; BP remains low but improved from yesterday and pt asymptomatic throughout. Patient received in bed and agreeable to ambulate. Patient ambulated 100 ft using RW with min assist for steadying, especially when turning. Verbal cues for upright posture and pursed lip breathing throughout. BP 91/58 pre-activity, SpO2 94% on room air. BP 93/56 post-activity, SpO2 96% on room air. Continue to recommend inpatient rehab at discharge to optimize functional independence. Patient very motivated and will be able to tolerate 3 hours of therapy per day. Will continue to follow. Progression toward goals:  [x]    Improving appropriately and progressing toward goals  []    Improving slowly and progressing toward goals  []    Not making progress toward goals and plan of care will be adjusted     PLAN:  Patient continues to benefit from skilled intervention to address the above impairments. Continue treatment per established plan of care. Discharge Recommendations:  Inpatient Rehab  Further Equipment Recommendations for Discharge:  none     SUBJECTIVE:   Patient stated Ok, let's walk.     OBJECTIVE DATA SUMMARY:   Critical Behavior:  Neurologic State: Alert  Orientation Level: Oriented X4  Cognition: Appropriate decision making  Safety/Judgement: Decreased insight into deficits, Decreased awareness of need for safety, Decreased awareness of need for assistance  Functional Mobility Training:  Bed Mobility:     Supine to Sit: Stand-by asssistance              Transfers:  Sit to Stand: Minimum assistance (verbal cues for hand placement)  Stand to Sit: Contact guard assistance                             Balance:  Sitting: Intact; With support  Standing: Impaired; With support  Standing - Static: Constant support;Good  Standing - Dynamic : Fair  Ambulation/Gait Training:  Distance (ft): 100 Feet (ft)  Assistive Device: Gait belt;Walker, rolling  Ambulation - Level of Assistance: Contact guard assistance;Minimal assistance (min assist for steadying when turning)        Gait Abnormalities: Decreased step clearance;Shuffling gait;Trunk sway increased        Base of Support: Widened     Speed/Christina: Fluctuations; Shuffled  Step Length: Left shortened;Right shortened          Pain:  Pain Scale 1: Numeric (0 - 10)  Pain Intensity 1: 0              Activity Tolerance:   BP 91/58 pre-activity, SpO2 94% on room air. BP 93/56 post-activity, SpO2 96% on room air. Please refer to the flowsheet for vital signs taken during this treatment.   After treatment:   [x]    Patient left in no apparent distress sitting up in chair  []    Patient left in no apparent distress in bed  [x]    Call bell left within reach  [x]    Nursing notified  []    Caregiver present  [x]    Bed alarm activated    COMMUNICATION/COLLABORATION:   The patients plan of care was discussed with: Registered Nurse    Belia Knowles, PT   Time Calculation: 24 mins

## 2018-02-22 LAB
ALBUMIN SERPL-MCNC: 2.8 G/DL (ref 3.5–5)
ALBUMIN/GLOB SERPL: 0.8 {RATIO} (ref 1.1–2.2)
ALP SERPL-CCNC: 85 U/L (ref 45–117)
ALT SERPL-CCNC: 17 U/L (ref 12–78)
ANION GAP SERPL CALC-SCNC: 4 MMOL/L (ref 5–15)
AST SERPL-CCNC: 30 U/L (ref 15–37)
BASOPHILS # BLD: 0 K/UL (ref 0–0.1)
BASOPHILS NFR BLD: 0 % (ref 0–1)
BILIRUB SERPL-MCNC: 0.9 MG/DL (ref 0.2–1)
BUN SERPL-MCNC: 16 MG/DL (ref 6–20)
BUN/CREAT SERPL: 10 (ref 12–20)
CALCIUM SERPL-MCNC: 7.9 MG/DL (ref 8.5–10.1)
CHLORIDE SERPL-SCNC: 101 MMOL/L (ref 97–108)
CO2 SERPL-SCNC: 33 MMOL/L (ref 21–32)
CREAT SERPL-MCNC: 1.68 MG/DL (ref 0.7–1.3)
DIFFERENTIAL METHOD BLD: ABNORMAL
EOSINOPHIL # BLD: 0 K/UL (ref 0–0.4)
EOSINOPHIL NFR BLD: 0 % (ref 0–7)
ERYTHROCYTE [DISTWIDTH] IN BLOOD BY AUTOMATED COUNT: 16.5 % (ref 11.5–14.5)
GLOBULIN SER CALC-MCNC: 3.3 G/DL (ref 2–4)
GLUCOSE BLD STRIP.AUTO-MCNC: 104 MG/DL (ref 65–100)
GLUCOSE BLD STRIP.AUTO-MCNC: 126 MG/DL (ref 65–100)
GLUCOSE BLD STRIP.AUTO-MCNC: 90 MG/DL (ref 65–100)
GLUCOSE BLD STRIP.AUTO-MCNC: 98 MG/DL (ref 65–100)
GLUCOSE SERPL-MCNC: 84 MG/DL (ref 65–100)
HCT VFR BLD AUTO: 27.2 % (ref 36.6–50.3)
HGB BLD-MCNC: 9 G/DL (ref 12.1–17)
IMM GRANULOCYTES # BLD: 0 K/UL (ref 0–0.04)
IMM GRANULOCYTES NFR BLD AUTO: 1 % (ref 0–0.5)
LYMPHOCYTES # BLD: 0.6 K/UL (ref 0.8–3.5)
LYMPHOCYTES NFR BLD: 23 % (ref 12–49)
MAGNESIUM SERPL-MCNC: 1.9 MG/DL (ref 1.6–2.4)
MCH RBC QN AUTO: 34.4 PG (ref 26–34)
MCHC RBC AUTO-ENTMCNC: 33.1 G/DL (ref 30–36.5)
MCV RBC AUTO: 103.8 FL (ref 80–99)
MONOCYTES # BLD: 0.5 K/UL (ref 0–1)
MONOCYTES NFR BLD: 19 % (ref 5–13)
NEUTS SEG # BLD: 1.4 K/UL (ref 1.8–8)
NEUTS SEG NFR BLD: 56 % (ref 32–75)
NRBC # BLD: 0 K/UL (ref 0–0.01)
NRBC BLD-RTO: 0 PER 100 WBC
PLATELET # BLD AUTO: 106 K/UL (ref 150–400)
PMV BLD AUTO: 11.5 FL (ref 8.9–12.9)
POTASSIUM SERPL-SCNC: 3.8 MMOL/L (ref 3.5–5.1)
PROT SERPL-MCNC: 6.1 G/DL (ref 6.4–8.2)
RBC # BLD AUTO: 2.62 M/UL (ref 4.1–5.7)
SERVICE CMNT-IMP: ABNORMAL
SERVICE CMNT-IMP: ABNORMAL
SERVICE CMNT-IMP: NORMAL
SERVICE CMNT-IMP: NORMAL
SODIUM SERPL-SCNC: 138 MMOL/L (ref 136–145)
WBC # BLD AUTO: 2.5 K/UL (ref 4.1–11.1)

## 2018-02-22 PROCEDURE — 92526 ORAL FUNCTION THERAPY: CPT

## 2018-02-22 PROCEDURE — 83735 ASSAY OF MAGNESIUM: CPT | Performed by: INTERNAL MEDICINE

## 2018-02-22 PROCEDURE — 36415 COLL VENOUS BLD VENIPUNCTURE: CPT | Performed by: INTERNAL MEDICINE

## 2018-02-22 PROCEDURE — 74011250637 HC RX REV CODE- 250/637: Performed by: INTERNAL MEDICINE

## 2018-02-22 PROCEDURE — 77010033678 HC OXYGEN DAILY

## 2018-02-22 PROCEDURE — 80053 COMPREHEN METABOLIC PANEL: CPT | Performed by: INTERNAL MEDICINE

## 2018-02-22 PROCEDURE — 85025 COMPLETE CBC W/AUTO DIFF WBC: CPT | Performed by: INTERNAL MEDICINE

## 2018-02-22 PROCEDURE — 65660000000 HC RM CCU STEPDOWN

## 2018-02-22 PROCEDURE — 82962 GLUCOSE BLOOD TEST: CPT

## 2018-02-22 PROCEDURE — 74011250636 HC RX REV CODE- 250/636: Performed by: ANESTHESIOLOGY

## 2018-02-22 RX ORDER — FUROSEMIDE 40 MG/1
40 TABLET ORAL DAILY
Status: DISCONTINUED | OUTPATIENT
Start: 2018-02-23 | End: 2018-02-23

## 2018-02-22 RX ADMIN — MIDODRINE HYDROCHLORIDE 7.5 MG: 5 TABLET ORAL at 12:23

## 2018-02-22 RX ADMIN — CASTOR OIL AND BALSAM, PERU: 788; 87 OINTMENT TOPICAL at 09:48

## 2018-02-22 RX ADMIN — RIVAROXABAN 15 MG: 15 TABLET, FILM COATED ORAL at 08:39

## 2018-02-22 RX ADMIN — MIDODRINE HYDROCHLORIDE 7.5 MG: 5 TABLET ORAL at 08:37

## 2018-02-22 RX ADMIN — Medication: at 17:43

## 2018-02-22 RX ADMIN — MINERAL OIL, PETROLATUM: 425; 568 OINTMENT OPHTHALMIC at 21:22

## 2018-02-22 RX ADMIN — NYSTATIN: 100000 POWDER TOPICAL at 09:48

## 2018-02-22 RX ADMIN — NYSTATIN: 100000 POWDER TOPICAL at 15:52

## 2018-02-22 RX ADMIN — Medication 10 ML: at 21:23

## 2018-02-22 RX ADMIN — CASTOR OIL AND BALSAM, PERU: 788; 87 OINTMENT TOPICAL at 21:22

## 2018-02-22 RX ADMIN — NYSTATIN: 100000 POWDER TOPICAL at 21:22

## 2018-02-22 RX ADMIN — MINERAL OIL, PETROLATUM: 425; 568 OINTMENT OPHTHALMIC at 14:12

## 2018-02-22 RX ADMIN — DOBUTAMINE HYDROCHLORIDE 10 MCG/KG/MIN: 200 INJECTION INTRAVENOUS at 11:33

## 2018-02-22 RX ADMIN — CASTOR OIL AND BALSAM, PERU: 788; 87 OINTMENT TOPICAL at 15:52

## 2018-02-22 RX ADMIN — Medication 10 ML: at 02:46

## 2018-02-22 RX ADMIN — MIDODRINE HYDROCHLORIDE 7.5 MG: 5 TABLET ORAL at 16:54

## 2018-02-22 RX ADMIN — Medication 10 ML: at 14:12

## 2018-02-22 RX ADMIN — DOBUTAMINE HYDROCHLORIDE 10 MCG/KG/MIN: 200 INJECTION INTRAVENOUS at 03:23

## 2018-02-22 NOTE — PROGRESS NOTES
Problem: Dysphagia (Adult)  Goal: *Acute Goals and Plan of Care (Insert Text)  2/12/2018  Speech path  1. Pt will participate with MBS. MET 2/13/18 2/13/2018  REEVAL 2/20/17  1. Patient will tolerate mechanical soft/nectar thick liquid diet without overt s/s of aspiration within 7 days. 2. Patient will perform effortful swallow swallowing exercises x20 with min cues without overt s/s of aspiration within 7 days. Speech language pathology dysphagia treatment  Patient: David Pittman (36 y.o. male)  Date: 2/22/2018  Diagnosis: Hypothermia  Sepsis ([facility]  Atrial fibrillation ([facility]  MARIA <principal problem not specified>  Procedure(s) (LRB):  PACU/RECOVERY                      EP/LAB (N/A) 6 Days Post-Op  Precautions: swallow      ASSESSMENT:  Patient seen for swallow treatment this date. Patient completed 20 effortful swallows with noted effort given min verbal cues. Patient with throat-clear x4, suspect due to lack of bolus control from swallowing puree in piecemeal. Patient without any overt s/s of aspiration when SLP student clarified that patient did not need to piecemeal swallow to perform exercise. Patient continues to benefit from laryngeal strengthening exercises. Patient remains at risk for aspiration secondary to right facial droop and cognitive status. Progression toward goals:  []         Improving appropriately and progressing toward goals  [x]         Improving slowly and progressing toward goals  []         Not making progress toward goals and plan of care will be adjusted     PLAN:  Recommendations and Planned Interventions:  --Mechanical soft/nectar thick diet  --No straws  --Meds crushed in applesauce  --SLP to follow up for laryngeal strengthening exercises    Patient continues to benefit from skilled intervention to address the above impairments. Continue treatment per established plan of care.   Discharge Recommendations:  Inpatient Rehab     SUBJECTIVE:   Patient stated I've been here 10 days.     OBJECTIVE:   Cognitive and Communication Status:  Neurologic State: Eyes open spontaneously, Alert, Appropriate for age  Orientation Level: Oriented to person, Oriented to place, Oriented to situation  Cognition: Appropriate decision making, Follows commands  Perception: Appears intact  Perseveration: No perseveration noted  Safety/Judgement: Decreased insight into deficits, Decreased awareness of need for safety, Decreased awareness of need for assistance  Dysphagia Treatment:     Exercises:  Laryngeal Exercises:                    Effortful Swallow: Yes  Sets : 1  Reps : 20                                                                                                        Pain:  Pain Scale 1: Numeric (0 - 10)  Pain Intensity 1: 0     After treatment:   [x]              Patient left in no apparent distress sitting up in chair  []              Patient left in no apparent distress in bed  [x]              Call bell left within reach  [x]              Nursing notified  []              Caregiver present  []              Bed alarm activated    COMMUNICATION/EDUCATION:     The patients plan of care including recommendations, planned interventions, and recommended diet changes were discussed with: Registered Nurse.       Neida Jalloh  Time Calculation: 10 mins

## 2018-02-22 NOTE — PROGRESS NOTES
PCU SHIFT NURSING NOTE      Bedside and Verbal shift change report given to Luba Rodas RN and Samm Pedraza RN (oncoming nurse) by  Jennifer Quintanilla RN(offgoing nurse). Report included the following information SBAR, Kardex, Procedure Summary, Intake/Output, MAR, Accordion and Recent Results. Shift Summary:    0730 Received report and assumed care of patient. Alert to person, place, situation. Blood pressure labile (90s/50s). Dobutamine drip running 10mcg/kg/min. A-fib, PVC's on occasion. Ambulating 1xassist with walker. Skin is very fragile-recommend paper tape use. 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 Daily goals include starting midodrine and reducing dopamine gtt as tolerated. 0800 Up to the chair for breakfast. Tolerated walking with walker well. Asymptomatic orthostatic hypotension noted with movement. 1342 Dobutamine gtt discontinued by Samm Pedraza RN per MD request.  1600  Blood pressure stable on oral midodrine TID.  1700  Discontinued nasal cannula. Monitoring oxygen saturations on room air. 1800 Oxygen saturations have been 95-97 on continuous monitor on room air.           Admission Date 2/2/2018   Admission Diagnosis Hypothermia  Sepsis ([facility] [address]  Atrial fibrillation ([facility] [address]  MARIA   Consults IP CONSULT TO CARDIOLOGY  IP CONSULT TO HEMATOLOGY  IP CONSULT TO PALLIATIVE CARE - PROVIDER  IP CONSULT TO INFECTIOUS DISEASES  IP CONSULT TO ANESTHESIOLOGY        Consults   [x]PT   [x]OT   []Speech   [x]Case Management      [] Palliative      Cardiac Monitoring Order   [x]Yes   []No     IV drips   [x]Yes    Drip:            Dobutamine                Dose: 10 mcg/kg/min  Drip:                            Dose:  Drip:                            Dose:   []No     GI Prophylaxis   []Yes   []No         DVT Prophylaxis   SCDs:  Sequential Compression Device: Bilateral     Patient Refused VTE Prophylaxis: Yes    Edwin stockings:         [] Medication   []Contraindicated   []None      Activity Level Activity Level: Up with Assistance     Activity Assistance: Partial (one person)   Purposeful Rounding every 1-2 hour? []Yes   Hutchison Score  Total Score: 3   Bed Alarm (If score 3 or >)   [x]Yes   [] Refused (See signed refusal form in chart)   Luis Score  Luis Score: 18   Luis Score (if score 14 or less)   []PMT consult   [x]Wound Care consult      []Specialty bed   [] Nutrition consult          Needs prior to discharge:   Home O2 required:    []Yes   [x]No    If yes, how much O2 required? Other:    Last Bowel Movement: Last Bowel Movement Date: 02/20/18      Influenza Vaccine Received Flu Vaccine for Current Season (usually Sept-March): Unsure    Patient/Guardian Refused (Notify MD): No   Pneumonia Vaccine           Diet Active Orders   Diet    DIET MECHANICAL SOFT 2 GM NA (House Low NA)      LDAs               Peripheral IV 02/19/18 Right Arm (Active)   Site Assessment Clean, dry, & intact 2/22/2018  3:30 AM   Phlebitis Assessment 0 2/22/2018  3:30 AM   Infiltration Assessment 0 2/22/2018  3:30 AM   Dressing Status Clean, dry, & intact 2/22/2018  3:30 AM   Dressing Type Tape;Transparent 2/22/2018  3:30 AM   Hub Color/Line Status Blue; Infusing;Flushed 2/22/2018  3:30 AM   Action Taken Open ports on tubing capped 2/22/2018  3:30 AM   Alcohol Cap Used Yes 2/22/2018  3:30 AM       Peripheral IV 02/19/18 Left Hand (Active)   Site Assessment Clean, dry, & intact 2/22/2018  3:30 AM   Phlebitis Assessment 0 2/22/2018  3:30 AM   Infiltration Assessment 0 2/22/2018  3:30 AM   Dressing Status New;Intact 2/22/2018  3:30 AM   Dressing Type Transparent;Tape 2/22/2018  3:30 AM   Hub Color/Line Status Blue;Capped;Flushed 2/22/2018  3:30 AM   Action Taken Open ports on tubing capped 2/22/2018  3:30 AM   Alcohol Cap Used Yes 2/22/2018  3:30 AM                      Urinary Catheter [REMOVED] Urinary Catheter 02/02/18 [facility] - Temperature-Criteria for Appropriate Use: Strict I/Os    Intake & Output   Date 02/21/18 0700 - 02/22/18 5147 02/22/18 0700 - 02/23/18 0659   Shift 9990-9422 8941-3396 24 Hour Total 0700-1859 1900-0659 24 Hour Total   I  N  T  A  K  E   Shift Total  (mL/kg)         O  U  T  P  U  T   Urine  (mL/kg/hr)  500  (0.5) 500  (0.2)         Urine Voided  500 500         Urine Occurrence(s)  2 x 2 x       Stool            Stool Occurrence(s)  2 x 2 x       Shift Total  (mL/kg)  500  (5.5) 500  (5.5)      NET  -500 -500      Weight (kg) 90.4 90.2 90.2 90.2 90.2 90.2         Readmission Risk Assessment Tool Score Low Risk            10       Total Score        2 . Living with Significant Other. Assisted Living. LTAC. SNF. or   Rehab    3 Patient Length of Stay (>5 days = 3)    5 Pt.  Coverage (Medicare=5 , Medicaid, or Self-Pay=4)        Criteria that do not apply:    Has Seen PCP in Last 6 Months (Yes=3, No=0)    IP Visits Last 12 Months (1-3=4, 4=9, >4=11)    Charlson Comorbidity Score (Age + Comorbid Conditions)       Expected Length of Stay 4d 21h   Actual Length of Stay 20

## 2018-02-22 NOTE — PROGRESS NOTES
PCU SHIFT NURSING NOTE      Bedside and Verbal shift change report given to Claire Jones RN (oncoming nurse) by López Canada RN (offgoing nurse). Report included the following information SBAR, Kardex, Intake/Output, MAR, Accordion, Recent Results and Cardiac Rhythm afib. Shift Summary:   Uneventful shift  Bedside and Verbal shift change report given to [address] [place] [address] (oncoming nurse) by Page Fabry (offgoing nurse). Report included the following information SBAR, Kardex, ED Summary, Intake/Output, MAR, Accordion, Recent Results and Cardiac Rhythm afib. Admission Date 2/2/2018   Admission Diagnosis Hypothermia  Sepsis ([facility]  Atrial fibrillation ([facility] [address]  MARIA   Consults IP CONSULT TO CARDIOLOGY  IP CONSULT TO HEMATOLOGY  IP CONSULT TO INFECTIOUS DISEASES  IP CONSULT TO ANESTHESIOLOGY        Consults   []PT   []OT   []Speech   []Case Management      [] Palliative      Cardiac Monitoring Order   []Yes   []No     IV drips   []Yes    Drip:                            Dose:  Drip:                            Dose:  Drip:                            Dose:   []No     GI Prophylaxis   []Yes   []No         DVT Prophylaxis   SCDs:  Sequential Compression Device: Bilateral     Patient Refused VTE Prophylaxis: Yes    Edwin stockings:         [] Medication   []Contraindicated   []None      Activity Level Activity Level: Ambulate X (#), Up ad rigo     Activity Assistance: Partial (one person)   Purposeful Rounding every 1-2 hour? []Yes   Hutchison Score  Total Score: 2   Bed Alarm (If score 3 or >)   []Yes   [] Refused (See signed refusal form in chart)   Luis Score  Luis Score: 19   Luis Score (if score 14 or less)   []PMT consult   []Wound Care consult      []Specialty bed   [] Nutrition consult          Needs prior to discharge:   Home O2 required:    []Yes   []No    If yes, how much O2 required?     Other:    Last Bowel Movement: Last Bowel Movement Date: 02/21/18      Influenza Vaccine Received Flu Vaccine for Current Season (usually Sept-March): Unsure    Patient/Guardian Refused (Notify MD): No   Pneumonia Vaccine           Diet Active Orders   Diet    DIET MECHANICAL SOFT 2 GM NA (House Low NA)      LDAs               Peripheral IV 02/19/18 Right Arm (Active)   Site Assessment Dry; Intact 2/22/2018  3:24 PM   Phlebitis Assessment 0 2/22/2018  3:24 PM   Infiltration Assessment 0 2/22/2018  3:24 PM   Dressing Status Dry; Intact 2/22/2018  3:24 PM   Dressing Type Transparent 2/22/2018  3:24 PM   Hub Color/Line Status Blue 2/22/2018  3:24 PM   Action Taken Open ports on tubing capped 2/22/2018  3:24 PM   Alcohol Cap Used Yes 2/22/2018  3:24 PM       Peripheral IV 02/19/18 Left Hand (Active)   Site Assessment Dry; Intact 2/22/2018  3:24 PM   Phlebitis Assessment 0 2/22/2018  3:24 PM   Infiltration Assessment 0 2/22/2018  3:24 PM   Dressing Status Dry; Intact 2/22/2018  3:24 PM   Dressing Type Transparent 2/22/2018  3:24 PM   Hub Color/Line Status Blue 2/22/2018  3:24 PM   Action Taken Open ports on tubing capped 2/22/2018  3:24 PM   Alcohol Cap Used Yes 2/22/2018  3:24 PM                      Urinary Catheter [REMOVED] Urinary Catheter 02/02/18 [facility] - Temperature-Criteria for Appropriate Use: Strict I/Os    Intake & Output   Date 02/21/18 0700 - 02/22/18 0659 02/22/18 0700 - 02/23/18 0659   Shift 7214-3533 4364-1585 24 Hour Total 2234-6985 6468-2681 24 Hour Total   I  N  T  A  K  E   P.O.    590  590      P. O.    590  590    I.V.  (mL/kg/hr)    1066.7  1066.7      DOBUTamine Volume    1066.7  1066.7    Shift Total  (mL/kg)    1656.7  (18.4)  1656.7  (18.4)   O  U  T  P  U  T   Urine  (mL/kg/hr)  500  (0.5) 500  (0.2)         Urine Voided  500 500         Urine Occurrence(s)  2 x 2 x 2 x  2 x    Stool            Stool Occurrence(s)  2 x 2 x       Shift Total  (mL/kg)  500  (5.5) 500  (5.5)      NET  -500 -500 1656.7  1656.7   Weight (kg) 90.4 90.2 90.2 90.2 90.2 90.2         Readmission Risk Assessment Tool Score Low Risk            10       Total Score        2 . Living with Significant Other. Assisted Living. LTAC. SNF. or   Rehab    3 Patient Length of Stay (>5 days = 3)    5 Pt.  Coverage (Medicare=5 , Medicaid, or Self-Pay=4)        Criteria that do not apply:    Has Seen PCP in Last 6 Months (Yes=3, No=0)    IP Visits Last 12 Months (1-3=4, 4=9, >4=11)    Charlson Comorbidity Score (Age + Comorbid Conditions)       Expected Length of Stay 4d 21h   Actual Length of Stay 20

## 2018-02-22 NOTE — PROGRESS NOTES
Attempted PT session, at time of attempt, nursing is with patient, has just helped him back to bed, and is prepared to do a dressing change on his eye. Will come back later today, if time, or resume PT treatment tomorrow, 2/23/2017.

## 2018-02-22 NOTE — PROGRESS NOTES
Cardiology Progress Note    Patient ID:  Patient: Stacie Jain  MRN: [id number]  Age: 80 y.o.  : 1928   Admit Date: 2018    Assessment: 1. Persistent atrial fibrillation with slow ventricular response requiring dobutamine initially. 2. Chronic bifascicular block (RBBB and LAFB). 3. Gram-positive cocci bacteremia (Staph, Streptococcus, Enterococcus) and sepsis. No evidence of endocarditis on MARIA here (vegetation, valve destruction, abscess). Extracardiac finding not seen on CT. 4. Postural hypotension, mild and asymptomatic. SBP in chair 80-90's.  5. Cardiomyopathy NOS, EF 25-35% depending on study. 6. Acute on chronic systolic congestive heart failure, better, but lots of peripheral edema. 7. Acute kidney injury atop chronic kidney disease stage III. Improved. 8. Pancytopenia (watching platelets, still >93P, improving). 9. Encephalopathy/delirium, resolved. 10. History of R face/neck mass resected. 11. Partial code status (No shock or chest compressions). Plan:     1. Discontinued beta blocker indefinitely. 2. Stop dobutamine. 3. Increased midodrine to 7.5 tid. 4. Start furosemide 40 mg po daily tomorrow. 5. Stopped amiodarone, risk>benefit. I think he'll persist in atrial fibrillation. 6. No temporary or permanent pacemaker recommended. 7. Not a candidate for ACEI or ARB or spironolactone due to acute renal failure. 8. Would not do an ischemia evaluation at this time. 9. Getting treated for GPC bacteremia. []       High complexity decision making was performed in this patient. Subjective:     Stacie Jain denies chest pain, shortness of breath, dizziness. Review of Systems - No abdominal pain, nausea or vomiting, productive cough, hemoptysis, pleurisy.     Objective:     Physical Exam:  Temp (24hrs), Av.6 °F (36.4 °C), Min:97.5 °F (36.4 °C), Max:97.9 °F (36.6 °C)    Patient Vitals for the past 8 hrs:   Pulse   02/22/18 1133 80   02/22/18 1100 78   02/22/18 1035 82   02/22/18 0902 90   02/22/18 0837 83   02/22/18 0730 74    Patient Vitals for the past 8 hrs:   Resp   02/22/18 1100 16   02/22/18 0730 16    Patient Vitals for the past 8 hrs:   BP   02/22/18 1133 96/51   02/22/18 1100 106/66   02/22/18 1035 96/55   02/22/18 0902 90/55   02/22/18 0837 91/57   02/22/18 0730 93/57          Intake/Output Summary (Last 24 hours) at 02/22/18 1316  Last data filed at 02/22/18 3372   Gross per 24 hour   Intake          1238.74 ml   Output              500 ml   Net           738.74 ml       Nondiaphoretic, not in acute distress. MMM, no jaundice, HEENT stable. R eye patch at this time. Unlabored, clear to auscultation bilaterally, symmetric air movement. Regular rate and rhythm, no murmur, pericardial rub, knock, or gallop. No JVD but there is +++peripheral edema. Palpable radial pulses bilaterally. Abdomen soft, nontender, nondistended. Extremities without cyanosis or clubbing. Skin warm and dry. Venostasis changes in legs. Musculoskeletal exam stable. Awake, appropriate. No tremor. Cardiographics and Studies, I personally reviewed:    Telemetry:  Afib with HR 80's. ECHO 2/3:    LEFT VENTRICLE: The ventricle was mildly dilated. Ejection fraction was estimated in the range of 25 % to 30 %. There was moderate diffuse hypokinesis. RIGHT VENTRICLE: The ventricle was moderately dilated. Systolic function was mildly reduced. LEFT ATRIUM: The atrium was moderately dilated. RIGHT ATRIUM: The atrium was mildly dilated. MITRAL VALVE: There was mild thickening. DOPPLER: There was moderate regurgitation. AORTIC VALVE: Leaflets exhibited mildly increased thickness. DOPPLER: There was no significant regurgitation. TRICUSPID VALVE: Normal valve structure. DOPPLER: There was moderate regurgitation. Pulmonary artery systolic pressure: 57 mmHg.  There was moderate pulmonary hypertension. PULMONIC VALVE: Normal valve structure. AORTA: The root exhibited normal size. SYSTEMIC VEINS: IVC: The respirophasic change in diameter was less than 50%. PERICARDIUM: There was no pericardial effusion. The pericardium was normal in appearance. LAB Review:     No results for input(s): CPK, CKMB, CKNDX, TROIQ in the last 72 hours. No lab exists for component: CPKMB  Lab Results   Component Value Date/Time    Cholesterol, total 128 06/29/2017 10:41 AM    HDL Cholesterol 49 06/29/2017 10:41 AM    LDL, calculated 60 06/29/2017 10:41 AM    Triglyceride 94 06/29/2017 10:41 AM     No results for input(s): INR, PTP, APTT in the last 72 hours. No lab exists for component: Brian Jacques   Recent Labs      02/22/18 0253 02/21/18 0459 02/20/18   0314   NA  138  137  139   K  3.8  3.7  3.8   CL  101  102  104   CO2  33*  30  31   BUN  16  18  18   CREA  1.68*  1.59*  1.49*   GLU  84  82  81   CA  7.9*  7.8*  7.9*   ALB  2.8*  2.5*   --    WBC  2.5*  2.6*   --    HGB  9.0*  8.6*   --    HCT  27.2*  25.8*   --    PLT  106*  102*   --      Recent Labs      02/22/18 0253 02/21/18 0459   SGOT  30  32   AP  85  81   TP  6.1*  6.0*   ALB  2.8*  2.5*   GLOB  3.3  3.5     No components found for: GLPOC  No results for input(s): PH, PCO2, PO2 in the last 72 hours. Medications Reviewed:      Allergies   Allergen Reactions    Ancef [Cefazolin] Unknown (comments)     \"drops my blood pressure\"    Neosporin [Benzalkonium Chloride] Unknown (comments)    Polysporin [Bacitracin-Polymyxin B] Other (comments)     \"WOUNDS DO NOT HEAL\"        Current Facility-Administered Medications   Medication Dose Route Frequency    midodrine (PROAMITINE) tablet 7.5 mg  7.5 mg Oral TID WITH MEALS    rivaroxaban (XARELTO) tablet 15 mg  15 mg Oral DAILY WITH BREAKFAST    DOBUTamine (DOBUTREX) 500 mg/250 mL (2,000 mcg/mL) infusion  0-10 mcg/kg/min IntraVENous TITRATE    zinc oxide-cod liver oil (DESITIN) 40 % paste   Topical PRN    balsam peru-castor oil (VENELEX)  mg/gram ointment   Topical TID    albuterol-ipratropium (DUO-NEB) 2.5 MG-0.5 MG/3 ML  3 mL Nebulization Q4H PRN    white petrolatum-mineral oil (LACRILUBE S.O.P.) ointment   Right Eye Q8H    polyethylene glycol (MIRALAX) packet 17 g  17 g Oral DAILY    insulin lispro (HUMALOG) injection   SubCUTAneous AC&HS    glucose chewable tablet 16 g  4 Tab Oral PRN    dextrose (D50W) injection syrg 12.5-25 g  12.5-25 g IntraVENous PRN    glucagon (GLUCAGEN) injection 1 mg  1 mg IntraMUSCular PRN    nystatin (MYCOSTATIN) 100,000 unit/gram powder   Topical TID    sodium chloride (NS) flush 5-10 mL  5-10 mL IntraVENous Q8H    sodium chloride (NS) flush 5-10 mL  5-10 mL IntraVENous PRN          Kira More MD  2/22/2018

## 2018-02-22 NOTE — PROGRESS NOTES
Hospitalist Progress Note    NAME: Emerita Batista   :  1928   MRN:  [id number]   LOS:   21      Assessment / Plan:  Septic Shock : BP still in the low side, c/w Dobutamine, further increase in dose of Midodrine noted  Hypothermia  Bacteremia  Staph, strep and enterococcus bacteremia  Aspiration PNA treated  -completed Vancomycin IV  (18)  -MARIA shows no valvular vegetations, however is showing extracardiac lesion, CT chest with contrast did not show any lesion  -accepted to [facility], needs authorization and needs to be off of dobutamine, c/w Dobutamine, increased dose of Midodrine  Acute on chronic systolic CHF with EF 27-97% still edematous, will c/w diuresis as much as BP allows, so far weight continues going down and keeping negative balances, BP still in the low side no room for diuretics  Acute respiratory failure with hypoxia from acute pulmonary edema (on CXR)  -c/w dobutamine, increase dose of midodrine  -BP in the low side hold lasix today  -continue Xarelto  -continue O2, wean as tolerated, so far down to 1LNC  -TSH wnl  -stopping BB indefinitely  -so far tolerating room air, monitor. Metabolic Encephalopathy  Delirium with hallucinations  -possible related to sepsis  -QTc high, now off haldol, was getting IV haldol in ICU without worsening QTc  -so far resolved, monitor. Chronic atrial fibrillation with junctional bradycardia  -Appreciate cardiology input  -On Amiodarone 100 mg daily  -BB stopped indefinitely  Hypernatremia  Hypoglycemia - Current resolved, check HbA1C.   Acute Kidney Injury with baseline CKD3  Creatinine slowly trending up  Continue to monitor lytes  Pancytopenia  -S/p 2 units plts on  for acute bleeding from central line   -Leukopenia resolved  -S/p Vitamin daily B12 shots x3, may need montly upon discharge  Blood in Stool PTA  -likely from Xarelto and thrombocytopenia  -Xarelto restarted  Patient with history of Left Parotid Mass s/p resection approximately 1 year ago by Dr. Jane Lob and XRT, July 2017 PET negative  Floaters in right eye   -since he was admitted here, just mentioned it today 2/19  -recommend outpatient opthalmology follow up after d/c  History of Prostate Cancer  Psoriasis  DTIs x2 left buttocks not POA Woundcare following  Code status: Partial code , no Compressions or Defibrillation. She is okay with temporary pacing, ACLS meds (like atropine) and temporary intubation. Palliative is following appreciate their visiting with patient  Prophylaxis: SCD's and H2B  Recommended Disposition: TBD  Obesity  Body mass index is 29.37 kg/(m^2). Subjective:     Chief Complaint: \"I feel good\"    Objective:     VITALS:   Last 24hrs VS reviewed since prior progress note.  Most recent are:  Patient Vitals for the past 24 hrs:   Temp Pulse Resp BP SpO2   02/22/18 0902 - 90 - 90/55 98 %   02/22/18 0837 - 83 - 91/57 -   02/22/18 0730 97.5 °F (36.4 °C) 74 16 93/57 93 %   02/22/18 0400 - - - 98/50 -   02/22/18 0330 97.5 °F (36.4 °C) 74 16 (!) 87/56 95 %   02/22/18 0323 - 69 - (!) 81/57 -   02/21/18 2321 - 82 - 105/59 -   02/21/18 2300 97.9 °F (36.6 °C) 78 16 105/59 95 %   02/21/18 1930 97.6 °F (36.4 °C) 80 16 95/44 98 %   02/21/18 1700 - 81 - 101/54 98 %   02/21/18 1630 - 87 - 101/57 100 %   02/21/18 1600 - 84 - 97/59 96 %   02/21/18 1530 - 87 - 102/68 98 %   02/21/18 1500 - 81 - 112/64 100 %   02/21/18 1430 - 80 - 104/63 97 %   02/21/18 1404 - 86 - 91/58 98 %   02/21/18 1330 - 78 - 97/76 95 %   02/21/18 1300 - 90 - 93/55 93 %   02/21/18 1230 - 84 - (!) 89/50 (!) 87 %   02/21/18 1200 - 80 - 95/50 95 %   02/21/18 1130 - 84 - (!) 89/47 97 %   02/21/18 1104 - - - - 96 %   02/21/18 1100 - 86 - (!) 81/54 95 %   02/21/18 1055 - - - - (!) 89 %   02/21/18 1030 - 88 - (!) 82/44 -   02/21/18 1000 - 92 - 98/57 -       Intake/Output Summary (Last 24 hours) at 02/22/18 0949  Last data filed at 02/22/18 0730   Gross per 24 hour   Intake           888.74 ml   Output              500 ml   Net 388.74 ml        PHYSICAL EXAM:  General: Alert, cooperative, no acute distress    EENT:  EOMI. Anicteric sclerae. Mucous membranes moist, right eye patched  Resp:  Coarse BS  CV:  Regular rhythm, +  edema  GI:  Soft, non distended, non tender. +Bowel sounds  Neurologic:  Alert and oriented X 3, normal speech, chronic R facial droop  Psych:   Good insight, not anxious nor agitated  Skin:  No rashes, no jaundice  ________________________________________________________________________  Care Plan discussed with:    Comments   Patient x    Family      RN y    Care Manager x    Consultant                        Multidiciplinary team rounds were held today with , nursing, pharmacist and clinical coordinator. Patient's plan of care was discussed; medications were reviewed and discharge planning was addressed. ________________________________________________________________________  Total NON critical care TIME:  20   Minutes    >50% of visit spent in counseling and coordination of care y      ________________________________________________________________________    Procedures: see electronic medical records for all procedures/Xrays and details which were not copied into this note but were reviewed prior to creation of Plan. LABS:  I reviewed today's most current labs and imaging studies.   Pertinent labs include:  Recent Labs      02/22/18 0253 02/21/18 0459   WBC  2.5*  2.6*   HGB  9.0*  8.6*   HCT  27.2*  25.8*   PLT  106*  102*     Recent Labs      02/22/18 0253 02/21/18 0459 02/20/18   0314   NA  138  137  139   K  3.8  3.7  3.8   CL  101  102  104   CO2  33*  30  31   GLU  84  82  81   BUN  16  18  18   CREA  1.68*  1.59*  1.49*   CA  7.9*  7.8*  7.9*   MG  1.9  1.7   --    ALB  2.8*  2.5*   --    TBILI  0.9  0.8   --    SGOT  30  32   --    ALT  17  17   --        Signed: Bernardo Marquis MD

## 2018-02-23 ENCOUNTER — HOSPITAL ENCOUNTER (OUTPATIENT)
Age: 83
Discharge: HOME | End: 2018-03-07
Attending: PHYSICAL MEDICINE & REHABILITATION | Admitting: PHYSICAL MEDICINE & REHABILITATION
Payer: MEDICARE

## 2018-02-23 VITALS
OXYGEN SATURATION: 100 % | RESPIRATION RATE: 16 BRPM | HEART RATE: 70 BPM | DIASTOLIC BLOOD PRESSURE: 74 MMHG | HEIGHT: 69 IN | BODY MASS INDEX: 29.39 KG/M2 | TEMPERATURE: 97.3 F | WEIGHT: 198.41 LBS | SYSTOLIC BLOOD PRESSURE: 92 MMHG

## 2018-02-23 LAB
ALBUMIN SERPL-MCNC: 3 G/DL (ref 3.5–5)
ALBUMIN/GLOB SERPL: 0.9 {RATIO} (ref 1.1–2.2)
ALP SERPL-CCNC: 92 U/L (ref 45–117)
ALT SERPL-CCNC: 19 U/L (ref 12–78)
ANION GAP SERPL CALC-SCNC: 4 MMOL/L (ref 5–15)
AST SERPL-CCNC: 30 U/L (ref 15–37)
BASOPHILS # BLD: 0 K/UL (ref 0–0.1)
BASOPHILS NFR BLD: 0 % (ref 0–1)
BILIRUB SERPL-MCNC: 0.8 MG/DL (ref 0.2–1)
BUN SERPL-MCNC: 16 MG/DL (ref 6–20)
BUN/CREAT SERPL: 9 (ref 12–20)
CALCIUM SERPL-MCNC: 8.2 MG/DL (ref 8.5–10.1)
CHLORIDE SERPL-SCNC: 100 MMOL/L (ref 97–108)
CO2 SERPL-SCNC: 31 MMOL/L (ref 21–32)
CREAT SERPL-MCNC: 1.75 MG/DL (ref 0.7–1.3)
DIFFERENTIAL METHOD BLD: ABNORMAL
EOSINOPHIL # BLD: 0 K/UL (ref 0–0.4)
EOSINOPHIL NFR BLD: 1 % (ref 0–7)
ERYTHROCYTE [DISTWIDTH] IN BLOOD BY AUTOMATED COUNT: 16.4 % (ref 11.5–14.5)
GLOBULIN SER CALC-MCNC: 3.5 G/DL (ref 2–4)
GLUCOSE BLD STRIP.AUTO-MCNC: 107 MG/DL (ref 65–100)
GLUCOSE BLD STRIP.AUTO-MCNC: 85 MG/DL (ref 65–100)
GLUCOSE SERPL-MCNC: 86 MG/DL (ref 65–100)
HCT VFR BLD AUTO: 28.8 % (ref 36.6–50.3)
HGB BLD-MCNC: 9.4 G/DL (ref 12.1–17)
IMM GRANULOCYTES # BLD: 0.1 K/UL (ref 0–0.04)
IMM GRANULOCYTES NFR BLD AUTO: 2 % (ref 0–0.5)
LYMPHOCYTES # BLD: 0.5 K/UL (ref 0.8–3.5)
LYMPHOCYTES NFR BLD: 19 % (ref 12–49)
MAGNESIUM SERPL-MCNC: 1.9 MG/DL (ref 1.6–2.4)
MCH RBC QN AUTO: 33.9 PG (ref 26–34)
MCHC RBC AUTO-ENTMCNC: 32.6 G/DL (ref 30–36.5)
MCV RBC AUTO: 104 FL (ref 80–99)
MONOCYTES # BLD: 0.5 K/UL (ref 0–1)
MONOCYTES NFR BLD: 19 % (ref 5–13)
NEUTS SEG # BLD: 1.6 K/UL (ref 1.8–8)
NEUTS SEG NFR BLD: 59 % (ref 32–75)
NRBC # BLD: 0 K/UL (ref 0–0.01)
NRBC BLD-RTO: 0 PER 100 WBC
PLATELET # BLD AUTO: 120 K/UL (ref 150–400)
PMV BLD AUTO: 11.8 FL (ref 8.9–12.9)
POTASSIUM SERPL-SCNC: 4.1 MMOL/L (ref 3.5–5.1)
PROT SERPL-MCNC: 6.5 G/DL (ref 6.4–8.2)
RBC # BLD AUTO: 2.77 M/UL (ref 4.1–5.7)
SERVICE CMNT-IMP: ABNORMAL
SERVICE CMNT-IMP: NORMAL
SODIUM SERPL-SCNC: 135 MMOL/L (ref 136–145)
WBC # BLD AUTO: 2.7 K/UL (ref 4.1–11.1)

## 2018-02-23 PROCEDURE — 74011250637 HC RX REV CODE- 250/637: Performed by: PHYSICAL MEDICINE & REHABILITATION

## 2018-02-23 PROCEDURE — 36415 COLL VENOUS BLD VENIPUNCTURE: CPT | Performed by: INTERNAL MEDICINE

## 2018-02-23 PROCEDURE — 80053 COMPREHEN METABOLIC PANEL: CPT | Performed by: INTERNAL MEDICINE

## 2018-02-23 PROCEDURE — 82962 GLUCOSE BLOOD TEST: CPT

## 2018-02-23 PROCEDURE — 74011250637 HC RX REV CODE- 250/637: Performed by: INTERNAL MEDICINE

## 2018-02-23 PROCEDURE — 97116 GAIT TRAINING THERAPY: CPT

## 2018-02-23 PROCEDURE — 85025 COMPLETE CBC W/AUTO DIFF WBC: CPT | Performed by: INTERNAL MEDICINE

## 2018-02-23 PROCEDURE — 83735 ASSAY OF MAGNESIUM: CPT | Performed by: INTERNAL MEDICINE

## 2018-02-23 RX ORDER — FUROSEMIDE 40 MG/1
40 TABLET ORAL
Status: DISCONTINUED | OUTPATIENT
Start: 2018-02-26 | End: 2018-02-23 | Stop reason: HOSPADM

## 2018-02-23 RX ORDER — POLYETHYLENE GLYCOL 3350 17 G/17G
17 POWDER, FOR SOLUTION ORAL DAILY
Qty: 30 EACH | Refills: 1 | Status: SHIPPED | OUTPATIENT
Start: 2018-02-24 | End: 2018-04-27

## 2018-02-23 RX ORDER — TRAMADOL HYDROCHLORIDE 50 MG/1
50 TABLET ORAL
Status: DISCONTINUED | OUTPATIENT
Start: 2018-02-23 | End: 2018-03-07 | Stop reason: HOSPADM

## 2018-02-23 RX ORDER — ADHESIVE BANDAGE
30 BANDAGE TOPICAL DAILY PRN
Status: DISCONTINUED | OUTPATIENT
Start: 2018-02-23 | End: 2018-03-07 | Stop reason: HOSPADM

## 2018-02-23 RX ORDER — NYSTATIN 100000 U/G
CREAM TOPICAL 3 TIMES DAILY
Status: DISCONTINUED | OUTPATIENT
Start: 2018-02-23 | End: 2018-03-07 | Stop reason: HOSPADM

## 2018-02-23 RX ORDER — LANOLIN ALCOHOL/MO/W.PET/CERES
3 CREAM (GRAM) TOPICAL
Status: DISCONTINUED | OUTPATIENT
Start: 2018-02-23 | End: 2018-02-25

## 2018-02-23 RX ORDER — FUROSEMIDE 40 MG/1
40 TABLET ORAL
Qty: 12 TAB | Refills: 11 | Status: SHIPPED | OUTPATIENT
Start: 2018-02-23 | End: 2018-04-17 | Stop reason: DRUGHIGH

## 2018-02-23 RX ORDER — ONDANSETRON 4 MG/1
4 TABLET, ORALLY DISINTEGRATING ORAL
Status: DISCONTINUED | OUTPATIENT
Start: 2018-02-23 | End: 2018-03-07 | Stop reason: HOSPADM

## 2018-02-23 RX ORDER — TRAMADOL HYDROCHLORIDE 50 MG/1
50 TABLET ORAL
Qty: 40 TAB | Refills: 0 | Status: SHIPPED | OUTPATIENT
Start: 2018-02-23

## 2018-02-23 RX ORDER — FACIAL-BODY WIPES
10 EACH TOPICAL DAILY PRN
Status: DISCONTINUED | OUTPATIENT
Start: 2018-02-23 | End: 2018-03-07 | Stop reason: HOSPADM

## 2018-02-23 RX ORDER — NITROGLYCERIN 0.4 MG/1
0.4 TABLET SUBLINGUAL
Status: DISCONTINUED | OUTPATIENT
Start: 2018-02-23 | End: 2018-03-07 | Stop reason: HOSPADM

## 2018-02-23 RX ORDER — ALBUTEROL SULFATE 90 UG/1
2 AEROSOL, METERED RESPIRATORY (INHALATION)
Status: DISCONTINUED | OUTPATIENT
Start: 2018-02-23 | End: 2018-03-07 | Stop reason: HOSPADM

## 2018-02-23 RX ORDER — ACETAMINOPHEN 325 MG/1
650 TABLET ORAL
Status: DISCONTINUED | OUTPATIENT
Start: 2018-02-23 | End: 2018-03-07 | Stop reason: HOSPADM

## 2018-02-23 RX ORDER — MIDODRINE HYDROCHLORIDE 2.5 MG/1
7.5 TABLET ORAL
Qty: 270 TAB | Refills: 1 | Status: SHIPPED | OUTPATIENT
Start: 2018-02-23 | End: 2018-03-25

## 2018-02-23 RX ORDER — FUROSEMIDE 40 MG/1
40 TABLET ORAL DAILY
Qty: 30 TAB | Refills: 1 | Status: SHIPPED | OUTPATIENT
Start: 2018-02-23 | End: 2018-02-23

## 2018-02-23 RX ORDER — MIDODRINE HYDROCHLORIDE 5 MG/1
7.5 TABLET ORAL
Status: DISCONTINUED | OUTPATIENT
Start: 2018-02-24 | End: 2018-02-25

## 2018-02-23 RX ORDER — FUROSEMIDE 40 MG/1
40 TABLET ORAL DAILY
Status: DISCONTINUED | OUTPATIENT
Start: 2018-02-24 | End: 2018-03-05

## 2018-02-23 RX ORDER — NYSTATIN 100000 [USP'U]/G
POWDER TOPICAL 3 TIMES DAILY
Qty: 1 BOTTLE | Refills: 1 | Status: SHIPPED | OUTPATIENT
Start: 2018-02-23 | End: 2018-04-27

## 2018-02-23 RX ADMIN — CASTOR OIL AND BALSAM, PERU: 788; 87 OINTMENT TOPICAL at 08:41

## 2018-02-23 RX ADMIN — MIDODRINE HYDROCHLORIDE 7.5 MG: 5 TABLET ORAL at 11:50

## 2018-02-23 RX ADMIN — MIDODRINE HYDROCHLORIDE 7.5 MG: 5 TABLET ORAL at 07:59

## 2018-02-23 RX ADMIN — RIVAROXABAN 15 MG: 15 TABLET, FILM COATED ORAL at 07:59

## 2018-02-23 RX ADMIN — CASTOR OIL AND BALSAM, PERU: 788; 87 OINTMENT TOPICAL at 14:05

## 2018-02-23 RX ADMIN — NYSTATIN: 100000 POWDER TOPICAL at 08:41

## 2018-02-23 RX ADMIN — Medication 10 ML: at 14:05

## 2018-02-23 RX ADMIN — Medication 10 ML: at 05:31

## 2018-02-23 RX ADMIN — Medication: at 14:05

## 2018-02-23 RX ADMIN — FUROSEMIDE 40 MG: 40 TABLET ORAL at 09:00

## 2018-02-23 NOTE — PROGRESS NOTES
Cm acknowledged discharge order. Patient is being discharged to [facility] today. Room assgn - 107  RN to call report to:  [phone]    Rn informed. H&P, facesheet on chart.     Shavon Madsen RN CM  Ext [phone]

## 2018-02-23 NOTE — PROGRESS NOTES
Problem: Mobility Impaired (Adult and Pediatric)  Goal: *Acute Goals and Plan of Care (Insert Text)  Physical Therapy Goals  Revised 2/23/2018  1. Patient will move from supine to sit and sit to supine  in bed with independence within 7 day(s). 2.  Patient will transfer from bed to chair and chair to bed with independence using the least restrictive device within 7 day(s). 3.  Patient will perform sit to stand with independence within 7 day(s). 4.  Patient will ambulate with modified independence for 200 feet with the least restrictive device within 7 day(s). Revised 2/16/2018  1. Patient will move from supine to sit and sit to supine  in bed with modified independence within 7 day(s). 2.  Patient will transfer from bed to chair and chair to bed with modified independence using the least restrictive device within 7 day(s). 3.  Patient will perform sit to stand with modified independence within 7 day(s). 4.  Patient will ambulate with contact guard assist for 100 feet with the least restrictive device within 7 day(s). -Met 2/23/2018    Initiated 2/9/2018  1. Patient will move from supine to sit and sit to supine , scoot up and down and roll side to side in bed with moderate assistance  within 7 day(s). --Met 2/16/18  2. Patient will transfer from bed to chair and chair to bed with minimal assistance/contact guard assist using the least restrictive device within 7 day(s). -- Met 2/16/18  3. Patient will perform sit to stand with minimal assistance/contact guard assist within 7 day(s). --Met 2/16/18  4. Patient will ambulate with minimal assistance/contact guard assist for 25 feet with the least restrictive device within 7 day(s).  --Met 2/16/18    physical Therapy TREATMENT: WEEKLY REASSESSMENT  Patient: Roberto Salas (24 y.o. male)  Date: 2/23/2018  Diagnosis: Hypothermia  Sepsis ([facility]  Atrial fibrillation ([facility]  MARIA <principal problem not specified>  Procedure(s) (LRB):  PACU/RECOVERY EP/LAB (N/A) 7 Days Post-Op  Precautions:    Chart, physical therapy assessment, plan of care and goals were reviewed. ASSESSMENT:  Pt received sitting EOB and eager to walk with therapy this morning. Plan for discharge to [address] [place] [address] possibly as soon as today. Pt's BP continues to be low however higher than recent days. 102/69 at rest and asymptomatic. Pt does well with RW for support and at an overall CGA level for transfers and gait training with the exception of turning with Min A. Pt ambulated short distance without RW today as this was his baseline. Pt displayed widened JAMEEL, increased unsteadiness and high guard arm positioning. Pt is hoping to return to ambulating without an AD. Pt is a high fall risk without AD. Recommending IP rehab   Patient's progression toward goals since last assessment: good progress with gait. Progressing towards independence and possibly decreasing RW use as strength and balance improve     PLAN:  Goals have been updated based on progression since last assessment. Patient continues to benefit from skilled intervention to address the above impairments. Continue to follow the patient 5 times a week to address goals. Planned Interventions:  [x]              Bed Mobility Training             [x]       Neuromuscular Re-Education  [x]              Transfer Training                   []       Orthotic/Prosthetic Training  [x]              Gait Training                         []       Modalities  [x]              Therapeutic Exercises           []       Edema Management/Control  [x]              Therapeutic Activities            [x]       Patient and Family Training/Education  []              Other (comment):  Discharge Recommendations: Inpatient Rehab  Further Equipment Recommendations for Discharge: None     SUBJECTIVE:   Patient stated We've got a [employer] room upstairs with a treadmill and bike.     OBJECTIVE DATA SUMMARY:   Critical Behavior:  Neurologic State: Alert  Orientation Level: Oriented X4  Cognition: Follows commands  Safety/Judgement: Decreased insight into deficits, Decreased awareness of need for safety, Decreased awareness of need for assistance    Strength:                          Functional Mobility Training:  Bed Mobility:                    Transfers:  Sit to Stand: Contact guard assistance;Assist x1;Minimum assistance (CGA with RW in front, Min A without RW)  Stand to Sit: Contact guard assistance                         Balance:  Sitting: Intact; With support  Standing: Impaired; Without support  Standing - Static: Constant support;Good  Standing - Dynamic : Fair  Ambulation/Gait Training:  Distance (ft): 70 Feet (ft) (70ft x 2 RW and no AD)  Assistive Device: Gait belt;Walker, rolling  Ambulation - Level of Assistance: Contact guard assistance;Minimal assistance (Min A without RW and with turning)        Gait Abnormalities: Decreased step clearance;Shuffling gait        Base of Support: Widened     Speed/Christina: Accelerated  Step Length: Right shortened;Left shortened              Activity Tolerance:   Good  Please refer to the flowsheet for vital signs taken during this treatment.   After treatment:   [x]  Patient left in no apparent distress sitting up in chair  []  Patient left in no apparent distress in bed  [x]  Call bell left within reach  [x]  Nursing notified  []  Caregiver present  []  Bed alarm activated    COMMUNICATION/COLLABORATION:   The patients plan of care was discussed with: Registered Nurse    Salvador Sarmiento, PT   Time Calculation: 16 mins

## 2018-02-23 NOTE — PROGRESS NOTES
Hospitalist Progress Note    NAME: David Pittman   :  1928   MRN:  [id number]   LOS:   21      Assessment / Plan:  Septic Shock : BP still in the low side, off  Dobutamine, c/w Midodrine higher dose  Hypothermia  Bacteremia  Staph, strep and enterococcus bacteremia  Aspiration PNA treated  -completed Vancomycin IV  (18)  -MARIA shows no valvular vegetations, however is showing extracardiac lesion, CT chest with contrast did not show any lesion  -accepted to [facility], needs authorization and needs to be off of dobutamine, off Dobutamine for 24h, increased dose of Midodrine  Acute on chronic systolic CHF with EF 19-78% still edematous, will c/w diuresis as much as BP allows, so far weight continues going down and keeping negative balances  Acute respiratory failure with hypoxia from acute pulmonary edema (on CXR)  -c/w dobutamine, increase dose of midodrine  -BP in the low side hold lasix today  -continue Xarelto  -continue O2, wean as tolerated, so far tolerating room air  -TSH wnl  -stopping BB indefinitely  -so far tolerating room air, monitor. Metabolic Encephalopathy  Delirium with hallucinations  -possible related to sepsis  -QTc high, now off haldol, was getting IV haldol in ICU without worsening QTc  -so far resolved, monitor. Chronic atrial fibrillation with junctional bradycardia  -Appreciate cardiology input  -On Amiodarone 100 mg daily  -BB stopped indefinitely  Hypernatremia  Hypoglycemia - Current resolved, check HbA1C.   Acute Kidney Injury with baseline CKD3  Creatinine slowly trending up  Continue to monitor lytes  Pancytopenia  -S/p 2 units plts on  for acute bleeding from central line   -Leukopenia resolved  -S/p Vitamin daily B12 shots x3, may need montly upon discharge  Blood in Stool PTA  -likely from Xarelto and thrombocytopenia  -Xarelto restarted  Patient with history of Left Parotid Mass s/p resection approximately 1 year ago by Dr. Javy Renteria and XRT, 2017 PET negative  Floaters in right eye   -since he was admitted here, just mentioned it today 2/19  -recommend outpatient opthalmology follow up after d/c  History of Prostate Cancer  Psoriasis  DTIs x2 left buttocks not POA Woundcare following  Code status: Partial code , no Compressions or Defibrillation. She is okay with temporary pacing, ACLS meds (like atropine) and temporary intubation. Palliative is following appreciate their visiting with patient  Prophylaxis: SCD's and H2B  Recommended Disposition: TBD  Obesity  Body mass index is 29.3 kg/(m^2). Off Dobutamine, will try to D/c to [facility] today     Subjective:     Chief Complaint: \"I feel all right\"    Objective:     VITALS:   Last 24hrs VS reviewed since prior progress note. Most recent are:  Patient Vitals for the past 24 hrs:   Temp Pulse Resp BP SpO2   02/23/18 1150 - 64 - 99/56 -   02/23/18 0800 - 75 - 105/87 -   02/23/18 0759 - 79 - 105/87 -   02/23/18 0730 96.3 °F (35.7 °C) 61 18 97/65 100 %   02/23/18 0330 97.4 °F (36.3 °C) 71 16 96/63 98 %   02/22/18 2230 97.6 °F (36.4 °C) 68 16 97/69 92 %   02/22/18 2000 97.4 °F (36.3 °C) 77 16 100/69 98 %   02/22/18 1700 - 71 - 97/74 98 %   02/22/18 1630 - 68 - 99/72 97 %   02/22/18 1524 97.6 °F (36.4 °C) 65 16 102/64 99 %   02/22/18 1500 - - 17 - -   02/22/18 1403 - 84 - 93/65 93 %   02/22/18 1400 - 80 - (!) 83/69 97 %       Intake/Output Summary (Last 24 hours) at 02/23/18 1324  Last data filed at 02/23/18 1220   Gross per 24 hour   Intake           417.94 ml   Output              300 ml   Net           117.94 ml        PHYSICAL EXAM:  General: Alert, cooperative, no acute distress    EENT:  EOMI. Anicteric sclerae. Mucous membranes moist, right eye patched  Resp:  Coarse BS  CV:  Regular rhythm, +  edema  GI:  Soft, non distended, non tender.  +Bowel sounds  Neurologic:  Alert and oriented X 3, normal speech, chronic R facial droop  Psych:   Good insight, not anxious nor agitated  Skin:  No rashes, no jaundice  ________________________________________________________________________  Care Plan discussed with:    Comments   Patient x    Family      RN y    Care Manager x    Consultant                        Multidiciplinary team rounds were held today with , nursing, pharmacist and clinical coordinator. Patient's plan of care was discussed; medications were reviewed and discharge planning was addressed. ________________________________________________________________________  Total NON critical care TIME:  20   Minutes    >50% of visit spent in counseling and coordination of care y      ________________________________________________________________________    Procedures: see electronic medical records for all procedures/Xrays and details which were not copied into this note but were reviewed prior to creation of Plan. LABS:  I reviewed today's most current labs and imaging studies.   Pertinent labs include:  Recent Labs      02/23/18   0120  02/22/18   0253  02/21/18   0459   WBC  2.7*  2.5*  2.6*   HGB  9.4*  9.0*  8.6*   HCT  28.8*  27.2*  25.8*   PLT  120*  106*  102*     Recent Labs      02/23/18   0120  02/22/18   0253  02/21/18   0459   NA  135*  138  137   K  4.1  3.8  3.7   CL  100  101  102   CO2  31  33*  30   GLU  86  84  82   BUN  16  16  18   CREA  1.75*  1.68*  1.59*   CA  8.2*  7.9*  7.8*   MG  1.9  1.9  1.7   ALB  3.0*  2.8*  2.5*   TBILI  0.8  0.9  0.8   SGOT  30  30  32   ALT  19  17  17       Signed: Jamari Delgado MD

## 2018-02-23 NOTE — PROGRESS NOTES
PCU rounds -  Patient has been off dobutamine drip since 2/22/2018. Stable. CM informed [facility] to start auth.     Yue Tomas RN CM  Ext [phone]

## 2018-02-23 NOTE — PROGRESS NOTES
PCU SHIFT NURSING NOTE      Bedside and Verbal shift change report given to Matthew Florence, HORACE and Jomar Rogers RN (oncoming nurse) by Ines Tran RN (offgoing nurse). Report included the following information SBAR, Kardex, Procedure Summary, Intake/Output, MAR, Accordion and Recent Results. Shift Summary:    0730 Received report and assumed care of patient. Patient A/Ox3. VSS. Moves all four extremities and mobilizes well 1xassist with walker. Lower extremities extremely edematous. Furosemide added back to treatment plan now that blood pressure is less labile. Plans for discharge today to [facility]  report to Leonardo Thomas at [address] [place] [address] (she is taking report on behalf of [facility]). 1610 Patient left unit-discharged to [address] [place] [address] room 107. Admission Date 2/2/2018   Admission Diagnosis Hypothermia  Sepsis ([facility] [address]  Atrial fibrillation ([facility] [address]  MARIA   Consults IP CONSULT TO CARDIOLOGY  IP CONSULT TO HEMATOLOGY  IP CONSULT TO INFECTIOUS DISEASES  IP CONSULT TO ANESTHESIOLOGY        Consults   [x]PT   []OT   []Speech   [x]Case Management      [] Palliative      Cardiac Monitoring Order   [x]Yes   []No     IV drips   []Yes    Drip:                            Dose:  Drip:                            Dose:  Drip:                            Dose:   [x]No     GI Prophylaxis   []Yes   [x]No         DVT Prophylaxis   SCDs:  Sequential Compression Device: Bilateral     Patient Refused VTE Prophylaxis: Yes    Edwin stockings:         [x] Medication   []Contraindicated   []None      Activity Level Activity Level: Chair, Dangle Side of Bed, Up with Assistance     Activity Assistance: Partial (one person)   Purposeful Rounding every 1-2 hour?    [x]Yes   Hutchison Score  Total Score: 3   Bed Alarm (If score 3 or >)   [x]Yes   [] Refused (See signed refusal form in chart)   Luis Score  Luis Score: 19   Luis Score (if score 14 or less)   []PMT consult   [x]Wound Care consult      []Specialty bed   [] Nutrition consult Needs prior to discharge:   Home O2 required:    []Yes   [x]No    If yes, how much O2 required? Other:    Last Bowel Movement: Last Bowel Movement Date: 02/23/18      Influenza Vaccine Received Flu Vaccine for Current Season (usually Sept-March): Unsure    Patient/Guardian Refused (Notify MD): No   Pneumonia Vaccine           Diet Active Orders   Diet    DIET MECHANICAL SOFT 2 GM NA (House Low NA)      LDAs               Peripheral IV 02/19/18 Right Arm (Active)   Site Assessment Clean, dry, & intact 2/23/2018  7:30 AM   Phlebitis Assessment 0 2/23/2018  7:30 AM   Infiltration Assessment 0 2/23/2018  7:30 AM   Dressing Status Clean, dry, & intact 2/23/2018  7:30 AM   Dressing Type Transparent 2/23/2018  7:30 AM   Hub Color/Line Status Blue 2/23/2018  7:30 AM   Action Taken Open ports on tubing capped 2/23/2018  7:30 AM   Alcohol Cap Used Yes 2/23/2018  7:30 AM       Peripheral IV 02/19/18 Left Hand (Active)   Site Assessment Clean, dry, & intact 2/23/2018  7:30 AM   Phlebitis Assessment 0 2/23/2018  7:30 AM   Infiltration Assessment 0 2/23/2018  7:30 AM   Dressing Status Clean, dry, & intact 2/23/2018  7:30 AM   Dressing Type Transparent 2/23/2018  7:30 AM   Hub Color/Line Status Blue 2/23/2018  7:30 AM   Action Taken Open ports on tubing capped 2/23/2018  7:30 AM   Alcohol Cap Used Yes 2/23/2018  7:30 AM                      Urinary Catheter [REMOVED] Urinary Catheter 02/02/18 Ivy - Temperature-Criteria for Appropriate Use: Strict I/Os    Intake & Output   Date 02/22/18 0700 - 02/23/18 0659 02/23/18 0700 - 02/24/18 0659   Shift 5087-4528 7907-6789 24 Hour Total 0758-6014 9089-8574 24 Hour Total   I  N  T  A  K  E   P.O. 590  590         P. O. 590  590       I.V.  (mL/kg/hr) 1066.7  (1)  1066.7  (0.5)         DOBUTamine Volume 1066.7  1066.7       Shift Total  (mL/kg) 1656.7  (18.4)  1656.7  (18.4)      O  U  T  P  U  T   Urine  (mL/kg/hr)    300  300      Urine Voided    300  300      Urine Occurrence(s) 2 x 2 x 4 x 1 x  1 x    Stool            Stool Occurrence(s)  2 x 2 x 1 x  1 x    Shift Total  (mL/kg)    300  (3.3)  300  (3.3)   NET 1656.7  1656.7 -300  -300   Weight (kg) 90.2 90 90 90 90 90         Readmission Risk Assessment Tool Score Low Risk            10       Total Score        2 . Living with Significant Other. Assisted Living. LTAC. SNF. or   Rehab    3 Patient Length of Stay (>5 days = 3)    5 Pt.  Coverage (Medicare=5 , Medicaid, or Self-Pay=4)        Criteria that do not apply:    Has Seen PCP in Last 6 Months (Yes=3, No=0)    IP Visits Last 12 Months (1-3=4, 4=9, >4=11)    Charlson Comorbidity Score (Age + Comorbid Conditions)       Expected Length of Stay 4d 21h   Actual Length of Stay 21

## 2018-02-23 NOTE — DISCHARGE SUMMARY
Hospitalist Discharge Summary     Patient ID:  Yovani Jasmine  [id number]  23 y.o.  7/1/1928    PCP on record: Samantha Birmingham NP    Admit date: 2/2/2018  Discharge date and time: 2/23/2018      DISCHARGE DIAGNOSIS:    Septic Shock, Hypothermia, Bacteremia, Aspiration Pneumonia, Respiratory Failure, Encephalopathy, Delirium, Chronic A. Fib,  BABAK/CKD, Pancytopenia, H/O Prostatic Cancer, Deep Tissue Injury      CONSULTATIONS:  IP CONSULT TO CARDIOLOGY  IP CONSULT TO HEMATOLOGY  IP CONSULT TO INFECTIOUS DISEASES  IP CONSULT TO ANESTHESIOLOGY    Excerpted HPI from H&P of Anne Perez MD:  Neal Cowan is a 80 y.o. Male PMH A.fib, ENT mass resected (parathyroid tumor? ??) R side of his face, who presented to [facility] today from home because weakness, cough. Apparently he was founded to be bradycardic, and was sent here for further eval. Here patient hypotensive, hypothermic. Denies N/V diarrhea, sick contacs, no fever. We were asked to admit for work up and evaluation of the above problems. ______________________________________________________________________  DISCHARGE SUMMARY/HOSPITAL COURSE:  for full details see H&P, daily progress notes, labs, consult notes.    Septic Shock : BP still in the low side, off  Dobutamine, c/w Midodrine higher dose  Hypothermia  Bacteremia  Staph, strep and enterococcus bacteremia  Aspiration PNA treated  -completed Vancomycin IV  (02/20/18)  -MARIA shows no valvular vegetations, however is showing extracardiac lesion, CT chest with contrast did not show any lesion  -accepted to [facility], needs authorization and needs to be off of dobutamine, off Dobutamine for 24h, increased dose of Midodrine  Acute on chronic systolic CHF with EF 79-75% still edematous, will c/w diuresis as much as BP allows, so far weight continues going down and keeping negative balances  Acute respiratory failure with hypoxia from acute pulmonary edema (on CXR)  -c/w dobutamine, increase dose of midodrine  -resumed lasix  -continue Xarelto  -continue O2, wean as tolerated, so far tolerating room air  -TSH wnl  -stopping BB indefinitely  -so far tolerating room air, monitor. Metabolic Encephalopathy  Delirium with hallucinations  -possible related to sepsis  -QTc high, now off haldol, was getting IV haldol in ICU without worsening QTc  -so far resolved, monitor. Chronic atrial fibrillation with junctional bradycardia  -Appreciate cardiology input  -On Amiodarone 100 mg daily  -BB stopped indefinitely  Hypernatremia  Hypoglycemia - Current resolved, check HbA1C. Acute Kidney Injury with baseline CKD3  Creatinine slowly trending up  Continue to monitor lytes  Pancytopenia  -S/p 2 units plts on 2/2 for acute bleeding from central line   -Leukopenia resolved  -S/p Vitamin daily B12 shots x3, may need montly upon discharge  Blood in Stool PTA  -likely from Xarelto and thrombocytopenia  -Xarelto restarted  Patient with history of Left Parotid Mass s/p resection approximately 1 year ago by Dr. Mykel Mohr and XRT, July 2017 PET negative  Floaters in right eye   -since he was admitted here, just mentioned it today 2/19  -recommend outpatient opthalmology follow up after d/c  History of Prostate Cancer  Psoriasis  DTIs x2 left buttocks not POA Woundcare following  Code status: Partial code , no Compressions or Defibrillation. She is okay with temporary pacing, ACLS meds (like atropine) and temporary intubation. Palliative is following appreciate their visiting with patient  Prophylaxis: SCD's and H2B  Recommended Disposition: TBD  Obesity  Body mass index is 29.3 kg/(m^2).    Off Dobutamine, will try to D/c to [facility] today  _______________________________________________________________________  Patient seen and examined by me on discharge day.   Pertinent Findings:  Gen:    Not in distress  Chest: Coarse BS  CVS:   Regular rhythm.  +  edema  Abd:  Soft, not distended, not tender  Neuro:  Alert, GCS 15  _______________________________________________________________________  DISCHARGE MEDICATIONS:   Current Discharge Medication List      START taking these medications    Details   midodrine (PROAMITINE) 2.5 mg tablet Take 3 Tabs by mouth three (3) times daily (with meals) for 30 days. Qty: 270 Tab, Refills: 1      nystatin (MYCOSTATIN) powder Apply  to affected area three (3) times daily. Qty: 1 Bottle, Refills: 1      polyethylene glycol (MIRALAX) 17 gram packet Take 1 Packet by mouth daily. Qty: 30 Each, Refills: 1         CONTINUE these medications which have CHANGED    Details   furosemide (LASIX) 40 mg tablet Take 1 Tab by mouth daily. Qty: 30 Tab, Refills: 1      rivaroxaban (XARELTO) 15 mg tab tablet Take 1 Tab by mouth daily (with breakfast). Qty: 30 Tab, Refills: 1      traMADol (ULTRAM) 50 mg tablet Take 1 Tab by mouth every six (6) hours as needed for Pain. Max Daily Amount: 200 mg. Qty: 40 Tab, Refills: 0    Associated Diagnoses: Parotid mass; Malignant neoplasm of prostate ([facility] [address]         CONTINUE these medications which have NOT CHANGED    Details   albuterol (PROVENTIL HFA, VENTOLIN HFA, PROAIR HFA) 90 mcg/actuation inhaler Take 2 Puffs by inhalation every four (4) hours as needed for Wheezing. Qty: 1 Inhaler, Refills: 2    Associated Diagnoses: Cough;  Respiratory crackles at right lung base; CAP (community acquired pneumonia)         STOP taking these medications       potassium chloride (KLOR-CON) 10 mEq tablet Comments:   Reason for Stopping:         amiodarone (PACERONE) 100 mg tablet Comments:   Reason for Stopping:         SSD 1 % topical cream Comments:   Reason for Stopping:         triamcinolone acetonide (KENALOG) 0.1 % ointment Comments:   Reason for Stopping:         diphenhydrAMINE (BENADRYL) 25 mg capsule Comments:   Reason for Stopping:         carvedilol (COREG) 3.125 mg tablet Comments:   Reason for Stopping:               My Recommended Diet, Activity, Wound Care, and follow-up labs are listed in the patient's Discharge Insturctions which I have personally completed and reviewed.     ______________________________________________________________________    Risk of deterioration: Moderate    Condition at Discharge:  Stable  ______________________________________________________________________    Disposition  IP Rehab  ______________________________________________________________________    Care Plan discussed with:   Patient, Family, RN, Care Manager, Consultant    ______________________________________________________________________    Code Status: Partial  ______________________________________________________________________      Follow up with:   PCP : Immanuel Manley NP  Follow-up Information     Follow up With Details 200 Julianna Costello NP On 2/14/2018 For hospital follow up appointment at [address] [place] [address]  3323 [facility] [phone]          F/U PCP  F/U Cardiology      Total time in minutes spent coordinating this discharge (includes going over instructions, follow-up, prescriptions, and preparing report for sign off to her PCP) :  35 minutes    Signed:  Mike Tidwell MD

## 2018-02-23 NOTE — DISCHARGE INSTRUCTIONS
HOSPITALIST DISCHARGE INSTRUCTIONS    NAME: Jimy Mares   :  1928   MRN:  [id number]     Date/Time:  2018 1:34 PM    ADMIT DATE: 2018   DISCHARGE DATE: 2018     Attending Physician: Rogelio Alexis MD    DISCHARGE DIAGNOSIS:  Septic Shock, Hypothermia, Bacteremia, Aspiration Pneumonia, Respiratory Failure, Encephalopathy, Delirium, Chronic A. Fib,  BABAK/CKD, Pancytopenia, H/O Prostatic Cancer, Deep Tissue Injury      Medications: Per above medication reconciliation. Pain Management: per above medications    Recommended diet: Mechanical Soft Diet    Recommended activity: Activity as tolerated    Wound care: See surgical/procedure care instructions    Indwelling devices:  None    Supplemental Oxygen: None    Required Lab work: Per SNF routine    Glucose management:  None    Code status: Partial      CHF specific discharge instructions    Weight:  · Daily weights, Notify your Doctor if Wt gain of 3 lb in a day or 5 lb in a week  · Daily Intake & Output    Diet :  · Low salt cardiac diet   · Fluid restriction of 1500 ml daily          Outside physician follow up: Follow-up Information     Follow up With Details Comments [address] [place] [address] Drive, NP On 2018 For hospital follow up appointment at [address] [place] [address]  5981 [facility] [phone]        F/U PCP  F/U Cardiology         Skilled nursing facility/ SNF MD responsible for above on discharge. Information obtained by :  I understand that if any problems occur once I am at home I am to contact my physician. I understand and acknowledge receipt of the instructions indicated above.                                                                                                                                            Physician's or R.N.'s Signature                                                                  Date/Time Patient or Repres

## 2018-02-23 NOTE — PROGRESS NOTES
Supportive follow up visit on PCU for LOS patient. No visitors present. Patient was seated in a chair reading his newspaper. Provided listening presence as patient spoke briefly about his hospitalization and shared he is moving to [address] [place] [address] sometime today. He is looking forward to gaining strength in order to be able to go home soon. He and his wife live on the [place] in [facility]. They are members of [address] [place] [address]. He had no concerns to share today. Mr. Dalton Fuentes was receptive to prayer. Ended visit as patient's phone rang so he could speak with his wife.   Katya Farias, MPS, [address] [employer], [address] [place] [address]    Mikaela Montesinos Paging Service  [phone]

## 2018-02-23 NOTE — PROGRESS NOTES
Cardiology Progress Note    Patient ID:  Patient: Brandan Bahena  MRN: [id number]  Age: 80 y.o.  : 1928   Admit Date: 2018    Assessment: 1. Persistent atrial fibrillation with slow ventricular response requiring dobutamine initially. Now adequate HR's off dobutamine but not on beta-blocker. 2. Chronic bifascicular block (RBBB and LAFB). 3. Gram-positive cocci bacteremia (Staph, Streptococcus, Enterococcus) and sepsis. No evidence of endocarditis on MARIA here (vegetation, valve destruction, abscess). Extracardiac finding on MARIA (see note) not seen on F/U CT. 4. Postural hypotension, mild and asymptomatic. SBP in chair 80-90's.  5. Cardiomyopathy NOS, EF 25-35% depending on study. 6. Acute on chronic systolic congestive heart failure, better, but lots of peripheral edema. 7. Acute kidney injury atop chronic kidney disease stage III. Improved, but sensitive to diuretic. 8. Pancytopenia (watching platelets, never below 50K, improving slowly). 9. Encephalopathy/delirium, completely resolved. 10. History of R face/neck mass resected. 11. Partial code status (No shock or chest compressions). Plan:     1. Discontinued beta blocker indefinitely. Not to restart. 2. Stopped dobutamine yesterday, doing well off. 3. Continue midodrine 7.5 tid. 4. Change furosemide to 40 mg po MWF only from daily. Should be able to slowly, slowly pull off edema. 5. Stopped amiodarone here, risk>>benefit. Will let him persist in atrial fibrillation. 6. No temporary or permanent pacemaker recommended. 7. Not a candidate for ACEI or ARB or spironolactone due to acute renal failure. 8. Would not do an ischemia evaluation at this time. I spoke with the patient and daughter. Heading to [facility] right now. I answered all questions. Modified the furosemide dose change in the AVS for him.       []       High complexity decision making was performed in this patient. Subjective:     Roberto Salas denies chest pain, shortness of breath, dizziness. He's wearing a baseball cap. Review of Systems - No abdominal pain, nausea or vomiting, productive cough, hemoptysis, pleurisy. Objective:     Physical Exam:  Temp (24hrs), Av.2 °F (36.2 °C), Min:96.3 °F (35.7 °C), Max:97.6 °F (36.4 °C)    Patient Vitals for the past 8 hrs:   Pulse   18 1458 70   18 1150 64    Patient Vitals for the past 8 hrs:   Resp   18 1458 16    Patient Vitals for the past 8 hrs:   BP   18 1458 92/74   18 1150 99/56          Intake/Output Summary (Last 24 hours) at 18 1650  Last data filed at 18 1220   Gross per 24 hour   Intake                0 ml   Output              300 ml   Net             -300 ml       Nondiaphoretic, not in acute distress. Sitting in a wheelchair. MMM, no jaundice, HEENT stable. R eye patch at this time. Unlabored, clear to auscultation bilaterally, symmetric air movement. Regular rate and rhythm, no murmur, pericardial rub, knock, or gallop. No JVD but there is +++peripheral edema. Palpable radial pulses bilaterally. Extremities without cyanosis or clubbing. Skin warm and dry. Venostasis changes in legs. Musculoskeletal exam stable. Awake, appropriate. No tremor. Cardiographics and Studies, I personally reviewed:    Telemetry:  Afib with HR 80's. ECHO 2/3:    LEFT VENTRICLE: The ventricle was mildly dilated. Ejection fraction was estimated in the range of 25 % to 30 %. There was moderate diffuse hypokinesis. RIGHT VENTRICLE: The ventricle was moderately dilated. Systolic function was mildly reduced. LEFT ATRIUM: The atrium was moderately dilated. RIGHT ATRIUM: The atrium was mildly dilated. MITRAL VALVE: There was mild thickening. DOPPLER: There was moderate regurgitation. AORTIC VALVE: Leaflets exhibited mildly increased thickness.  DOPPLER: There was no significant regurgitation. TRICUSPID VALVE: Normal valve structure. DOPPLER: There was moderate regurgitation. Pulmonary artery systolic pressure: 57 mmHg. There was moderate pulmonary hypertension. PULMONIC VALVE: Normal valve structure. AORTA: The root exhibited normal size. SYSTEMIC VEINS: IVC: The respirophasic change in diameter was less than 50%. PERICARDIUM: There was no pericardial effusion. The pericardium was normal in appearance. LAB Review:     No results for input(s): CPK, CKMB, CKNDX, TROIQ in the last 72 hours. No lab exists for component: CPKMB  Lab Results   Component Value Date/Time    Cholesterol, total 128 06/29/2017 10:41 AM    HDL Cholesterol 49 06/29/2017 10:41 AM    LDL, calculated 60 06/29/2017 10:41 AM    Triglyceride 94 06/29/2017 10:41 AM     No results for input(s): INR, PTP, APTT in the last 72 hours. No lab exists for component: INREXT, INREXT   Recent Labs      02/23/18   0120  02/22/18 0253 02/21/18   0459   NA  135*  138  137   K  4.1  3.8  3.7   CL  100  101  102   CO2  31  33*  30   BUN  16  16  18   CREA  1.75*  1.68*  1.59*   GLU  86  84  82   CA  8.2*  7.9*  7.8*   ALB  3.0*  2.8*  2.5*   WBC  2.7*  2.5*  2.6*   HGB  9.4*  9.0*  8.6*   HCT  28.8*  27.2*  25.8*   PLT  120*  106*  102*     Recent Labs      02/23/18   0120 02/22/18 0253  02/21/18   0459   SGOT  30  30  32   AP  92  85  81   TP  6.5  6.1*  6.0*   ALB  3.0*  2.8*  2.5*   GLOB  3.5  3.3  3.5     No components found for: GLPOC  No results for input(s): PH, PCO2, PO2 in the last 72 hours. Medications Reviewed:      Allergies   Allergen Reactions    Ancef [Cefazolin] Unknown (comments)     \"drops my blood pressure\"    Neosporin [Benzalkonium Chloride] Unknown (comments)    Polysporin [Bacitracin-Polymyxin B] Other (comments)     \"WOUNDS DO NOT HEAL\"        Current Facility-Administered Medications   Medication Dose Route Frequency    [START ON 2/26/2018] furosemide (LASIX) tablet 40 mg  40 mg Oral Q MON, WED & FRI    midodrine (PROAMITINE) tablet 7.5 mg  7.5 mg Oral TID WITH MEALS    rivaroxaban (XARELTO) tablet 15 mg  15 mg Oral DAILY WITH BREAKFAST    zinc oxide-cod liver oil (DESITIN) 40 % paste   Topical PRN    balsam peru-castor oil (VENELEX)  mg/gram ointment   Topical TID    albuterol-ipratropium (DUO-NEB) 2.5 MG-0.5 MG/3 ML  3 mL Nebulization Q4H PRN    white petrolatum-mineral oil (LACRILUBE S.O.P.) ointment   Right Eye Q8H    polyethylene glycol (MIRALAX) packet 17 g  17 g Oral DAILY    insulin lispro (HUMALOG) injection   SubCUTAneous AC&HS    glucose chewable tablet 16 g  4 Tab Oral PRN    dextrose (D50W) injection syrg 12.5-25 g  12.5-25 g IntraVENous PRN    glucagon (GLUCAGEN) injection 1 mg  1 mg IntraMUSCular PRN    nystatin (MYCOSTATIN) 100,000 unit/gram powder   Topical TID    sodium chloride (NS) flush 5-10 mL  5-10 mL IntraVENous Q8H    sodium chloride (NS) flush 5-10 mL  5-10 mL IntraVENous PRN     Current Outpatient Prescriptions   Medication Sig    midodrine (PROAMITINE) 2.5 mg tablet Take 3 Tabs by mouth three (3) times daily (with meals) for 30 days.  nystatin (MYCOSTATIN) powder Apply  to affected area three (3) times daily.  [START ON 2/24/2018] polyethylene glycol (MIRALAX) 17 gram packet Take 1 Packet by mouth daily.  [START ON 2/24/2018] rivaroxaban (XARELTO) 15 mg tab tablet Take 1 Tab by mouth daily (with breakfast).  traMADol (ULTRAM) 50 mg tablet Take 1 Tab by mouth every six (6) hours as needed for Pain. Max Daily Amount: 200 mg.  furosemide (LASIX) 40 mg tablet Take 1 Tab by mouth every Monday, Wednesday, Friday.  albuterol (PROVENTIL HFA, VENTOLIN HFA, PROAIR HFA) 90 mcg/actuation inhaler Take 2 Puffs by inhalation every four (4) hours as needed for Wheezing.           Ej Eric MD  2/23/2018

## 2018-02-23 NOTE — PROGRESS NOTES
Problem: Falls - Risk of  Goal: *Absence of Falls  Document Jenna Fall Risk and appropriate interventions in the flowsheet.    Outcome: Progressing Towards Goal  Fall Risk Interventions:  Mobility Interventions: Bed/chair exit alarm, Communicate number of staff needed for ambulation/transfer, OT consult for ADLs, Patient to call before getting OOB, PT Consult for mobility concerns, PT Consult for assist device competence    Mentation Interventions: Adequate sleep, hydration, pain control, Bed/chair exit alarm, Door open when patient unattended, Increase mobility, More frequent rounding, Reorient patient, Room close to nurse's station, Toileting rounds, Update white board    Medication Interventions: Bed/chair exit alarm, Evaluate medications/consider consulting pharmacy, Patient to call before getting OOB, Teach patient to arise slowly    Elimination Interventions: Bed/chair exit alarm, Call light in reach, Patient to call for help with toileting needs, Toilet paper/wipes in reach, Toileting schedule/hourly rounds, Urinal in reach    History of Falls Interventions: Bed/chair exit alarm

## 2018-02-24 LAB
25(OH)D3 SERPL-MCNC: 23.5 NG/ML (ref 30–100)
ANION GAP SERPL CALC-SCNC: 4 MMOL/L (ref 5–15)
APPEARANCE UR: CLEAR
BACTERIA URNS QL MICRO: NEGATIVE /HPF
BILIRUB UR QL: NEGATIVE
BUN SERPL-MCNC: 21 MG/DL (ref 6–20)
BUN/CREAT SERPL: 12 (ref 12–20)
CALCIUM SERPL-MCNC: 8.3 MG/DL (ref 8.5–10.1)
CHLORIDE SERPL-SCNC: 102 MMOL/L (ref 97–108)
CO2 SERPL-SCNC: 33 MMOL/L (ref 21–32)
COLOR UR: NORMAL
CREAT SERPL-MCNC: 1.79 MG/DL (ref 0.7–1.3)
EPITH CASTS URNS QL MICRO: NORMAL /LPF
ERYTHROCYTE [DISTWIDTH] IN BLOOD BY AUTOMATED COUNT: 16.5 % (ref 11.5–14.5)
GLUCOSE SERPL-MCNC: 73 MG/DL (ref 65–100)
GLUCOSE UR STRIP.AUTO-MCNC: NEGATIVE MG/DL
HCT VFR BLD AUTO: 27.8 % (ref 36.6–50.3)
HGB BLD-MCNC: 9.3 G/DL (ref 12.1–17)
HGB UR QL STRIP: NEGATIVE
HYALINE CASTS URNS QL MICRO: NORMAL /LPF (ref 0–5)
KETONES UR QL STRIP.AUTO: NEGATIVE MG/DL
LEUKOCYTE ESTERASE UR QL STRIP.AUTO: NEGATIVE
MCH RBC QN AUTO: 34.8 PG (ref 26–34)
MCHC RBC AUTO-ENTMCNC: 33.5 G/DL (ref 30–36.5)
MCV RBC AUTO: 104.1 FL (ref 80–99)
NITRITE UR QL STRIP.AUTO: NEGATIVE
NRBC # BLD: 0 K/UL (ref 0–0.01)
NRBC BLD-RTO: 0 PER 100 WBC
PH UR STRIP: 6.5 [PH] (ref 5–8)
PLATELET # BLD AUTO: 115 K/UL (ref 150–400)
PMV BLD AUTO: 11.8 FL (ref 8.9–12.9)
POTASSIUM SERPL-SCNC: 4 MMOL/L (ref 3.5–5.1)
PROT UR STRIP-MCNC: NEGATIVE MG/DL
RBC # BLD AUTO: 2.67 M/UL (ref 4.1–5.7)
RBC #/AREA URNS HPF: NORMAL /HPF (ref 0–5)
SODIUM SERPL-SCNC: 139 MMOL/L (ref 136–145)
SP GR UR REFRACTOMETRY: 1.01 (ref 1–1.03)
UROBILINOGEN UR QL STRIP.AUTO: 0.2 EU/DL (ref 0.2–1)
WBC # BLD AUTO: 2.4 K/UL (ref 4.1–11.1)
WBC URNS QL MICRO: NORMAL /HPF (ref 0–4)

## 2018-02-24 PROCEDURE — 74011250637 HC RX REV CODE- 250/637: Performed by: PHYSICAL MEDICINE & REHABILITATION

## 2018-02-24 PROCEDURE — 85027 COMPLETE CBC AUTOMATED: CPT | Performed by: PHYSICAL MEDICINE & REHABILITATION

## 2018-02-24 PROCEDURE — 82306 VITAMIN D 25 HYDROXY: CPT | Performed by: PHYSICAL MEDICINE & REHABILITATION

## 2018-02-24 PROCEDURE — 81001 URINALYSIS AUTO W/SCOPE: CPT | Performed by: PHYSICAL MEDICINE & REHABILITATION

## 2018-02-24 PROCEDURE — 80048 BASIC METABOLIC PNL TOTAL CA: CPT | Performed by: PHYSICAL MEDICINE & REHABILITATION

## 2018-02-24 PROCEDURE — 87086 URINE CULTURE/COLONY COUNT: CPT | Performed by: PHYSICAL MEDICINE & REHABILITATION

## 2018-02-24 PROCEDURE — 36415 COLL VENOUS BLD VENIPUNCTURE: CPT | Performed by: PHYSICAL MEDICINE & REHABILITATION

## 2018-02-24 RX ORDER — DIPHENOXYLATE HYDROCHLORIDE AND ATROPINE SULFATE 2.5; .025 MG/1; MG/1
1 TABLET ORAL
Status: DISCONTINUED | OUTPATIENT
Start: 2018-02-24 | End: 2018-03-07 | Stop reason: HOSPADM

## 2018-02-24 RX ORDER — LANOLIN ALCOHOL/MO/W.PET/CERES
6 CREAM (GRAM) TOPICAL
Status: DISCONTINUED | OUTPATIENT
Start: 2018-02-24 | End: 2018-03-07 | Stop reason: HOSPADM

## 2018-02-24 RX ADMIN — MIDODRINE HYDROCHLORIDE 7.5 MG: 5 TABLET ORAL at 16:36

## 2018-02-24 RX ADMIN — FUROSEMIDE 40 MG: 40 TABLET ORAL at 08:28

## 2018-02-24 RX ADMIN — NYSTATIN: 100000 CREAM TOPICAL at 13:18

## 2018-02-24 RX ADMIN — MIDODRINE HYDROCHLORIDE 7.5 MG: 5 TABLET ORAL at 12:10

## 2018-02-24 RX ADMIN — MELATONIN 3 MG ORAL TABLET 6 MG: 3 TABLET ORAL at 20:34

## 2018-02-24 RX ADMIN — RIVAROXABAN 15 MG: 10 TABLET, FILM COATED ORAL at 08:28

## 2018-02-24 RX ADMIN — MIDODRINE HYDROCHLORIDE 7.5 MG: 5 TABLET ORAL at 08:29

## 2018-02-24 RX ADMIN — NYSTATIN: 100000 CREAM TOPICAL at 21:00

## 2018-02-25 PROCEDURE — 74011250637 HC RX REV CODE- 250/637: Performed by: PHYSICAL MEDICINE & REHABILITATION

## 2018-02-25 RX ORDER — MIDODRINE HYDROCHLORIDE 5 MG/1
10 TABLET ORAL
Status: DISCONTINUED | OUTPATIENT
Start: 2018-02-25 | End: 2018-03-07 | Stop reason: HOSPADM

## 2018-02-25 RX ADMIN — NYSTATIN: 100000 CREAM TOPICAL at 12:49

## 2018-02-25 RX ADMIN — MIDODRINE HYDROCHLORIDE 10 MG: 5 TABLET ORAL at 17:40

## 2018-02-25 RX ADMIN — MINERAL OIL, PETROLATUM: 425; 568 OINTMENT OPHTHALMIC at 21:43

## 2018-02-25 RX ADMIN — RIVAROXABAN 15 MG: 10 TABLET, FILM COATED ORAL at 09:29

## 2018-02-25 RX ADMIN — MINERAL OIL, PETROLATUM: 425; 568 OINTMENT OPHTHALMIC at 12:52

## 2018-02-25 RX ADMIN — MELATONIN 3 MG ORAL TABLET 6 MG: 3 TABLET ORAL at 21:42

## 2018-02-25 RX ADMIN — MIDODRINE HYDROCHLORIDE 10 MG: 5 TABLET ORAL at 12:48

## 2018-02-25 RX ADMIN — MIDODRINE HYDROCHLORIDE 7.5 MG: 5 TABLET ORAL at 09:27

## 2018-02-25 RX ADMIN — FUROSEMIDE 40 MG: 40 TABLET ORAL at 09:29

## 2018-02-25 RX ADMIN — NYSTATIN: 100000 CREAM TOPICAL at 21:43

## 2018-02-25 RX ADMIN — NYSTATIN: 100000 CREAM TOPICAL at 06:43

## 2018-02-26 ENCOUNTER — TELEPHONE (OUTPATIENT)
Dept: FAMILY MEDICINE CLINIC | Age: 83
End: 2018-02-26

## 2018-02-26 LAB
ANION GAP SERPL CALC-SCNC: 7 MMOL/L (ref 5–15)
BACTERIA SPEC CULT: NORMAL
BUN SERPL-MCNC: 20 MG/DL (ref 6–20)
BUN/CREAT SERPL: 12 (ref 12–20)
CALCIUM SERPL-MCNC: 8.5 MG/DL (ref 8.5–10.1)
CC UR VC: NORMAL
CHLORIDE SERPL-SCNC: 102 MMOL/L (ref 97–108)
CO2 SERPL-SCNC: 30 MMOL/L (ref 21–32)
CREAT SERPL-MCNC: 1.7 MG/DL (ref 0.7–1.3)
GLUCOSE SERPL-MCNC: 69 MG/DL (ref 65–100)
MAGNESIUM SERPL-MCNC: 2.1 MG/DL (ref 1.6–2.4)
PHOSPHATE SERPL-MCNC: 3.7 MG/DL (ref 2.6–4.7)
POTASSIUM SERPL-SCNC: 3.9 MMOL/L (ref 3.5–5.1)
SERVICE CMNT-IMP: NORMAL
SODIUM SERPL-SCNC: 139 MMOL/L (ref 136–145)

## 2018-02-26 PROCEDURE — 74011250637 HC RX REV CODE- 250/637: Performed by: PHYSICAL MEDICINE & REHABILITATION

## 2018-02-26 PROCEDURE — 80048 BASIC METABOLIC PNL TOTAL CA: CPT | Performed by: PHYSICAL MEDICINE & REHABILITATION

## 2018-02-26 PROCEDURE — 36415 COLL VENOUS BLD VENIPUNCTURE: CPT | Performed by: PHYSICAL MEDICINE & REHABILITATION

## 2018-02-26 PROCEDURE — 84100 ASSAY OF PHOSPHORUS: CPT | Performed by: PHYSICAL MEDICINE & REHABILITATION

## 2018-02-26 PROCEDURE — 83735 ASSAY OF MAGNESIUM: CPT | Performed by: PHYSICAL MEDICINE & REHABILITATION

## 2018-02-26 RX ORDER — AMMONIUM LACTATE 12 G/100G
LOTION TOPICAL 2 TIMES DAILY
Status: DISCONTINUED | OUTPATIENT
Start: 2018-02-26 | End: 2018-02-26

## 2018-02-26 RX ORDER — MELATONIN
1000 DAILY
Status: DISCONTINUED | OUTPATIENT
Start: 2018-02-26 | End: 2018-03-07 | Stop reason: HOSPADM

## 2018-02-26 RX ORDER — AMMONIUM LACTATE 12 G/100G
LOTION TOPICAL 2 TIMES DAILY
Status: DISCONTINUED | OUTPATIENT
Start: 2018-02-26 | End: 2018-03-07 | Stop reason: HOSPADM

## 2018-02-26 RX ADMIN — MINERAL OIL, PETROLATUM: 425; 568 OINTMENT OPHTHALMIC at 21:37

## 2018-02-26 RX ADMIN — MELATONIN 3 MG ORAL TABLET 6 MG: 3 TABLET ORAL at 21:35

## 2018-02-26 RX ADMIN — VITAMIN D, TAB 1000IU (100/BT) 1000 UNITS: 25 TAB at 12:17

## 2018-02-26 RX ADMIN — MIDODRINE HYDROCHLORIDE 10 MG: 5 TABLET ORAL at 12:17

## 2018-02-26 RX ADMIN — Medication: at 12:21

## 2018-02-26 RX ADMIN — MINERAL OIL, PETROLATUM: 425; 568 OINTMENT OPHTHALMIC at 09:23

## 2018-02-26 RX ADMIN — Medication: at 21:35

## 2018-02-26 RX ADMIN — NYSTATIN: 100000 CREAM TOPICAL at 12:24

## 2018-02-26 RX ADMIN — RIVAROXABAN 15 MG: 10 TABLET, FILM COATED ORAL at 09:17

## 2018-02-26 RX ADMIN — FUROSEMIDE 40 MG: 40 TABLET ORAL at 09:17

## 2018-02-26 RX ADMIN — NYSTATIN: 100000 CREAM TOPICAL at 04:58

## 2018-02-26 RX ADMIN — MIDODRINE HYDROCHLORIDE 10 MG: 5 TABLET ORAL at 09:17

## 2018-02-26 RX ADMIN — NYSTATIN: 100000 CREAM TOPICAL at 21:36

## 2018-02-26 RX ADMIN — MIDODRINE HYDROCHLORIDE 10 MG: 5 TABLET ORAL at 17:16

## 2018-02-27 PROCEDURE — 74011250637 HC RX REV CODE- 250/637: Performed by: PHYSICAL MEDICINE & REHABILITATION

## 2018-02-27 RX ADMIN — MIDODRINE HYDROCHLORIDE 10 MG: 5 TABLET ORAL at 09:10

## 2018-02-27 RX ADMIN — RIVAROXABAN 15 MG: 10 TABLET, FILM COATED ORAL at 09:10

## 2018-02-27 RX ADMIN — VITAMIN D, TAB 1000IU (100/BT) 1000 UNITS: 25 TAB at 09:10

## 2018-02-27 RX ADMIN — MINERAL OIL, PETROLATUM: 425; 568 OINTMENT OPHTHALMIC at 09:16

## 2018-02-27 RX ADMIN — MELATONIN 3 MG ORAL TABLET 6 MG: 3 TABLET ORAL at 21:40

## 2018-02-27 RX ADMIN — MIDODRINE HYDROCHLORIDE 10 MG: 5 TABLET ORAL at 11:30

## 2018-02-27 RX ADMIN — MINERAL OIL, PETROLATUM: 425; 568 OINTMENT OPHTHALMIC at 21:40

## 2018-02-27 RX ADMIN — Medication: at 09:16

## 2018-02-27 RX ADMIN — Medication: at 21:40

## 2018-02-27 RX ADMIN — MIDODRINE HYDROCHLORIDE 10 MG: 5 TABLET ORAL at 17:27

## 2018-02-27 RX ADMIN — FUROSEMIDE 40 MG: 40 TABLET ORAL at 09:10

## 2018-02-27 RX ADMIN — NYSTATIN: 100000 CREAM TOPICAL at 11:30

## 2018-02-27 RX ADMIN — NYSTATIN: 100000 CREAM TOPICAL at 06:46

## 2018-02-27 RX ADMIN — NYSTATIN: 100000 CREAM TOPICAL at 21:41

## 2018-02-28 PROCEDURE — 74011250637 HC RX REV CODE- 250/637: Performed by: PHYSICAL MEDICINE & REHABILITATION

## 2018-02-28 RX ADMIN — MIDODRINE HYDROCHLORIDE 10 MG: 5 TABLET ORAL at 18:02

## 2018-02-28 RX ADMIN — NYSTATIN: 100000 CREAM TOPICAL at 21:10

## 2018-02-28 RX ADMIN — MIDODRINE HYDROCHLORIDE 10 MG: 5 TABLET ORAL at 08:44

## 2018-02-28 RX ADMIN — Medication: at 21:10

## 2018-02-28 RX ADMIN — NYSTATIN: 100000 CREAM TOPICAL at 13:28

## 2018-02-28 RX ADMIN — FUROSEMIDE 40 MG: 40 TABLET ORAL at 08:44

## 2018-02-28 RX ADMIN — MINERAL OIL, PETROLATUM: 425; 568 OINTMENT OPHTHALMIC at 13:27

## 2018-02-28 RX ADMIN — RIVAROXABAN 15 MG: 10 TABLET, FILM COATED ORAL at 08:43

## 2018-02-28 RX ADMIN — MELATONIN 3 MG ORAL TABLET 6 MG: 3 TABLET ORAL at 21:08

## 2018-02-28 RX ADMIN — MIDODRINE HYDROCHLORIDE 10 MG: 5 TABLET ORAL at 13:26

## 2018-02-28 RX ADMIN — VITAMIN D, TAB 1000IU (100/BT) 1000 UNITS: 25 TAB at 08:44

## 2018-02-28 RX ADMIN — Medication: at 13:27

## 2018-02-28 RX ADMIN — MINERAL OIL, PETROLATUM: 425; 568 OINTMENT OPHTHALMIC at 21:11

## 2018-03-01 ENCOUNTER — HOSPITAL ENCOUNTER (OUTPATIENT)
Dept: GENERAL RADIOLOGY | Age: 83
Discharge: HOME | End: 2018-03-01
Attending: PHYSICAL MEDICINE & REHABILITATION
Payer: MEDICARE

## 2018-03-01 DIAGNOSIS — R13.10 DYSPHAGIA: ICD-10-CM

## 2018-03-01 PROCEDURE — 74011250637 HC RX REV CODE- 250/637: Performed by: PHYSICAL MEDICINE & REHABILITATION

## 2018-03-01 PROCEDURE — 74230 X-RAY XM SWLNG FUNCJ C+: CPT

## 2018-03-01 RX ADMIN — MINERAL OIL, PETROLATUM: 425; 568 OINTMENT OPHTHALMIC at 08:44

## 2018-03-01 RX ADMIN — NYSTATIN: 100000 CREAM TOPICAL at 07:05

## 2018-03-01 RX ADMIN — Medication: at 08:43

## 2018-03-01 RX ADMIN — MIDODRINE HYDROCHLORIDE 10 MG: 5 TABLET ORAL at 17:09

## 2018-03-01 RX ADMIN — RIVAROXABAN 15 MG: 10 TABLET, FILM COATED ORAL at 08:44

## 2018-03-01 RX ADMIN — NYSTATIN: 100000 CREAM TOPICAL at 12:11

## 2018-03-01 RX ADMIN — VITAMIN D, TAB 1000IU (100/BT) 1000 UNITS: 25 TAB at 08:44

## 2018-03-01 RX ADMIN — MIDODRINE HYDROCHLORIDE 10 MG: 5 TABLET ORAL at 08:44

## 2018-03-01 RX ADMIN — FUROSEMIDE 40 MG: 40 TABLET ORAL at 08:44

## 2018-03-01 RX ADMIN — NYSTATIN: 100000 CREAM TOPICAL at 21:30

## 2018-03-01 RX ADMIN — MELATONIN 3 MG ORAL TABLET 6 MG: 3 TABLET ORAL at 21:30

## 2018-03-01 RX ADMIN — Medication: at 21:30

## 2018-03-01 RX ADMIN — MINERAL OIL, PETROLATUM: 425; 568 OINTMENT OPHTHALMIC at 21:30

## 2018-03-01 RX ADMIN — MIDODRINE HYDROCHLORIDE 10 MG: 5 TABLET ORAL at 12:10

## 2018-03-02 PROCEDURE — 74011250637 HC RX REV CODE- 250/637: Performed by: PHYSICAL MEDICINE & REHABILITATION

## 2018-03-02 RX ADMIN — NYSTATIN: 100000 CREAM TOPICAL at 21:24

## 2018-03-02 RX ADMIN — RIVAROXABAN 15 MG: 10 TABLET, FILM COATED ORAL at 09:10

## 2018-03-02 RX ADMIN — MIDODRINE HYDROCHLORIDE 10 MG: 5 TABLET ORAL at 14:53

## 2018-03-02 RX ADMIN — Medication: at 21:25

## 2018-03-02 RX ADMIN — FUROSEMIDE 40 MG: 40 TABLET ORAL at 09:11

## 2018-03-02 RX ADMIN — MELATONIN 3 MG ORAL TABLET 6 MG: 3 TABLET ORAL at 21:24

## 2018-03-02 RX ADMIN — MIDODRINE HYDROCHLORIDE 10 MG: 5 TABLET ORAL at 17:45

## 2018-03-02 RX ADMIN — VITAMIN D, TAB 1000IU (100/BT) 1000 UNITS: 25 TAB at 09:11

## 2018-03-02 RX ADMIN — MINERAL OIL, PETROLATUM: 425; 568 OINTMENT OPHTHALMIC at 21:24

## 2018-03-02 RX ADMIN — MIDODRINE HYDROCHLORIDE 10 MG: 5 TABLET ORAL at 09:11

## 2018-03-02 RX ADMIN — MINERAL OIL, PETROLATUM: 425; 568 OINTMENT OPHTHALMIC at 13:00

## 2018-03-02 RX ADMIN — NYSTATIN: 100000 CREAM TOPICAL at 05:54

## 2018-03-03 PROCEDURE — 74011250637 HC RX REV CODE- 250/637: Performed by: PHYSICAL MEDICINE & REHABILITATION

## 2018-03-03 RX ADMIN — RIVAROXABAN 15 MG: 10 TABLET, FILM COATED ORAL at 08:09

## 2018-03-03 RX ADMIN — MIDODRINE HYDROCHLORIDE 10 MG: 5 TABLET ORAL at 16:37

## 2018-03-03 RX ADMIN — FUROSEMIDE 40 MG: 40 TABLET ORAL at 08:10

## 2018-03-03 RX ADMIN — NYSTATIN: 100000 CREAM TOPICAL at 21:36

## 2018-03-03 RX ADMIN — LIGHT MINERAL OIL, WHITE PETROLATUM 1 APPLICATOR: 150; 850 OINTMENT OPHTHALMIC at 21:37

## 2018-03-03 RX ADMIN — MELATONIN 3 MG ORAL TABLET 6 MG: 3 TABLET ORAL at 21:36

## 2018-03-03 RX ADMIN — LIGHT MINERAL OIL, WHITE PETROLATUM 1 APPLICATOR: 150; 850 OINTMENT OPHTHALMIC at 09:00

## 2018-03-03 RX ADMIN — Medication: at 08:08

## 2018-03-03 RX ADMIN — NYSTATIN: 100000 CREAM TOPICAL at 06:22

## 2018-03-03 RX ADMIN — Medication: at 21:36

## 2018-03-03 RX ADMIN — VITAMIN D, TAB 1000IU (100/BT) 1000 UNITS: 25 TAB at 08:09

## 2018-03-03 RX ADMIN — MIDODRINE HYDROCHLORIDE 10 MG: 5 TABLET ORAL at 08:09

## 2018-03-03 RX ADMIN — MIDODRINE HYDROCHLORIDE 10 MG: 5 TABLET ORAL at 12:07

## 2018-03-04 PROCEDURE — 74011250637 HC RX REV CODE- 250/637: Performed by: PHYSICAL MEDICINE & REHABILITATION

## 2018-03-04 RX ADMIN — NYSTATIN: 100000 CREAM TOPICAL at 21:19

## 2018-03-04 RX ADMIN — VITAMIN D, TAB 1000IU (100/BT) 1000 UNITS: 25 TAB at 08:20

## 2018-03-04 RX ADMIN — MELATONIN 3 MG ORAL TABLET 6 MG: 3 TABLET ORAL at 21:18

## 2018-03-04 RX ADMIN — MIDODRINE HYDROCHLORIDE 10 MG: 5 TABLET ORAL at 12:04

## 2018-03-04 RX ADMIN — LIGHT MINERAL OIL, WHITE PETROLATUM 1 APPLICATOR: 150; 850 OINTMENT OPHTHALMIC at 08:20

## 2018-03-04 RX ADMIN — RIVAROXABAN 15 MG: 10 TABLET, FILM COATED ORAL at 08:20

## 2018-03-04 RX ADMIN — FUROSEMIDE 40 MG: 40 TABLET ORAL at 08:24

## 2018-03-04 RX ADMIN — Medication: at 06:33

## 2018-03-04 RX ADMIN — LIGHT MINERAL OIL, WHITE PETROLATUM 1 APPLICATOR: 150; 850 OINTMENT OPHTHALMIC at 21:18

## 2018-03-04 RX ADMIN — Medication: at 21:18

## 2018-03-04 RX ADMIN — MIDODRINE HYDROCHLORIDE 10 MG: 5 TABLET ORAL at 08:20

## 2018-03-04 RX ADMIN — MIDODRINE HYDROCHLORIDE 10 MG: 5 TABLET ORAL at 17:19

## 2018-03-04 RX ADMIN — NYSTATIN: 100000 CREAM TOPICAL at 06:33

## 2018-03-05 LAB
ANION GAP SERPL CALC-SCNC: 6 MMOL/L (ref 5–15)
BUN SERPL-MCNC: 33 MG/DL (ref 6–20)
BUN/CREAT SERPL: 17 (ref 12–20)
CALCIUM SERPL-MCNC: 8.6 MG/DL (ref 8.5–10.1)
CHLORIDE SERPL-SCNC: 104 MMOL/L (ref 97–108)
CO2 SERPL-SCNC: 31 MMOL/L (ref 21–32)
CREAT SERPL-MCNC: 1.9 MG/DL (ref 0.7–1.3)
ERYTHROCYTE [DISTWIDTH] IN BLOOD BY AUTOMATED COUNT: 17.4 % (ref 11.5–14.5)
GLUCOSE SERPL-MCNC: 74 MG/DL (ref 65–100)
HCT VFR BLD AUTO: 28 % (ref 36.6–50.3)
HGB BLD-MCNC: 9.3 G/DL (ref 12.1–17)
MCH RBC QN AUTO: 34.7 PG (ref 26–34)
MCHC RBC AUTO-ENTMCNC: 33.2 G/DL (ref 30–36.5)
MCV RBC AUTO: 104.5 FL (ref 80–99)
NRBC # BLD: 0 K/UL (ref 0–0.01)
NRBC BLD-RTO: 0 PER 100 WBC
PLATELET # BLD AUTO: 107 K/UL (ref 150–400)
PMV BLD AUTO: 12.6 FL (ref 8.9–12.9)
POTASSIUM SERPL-SCNC: 4.2 MMOL/L (ref 3.5–5.1)
RBC # BLD AUTO: 2.68 M/UL (ref 4.1–5.7)
SODIUM SERPL-SCNC: 141 MMOL/L (ref 136–145)
WBC # BLD AUTO: 2.8 K/UL (ref 4.1–11.1)

## 2018-03-05 PROCEDURE — 74011250637 HC RX REV CODE- 250/637: Performed by: PHYSICAL MEDICINE & REHABILITATION

## 2018-03-05 PROCEDURE — 36415 COLL VENOUS BLD VENIPUNCTURE: CPT | Performed by: PHYSICAL MEDICINE & REHABILITATION

## 2018-03-05 PROCEDURE — 85027 COMPLETE CBC AUTOMATED: CPT | Performed by: PHYSICAL MEDICINE & REHABILITATION

## 2018-03-05 PROCEDURE — 80048 BASIC METABOLIC PNL TOTAL CA: CPT | Performed by: PHYSICAL MEDICINE & REHABILITATION

## 2018-03-05 RX ORDER — FUROSEMIDE 20 MG/1
20 TABLET ORAL DAILY
Status: DISCONTINUED | OUTPATIENT
Start: 2018-03-06 | End: 2018-03-07 | Stop reason: HOSPADM

## 2018-03-05 RX ADMIN — Medication: at 06:36

## 2018-03-05 RX ADMIN — MELATONIN 3 MG ORAL TABLET 6 MG: 3 TABLET ORAL at 21:04

## 2018-03-05 RX ADMIN — FUROSEMIDE 40 MG: 40 TABLET ORAL at 08:55

## 2018-03-05 RX ADMIN — MIDODRINE HYDROCHLORIDE 10 MG: 5 TABLET ORAL at 16:31

## 2018-03-05 RX ADMIN — VITAMIN D, TAB 1000IU (100/BT) 1000 UNITS: 25 TAB at 08:55

## 2018-03-05 RX ADMIN — MIDODRINE HYDROCHLORIDE 10 MG: 5 TABLET ORAL at 08:55

## 2018-03-05 RX ADMIN — Medication: at 21:05

## 2018-03-05 RX ADMIN — MIDODRINE HYDROCHLORIDE 10 MG: 5 TABLET ORAL at 13:21

## 2018-03-05 RX ADMIN — NYSTATIN: 100000 CREAM TOPICAL at 14:00

## 2018-03-05 RX ADMIN — NYSTATIN: 100000 CREAM TOPICAL at 06:36

## 2018-03-05 RX ADMIN — LIGHT MINERAL OIL, WHITE PETROLATUM 1 APPLICATOR: 150; 850 OINTMENT OPHTHALMIC at 09:00

## 2018-03-05 RX ADMIN — LIGHT MINERAL OIL, WHITE PETROLATUM 1 APPLICATOR: 150; 850 OINTMENT OPHTHALMIC at 21:16

## 2018-03-05 RX ADMIN — NYSTATIN: 100000 CREAM TOPICAL at 21:05

## 2018-03-05 RX ADMIN — RIVAROXABAN 15 MG: 10 TABLET, FILM COATED ORAL at 08:59

## 2018-03-06 PROCEDURE — 74011250637 HC RX REV CODE- 250/637: Performed by: PHYSICAL MEDICINE & REHABILITATION

## 2018-03-06 RX ADMIN — MELATONIN 3 MG ORAL TABLET 6 MG: 3 TABLET ORAL at 21:35

## 2018-03-06 RX ADMIN — NYSTATIN: 100000 CREAM TOPICAL at 21:43

## 2018-03-06 RX ADMIN — LIGHT MINERAL OIL, WHITE PETROLATUM 1 APPLICATOR: 150; 850 OINTMENT OPHTHALMIC at 09:54

## 2018-03-06 RX ADMIN — LIGHT MINERAL OIL, WHITE PETROLATUM 1 APPLICATOR: 150; 850 OINTMENT OPHTHALMIC at 21:43

## 2018-03-06 RX ADMIN — Medication: at 21:43

## 2018-03-06 RX ADMIN — NYSTATIN: 100000 CREAM TOPICAL at 13:09

## 2018-03-06 RX ADMIN — Medication: at 06:14

## 2018-03-06 RX ADMIN — FUROSEMIDE 20 MG: 20 TABLET ORAL at 08:34

## 2018-03-06 RX ADMIN — MIDODRINE HYDROCHLORIDE 10 MG: 5 TABLET ORAL at 16:26

## 2018-03-06 RX ADMIN — NYSTATIN: 100000 CREAM TOPICAL at 06:15

## 2018-03-06 RX ADMIN — MIDODRINE HYDROCHLORIDE 10 MG: 5 TABLET ORAL at 08:34

## 2018-03-06 RX ADMIN — MIDODRINE HYDROCHLORIDE 10 MG: 5 TABLET ORAL at 13:08

## 2018-03-06 RX ADMIN — VITAMIN D, TAB 1000IU (100/BT) 1000 UNITS: 25 TAB at 08:34

## 2018-03-06 RX ADMIN — RIVAROXABAN 15 MG: 10 TABLET, FILM COATED ORAL at 08:34

## 2018-03-07 VITALS
HEART RATE: 67 BPM | SYSTOLIC BLOOD PRESSURE: 106 MMHG | BODY MASS INDEX: 28.58 KG/M2 | WEIGHT: 193 LBS | HEIGHT: 69 IN | DIASTOLIC BLOOD PRESSURE: 61 MMHG

## 2018-03-07 PROCEDURE — 74011250637 HC RX REV CODE- 250/637: Performed by: PHYSICAL MEDICINE & REHABILITATION

## 2018-03-07 RX ADMIN — Medication: at 05:11

## 2018-03-07 RX ADMIN — VITAMIN D, TAB 1000IU (100/BT) 1000 UNITS: 25 TAB at 08:47

## 2018-03-07 RX ADMIN — RIVAROXABAN 15 MG: 10 TABLET, FILM COATED ORAL at 08:46

## 2018-03-07 RX ADMIN — MIDODRINE HYDROCHLORIDE 10 MG: 5 TABLET ORAL at 08:47

## 2018-03-07 RX ADMIN — LIGHT MINERAL OIL, WHITE PETROLATUM 1 APPLICATOR: 150; 850 OINTMENT OPHTHALMIC at 08:48

## 2018-03-07 RX ADMIN — FUROSEMIDE 20 MG: 20 TABLET ORAL at 08:47

## 2018-03-07 RX ADMIN — NYSTATIN: 100000 CREAM TOPICAL at 05:11

## 2018-03-19 ENCOUNTER — TELEPHONE (OUTPATIENT)
Dept: FAMILY MEDICINE CLINIC | Age: 83
End: 2018-03-19

## 2018-03-19 NOTE — TELEPHONE ENCOUNTER
Look at record, after effects of blood poisoning, Has appointment with Oral surgeon and Plastic surgeon, wanted to discuss with Dr. Jerome Mayfield

## 2018-03-26 ENCOUNTER — TELEPHONE (OUTPATIENT)
Dept: FAMILY MEDICINE CLINIC | Age: 83
End: 2018-03-26

## 2018-03-26 NOTE — TELEPHONE ENCOUNTER
Spoke with patient advised per [facility] RN can work in on April 17 @ [address] [place] [address].  Patient has accepted appointment.

## 2018-03-26 NOTE — TELEPHONE ENCOUNTER
Patient needs an appointment to see Dr Charmaine Quesada for a pre-op for end of May surgery and also to bee seen before 4/24/2018 because he is having teeth removed. When to work-in?

## 2018-04-17 ENCOUNTER — OFFICE VISIT (OUTPATIENT)
Dept: FAMILY MEDICINE CLINIC | Age: 83
End: 2018-04-17

## 2018-04-17 VITALS
HEIGHT: 69 IN | HEART RATE: 77 BPM | RESPIRATION RATE: 16 BRPM | WEIGHT: 160 LBS | SYSTOLIC BLOOD PRESSURE: 100 MMHG | BODY MASS INDEX: 23.7 KG/M2 | DIASTOLIC BLOOD PRESSURE: 60 MMHG | TEMPERATURE: 94.6 F | OXYGEN SATURATION: 94 %

## 2018-04-17 DIAGNOSIS — I48.91 ATRIAL FIBRILLATION, UNSPECIFIED TYPE (HCC): ICD-10-CM

## 2018-04-17 DIAGNOSIS — S04.51XD INJURY OF RIGHT FACIAL NERVE, SUBSEQUENT ENCOUNTER: ICD-10-CM

## 2018-04-17 DIAGNOSIS — D49.0 PAROTID TUMOR: Primary | ICD-10-CM

## 2018-04-17 PROBLEM — S04.51XA INJURY OF RIGHT FACIAL NERVE: Status: ACTIVE | Noted: 2018-04-17

## 2018-04-17 RX ORDER — MIDODRINE HYDROCHLORIDE 10 MG/1
10 TABLET ORAL 3 TIMES DAILY
COMMUNITY
Start: 2018-04-06

## 2018-04-17 RX ORDER — FUROSEMIDE 20 MG/1
20 TABLET ORAL DAILY
COMMUNITY
Start: 2018-04-16 | End: 2018-04-27

## 2018-04-17 RX ORDER — LISINOPRIL 2.5 MG/1
2.5 TABLET ORAL DAILY
Qty: 90 TAB | Refills: 4
Start: 2018-04-17 | End: 2018-04-27

## 2018-04-17 NOTE — PROGRESS NOTES
1. Have you been to the ER, urgent care clinic since your last visit? Hospitalized since your last visit? Yes When: 2/2/18 [facility]    2. Have you seen or consulted any other health care providers outside of the [address] [place] [address] since your last visit? Include any pap smears or colon screening. Yes When: Friday.  Dr. Franklyn Ravi 4/13/18

## 2018-04-17 NOTE — PROGRESS NOTES
Chief Complaint   Patient presents with    Pre-op Exam     surgery scheduled 5/25/18 for [facility] & face @[facility]         HPI:      Rolan Begum is a 80 y.o. 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 He is  and lives in [facility]. 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 He is preop with Dr Elza Shepherd at [facility] to perform a facial sling procedure May 25. Dr Fernando Donahue follows for AF and he is taking Xarelto. Last hospitalized Feb 2018 with sepsis. Compliactions: sundowning. Allergies   Allergen Reactions    Ancef [Cefazolin] Unknown (comments)     \"drops my blood pressure\"    Neosporin [Benzalkonium Chloride] Unknown (comments)    Polysporin [Bacitracin-Polymyxin B] Other (comments)     \"WOUNDS DO NOT HEAL\"       Current Outpatient Prescriptions   Medication Sig    midodrine (PROAMITINE) 10 mg tablet Take 10 mg by mouth three (3) times daily.  furosemide (LASIX) 20 mg tablet Take 20 mg by mouth daily.  lisinopril (PRINIVIL, ZESTRIL) 2.5 mg tablet Take 1 Tab by mouth daily.  rivaroxaban (XARELTO) 15 mg tab tablet Take 1 Tab by mouth daily (with breakfast).  traMADol (ULTRAM) 50 mg tablet Take 1 Tab by mouth every six (6) hours as needed for Pain. Max Daily Amount: 200 mg.    nystatin (MYCOSTATIN) powder Apply  to affected area three (3) times daily.  polyethylene glycol (MIRALAX) 17 gram packet Take 1 Packet by mouth daily.  albuterol (PROVENTIL HFA, VENTOLIN HFA, PROAIR HFA) 90 mcg/actuation inhaler Take 2 Puffs by inhalation every four (4) hours as needed for Wheezing. No current facility-administered medications for this visit.         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 Denies wt loss, wt gain, hemoptysis, hematochezia or melena. Physical Examination:    /60 (BP 1 Location: Left leg, BP Patient Position: Sitting)  Pulse 77  Temp (!) 94.6 °F (34.8 °C) (Oral)   Resp 16  Ht 5' 8.5\" (1.74 m)  Wt 160 lb (72.6 kg)  SpO2 94%  BMI 23.97 kg/m2    General: Alert and Ox3, Fluent speech  HEENT:  NC/AT,  OP: clear                Neck:  Supple, no adenopathy, JVD, mass or bruit  Chest:  Clear to Ausculation, without wheezes, rales, rubs or ronchi  Cardiac: IRRR  Abdomen:  +BS, soft, nontender without palpable HSM  Extremities:  No cyanosis, clubbing or edema  Neurologic:  Ambulatory without assist, CN 2-12 grossly intact. Moves all extremities. Skin: no rash  Lymphadenopathy: no cervical or supraclavicular nodes      ASSESSMENT AND PLAN:     1. He is medically stable for surgery pending his workup with Dr Swapnil Davis  2. Labs are pending today  3. Preop Rivaroxaban will need to be held:  Discuss with Dr Jamshid Ahmadi This Encounter    CBC WITH AUTOMATED DIFF    METABOLIC PANEL, COMPREHENSIVE    MAGNESIUM    midodrine (PROAMITINE) 10 mg tablet     Sig: Take 10 mg by mouth three (3) times daily.  furosemide (LASIX) 20 mg tablet     Sig: Take 20 mg by mouth daily.  lisinopril (PRINIVIL, ZESTRIL) 2.5 mg tablet     Sig: Take 1 Tab by mouth daily.      Dispense:  90 Tab     Refill:  4       Wai Jones MD, [address]

## 2018-04-18 LAB
ALBUMIN SERPL-MCNC: 3.5 G/DL (ref 3.5–4.7)
ALBUMIN/GLOB SERPL: 1 {RATIO} (ref 1.2–2.2)
ALP SERPL-CCNC: 135 IU/L (ref 39–117)
ALT SERPL-CCNC: 12 IU/L (ref 0–44)
AST SERPL-CCNC: 31 IU/L (ref 0–40)
BASOPHILS # BLD AUTO: 0 X10E3/UL (ref 0–0.2)
BASOPHILS NFR BLD AUTO: 0 %
BILIRUB SERPL-MCNC: 0.7 MG/DL (ref 0–1.2)
BUN SERPL-MCNC: 32 MG/DL (ref 8–27)
BUN/CREAT SERPL: 24 (ref 10–24)
CALCIUM SERPL-MCNC: 8.9 MG/DL (ref 8.6–10.2)
CHLORIDE SERPL-SCNC: 100 MMOL/L (ref 96–106)
CO2 SERPL-SCNC: 26 MMOL/L (ref 18–29)
CREAT SERPL-MCNC: 1.31 MG/DL (ref 0.76–1.27)
EOSINOPHIL # BLD AUTO: 0.1 X10E3/UL (ref 0–0.4)
EOSINOPHIL NFR BLD AUTO: 3 %
ERYTHROCYTE [DISTWIDTH] IN BLOOD BY AUTOMATED COUNT: 17.3 % (ref 12.3–15.4)
GFR SERPLBLD CREATININE-BSD FMLA CKD-EPI: 48 ML/MIN/1.73
GFR SERPLBLD CREATININE-BSD FMLA CKD-EPI: 55 ML/MIN/1.73
GLOBULIN SER CALC-MCNC: 3.6 G/DL (ref 1.5–4.5)
GLUCOSE SERPL-MCNC: 116 MG/DL (ref 65–99)
HCT VFR BLD AUTO: 32.4 % (ref 37.5–51)
HGB BLD-MCNC: 10.5 G/DL (ref 13–17.7)
IMM GRANULOCYTES # BLD: 0 X10E3/UL (ref 0–0.1)
IMM GRANULOCYTES NFR BLD: 1 %
INTERPRETATION: NORMAL
LYMPHOCYTES # BLD AUTO: 0.7 X10E3/UL (ref 0.7–3.1)
LYMPHOCYTES NFR BLD AUTO: 23 %
MAGNESIUM SERPL-MCNC: 2.1 MG/DL (ref 1.6–2.3)
MCH RBC QN AUTO: 33 PG (ref 26.6–33)
MCHC RBC AUTO-ENTMCNC: 32.4 G/DL (ref 31.5–35.7)
MCV RBC AUTO: 102 FL (ref 79–97)
MONOCYTES # BLD AUTO: 0.6 X10E3/UL (ref 0.1–0.9)
MONOCYTES NFR BLD AUTO: 20 %
MORPHOLOGY BLD-IMP: ABNORMAL
NEUTROPHILS # BLD AUTO: 1.6 X10E3/UL (ref 1.4–7)
NEUTROPHILS NFR BLD AUTO: 53 %
PLATELET # BLD AUTO: 189 X10E3/UL (ref 150–379)
POTASSIUM SERPL-SCNC: 4.7 MMOL/L (ref 3.5–5.2)
PROT SERPL-MCNC: 7.1 G/DL (ref 6–8.5)
RBC # BLD AUTO: 3.18 X10E6/UL (ref 4.14–5.8)
SODIUM SERPL-SCNC: 142 MMOL/L (ref 134–144)
WBC # BLD AUTO: 3.1 X10E3/UL (ref 3.4–10.8)

## 2018-04-26 ENCOUNTER — TELEPHONE (OUTPATIENT)
Dept: FAMILY MEDICINE CLINIC | Age: 83
End: 2018-04-26

## 2018-04-26 NOTE — TELEPHONE ENCOUNTER
Pts wife called stating that Radha Blackwell is very dizzy. He had oral surgery on Tuesday and seemed to be fine after that. But as of today his BP was 98/68 along with the dizziness. I told the wife that Dr. Christine Quinn is booked today and I would send a note back to the nurses but I did mention to try [facility] while I send the note back for her. She said she would call if they could squeeze her in.

## 2018-04-27 ENCOUNTER — OFFICE VISIT (OUTPATIENT)
Dept: FAMILY MEDICINE CLINIC | Age: 83
End: 2018-04-27

## 2018-04-27 VITALS
BODY MASS INDEX: 22.81 KG/M2 | DIASTOLIC BLOOD PRESSURE: 52 MMHG | OXYGEN SATURATION: 96 % | RESPIRATION RATE: 18 BRPM | HEIGHT: 69 IN | WEIGHT: 154 LBS | SYSTOLIC BLOOD PRESSURE: 80 MMHG | HEART RATE: 83 BPM

## 2018-04-27 DIAGNOSIS — R42 DIZZINESS: Primary | ICD-10-CM

## 2018-04-27 RX ORDER — FUROSEMIDE 20 MG/1
TABLET ORAL
Qty: 90 TAB | Refills: 2
Start: 2018-04-27

## 2018-04-27 NOTE — MR AVS SNAPSHOT
[facility] 
 
 
 [address] [facility] [address] [phone] Patient: Zohaib Galvan 
MRN: [id number] ZDC:6/3/2073 Visit Information Date & Time Provider Department Dept. Phone Encounter #  
 4/27/2018 11:00 AM Stew Hendricks NP [address] [id number] Follow-up Instructions Return in about 2 weeks (around 5/11/2018). Follow-up and Disposition History Upcoming Health Maintenance Date Due Pneumococcal 65+ High/Highest Risk (2 of 2 - PPSV23) 10/21/2015 MEDICARE YEARLY EXAM 6/30/2018 Influenza Age 5 to Adult 8/1/2018 GLAUCOMA SCREENING Q2Y 4/28/2019 DTaP/Tdap/Td series (2 - Td) 12/12/2026 Allergies as of 4/27/2018  Review Complete On: 4/27/2018 By: Stew Hendricks NP Severity Noted Reaction Type Reactions Ancef [Cefazolin]  10/28/2013    Unknown (comments) \"drops my blood pressure\" Neosporin [Benzalkonium Chloride]  10/28/2013    Unknown (comments) Polysporin [Bacitracin-polymyxin B]  10/28/2013    Other (comments) \"WOUNDS DO NOT HEAL\" Current Immunizations  Reviewed on 2/19/2018 Name Date Influenza High Dose Vaccine PF 9/13/2017 Influenza Vaccine 10/14/2013 Pneumococcal Conjugate (PCV-13) 8/26/2015 Tdap 12/12/2016 Zoster Vaccine, Live 2/23/2016 Not reviewed this visit You Were Diagnosed With   
  
 Codes Comments Dizziness    -  Primary ICD-10-CM: T95 ICD-9-CM: 780.4 Vitals BP Pulse Resp Height(growth percentile) Weight(growth percentile) SpO2  
 (!) 80/52 (BP 1 Location: Left arm, BP Patient Position: Sitting) 83 18 5' 8.5\" (1.74 m) 154 lb (69.9 kg) 96% BMI Smoking Status 23.08 kg/m2 Former Smoker Vitals History BMI and BSA Data Body Mass Index Body Surface Area 23.08 kg/m 2 1.84 m 2 Preferred Pharmacy Pharmacy Name Phone [address] [place] [phone] Your Updated Medication List  
  
   
This list is accurate as of 4/27/18 11:57 AM.  Always use your most recent med list.  
  
  
  
  
 albuterol 90 mcg/actuation inhaler Commonly known as:  PROVENTIL HFA, VENTOLIN HFA, PROAIR HFA Take 2 Puffs by inhalation every four (4) hours as needed for Wheezing. furosemide 20 mg tablet Commonly known as:  LASIX Take 1 tablet (20 mg) PO on Tuesdays and Thursdays  
  
 midodrine 10 mg tablet Commonly known as:  Eunice Plater Take 10 mg by mouth three (3) times daily. rivaroxaban 15 mg Tab tablet Commonly known as:  Evangelista Lemon Take 1 Tab by mouth daily (with breakfast). traMADol 50 mg tablet Commonly known as:  ULTRAM  
Take 1 Tab by mouth every six (6) hours as needed for Pain. Max Daily Amount: 200 mg. Follow-up Instructions Return in about 2 weeks (around 5/11/2018). To-Do List   
 05/03/2018 2:00 PM  
  Appointment with Lion Hernandez PT at [address] [place] [address] ([phone]) Please remember to arrive at the hospital at least 30 minutes prior to your scheduled appointment time. When you come in for your appointment, please be sure to bring the therapy prescription the doctor gave you, your insurance card, and a list of the medicines you are taking. Also, please remember to wear comfortable, loose- fitting clothes. We look forward to seeing you. Patient Instructions If you have any questions regarding [employer], you may call [employer] support at [phone]. Introducing [facility]! Dear Jose Cordero: Thank you for requesting a [employer] account. Our records indicate that you already have an active [employer] account. You can access your account anytime at [email or web address] Did you know that you can access your hospital and ER discharge instructions at any time in [employer]? You can also review all of your test results from your hospital stay or ER visit. Additional Information If you have questions, please visit the Frequently Asked Questions section of the [employer] website at [email or web address] Remember, [employer] is NOT to be used for urgent needs. For medical emergencies, dial 911. Now available from your iPhone and Android! Please provide this summary of care documentation to your next provider. Your primary care clinician is listed as Lacey Covington. If you have any questions after today's visit, please call [phone].

## 2018-04-27 NOTE — PROGRESS NOTES
Chief Complaint   Patient presents with    Dizziness         HPI:       is a 80 y.o. male. 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 He is  and lives in [facility]. 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 Dr Buzz Rodrigez at [facility] is to perform a facial sling procedure May 25. Dr Huber Barrios follows for AF and he is taking Xarelto. Last hospitalized Feb 2018 with sepsis. Complications: sundowning. New Issues:  Pts wife called yesterday stating that Jorge Turcios is very dizzy. He had oral surgery on Tuesday with Dr. Arsalan Washburn in [facility] and seemed to be fine after that. But as of today his BP was 98/68 along with the dizziness. BP even lower today. Has been taking lasix daily and Lisinopril. Takes Midodrine TID. Allergies   Allergen Reactions    Ancef [Cefazolin] Unknown (comments)     \"drops my blood pressure\"    Neosporin [Benzalkonium Chloride] Unknown (comments)    Polysporin [Bacitracin-Polymyxin B] Other (comments)     \"WOUNDS DO NOT HEAL\"       Current Outpatient Prescriptions   Medication Sig    midodrine (PROAMITINE) 10 mg tablet Take 10 mg by mouth three (3) times daily.  furosemide (LASIX) 20 mg tablet Take 20 mg by mouth daily.  lisinopril (PRINIVIL, ZESTRIL) 2.5 mg tablet Take 1 Tab by mouth daily.  rivaroxaban (XARELTO) 15 mg tab tablet Take 1 Tab by mouth daily (with breakfast).  traMADol (ULTRAM) 50 mg tablet Take 1 Tab by mouth every six (6) hours as needed for Pain. Max Daily Amount: 200 mg.    albuterol (PROVENTIL HFA, VENTOLIN HFA, PROAIR HFA) 90 mcg/actuation inhaler Take 2 Puffs by inhalation every four (4) hours as needed for Wheezing. No current facility-administered medications for this visit.         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    Surgery, XRT; resulting right Humphrey' palsy    Psoriasis     lower arms, legs (above knees)       Past Surgical History:   Procedure Laterality Date    HX CATARACT REMOVAL Bilateral     HX COLONOSCOPY      HX PROSTATECTOMY  1997    HX TONSILLECTOMY  as a child       Social History     Social History    Marital status:      Spouse name: N/A    Number of children: N/A    Years of education: N/A     Social History Main Topics    Smoking status: Former Smoker    Smokeless tobacco: Never Used    Alcohol use 4.2 oz/week     7 Standard drinks or equivalent per week      Comment: \"1 drink a night\"    Drug use: No    Sexual activity: Not Asked     Other Topics Concern     Service Yes     [employer]    Blood Transfusions No    Caffeine Concern No     1 Cup Coffe Daily    Occupational Exposure No    Hobby Hazards No    Sleep Concern No    Stress Concern No    Weight Concern No     Heart Health    Special Diet No    Back Care Yes    Exercise Yes     Swimmimg / Walking    Bike Helmet No     Doesn't Own    Seat Belt Yes    Self-Exams Yes     Social History Narrative       Family History   Problem Relation Age of Onset    Cancer Mother      BRAIN TUMOR    Cancer Brother      PROSTATE       Above history reviewed. ROS:  POS dizziness, denies fever, chills, cough, chest pain, SOB,  nausea, vomiting, or diarrhea. Denies wt loss, wt gain, hemoptysis, hematochezia or melena.     Physical Examination:    BP (!) 80/52 (BP 1 Location: Left arm, BP Patient Position: Sitting)  Pulse 83  Resp 18  Ht 5' 8.5\" (1.74 m)  Wt 154 lb (69.9 kg)  SpO2 96%  BMI 23.08 kg/m2    General: Alert and Ox3, Fluent speech  HEENT:  Right sided facial droop  Chest:  Clear to Ausculation, without wheezes, rales, rubs or ronchi  Cardiac: RRR  Extremities:  No cyanosis, clubbing or edema  Neurologic:  Ambulatory without assist, CN 2-12 grossly intact. Moves all extremities. Skin: no rash  Lymphadenopathy: no cervical or supraclavicular nodes    ASSESSMENT AND PLAN:     1. Dizziness  Stopping Lisinopril. Decrease Lasix to Tuesdays and Fridays  Monitor weights and BP  Would consider TSH with next lab draws  - furosemide (LASIX) 20 mg tablet; Take 1 tablet (20 mg) PO on Tuesdays and Thursdays  Dispense: 90 Tab;  Refill: 2     RTC in 2 weeks    Re Samaniego NP

## 2018-04-30 ENCOUNTER — TELEPHONE (OUTPATIENT)
Dept: FAMILY MEDICINE CLINIC | Age: 83
End: 2018-04-30

## 2018-04-30 NOTE — TELEPHONE ENCOUNTER
Patient is relocating to a [place] assisted living facility with his wife and needs to have a form completed. He has been seen within the 90d period required by Dr. Iftikhar Ling. Form is in the nurse station basket. Wife would like to pick it up on Thursday when they travel back to [place].
